# Patient Record
Sex: MALE | Race: WHITE | Employment: FULL TIME | URBAN - METROPOLITAN AREA
[De-identification: names, ages, dates, MRNs, and addresses within clinical notes are randomized per-mention and may not be internally consistent; named-entity substitution may affect disease eponyms.]

---

## 2020-03-25 ENCOUNTER — APPOINTMENT (OUTPATIENT)
Dept: CT IMAGING | Age: 55
DRG: 244 | End: 2020-03-25
Attending: PHYSICIAN ASSISTANT
Payer: MEDICAID

## 2020-03-25 ENCOUNTER — HOSPITAL ENCOUNTER (INPATIENT)
Age: 55
LOS: 2 days | Discharge: HOME HEALTH | DRG: 244 | End: 2020-03-27
Attending: EMERGENCY MEDICINE | Admitting: HOSPITALIST
Payer: MEDICAID

## 2020-03-25 DIAGNOSIS — K57.92 DIVERTICULITIS: Primary | ICD-10-CM

## 2020-03-25 DIAGNOSIS — D72.829 LEUKOCYTOSIS, UNSPECIFIED TYPE: ICD-10-CM

## 2020-03-25 DIAGNOSIS — N28.1 KIDNEY CYSTS: ICD-10-CM

## 2020-03-25 PROBLEM — K65.1 ABSCESS OF ABDOMINAL CAVITY (HCC): Status: ACTIVE | Noted: 2020-03-25

## 2020-03-25 PROBLEM — Z72.0 TOBACCO ABUSE: Status: ACTIVE | Noted: 2020-03-25

## 2020-03-25 LAB
ALBUMIN SERPL-MCNC: 3.6 G/DL (ref 3.4–5)
ALBUMIN/GLOB SERPL: 0.8 {RATIO} (ref 0.8–1.7)
ALP SERPL-CCNC: 118 U/L (ref 45–117)
ALT SERPL-CCNC: 31 U/L (ref 16–61)
ANION GAP SERPL CALC-SCNC: 3 MMOL/L (ref 3–18)
APPEARANCE UR: ABNORMAL
AST SERPL-CCNC: 14 U/L (ref 10–38)
BACTERIA URNS QL MICRO: ABNORMAL /HPF
BASOPHILS # BLD: 0 K/UL (ref 0–0.06)
BASOPHILS NFR BLD: 0 % (ref 0–3)
BILIRUB SERPL-MCNC: 0.5 MG/DL (ref 0.2–1)
BILIRUB UR QL: ABNORMAL
BUN SERPL-MCNC: 16 MG/DL (ref 7–18)
BUN/CREAT SERPL: 21 (ref 12–20)
CALCIUM SERPL-MCNC: 9.5 MG/DL (ref 8.5–10.1)
CHLORIDE SERPL-SCNC: 110 MMOL/L (ref 100–111)
CO2 SERPL-SCNC: 29 MMOL/L (ref 21–32)
COLOR UR: ABNORMAL
CREAT SERPL-MCNC: 0.76 MG/DL (ref 0.6–1.3)
DIFFERENTIAL METHOD BLD: ABNORMAL
EOSINOPHIL # BLD: 0 K/UL (ref 0–0.4)
EOSINOPHIL NFR BLD: 0 % (ref 0–5)
EPITH CASTS URNS QL MICRO: ABNORMAL /LPF (ref 0–5)
ERYTHROCYTE [DISTWIDTH] IN BLOOD BY AUTOMATED COUNT: 14.3 % (ref 11.6–14.5)
GLOBULIN SER CALC-MCNC: 4.4 G/DL (ref 2–4)
GLUCOSE SERPL-MCNC: 109 MG/DL (ref 74–99)
GLUCOSE UR STRIP.AUTO-MCNC: NEGATIVE MG/DL
HCT VFR BLD AUTO: 44.9 % (ref 36–48)
HGB BLD-MCNC: 15.1 G/DL (ref 13–16)
HGB UR QL STRIP: NEGATIVE
KETONES UR QL STRIP.AUTO: ABNORMAL MG/DL
LACTATE SERPL-SCNC: 1 MMOL/L (ref 0.4–2)
LEUKOCYTE ESTERASE UR QL STRIP.AUTO: ABNORMAL
LIPASE SERPL-CCNC: 100 U/L (ref 73–393)
LYMPHOCYTES # BLD: 3 K/UL (ref 0.8–3.5)
LYMPHOCYTES NFR BLD: 12 % (ref 20–51)
MCH RBC QN AUTO: 29.4 PG (ref 24–34)
MCHC RBC AUTO-ENTMCNC: 33.6 G/DL (ref 31–37)
MCV RBC AUTO: 87.5 FL (ref 74–97)
MONOCYTES # BLD: 1.8 K/UL (ref 0–1)
MONOCYTES NFR BLD: 7 % (ref 2–9)
MUCOUS THREADS URNS QL MICRO: ABNORMAL /LPF
NEUTS SEG # BLD: 20.5 K/UL (ref 1.8–8)
NEUTS SEG NFR BLD: 81 % (ref 42–75)
NITRITE UR QL STRIP.AUTO: NEGATIVE
PH UR STRIP: 5.5 [PH] (ref 5–8)
PLATELET # BLD AUTO: 370 K/UL (ref 135–420)
PLATELET COMMENTS,PCOM: ABNORMAL
PMV BLD AUTO: 10.2 FL (ref 9.2–11.8)
POTASSIUM SERPL-SCNC: 4.7 MMOL/L (ref 3.5–5.5)
PROT SERPL-MCNC: 8 G/DL (ref 6.4–8.2)
PROT UR STRIP-MCNC: 30 MG/DL
RBC # BLD AUTO: 5.13 M/UL (ref 4.7–5.5)
RBC #/AREA URNS HPF: ABNORMAL /HPF (ref 0–5)
RBC MORPH BLD: ABNORMAL
SODIUM SERPL-SCNC: 142 MMOL/L (ref 136–145)
SP GR UR REFRACTOMETRY: 1.02 (ref 1–1.03)
UROBILINOGEN UR QL STRIP.AUTO: 1 EU/DL (ref 0.2–1)
WBC # BLD AUTO: 25.3 K/UL (ref 4.6–13.2)
WBC URNS QL MICRO: ABNORMAL /HPF (ref 0–4)

## 2020-03-25 PROCEDURE — 99285 EMERGENCY DEPT VISIT HI MDM: CPT

## 2020-03-25 PROCEDURE — 83605 ASSAY OF LACTIC ACID: CPT

## 2020-03-25 PROCEDURE — 74011000258 HC RX REV CODE- 258: Performed by: PHYSICIAN ASSISTANT

## 2020-03-25 PROCEDURE — 74011250637 HC RX REV CODE- 250/637: Performed by: PHYSICIAN ASSISTANT

## 2020-03-25 PROCEDURE — 87086 URINE CULTURE/COLONY COUNT: CPT

## 2020-03-25 PROCEDURE — 85025 COMPLETE CBC W/AUTO DIFF WBC: CPT

## 2020-03-25 PROCEDURE — 81001 URINALYSIS AUTO W/SCOPE: CPT

## 2020-03-25 PROCEDURE — 96375 TX/PRO/DX INJ NEW DRUG ADDON: CPT

## 2020-03-25 PROCEDURE — 83690 ASSAY OF LIPASE: CPT

## 2020-03-25 PROCEDURE — 51798 US URINE CAPACITY MEASURE: CPT

## 2020-03-25 PROCEDURE — 87040 BLOOD CULTURE FOR BACTERIA: CPT

## 2020-03-25 PROCEDURE — 74177 CT ABD & PELVIS W/CONTRAST: CPT

## 2020-03-25 PROCEDURE — 74011636320 HC RX REV CODE- 636/320: Performed by: EMERGENCY MEDICINE

## 2020-03-25 PROCEDURE — 80053 COMPREHEN METABOLIC PANEL: CPT

## 2020-03-25 PROCEDURE — 96365 THER/PROPH/DIAG IV INF INIT: CPT

## 2020-03-25 PROCEDURE — 74011250636 HC RX REV CODE- 250/636: Performed by: PHYSICIAN ASSISTANT

## 2020-03-25 PROCEDURE — 93005 ELECTROCARDIOGRAM TRACING: CPT

## 2020-03-25 PROCEDURE — 65270000029 HC RM PRIVATE

## 2020-03-25 RX ORDER — OXYCODONE AND ACETAMINOPHEN 5; 325 MG/1; MG/1
1-2 TABLET ORAL
Status: DISCONTINUED | OUTPATIENT
Start: 2020-03-25 | End: 2020-03-26

## 2020-03-25 RX ORDER — SODIUM CHLORIDE 0.9 % (FLUSH) 0.9 %
5-10 SYRINGE (ML) INJECTION AS NEEDED
Status: DISCONTINUED | OUTPATIENT
Start: 2020-03-25 | End: 2020-03-27 | Stop reason: HOSPADM

## 2020-03-25 RX ORDER — SODIUM CHLORIDE 9 MG/ML
75 INJECTION, SOLUTION INTRAVENOUS CONTINUOUS
Status: DISCONTINUED | OUTPATIENT
Start: 2020-03-25 | End: 2020-03-26

## 2020-03-25 RX ORDER — ENOXAPARIN SODIUM 100 MG/ML
40 INJECTION SUBCUTANEOUS EVERY 24 HOURS
Status: DISCONTINUED | OUTPATIENT
Start: 2020-03-25 | End: 2020-03-27 | Stop reason: HOSPADM

## 2020-03-25 RX ORDER — ACETAMINOPHEN 325 MG/1
650 TABLET ORAL
Status: DISCONTINUED | OUTPATIENT
Start: 2020-03-25 | End: 2020-03-27 | Stop reason: HOSPADM

## 2020-03-25 RX ORDER — ONDANSETRON 2 MG/ML
4 INJECTION INTRAMUSCULAR; INTRAVENOUS
Status: COMPLETED | OUTPATIENT
Start: 2020-03-25 | End: 2020-03-25

## 2020-03-25 RX ORDER — ONDANSETRON 2 MG/ML
4 INJECTION INTRAMUSCULAR; INTRAVENOUS
Status: DISCONTINUED | OUTPATIENT
Start: 2020-03-25 | End: 2020-03-27 | Stop reason: HOSPADM

## 2020-03-25 RX ORDER — DEXTROSE MONOHYDRATE AND SODIUM CHLORIDE 5; .45 G/100ML; G/100ML
75 INJECTION, SOLUTION INTRAVENOUS CONTINUOUS
Status: DISCONTINUED | OUTPATIENT
Start: 2020-03-25 | End: 2020-03-25

## 2020-03-25 RX ORDER — MORPHINE SULFATE 2 MG/ML
2 INJECTION, SOLUTION INTRAMUSCULAR; INTRAVENOUS
Status: COMPLETED | OUTPATIENT
Start: 2020-03-25 | End: 2020-03-25

## 2020-03-25 RX ADMIN — ONDANSETRON 4 MG: 2 INJECTION INTRAMUSCULAR; INTRAVENOUS at 11:25

## 2020-03-25 RX ADMIN — SODIUM CHLORIDE 75 ML/HR: 900 INJECTION, SOLUTION INTRAVENOUS at 18:31

## 2020-03-25 RX ADMIN — SODIUM CHLORIDE 1000 ML: 900 INJECTION, SOLUTION INTRAVENOUS at 12:07

## 2020-03-25 RX ADMIN — SODIUM CHLORIDE 448 ML: 900 INJECTION, SOLUTION INTRAVENOUS at 14:38

## 2020-03-25 RX ADMIN — PIPERACILLIN AND TAZOBACTAM 3.38 G: 3; .375 INJECTION, POWDER, FOR SOLUTION INTRAVENOUS at 17:26

## 2020-03-25 RX ADMIN — OXYCODONE HYDROCHLORIDE AND ACETAMINOPHEN 2 TABLET: 5; 325 TABLET ORAL at 23:21

## 2020-03-25 RX ADMIN — ENOXAPARIN SODIUM 40 MG: 40 INJECTION SUBCUTANEOUS at 17:23

## 2020-03-25 RX ADMIN — DEXTROSE MONOHYDRATE AND SODIUM CHLORIDE 75 ML/HR: 5; .45 INJECTION, SOLUTION INTRAVENOUS at 17:29

## 2020-03-25 RX ADMIN — MORPHINE SULFATE 2 MG: 2 INJECTION, SOLUTION INTRAMUSCULAR; INTRAVENOUS at 11:27

## 2020-03-25 RX ADMIN — OXYCODONE HYDROCHLORIDE AND ACETAMINOPHEN 2 TABLET: 5; 325 TABLET ORAL at 17:19

## 2020-03-25 RX ADMIN — IOPAMIDOL 100 ML: 612 INJECTION, SOLUTION INTRAVENOUS at 11:18

## 2020-03-25 RX ADMIN — PIPERACILLIN AND TAZOBACTAM 3.38 G: 3; .375 INJECTION, POWDER, FOR SOLUTION INTRAVENOUS at 12:01

## 2020-03-25 NOTE — ED PROVIDER NOTES
EMERGENCY DEPARTMENT HISTORY AND PHYSICAL EXAM    11:19 AM      Date: 3/25/2020  Patient Name: Flakito Rausch    History of Presenting Illness     No chief complaint on file. History Provided By: Patient    Additional History (Context): Flakito Rausch is a 47 y.o. male with No significant past medical history who presents with complaint of intermittent lower abdominal pain x 3 days. Patient notes pain became more severe today. Patient denies fever chills, diaphoresis, chest pain, shortness of breath, nausea, vomiting, diarrhea, dysuria, hematuria, blood in stool. Patient notes Michaelle Rodriguez is a lot of mucus in stool \". Did not take any medication for symptoms prior to arrival. Denies recent travel, abx, sick contacts. PCP: None    Current Facility-Administered Medications   Medication Dose Route Frequency Provider Last Rate Last Dose    sodium chloride (NS) flush 5-10 mL  5-10 mL IntraVENous PRN Gisela Zarate PA        piperacillin-tazobactam (ZOSYN) 3.375 g in 0.9% sodium chloride (MBP/ADV) 100 mL MBP  3.375 g IntraVENous Q6H Gisela Zarate PA   Stopped at 03/25/20 1231    acetaminophen (TYLENOL) tablet 650 mg  650 mg Oral Q4H PRN LIANNE Fischer        ondansetron [facility]) injection 4 mg  4 mg IntraVENous Q6H PRN Renetta Yoon PA        enoxaparin (LOVENOX) injection 40 mg  40 mg SubCUTAneous Q24H Keven MAURICIO [place]           Past History     Past Medical History:  History reviewed. No pertinent past medical history. Past Surgical History:  History reviewed. No pertinent surgical history. Family History:  History reviewed. No pertinent family history. Social History:  Social History     Tobacco Use    Smoking status: Current Every Day Smoker     Packs/day: 1.00   Substance Use Topics    Alcohol use: No    Drug use: No       Allergies:  No Known Allergies      Review of Systems       Review of Systems   Constitutional: Negative for chills and fever.    Respiratory: Negative for shortness of breath. Cardiovascular: Negative for chest pain. Gastrointestinal: Positive for abdominal pain. Negative for nausea and vomiting. Skin: Negative for rash. Neurological: Negative for weakness. All other systems reviewed and are negative. Physical Exam     Visit Vitals  /66   Pulse 83   Temp 98.2 °F (36.8 °C)   Resp 15   Wt 81.6 kg (180 lb)   SpO2 100%   BMI 26.58 kg/m²         Physical Exam  Vitals signs and nursing note reviewed. Constitutional:       General: He is not in acute distress. Appearance: He is well-developed. He is not diaphoretic. HENT:      Head: Normocephalic and atraumatic. Neck:      Musculoskeletal: Normal range of motion and neck supple. Cardiovascular:      Rate and Rhythm: Normal rate and regular rhythm. Heart sounds: Normal heart sounds. No murmur. No friction rub. No gallop. Pulmonary:      Effort: Pulmonary effort is normal. No respiratory distress. Breath sounds: Normal breath sounds. No wheezing or rales. Abdominal:      General: Abdomen is flat. There is no distension. Palpations: Abdomen is soft. There is no mass. Tenderness: There is abdominal tenderness (RLQ/suprapubic moderate). There is no guarding (voluntary) or rebound. Hernia: No hernia is present. Genitourinary:     Rectum: External hemorrhoid (moderate, non-thrombosed) present. No mass, tenderness or anal fissure. Musculoskeletal: Normal range of motion. Skin:     General: Skin is warm. Findings: No rash. Neurological:      Mental Status: He is alert.            Diagnostic Study Results     Labs -  Recent Results (from the past 12 hour(s))   URINALYSIS W/ RFLX MICROSCOPIC    Collection Time: 03/25/20 10:32 AM   Result Value Ref Range    Color DARK YELLOW      Appearance TURBID      Specific gravity 1.025 1.005 - 1.030      pH (UA) 5.5 5.0 - 8.0      Protein 30 (A) NEG mg/dL    Glucose NEGATIVE  NEG mg/dL    Ketone TRACE (A) NEG mg/dL    Bilirubin SMALL (A) NEG      Blood NEGATIVE  NEG      Urobilinogen 1.0 0.2 - 1.0 EU/dL    Nitrites NEGATIVE  NEG      Leukocyte Esterase SMALL (A) NEG     URINE MICROSCOPIC ONLY    Collection Time: 03/25/20 10:32 AM   Result Value Ref Range    WBC 5 to 10 0 - 4 /hpf    RBC 0 to 2 0 - 5 /hpf    Epithelial cells FEW 0 - 5 /lpf    Bacteria 2+ (A) NEG /hpf    Mucus 3+ (A) NEG /lpf   CBC WITH AUTOMATED DIFF    Collection Time: 03/25/20 10:40 AM   Result Value Ref Range    WBC 25.3 (H) 4.6 - 13.2 K/uL    RBC 5.13 4.70 - 5.50 M/uL    HGB 15.1 13.0 - 16.0 g/dL    HCT 44.9 36.0 - 48.0 %    MCV 87.5 74.0 - 97.0 FL    MCH 29.4 24.0 - 34.0 PG    MCHC 33.6 31.0 - 37.0 g/dL    RDW 14.3 11.6 - 14.5 %    PLATELET 468 403 - 604 K/uL    MPV 10.2 9.2 - 11.8 FL    NEUTROPHILS 81 (H) 42 - 75 %    LYMPHOCYTES 12 (L) 20 - 51 %    MONOCYTES 7 2 - 9 %    EOSINOPHILS 0 0 - 5 %    BASOPHILS 0 0 - 3 %    ABS. NEUTROPHILS 20.5 (H) 1.8 - 8.0 K/UL    ABS. LYMPHOCYTES 3.0 0.8 - 3.5 K/UL    ABS. MONOCYTES 1.8 (H) 0 - 1.0 K/UL    ABS. EOSINOPHILS 0.0 0.0 - 0.4 K/UL    ABS. BASOPHILS 0.0 0.0 - 0.06 K/UL    DF MANUAL      PLATELET COMMENTS ADEQUATE PLATELETS      RBC COMMENTS NORMOCYTIC, NORMOCHROMIC     METABOLIC PANEL, COMPREHENSIVE    Collection Time: 03/25/20 10:40 AM   Result Value Ref Range    Sodium 142 136 - 145 mmol/L    Potassium 4.7 3.5 - 5.5 mmol/L    Chloride 110 100 - 111 mmol/L    CO2 29 21 - 32 mmol/L    Anion gap 3 3.0 - 18 mmol/L    Glucose 109 (H) 74 - 99 mg/dL    BUN 16 7.0 - 18 MG/DL    Creatinine 0.76 0.6 - 1.3 MG/DL    BUN/Creatinine ratio 21 (H) 12 - 20      GFR est AA >60 >60 ml/min/1.73m2    GFR est non-AA >60 >60 ml/min/1.73m2    Calcium 9.5 8.5 - 10.1 MG/DL    Bilirubin, total 0.5 0.2 - 1.0 MG/DL    ALT (SGPT) 31 16 - 61 U/L    AST (SGOT) 14 10 - 38 U/L    Alk.  phosphatase 118 (H) 45 - 117 U/L    Protein, total 8.0 6.4 - 8.2 g/dL    Albumin 3.6 3.4 - 5.0 g/dL    Globulin 4.4 (H) 2.0 - 4.0 g/dL    A-G Ratio 0.8 0.8 - 1.7     LIPASE    Collection Time: 03/25/20 10:40 AM   Result Value Ref Range    Lipase 100 73 - 393 U/L   LACTIC ACID    Collection Time: 03/25/20 11:56 AM   Result Value Ref Range    Lactic acid 1.0 0.4 - 2.0 MMOL/L   EKG, 12 LEAD, INITIAL    Collection Time: 03/25/20  2:45 PM   Result Value Ref Range    Ventricular Rate 79 BPM    Atrial Rate 79 BPM    P-R Interval 184 ms    QRS Duration 82 ms    Q-T Interval 374 ms    QTC Calculation (Bezet) 428 ms    Calculated P Axis 53 degrees    Calculated R Axis 49 degrees    Calculated T Axis 39 degrees    Diagnosis       Normal sinus rhythm  Normal ECG  When compared with ECG of 25-JUL-2008 22:40,  No significant change was found         Radiologic Studies -   CT ABD PELV W CONT   Final Result   IMPRESSION:       1. Evidence of complicated diverticulitis, probably subacute, with significant   mural thickening and moderate surrounding stranding and fluid. Likely early   performed abscess in perirectal region. 2. Diverticulosis coli. 3. Large lobulated cystic lesion with thin wall and thin septa in lateral upper   pole right kidney. Recommend further evaluation by renal ultrasound. 2 small   cortical cysts, one in each kidney. Thank you for this referral.       [address] [place] [address]    (Results Pending)         Medical Decision Making   I am the first provider for this patient. I reviewed the vital signs, available nursing notes, past medical history, past surgical history, family history and social history. Vital Signs-Reviewed the patient's vital signs. Records Reviewed: Nursing Notes and Old Medical Records (Time of Review: 11:19 AM)    ED Course: Progress Notes, Reevaluation, and Consults:  11:19 AM: Leukocytosis noted, sepsis bundle ordered. Updated nurse. Abx ordered to cover for possible intra-abdominal infection. 3:46 PM: Discussed case with Dr. Ancelmo Ewing, general surgeon. Would recommend admission, IV abx.     CONSULT NOTE:   3:46 PM  I spoke with Dr. Martha Malcolm  Specialty: Hospitalist  Discussed pt's hx, disposition, and available diagnostic and imaging results. Reviewed care plans. Consulting physician agrees with plans as outlined. Accepts for admission, requests renal US, urology on call for consultation. Written by West Saenz PA-C    Reviewed results with patient. In agreement with admission. 4:31 PM: Discussed case with Jeremiah Fair urology PA. Would recommend post-void residuals. Will consult on patient during admission    Provider Notes (Medical Decision Making): 40-year-old male who presents to the ED due to lower abdominal pain x 3 days. Afebrile, nontoxic-appearing, looks well. Patient with significant leukocytosis with left shift. Lactic within normal limits. CT abdomen/pelvis demonstrates complicated diverticulitis with abscess. Consulted general surgery. Sepsis bundle ordered, including broad spectrum abx. Will be admitted to hospitalist for further assessment. Diagnosis     Clinical Impression:   1. Diverticulitis    2. Leukocytosis, unspecified type    3. Kidney cysts        Disposition: admission    Follow-up Information    None          Patient's Medications    No medications on file       Dictation disclaimer:  Please note that this dictation was completed with [employer], the [employer] voice recognition software. Quite often unanticipated grammatical, syntax, homophones, and other interpretive errors are inadvertently transcribed by the computer software. Please disregard these errors. Please excuse any errors that have escaped final proofreading.

## 2020-03-25 NOTE — PROGRESS NOTES
conducted an initial consultation and Spiritual Assessment for Gaurav Plummer, who is a 47 y.o.,male. Patients Primary Language is: [place]. According to the patients EMR Jewish Affiliation is: [place]. The reason the Patient came to the hospital is:   Patient Active Problem List    Diagnosis Date Noted    Diverticulitis 03/25/2020    Leukocytosis 03/25/2020    Renal cyst 03/25/2020    Abscess of abdominal cavity ([address] 03/25/2020    Tobacco abuse 03/25/2020        The  provided the following Interventions:  Initiated a relationship of care and support. No issues of neisha, belief, spirituality and Buddhist/ritual needs while hospitalized. Listened empathically. Patient expressed\"I am bored and depressed because my wife can't even come and visit me. I have fear of getting corona virus being here. \"  Compassionate assurance that patients tested for Covid-19 are isolated in a different unit. Suggested listening to the music he likes could uplift his spirits. \"I asked my wife to bring my phone  and other personal stuff but will not be allowed to visit. \"  Provided chaplaincy education. Provided information about Spiritual Care Services. Offered prayer and assurance of continued prayers on patient's behalf. Chart reviewed. The following outcomes where achieved:  Patient shared limited information about both his medical narrative and spiritual journey/beliefs.  confirmed Patient's Jewish Affiliation with Let. Patient continues to feel unsafe being in this current hospitalization. Patient expressed gratitude for 's visit. Assessment:  Patient does not have any Buddhist/cultural needs that will affect patients preferences in health care. There are no spiritual or Buddhist issues which require intervention at this time.      Plan:  Chaplains will continue to follow and will provide pastoral care on an as needed/requested basis.   recommends bedside caregivers page  on duty if patient shows signs of acute spiritual or emotional distress.     [address] [facility]   [phone]

## 2020-03-25 NOTE — ED NOTES
TRANSFER - OUT REPORT:    Verbal report given to on Colton Campos  being transferred to  (unit) for routine progression of care       Report consisted of patients Situation, Background, Assessment and   Recommendations(SBAR). Information from the following report(s) SBAR was reviewed with the receiving nurse. Lines:   Peripheral IV 03/25/20 Left Antecubital (Active)   Site Assessment Clean, dry, & intact 3/25/2020 11:23 AM   Phlebitis Assessment 0 3/25/2020 11:23 AM   Infiltration Assessment 0 3/25/2020 11:23 AM   Dressing Type 4 X 4 3/25/2020 11:23 AM   Hub Color/Line Status Flushed;Patent;Pink 3/25/2020 11:23 AM       Peripheral IV 03/25/20 Right Antecubital (Active)   Site Assessment Clean, dry, & intact 3/25/2020 11:58 AM   Phlebitis Assessment 0 3/25/2020 11:58 AM   Infiltration Assessment 0 3/25/2020 11:58 AM   Dressing Type 4 X 4 3/25/2020 11:58 AM   Hub Color/Line Status Pink;Flushed;Patent 3/25/2020 11:58 AM        Opportunity for questions and clarification was provided.

## 2020-03-25 NOTE — ED NOTES
noticed in patient's hospital chart PCP and insurance was not listed.  went into ER-08 to assess patient.  asked patient about insurance and PCP. Patient confirmed that he did not have PCP or insurance.  and patient spoke about [facility] and [address]. Due to patient not having insurance.  suggested [address]. Patient was given [facility] schedule and next available day Mary Lorenz will be near patient. Patient was also given [facility] application. Patient was  told specific documents needed for [facility].  also encouraged patient to contact [employer]. Patient was given [employer] phone number and web site information.  also gave patient Social Service information to apply for Medicaid. Patient stated once he has obtained W-6 form, ID, and bank statement he will bring all documents  into 's office.  patient 's contact information.

## 2020-03-25 NOTE — ED NOTES
First interaction with PT. PT is lying on the stretcher, no distress noted. Reports abdominal pain since Sunday. And diarrhea on Saturday. \"Got up this morning and was feeling bad\". Denies cough, chest pain.

## 2020-03-25 NOTE — H&P
Hospitalist Admission History and Physical    NAME:  Kyler Hernandez   :   1965   MRN:   [id number]     PCP:  None  Date/Time:  3/25/2020 4:09 PM  Subjective:   CHIEF COMPLAINT:  Abdominal pain x4 days     HISTORY OF PRESENT ILLNESS:     Mr. Matt Trevino is a 48 yo male with a past medical hx of inguinal hernia repair wit mesh in  and active tobacco abuse who presents from home with abdominal pain since , 4 days ago. He states the pain was in the RLQ and LLQ then has concentrated to below periumbilical region. The pain was better yesterday than worsened this morning thus prompting ED presentation. He endorsed clear mucus with bowel movement which is unusual for him. He denies fever, chills, nausea, vomiting. Patient has never had a diagnosis of diverticulosis nor diverticulitis. ED course:   - Vital signs: 98.2 F, HR 86, /72, RR 16, 100% RA    - Labs remarkable for: wbc 25, UA + small LE/5-10 WBC/2+ bacteria, lactic acid 1.0  - Imaging: ct abdomen pelvis shows likely subacute diverticulitis and likely early performed abscess in perirectal regions, diverticulosis coli, cystic lesion in right kidney   - EKG: NSR   - Patient received:  Morphine, zofran, zosyn   ED consulted Dr Ceferino Longoria who recommended admission. Past medical hx- smoker  Past surgical hx- the only surgery he has ever gotten is inguinal hernia repair with mesh in   No known allergies   No medications   Social hx- lives with wife, used to smoke pot many yrs ago, denies any other recreational drugs, occasionally drinks, smoking 1 pack per day and vaping as well. Social History     Tobacco Use    Smoking status: Current Every Day Smoker     Packs/day: 1.00   Substance Use Topics    Alcohol use: No        History reviewed. No pertinent family history.      No Known Allergies     None       REVIEW OF SYSTEMS:     [] Unable to obtain  ROS due to  []mental status change  []sedated   []intubated   [x]Total of 12 systems reviewed as follows:  Review of Systems  Constitutional: Patient denies: fever, chills, weight loss  HEENT: Patient denies: change in vision, change in hearing, rhinorrhea, sinus congestion, neck pain/stiffness, sore throat, tongue swelling, lip swelling   PULMONARY: Patient denies: shortness of breath at rest, dyspnea on exertion, cough, wheezing  Cardiovascular: Patient denies chest pain, palpitations, paroxysmal nocturnal dyspnea, orthopnea, lower extremity edema   GASTROINTESTINAL: Patient denies: nausea, vomiting, diarrhea, constipation, melena, bright red blood per rectum. GENITOURINARY: Patient denies: urinary frequency, urgency, dysuria, hematuria  MUSCULOSKELETAL: No joint or muscle pain, no back pain, no muscle weakness, no recent trauma. DERMATOLOGIC: No rash, no itching, no lesions. ENDOCRINE: No polyuria, polydipsia, no heat or cold intolerance. No recent change in weight. HEMATOLOGICAL: No anemia or easy bruising or bleeding. NEUROLOGIC: No headache, seizures, numbness, tingling or weakness. PSY: No anxiety nor depression      Pertinent Positives include :   abdominal pain  Objective:   VITALS:    Visit Vitals  /66   Pulse 83   Temp 98.2 °F (36.8 °C)   Resp 15   Wt 81.6 kg (180 lb)   SpO2 100%   BMI 26.58 kg/m²     Temp (24hrs), Av.2 °F (36.8 °C), Min:98.2 °F (36.8 °C), Max:98.2 °F (36.8 °C)      PHYSICAL EXAM:   General:    Young   male sitting up in bed, no acute distress, appears stated age. Poor dentition. Head:   Normocephalic, without obvious abnormality  Eyes:   Conjunctivae clear, anicteric sclerae, pupils are equal  Nose:  Nares normal, no drainage   Throat:    Lips, mucosa, and tongue normal.    Neck:  Supple, symmetrical, no JVD. Back:    No CVA tenderness. Lungs: On room air, speaking in complete sentences, clear to auscultation bilaterally- no wheezing, rhonchi or rales  Chest wall:  No tenderness or deformity. No Accessory muscle use.   Heart: Regular rate and rhythm;  no murmur, rub or gallop. Abdomen:   Soft, tender to deep palpation in region below umbilicus. Not distended. Bowel sounds normal. No masses  Extremities: No LE edema. No gaurding. Skin:     No rashes or lesions. Not Jaundiced  Psych:  Good insight. Not depressed, anxious or agitated. Neurologic: Alert and oriented x3. No facial asymmetry. No aphasia or slurred speech. Normal strength. Moving all extremities. I have observed pt walking to bathroom- gait is stable without assistive walking device. His breath smells like tobacco     LAB DATA REVIEWED:    No components found for: [place]  Recent Labs     03/25/20  1040      K 4.7      CO2 29   BUN 16   CREA 0.76   *   CA 9.5   ALB 3.6   WBC 25.3*   HGB 15.1   HCT 44.9            IMAGING RESULTS:   []  I have personally reviewed the actual   []CXR  []CT scan      CT :   CT Results (most recent):  Results from Hospital Encounter encounter on 03/25/20   CT ABD PELV W CONT    Narrative CT of abdomen and pelvis with contrast     INDICATION: Intermittent lower abdominal pain    COMPARISON: None. TECHNIQUE: 5 mm helical scan to the abdomen and pelvis is obtained  from the  diaphragm to the symphysis pubis after uneventful nonionic IV contrast  administration. All CT scans at this facility performed using dose optimization techniques as  appreciated to a performed exam, to include automated exposure control,  adjustment of the mA and or KU according to patient size (including appropriate  matching for site specific examination), or use of iterative reconstruction  technique. FINDINGS:     Lung Bases: Clear. Liver: Normal.    Gallbladder: Normal.     Biliary System: No ductal dilatation. Spleen: Normal.    Pancreas: Normal.    Adrenal Glands: Normal.    Kidneys: In the lateral upper pole, there is a large exophytic cystic lesion  with lobulated contour identified and measures 5.6 x 5.5 x 7.4 cm. Likely thin  septa noted at the medial aspect of the lesion. No definite enhancing mural  nodule seen. 1.0 cm cortical cysts present at posterior midpole of right kidney  and medial midpole of left kidney. Bowel: The small and large bowel are nondilated. Normal appendix. Extensive  diverticula in the left-sided colon. Significant mural thickening and moderate  surrounding inflammation and fluid identified in the distal sigmoid colon,  consistent with diverticulitis. No definite free air seen. Although, subtle  focal fluid collection in the prerectal region with thin enhancing wall  identified on #77/2 and better seen on coronal #36 and roughly measures 3.0 x  1.4 x 6.5 cm. Lower genitourinary system: The bladder is poorly distended. The prostate is not  enlarged. Peritoneum/Retroperitoneum:  Multiple subcentimeter lymph nodes noted in the  iliac chains with no pathologic lymphadenopathy. Vasculature: Mild atherosclerotic aortic disease. Other: Moderate fatty stranding and small free fluid in the pelvic floor. Faint  focal fluid collection in perirectal region as mentioned above. CT OSSEOUS STRUCTURES:       Unremarkable for age. Impression IMPRESSION:     1. Evidence of complicated diverticulitis, probably subacute, with significant  mural thickening and moderate surrounding stranding and fluid. Likely early  performed abscess in perirectal region. 2. Diverticulosis coli. 3. Large lobulated cystic lesion with thin wall and thin septa in lateral upper  pole right kidney. Recommend further evaluation by renal ultrasound. 2 small  cortical cysts, one in each kidney. Thank you for this referral.          Assessment/Plan:    Mr. Lou Paul is a 48 yo  male who came with abdominal pain for 4 days and being admitted for diverticulitis with possible rectal abscess. Consults: surgery Dr. Willow Rae, Urology TARUN Nina     1.  Abdominal pain secondary to subacute? complicated diverticulitis   2. Early performed abscess in perirectal region   3. Leukocytosis   4. Diverticulosis coli   5. Lobulated cystic lesion in lateral upper pole right kidney   6. Hx of right inguinal hernia repair with mesh in 9/2013 by Dr. Elsy Watkins at [facility]   7. Active tobacco abuse     - Surgery consulted. Keep NPO and start maintenance fluids. continue IV zosyn. Pain control as needed. - follow blood and urine cultures. Trend white count. - Urology consulted. renal ultrasound is pending. Urology Recommends PVRs for now. - patient counseled on tobacco cessation, I told him he can die from lung cancer if he continues to smoke. States he tried to quit, then transitioned to vaping, then back to cigarettes. Declines nicotine patch at this time. - will need nutrition consult for education on best diet for diverticulosis. - encourage OOB    Patient is hesitant to be admitted due to concerns for COVID 19 infection. He is requesting oral abxs and discharge to home. After a very long discussion with pt about risks of leaving against medical advise, including perforation and/or death, he has agreed to stay. I also reassured him that COVID rule out patients are  in their own unit. Patient denies fever, sob, cough. He denies travel, has stayed in [facility] since the pandemic in [place]. Lives with wife who has not traveled nor is sick. Code status: full code   DVT/GI prophylaxis: lovenox.    Disposition: home in 24-48 hours   __________________________________________________  Risk of deterioration:  []Low    [x]Moderate  []High    Care Plan discussed with:    [x]Patient   []Family    []ED Care Manager  []ED Doc   []Specialist :    Total Time Coordinating Admission:  60    minutes    []Total Critical Care Time:     ___________________________________________________  Admitting Physician: LIANNE Coronado

## 2020-03-25 NOTE — ED TRIAGE NOTES
C/o lower intermittent  abdominal pain that started 2/22/2020. reports clear mucus discharge in tool, denies nausea vomiting.  Reports has mesh to RLQ from previous hernia surgery

## 2020-03-26 ENCOUNTER — HOSPITAL ENCOUNTER (OUTPATIENT)
Dept: ULTRASOUND IMAGING | Age: 55
Discharge: HOME | DRG: 244 | End: 2020-03-26
Attending: PHYSICIAN ASSISTANT
Payer: MEDICAID

## 2020-03-26 LAB
ANION GAP SERPL CALC-SCNC: 5 MMOL/L (ref 3–18)
ATRIAL RATE: 79 BPM
BASOPHILS # BLD: 0 K/UL (ref 0–0.06)
BASOPHILS NFR BLD: 0 % (ref 0–3)
BUN SERPL-MCNC: 14 MG/DL (ref 7–18)
BUN/CREAT SERPL: 25 (ref 12–20)
CALCIUM SERPL-MCNC: 8.1 MG/DL (ref 8.5–10.1)
CALCULATED P AXIS, ECG09: 53 DEGREES
CALCULATED R AXIS, ECG10: 49 DEGREES
CALCULATED T AXIS, ECG11: 39 DEGREES
CHLORIDE SERPL-SCNC: 114 MMOL/L (ref 100–111)
CO2 SERPL-SCNC: 23 MMOL/L (ref 21–32)
CREAT SERPL-MCNC: 0.56 MG/DL (ref 0.6–1.3)
DIAGNOSIS, 93000: NORMAL
DIFFERENTIAL METHOD BLD: ABNORMAL
EOSINOPHIL # BLD: 0.3 K/UL (ref 0–0.4)
EOSINOPHIL NFR BLD: 2 % (ref 0–5)
ERYTHROCYTE [DISTWIDTH] IN BLOOD BY AUTOMATED COUNT: 14.2 % (ref 11.6–14.5)
GLUCOSE SERPL-MCNC: 102 MG/DL (ref 74–99)
HCT VFR BLD AUTO: 37.1 % (ref 36–48)
HGB BLD-MCNC: 12.3 G/DL (ref 13–16)
LYMPHOCYTES # BLD: 3.7 K/UL (ref 0.8–3.5)
LYMPHOCYTES NFR BLD: 22 % (ref 20–51)
MCH RBC QN AUTO: 29.5 PG (ref 24–34)
MCHC RBC AUTO-ENTMCNC: 33.2 G/DL (ref 31–37)
MCV RBC AUTO: 89 FL (ref 74–97)
MONOCYTES # BLD: 1.2 K/UL (ref 0–1)
MONOCYTES NFR BLD: 7 % (ref 2–9)
NEUTS BAND NFR BLD MANUAL: 1 % (ref 0–5)
NEUTS SEG # BLD: 11.5 K/UL (ref 1.8–8)
NEUTS SEG NFR BLD: 68 % (ref 42–75)
P-R INTERVAL, ECG05: 184 MS
PLATELET # BLD AUTO: 338 K/UL (ref 135–420)
PLATELET COMMENTS,PCOM: ABNORMAL
PMV BLD AUTO: 10.2 FL (ref 9.2–11.8)
POTASSIUM SERPL-SCNC: 3.8 MMOL/L (ref 3.5–5.5)
Q-T INTERVAL, ECG07: 374 MS
QRS DURATION, ECG06: 82 MS
QTC CALCULATION (BEZET), ECG08: 428 MS
RBC # BLD AUTO: 4.17 M/UL (ref 4.7–5.5)
RBC MORPH BLD: ABNORMAL
RBC MORPH BLD: ABNORMAL
SODIUM SERPL-SCNC: 142 MMOL/L (ref 136–145)
VENTRICULAR RATE, ECG03: 79 BPM
WBC # BLD AUTO: 16.7 K/UL (ref 4.6–13.2)

## 2020-03-26 PROCEDURE — 74011000258 HC RX REV CODE- 258: Performed by: PHYSICIAN ASSISTANT

## 2020-03-26 PROCEDURE — 85025 COMPLETE CBC W/AUTO DIFF WBC: CPT

## 2020-03-26 PROCEDURE — 36415 COLL VENOUS BLD VENIPUNCTURE: CPT

## 2020-03-26 PROCEDURE — 65270000029 HC RM PRIVATE

## 2020-03-26 PROCEDURE — 74011250637 HC RX REV CODE- 250/637: Performed by: PHYSICIAN ASSISTANT

## 2020-03-26 PROCEDURE — 74011250636 HC RX REV CODE- 250/636: Performed by: PHYSICIAN ASSISTANT

## 2020-03-26 PROCEDURE — 76770 US EXAM ABDO BACK WALL COMP: CPT

## 2020-03-26 PROCEDURE — 80048 BASIC METABOLIC PNL TOTAL CA: CPT

## 2020-03-26 PROCEDURE — 74011250637 HC RX REV CODE- 250/637: Performed by: INTERNAL MEDICINE

## 2020-03-26 RX ORDER — FAMOTIDINE 20 MG/1
20 TABLET, FILM COATED ORAL 2 TIMES DAILY
Status: DISCONTINUED | OUTPATIENT
Start: 2020-03-26 | End: 2020-03-27 | Stop reason: HOSPADM

## 2020-03-26 RX ORDER — OXYCODONE AND ACETAMINOPHEN 5; 325 MG/1; MG/1
1 TABLET ORAL
Status: DISCONTINUED | OUTPATIENT
Start: 2020-03-26 | End: 2020-03-27 | Stop reason: HOSPADM

## 2020-03-26 RX ADMIN — PIPERACILLIN AND TAZOBACTAM 3.38 G: 3; .375 INJECTION, POWDER, FOR SOLUTION INTRAVENOUS at 01:05

## 2020-03-26 RX ADMIN — PIPERACILLIN AND TAZOBACTAM 3.38 G: 3; .375 INJECTION, POWDER, FOR SOLUTION INTRAVENOUS at 08:28

## 2020-03-26 RX ADMIN — ACETAMINOPHEN 650 MG: 325 TABLET ORAL at 16:19

## 2020-03-26 RX ADMIN — FAMOTIDINE 20 MG: 20 TABLET ORAL at 17:25

## 2020-03-26 RX ADMIN — PIPERACILLIN AND TAZOBACTAM 3.38 G: 3; .375 INJECTION, POWDER, FOR SOLUTION INTRAVENOUS at 14:53

## 2020-03-26 RX ADMIN — PIPERACILLIN AND TAZOBACTAM 3.38 G: 3; .375 INJECTION, POWDER, FOR SOLUTION INTRAVENOUS at 21:03

## 2020-03-26 RX ADMIN — OXYCODONE HYDROCHLORIDE AND ACETAMINOPHEN 1 TABLET: 5; 325 TABLET ORAL at 21:41

## 2020-03-26 RX ADMIN — OXYCODONE HYDROCHLORIDE AND ACETAMINOPHEN 1 TABLET: 5; 325 TABLET ORAL at 14:57

## 2020-03-26 RX ADMIN — OXYCODONE HYDROCHLORIDE AND ACETAMINOPHEN 2 TABLET: 5; 325 TABLET ORAL at 08:27

## 2020-03-26 NOTE — PROGRESS NOTES
1920: Assumed patient care from [address]. Patient is alert and oriented to person, place, time and situation. Respiratory status is stable on room air. Vital signs are stable. MEWS score is a one. Patient denies any pain, discomfort, nausea vomiting dizziness or anxiety. White board and fall card is updated. Bed is locked and in lowest position. Call bell, water and personal belongings are within reach. Patient has no questions, comments or concerns after bedside shift report. 2321: Patient asked for and received  Two 5/325 mg Percocet tablets for abdominal pain which he rated at a 8/10, with five being an acceptable level. 0015: Patient rated his pain at a 3/10    0700: Patient had an uneventful shift. Respiratory status, vital signs and MEWS score remained stable. Patient was resting quietly with no signs of distress noted. Bed locked and in lowest position. Call bell water and personal belongings were within reach. Patient had no questions, comments or concerns after bedside shift report.

## 2020-03-26 NOTE — PROGRESS NOTES
[facility]ist [facility]  Progress Note    Patient: Lorenza Gomesster Age: 47 y.o. : 1965 MR#: [id number] SSN: [id number]  Date: 3/26/2020     Subjective:     Reports lower abd pain improving, +mucous per rectum; Denies SOB, CP, nausea / vomiting or constipation    Assessment/Plan:   1. Abdominal pain r/t diverticulitis, ? abscess - improving; pain control  2. Early performed abscess in perirectal region   3. Leukocytosis - improving, follow  4. Lobulated cystic lesion in lateral upper pole right kidney - Dr. Alatorre Generous appreciated / OP follow up  5. Hx of right inguinal hernia repair with mesh in 2013 by Dr. Nargis Edmond at [facility]   6. Active tobacco abuse - counseled on cessation   7.  Dispo - potential discharge tomorrow  provided continued improvement pending further surgical input    Additional Notes:      Case discussed with:  [x]Patient  []Family  [x]Nursing  []Case Management  DVT Prophylaxis:  [x]Lovenox  []Hep SQ  []SCDs  []Coumadin   []On Heparin gtt    Objective:   VS:   Visit Vitals  /67 (BP 1 Location: Left arm, BP Patient Position: At rest)   Pulse 69   Temp 97.7 °F (36.5 °C)   Resp 18   Ht 5' 8\" (1.727 m)   Wt 81.6 kg (180 lb)   SpO2 95%   BMI 27.37 kg/m²      Tmax/24hrs: Temp (24hrs), Av.5 °F (36.4 °C), Min:96.8 °F (36 °C), Max:98.2 °F (36.8 °C)      Intake/Output Summary (Last 24 hours) at 3/26/2020 1212  Last data filed at 3/25/2020 1920  Gross per 24 hour   Intake 240 ml   Output 350 ml   Net -110 ml     General:  Alert, NAD  Cardiovascular:  RRR  Pulmonary:  LSC throughout  GI:  +BS in all four quadrants, soft, non-tender this AM  Extremities:  No edema; 2+ dorsalis pedis pulses bilaterally  Neuro: alert and oriented x 4    Labs:    Recent Results (from the past 24 hour(s))   EKG, 12 LEAD, INITIAL    Collection Time: 20  2:45 PM   Result Value Ref Range    Ventricular Rate 79 BPM    Atrial Rate 79 BPM    P-R Interval 184 ms    QRS Duration 82 ms    Q-T Interval 374 ms    QTC Calculation (Bezet) 428 ms    Calculated P Axis 53 degrees    Calculated R Axis 49 degrees    Calculated T Axis 39 degrees    Diagnosis       Normal sinus rhythm  Normal ECG  When compared with ECG of 25-JUL-2008 22:40,  No significant change was found  Confirmed by Barbara Rodriguez ([id number]) on 3/26/2020 7:32:18 AM     CBC WITH AUTOMATED DIFF    Collection Time: 03/26/20  2:05 AM   Result Value Ref Range    WBC 16.7 (H) 4.6 - 13.2 K/uL    RBC 4.17 (L) 4.70 - 5.50 M/uL    HGB 12.3 (L) 13.0 - 16.0 g/dL    HCT 37.1 36.0 - 48.0 %    MCV 89.0 74.0 - 97.0 FL    MCH 29.5 24.0 - 34.0 PG    MCHC 33.2 31.0 - 37.0 g/dL    RDW 14.2 11.6 - 14.5 %    PLATELET 293 771 - 181 K/uL    MPV 10.2 9.2 - 11.8 FL    NEUTROPHILS 68 42 - 75 %    BAND NEUTROPHILS 1 0 - 5 %    LYMPHOCYTES 22 20 - 51 %    MONOCYTES 7 2 - 9 %    EOSINOPHILS 2 0 - 5 %    BASOPHILS 0 0 - 3 %    ABS. NEUTROPHILS 11.5 (H) 1.8 - 8.0 K/UL    ABS. LYMPHOCYTES 3.7 (H) 0.8 - 3.5 K/UL    ABS. MONOCYTES 1.2 (H) 0 - 1.0 K/UL    ABS. EOSINOPHILS 0.3 0.0 - 0.4 K/UL    ABS.  BASOPHILS 0.0 0.0 - 0.06 K/UL    DF MANUAL      PLATELET COMMENTS ADEQUATE PLATELETS      RBC COMMENTS HYPOCHROMIA  1+        RBC COMMENTS MICROCYTOSIS  1+       METABOLIC PANEL, BASIC    Collection Time: 03/26/20  2:05 AM   Result Value Ref Range    Sodium 142 136 - 145 mmol/L    Potassium 3.8 3.5 - 5.5 mmol/L    Chloride 114 (H) 100 - 111 mmol/L    CO2 23 21 - 32 mmol/L    Anion gap 5 3.0 - 18 mmol/L    Glucose 102 (H) 74 - 99 mg/dL    BUN 14 7.0 - 18 MG/DL    Creatinine 0.56 (L) 0.6 - 1.3 MG/DL    BUN/Creatinine ratio 25 (H) 12 - 20      GFR est AA >60 >60 ml/min/1.73m2    GFR est non-AA >60 >60 ml/min/1.73m2    Calcium 8.1 (L) 8.5 - 10.1 MG/DL     Signed By: Darrin Payne NP     March 26, 2020

## 2020-03-26 NOTE — PROGRESS NOTES
[facility] [place] [address]  [phone]  Colon and Rectal Surgery Progress Note      Patient: Bear rGoss MRN: [id number]  SSN: [id number]    YOB: 1965  Age: 47 y.o. Sex: male      Admit Date: 3/25/2020    LOS: 1 day     Subjective:     Pain stable. No prior diverticulitis episodes. No prior colonoscopy. Tolerating some solids, appetite moderate. Objective:     Vitals:    03/25/20 2051 03/25/20 2352 03/26/20 0344 03/26/20 0827   BP: 122/70 114/72 118/70 123/69   Pulse: 68 67 84 77   Resp: 20 16 20 18   Temp: 96.8 °F (36 °C) 97 °F (36.1 °C) 97.4 °F (36.3 °C) 97.2 °F (36.2 °C)   SpO2: 97% 98% 98% 95%   Weight:            Intake and Output:  Current Shift: No intake/output data recorded. Last three shifts: 03/24 1901 - 03/26 0700  In: 240 [P.O.:240]  Out: 350 [Urine:350]    Physical Exam:     abd soft, LLQ tender, moderate, ND    Lab/Data Review:    CMP:   Lab Results   Component Value Date/Time     03/26/2020 02:05 AM    K 3.8 03/26/2020 02:05 AM     (H) 03/26/2020 02:05 AM    CO2 23 03/26/2020 02:05 AM    AGAP 5 03/26/2020 02:05 AM     (H) 03/26/2020 02:05 AM    BUN 14 03/26/2020 02:05 AM    CREA 0.56 (L) 03/26/2020 02:05 AM    GFRAA >60 03/26/2020 02:05 AM    GFRNA >60 03/26/2020 02:05 AM    CA 8.1 (L) 03/26/2020 02:05 AM    ALB 3.6 03/25/2020 10:40 AM    TP 8.0 03/25/2020 10:40 AM    GLOB 4.4 (H) 03/25/2020 10:40 AM    AGRAT 0.8 03/25/2020 10:40 AM    SGOT 14 03/25/2020 10:40 AM    ALT 31 03/25/2020 10:40 AM     CBC:   Lab Results   Component Value Date/Time    WBC 16.7 (H) 03/26/2020 02:05 AM    HGB 12.3 (L) 03/26/2020 02:05 AM    HCT 37.1 03/26/2020 02:05 AM     03/26/2020 02:05 AM        Assessment:     Diverticulitis, pelvic free fluid, ?  Developing abscess    Plan:     Continue IV abx  Diet as tolerated  Consider D/C tomorrow if tolerating diet    Signed By: Therese Moran MD        March 26, 2020

## 2020-03-26 NOTE — INTERDISCIPLINARY ROUNDS
Interdisciplinary Round Note Patient Information: Patient Information: Cassandra Rodriguez 464/01 Reason for Admission: Diverticulitis [K57.92] Leukocytosis [D72.829] Renal cyst [N28.1] Attending Provider:   Tc Le MD 
 Past Medical History: History reviewed. No pertinent past medical history. Hospital day: 1 Estimated discharge date: ?3/27/20 RRAT Score: Low Risk 6 Total Score 2 . Living with Significant Other. Assisted Living. LTAC. SNF. or  
Rehab  
 4 Pt. Coverage (Medicare=5 , Medicaid, or Self-Pay=4) Criteria that do not apply:  
 Has Seen PCP in Last 6 Months (Yes=3, No=0) Patient Length of Stay (>5 days = 3) IP Visits Last 12 Months (1-3=4, 4=9, >4=11) Charlson Comorbidity Score (Age + Comorbid Conditions) Goals for Today: iv hydration VITAL SIGNS Vitals:  
 03/26/20 0344 03/26/20 0827 03/26/20 1041 03/26/20 1132 BP: 118/70 123/69  132/67 Pulse: 84 77  69 Resp: 20 18  18 Temp: 97.4 °F (36.3 °C) 97.2 °F (36.2 °C)  97.7 °F (36.5 °C) SpO2: 98% 95%  95% Weight:      
Height:   5' 8\" (1.727 m) Lines, Drains, & Airways Peripheral IV 03/25/20 Left Antecubital-Site Assessment: Clean, dry, & intact Peripheral IV 03/25/20 Right Antecubital-Site Assessment: Clean, dry, & intact VTE Prophylaxis Intake and Output:  
03/24 1901 - 03/26 0700 In: 240 [P.O.:240] Out: 350 [Urine:350] No intake/output data recorded. Current Diet: DIET GI LITE (POST SURGICAL) Abdominal  
Abdominal Assessment: Soft, Intact, Tender Appetite: Good Bowel Sounds: Active Recent Glucose Results:  
Lab Results Component Value Date/Time  (H) 03/26/2020 02:05 AM  
 
 
  
IV Antibiotics? yes Activity Level: Activity Level: Up ad marcelo Needs assistance with ADLs: yes PT Consult Status: NO Current Immunizations: There is no immunization history on file for this patient. Recommendations:  
Discharge Disposition: Home with Home Health Medication Reconciliation Completed: YES Cardiac/Pulmonary Rehab Ordered:  NO 
 
Needs for Discharge: seen today by [employer] to apply for medicaid Recommendations from IDR team: none

## 2020-03-26 NOTE — CONSULTS
Urology Consult Note    Patient: Chyrl Hamman               Sex: male          DOA: 3/25/2020         YOB: 1965      Age:  47 y.o.        LOS:  LOS: 1 day              Referring physician: Dillon Veloz  Reason for consult: renal cyst      Assessment   This is a 47 y.o. male with -  Complex 7.4 cm left renal cyst  diverticulitis    Plan   1. I reviewed CT images. Complex left renal cyst, bosniak II. Agree with Ultrasound. Will likely need annual follow up. Will review ultrasound. He can see me as an annual follow up. pls contact with any other questions or concerns. HPI:     Chyrl Hamman is a 47 y.o. male who has been is currently admitted with  Diverticulitis. 2 day h/o lower abdominal pain and diarrhea. No fevers or chills. Denies hematuria or flank pain. Underwent CT imaging revealing a 7 cm complex left renal cyst.     History reviewed. No pertinent past medical history. History reviewed. No pertinent surgical history. History reviewed. No pertinent family history.     Social History     Socioeconomic History    Marital status:      Spouse name: Not on file    Number of children: Not on file    Years of education: Not on file    Highest education level: Not on file   Tobacco Use    Smoking status: Current Every Day Smoker     Packs/day: 1.00   Substance and Sexual Activity    Alcohol use: No    Drug use: No       Prior to Admission medications    Not on File       No Known Allergies    Review of Systems  Constitutional: negative  Eyes: negative  Ears, Nose, Mouth, Throat, and Face: negative  Respiratory: negative  Cardiovascular: negative  Gastrointestinal: positive for abdominal pain  Genitourinary:negative  Integument/Breast: negative  Hematologic/Lymphatic: negative  Musculoskeletal:negative      Physical Exam:      Visit Vitals  /69 (BP 1 Location: Left arm, BP Patient Position: At rest)   Pulse 77   Temp 97.2 °F (36.2 °C)   Resp 18   Ht 5' 8\" (1.727 m)   Wt 180 lb (81.6 kg)   SpO2 95%   BMI 27.37 kg/m²       Physical Exam:   GENERAL: alert, cooperative, no distress, appears stated age  [de-identified]: NCAT, PERRL  CV: RRR  LUNG: unlabored breathing  ABDOMEN: soft, non-tender. EXTREMITIES:  extremities normal, atraumatic, no cyanosis or edema  SKIN: no jaundice  : no CVA tenderness      Lab/Data Review:  BMP:   Lab Results   Component Value Date/Time     03/26/2020 02:05 AM    K 3.8 03/26/2020 02:05 AM     (H) 03/26/2020 02:05 AM    CO2 23 03/26/2020 02:05 AM    AGAP 5 03/26/2020 02:05 AM     (H) 03/26/2020 02:05 AM    BUN 14 03/26/2020 02:05 AM    CREA 0.56 (L) 03/26/2020 02:05 AM    GFRAA >60 03/26/2020 02:05 AM    GFRNA >60 03/26/2020 02:05 AM     CBC:   Lab Results   Component Value Date/Time    WBC 16.7 (H) 03/26/2020 02:05 AM    HGB 12.3 (L) 03/26/2020 02:05 AM    HCT 37.1 03/26/2020 02:05 AM     03/26/2020 02:05 AM     COAGS: No results found for: APTT, PTP, INR, INREXT, INREXT       CT Results:  Results from Hospital Encounter encounter on 03/25/20   CT ABD PELV W CONT    Narrative CT of abdomen and pelvis with contrast     INDICATION: Intermittent lower abdominal pain    COMPARISON: None. TECHNIQUE: 5 mm helical scan to the abdomen and pelvis is obtained  from the  diaphragm to the symphysis pubis after uneventful nonionic IV contrast  administration. All CT scans at this facility performed using dose optimization techniques as  appreciated to a performed exam, to include automated exposure control,  adjustment of the mA and or KU according to patient size (including appropriate  matching for site specific examination), or use of iterative reconstruction  technique. FINDINGS:     Lung Bases: Clear. Liver: Normal.    Gallbladder: Normal.     Biliary System: No ductal dilatation. Spleen: Normal.    Pancreas: Normal.    Adrenal Glands: Normal.    Kidneys:  In the lateral upper pole, there is a large exophytic cystic lesion  with lobulated contour identified and measures 5.6 x 5.5 x 7.4 cm. Likely thin  septa noted at the medial aspect of the lesion. No definite enhancing mural  nodule seen. 1.0 cm cortical cysts present at posterior midpole of right kidney  and medial midpole of left kidney. Bowel: The small and large bowel are nondilated. Normal appendix. Extensive  diverticula in the left-sided colon. Significant mural thickening and moderate  surrounding inflammation and fluid identified in the distal sigmoid colon,  consistent with diverticulitis. No definite free air seen. Although, subtle  focal fluid collection in the prerectal region with thin enhancing wall  identified on #77/2 and better seen on coronal #36 and roughly measures 3.0 x  1.4 x 6.5 cm. Lower genitourinary system: The bladder is poorly distended. The prostate is not  enlarged. Peritoneum/Retroperitoneum:  Multiple subcentimeter lymph nodes noted in the  iliac chains with no pathologic lymphadenopathy. Vasculature: Mild atherosclerotic aortic disease. Other: Moderate fatty stranding and small free fluid in the pelvic floor. Faint  focal fluid collection in perirectal region as mentioned above. CT OSSEOUS STRUCTURES:       Unremarkable for age. Impression IMPRESSION:     1. Evidence of complicated diverticulitis, probably subacute, with significant  mural thickening and moderate surrounding stranding and fluid. Likely early  performed abscess in perirectal region. 2. Diverticulosis coli. 3. Large lobulated cystic lesion with thin wall and thin septa in lateral upper  pole right kidney. Recommend further evaluation by renal ultrasound. 2 small  cortical cysts, one in each kidney. Thank you for this referral.        US Results:  No results found for this or any previous visit.     Delicia Kan MD    Pager: [phone]  Office: [phone]

## 2020-03-26 NOTE — PROGRESS NOTES
Reason for Admission:  Diverticulitis [K57.92]  Leukocytosis [D72.829]  Renal cyst [N28.1]                 RRAT Score:    9%            Plan for utilizing home health:    TBD                      Likelihood of Readmission:   LOW                         Transition of Care Plan:    Home with wife          Initial assessment completed with spouse/SO. Cognitive status of patient: oriented to time, place, person and situation. Face sheet information confirmed:  yes. The patient designates spouse, Joaquin Ly [phone] to participate in his discharge plan and to receive any needed information. This patient lives in a single family home with spouse. Patient is able to navigate steps as needed. Prior to hospitalization, patient was considered to be independent with ADLs/IADLS : yes . Patient has a current ACP document on file: no  The spouse will be available to transport patient home upon discharge. The patient has no medical equipment available in the home. Patient is not currently active with home health. Patient has not stayed in a skilled nursing facility or rehab. This patient is on dialysis :no    Currently, the discharge plan is Home. The patient states that he can obtain his medications from the pharmacy, and take his medications as directed. Patient's current insurance is Self Pay. Pt has not applied for Medicaid before. Instructed wife to call Patient Accounts to talk with them about the \[facility] [place] [facility]\"" free care program.  Pt is employed but hasnt been working lately. Pt seen by Livia today.       Care Management Interventions  PCP Verified by CM: Yes(pt does not have a PCP)  Mode of Transport at Discharge: Self  Transition of Care Consult (CM Consult): Discharge Planning, [address]: Yes  Current Support Network: Lives with Spouse  Confirm Follow Up Transport: Family  The Plan for Transition of Care is Related to the Following Treatment Goals : home vs home with home health  The Patient and/or Patient Representative was Provided with a Choice of Provider and Agrees with the Discharge Plan?: Yes  Name of the Patient Representative Who was Provided with a Choice of Provider and Agrees with the Discharge Plan: verbal given via phone by wife, Kamille Liplia of Choice List was Provided with Basic Dialogue that Supports the Patient's Individualized Plan of Care/Goals, Treatment Preferences and Shares the Quality Data Associated with the Providers?: Yes  Discharge Location  Discharge Placement: Home with home health        Neetu Shelton RN - Outcomes Manager  [phone]

## 2020-03-26 NOTE — CONSULTS
[facility]  General Surgery    Subjective:      HPI: Patient is a very pleasant 59-year-old male with a past medical history remarkable for tobacco abuse. He presented to the hospital today with a 4-day history of lower quadrant abdominal pain. He states that he has been eating lots of pecans. On Sunday he had a large loose bowel movement. He states that he could make it to the toilet. He did not think anything of this. He then started to have sharp lower abdominal pain in the suprapubic region. The pains have intensified until he presented to the emergency department today. I reviewed the CT of the abdomen and pelvis independently on the monitor. I reviewed the findings of sigmoid diverticulitis with abscess without free air. Patient has leukocytosis with white blood cell count nearly 26,000. Patient Active Problem List    Diagnosis Date Noted    Diverticulitis 03/25/2020    Leukocytosis 03/25/2020    Renal cyst 03/25/2020    Abscess of abdominal cavity ([address] 03/25/2020    Tobacco abuse 03/25/2020     History reviewed. No pertinent past medical history. History reviewed. No pertinent surgical history. History reviewed. No pertinent family history. Social History     Tobacco Use    Smoking status: Current Every Day Smoker     Packs/day: 1.00   Substance Use Topics    Alcohol use: No      No Known Allergies    Prior to Admission medications    Not on File       Review of Systems:    14 systems were reviewed. The results are as above in the HPI and otherwise negative. Objective:     Vitals:    03/25/20 1515 03/25/20 1715 03/25/20 1730 03/25/20 1833   BP: 119/83 129/74 130/75 110/69   Pulse: 76 76 78 73   Resp: 13 14 11 16   Temp:   98.2 °F (36.8 °C) 97.9 °F (36.6 °C)   SpO2: 100% 97% 98% 95%   Weight:           Physical Exam:  GENERAL: alert, cooperative, no distress, appears stated age,   EYE: conjunctivae/corneas clear. PERRL, EOM's intact.   THROAT & NECK: normal and no erythema or exudates noted. ,    LYMPHATIC: Cervical, supraclavicular, and axillary nodes normal. ,   LUNG: clear to auscultation bilaterally,   HEART: regular rate and rhythm, S1, S2 normal, no murmur, click, rub or gallop,   ABDOMEN: soft, non-distended, tender suprapubic and left lower quadrant. . Bowel sounds normal. No masses,  no organomegaly,   EXTREMITIES:  extremities normal, atraumatic, no cyanosis or edema,   SKIN: Normal.,   NEUROLOGIC: AOx3. Cranial nerves 2-12 and sensation grossly intact. ,     Data Review:    Recent Results (from the past 24 hour(s))   URINALYSIS W/ RFLX MICROSCOPIC    Collection Time: 03/25/20 10:32 AM   Result Value Ref Range    Color DARK YELLOW      Appearance TURBID      Specific gravity 1.025 1.005 - 1.030      pH (UA) 5.5 5.0 - 8.0      Protein 30 (A) NEG mg/dL    Glucose NEGATIVE  NEG mg/dL    Ketone TRACE (A) NEG mg/dL    Bilirubin SMALL (A) NEG      Blood NEGATIVE  NEG      Urobilinogen 1.0 0.2 - 1.0 EU/dL    Nitrites NEGATIVE  NEG      Leukocyte Esterase SMALL (A) NEG     URINE MICROSCOPIC ONLY    Collection Time: 03/25/20 10:32 AM   Result Value Ref Range    WBC 5 to 10 0 - 4 /hpf    RBC 0 to 2 0 - 5 /hpf    Epithelial cells FEW 0 - 5 /lpf    Bacteria 2+ (A) NEG /hpf    Mucus 3+ (A) NEG /lpf   CBC WITH AUTOMATED DIFF    Collection Time: 03/25/20 10:40 AM   Result Value Ref Range    WBC 25.3 (H) 4.6 - 13.2 K/uL    RBC 5.13 4.70 - 5.50 M/uL    HGB 15.1 13.0 - 16.0 g/dL    HCT 44.9 36.0 - 48.0 %    MCV 87.5 74.0 - 97.0 FL    MCH 29.4 24.0 - 34.0 PG    MCHC 33.6 31.0 - 37.0 g/dL    RDW 14.3 11.6 - 14.5 %    PLATELET 998 439 - 864 K/uL    MPV 10.2 9.2 - 11.8 FL    NEUTROPHILS 81 (H) 42 - 75 %    LYMPHOCYTES 12 (L) 20 - 51 %    MONOCYTES 7 2 - 9 %    EOSINOPHILS 0 0 - 5 %    BASOPHILS 0 0 - 3 %    ABS. NEUTROPHILS 20.5 (H) 1.8 - 8.0 K/UL    ABS. LYMPHOCYTES 3.0 0.8 - 3.5 K/UL    ABS. MONOCYTES 1.8 (H) 0 - 1.0 K/UL    ABS. EOSINOPHILS 0.0 0.0 - 0.4 K/UL    ABS.  BASOPHILS 0.0 0.0 - 0.06 K/UL    DF MANUAL      PLATELET COMMENTS ADEQUATE PLATELETS      RBC COMMENTS NORMOCYTIC, NORMOCHROMIC     METABOLIC PANEL, COMPREHENSIVE    Collection Time: 03/25/20 10:40 AM   Result Value Ref Range    Sodium 142 136 - 145 mmol/L    Potassium 4.7 3.5 - 5.5 mmol/L    Chloride 110 100 - 111 mmol/L    CO2 29 21 - 32 mmol/L    Anion gap 3 3.0 - 18 mmol/L    Glucose 109 (H) 74 - 99 mg/dL    BUN 16 7.0 - 18 MG/DL    Creatinine 0.76 0.6 - 1.3 MG/DL    BUN/Creatinine ratio 21 (H) 12 - 20      GFR est AA >60 >60 ml/min/1.73m2    GFR est non-AA >60 >60 ml/min/1.73m2    Calcium 9.5 8.5 - 10.1 MG/DL    Bilirubin, total 0.5 0.2 - 1.0 MG/DL    ALT (SGPT) 31 16 - 61 U/L    AST (SGOT) 14 10 - 38 U/L    Alk. phosphatase 118 (H) 45 - 117 U/L    Protein, total 8.0 6.4 - 8.2 g/dL    Albumin 3.6 3.4 - 5.0 g/dL    Globulin 4.4 (H) 2.0 - 4.0 g/dL    A-G Ratio 0.8 0.8 - 1.7     LIPASE    Collection Time: 03/25/20 10:40 AM   Result Value Ref Range    Lipase 100 73 - 393 U/L   LACTIC ACID    Collection Time: 03/25/20 11:56 AM   Result Value Ref Range    Lactic acid 1.0 0.4 - 2.0 MMOL/L   EKG, 12 LEAD, INITIAL    Collection Time: 03/25/20  2:45 PM   Result Value Ref Range    Ventricular Rate 79 BPM    Atrial Rate 79 BPM    P-R Interval 184 ms    QRS Duration 82 ms    Q-T Interval 374 ms    QTC Calculation (Bezet) 428 ms    Calculated P Axis 53 degrees    Calculated R Axis 49 degrees    Calculated T Axis 39 degrees    Diagnosis       Normal sinus rhythm  Normal ECG  When compared with ECG of 25-JUL-2008 22:40,  No significant change was found           Impression:     · Patient with complicated sigmoid diverticulitis with abscess.     Plan:     · Agree with IV antibiotics  · N.p.o. with IV fluid  · Repeat CAT scan in 2 days    Signed By: Juani Lantigua MD     March 25, 2020

## 2020-03-26 NOTE — PROGRESS NOTES
Bedside shift change report given to Graciela Umanzor RN (oncoming nurse) by Jennifer Jackson RN (offgoing nurse). Report included the following information SBAR, Kardex and MAR.

## 2020-03-26 NOTE — PROGRESS NOTES
Problem: Falls - Risk of  Goal: *Absence of Falls  Description: Document Yazmin Will Fall Risk and appropriate interventions in the flowsheet.   Outcome: Progressing Towards Goal  Note: Fall Risk Interventions:            Medication Interventions: Teach patient to arise slowly                   Problem: Patient Education: Go to Patient Education Activity  Goal: Patient/Family Education  Outcome: Progressing Towards Goal     Problem: General Medical Care Plan  Goal: *Vital signs within specified parameters  Outcome: Progressing Towards Goal  Goal: *Labs within defined limits  Outcome: Progressing Towards Goal  Goal: *Absence of infection signs and symptoms  Outcome: Progressing Towards Goal  Goal: *Optimal pain control at patient's stated goal  Outcome: Progressing Towards Goal  Goal: *Skin integrity maintained  Outcome: Progressing Towards Goal  Goal: *Fluid volume balance  Outcome: Progressing Towards Goal  Goal: *Optimize nutritional status  Outcome: Progressing Towards Goal  Goal: *Anxiety reduced or absent  Outcome: Progressing Towards Goal  Goal: *Progressive mobility and function (eg: ADL's)  Outcome: Progressing Towards Goal

## 2020-03-27 ENCOUNTER — APPOINTMENT (OUTPATIENT)
Dept: MRI IMAGING | Age: 55
DRG: 244 | End: 2020-03-27
Attending: NURSE PRACTITIONER
Payer: MEDICAID

## 2020-03-27 ENCOUNTER — APPOINTMENT (OUTPATIENT)
Dept: GENERAL RADIOLOGY | Age: 55
DRG: 244 | End: 2020-03-27
Attending: INTERNAL MEDICINE
Payer: MEDICAID

## 2020-03-27 VITALS
HEART RATE: 67 BPM | WEIGHT: 172.6 LBS | DIASTOLIC BLOOD PRESSURE: 71 MMHG | BODY MASS INDEX: 26.16 KG/M2 | HEIGHT: 68 IN | OXYGEN SATURATION: 96 % | TEMPERATURE: 97.4 F | RESPIRATION RATE: 16 BRPM | SYSTOLIC BLOOD PRESSURE: 122 MMHG

## 2020-03-27 LAB
BACTERIA SPEC CULT: NORMAL
BASOPHILS # BLD: 0 K/UL (ref 0–0.1)
BASOPHILS NFR BLD: 0 % (ref 0–2)
DIFFERENTIAL METHOD BLD: ABNORMAL
EOSINOPHIL # BLD: 0.1 K/UL (ref 0–0.4)
EOSINOPHIL NFR BLD: 1 % (ref 0–5)
ERYTHROCYTE [DISTWIDTH] IN BLOOD BY AUTOMATED COUNT: 14 % (ref 11.6–14.5)
HCT VFR BLD AUTO: 38 % (ref 36–48)
HGB BLD-MCNC: 12.6 G/DL (ref 13–16)
LYMPHOCYTES # BLD: 3.8 K/UL (ref 0.9–3.6)
LYMPHOCYTES NFR BLD: 21 % (ref 21–52)
MCH RBC QN AUTO: 29.4 PG (ref 24–34)
MCHC RBC AUTO-ENTMCNC: 33.2 G/DL (ref 31–37)
MCV RBC AUTO: 88.8 FL (ref 74–97)
MONOCYTES # BLD: 1.4 K/UL (ref 0.05–1.2)
MONOCYTES NFR BLD: 8 % (ref 3–10)
NEUTS SEG # BLD: 12.4 K/UL (ref 1.8–8)
NEUTS SEG NFR BLD: 70 % (ref 40–73)
PLATELET # BLD AUTO: 372 K/UL (ref 135–420)
PMV BLD AUTO: 10.3 FL (ref 9.2–11.8)
RBC # BLD AUTO: 4.28 M/UL (ref 4.7–5.5)
SERVICE CMNT-IMP: NORMAL
WBC # BLD AUTO: 17.8 K/UL (ref 4.6–13.2)

## 2020-03-27 PROCEDURE — 36415 COLL VENOUS BLD VENIPUNCTURE: CPT

## 2020-03-27 PROCEDURE — 74011250636 HC RX REV CODE- 250/636: Performed by: INTERNAL MEDICINE

## 2020-03-27 PROCEDURE — 74011250637 HC RX REV CODE- 250/637: Performed by: INTERNAL MEDICINE

## 2020-03-27 PROCEDURE — 74183 MRI ABD W/O CNTR FLWD CNTR: CPT

## 2020-03-27 PROCEDURE — A9577 INJ MULTIHANCE: HCPCS | Performed by: INTERNAL MEDICINE

## 2020-03-27 PROCEDURE — 74011250636 HC RX REV CODE- 250/636: Performed by: PHYSICIAN ASSISTANT

## 2020-03-27 PROCEDURE — 85025 COMPLETE CBC W/AUTO DIFF WBC: CPT

## 2020-03-27 PROCEDURE — 74011000258 HC RX REV CODE- 258: Performed by: PHYSICIAN ASSISTANT

## 2020-03-27 PROCEDURE — 73552 X-RAY EXAM OF FEMUR 2/>: CPT

## 2020-03-27 RX ORDER — METRONIDAZOLE 500 MG/1
500 TABLET ORAL EVERY 12 HOURS
Qty: 20 TAB | Refills: 0 | Status: SHIPPED | OUTPATIENT
Start: 2020-03-27 | End: 2020-04-06

## 2020-03-27 RX ORDER — CIPROFLOXACIN 500 MG/1
500 TABLET ORAL EVERY 12 HOURS
Qty: 20 TAB | Refills: 0 | Status: SHIPPED | OUTPATIENT
Start: 2020-03-27 | End: 2020-04-06

## 2020-03-27 RX ORDER — CIPROFLOXACIN 500 MG/1
500 TABLET ORAL EVERY 12 HOURS
Status: DISCONTINUED | OUTPATIENT
Start: 2020-03-27 | End: 2020-03-27

## 2020-03-27 RX ORDER — ONDANSETRON 4 MG/1
4 TABLET, ORALLY DISINTEGRATING ORAL
Qty: 10 TAB | Refills: 0 | Status: SHIPPED | OUTPATIENT
Start: 2020-03-27 | End: 2022-02-20

## 2020-03-27 RX ORDER — METRONIDAZOLE 500 MG/1
500 TABLET ORAL EVERY 12 HOURS
Status: DISCONTINUED | OUTPATIENT
Start: 2020-03-27 | End: 2020-03-27 | Stop reason: HOSPADM

## 2020-03-27 RX ORDER — CIPROFLOXACIN 500 MG/1
500 TABLET ORAL EVERY 12 HOURS
Status: DISCONTINUED | OUTPATIENT
Start: 2020-03-27 | End: 2020-03-27 | Stop reason: HOSPADM

## 2020-03-27 RX ORDER — OXYCODONE AND ACETAMINOPHEN 5; 325 MG/1; MG/1
1 TABLET ORAL
Qty: 21 TAB | Refills: 0 | Status: SHIPPED | OUTPATIENT
Start: 2020-03-27 | End: 2020-04-03

## 2020-03-27 RX ADMIN — FAMOTIDINE 20 MG: 20 TABLET ORAL at 09:15

## 2020-03-27 RX ADMIN — GADOBENATE DIMEGLUMINE 15 ML: 529 INJECTION, SOLUTION INTRAVENOUS at 11:50

## 2020-03-27 RX ADMIN — PIPERACILLIN AND TAZOBACTAM 3.38 G: 3; .375 INJECTION, POWDER, FOR SOLUTION INTRAVENOUS at 03:23

## 2020-03-27 RX ADMIN — OXYCODONE HYDROCHLORIDE AND ACETAMINOPHEN 1 TABLET: 5; 325 TABLET ORAL at 09:47

## 2020-03-27 RX ADMIN — CIPROFLOXACIN HYDROCHLORIDE 500 MG: 500 TABLET, FILM COATED ORAL at 14:24

## 2020-03-27 RX ADMIN — OXYCODONE HYDROCHLORIDE AND ACETAMINOPHEN 1 TABLET: 5; 325 TABLET ORAL at 04:24

## 2020-03-27 RX ADMIN — PIPERACILLIN AND TAZOBACTAM 3.38 G: 3; .375 INJECTION, POWDER, FOR SOLUTION INTRAVENOUS at 09:15

## 2020-03-27 NOTE — INTERDISCIPLINARY ROUNDS
Interdisciplinary Round Note Patient Information: Patient Information: Mark Decker 464/01 Reason for Admission: Diverticulitis [K57.92] Leukocytosis [D72.829] Renal cyst [N28.1] Attending Provider:   Nancy Ramos MD 
 Past Medical History: History reviewed. No pertinent past medical history. Hospital day: 2 Estimated discharge date: possibly today RRAT Score: Low Risk 6 Total Score 2 . Living with Significant Other. Assisted Living. LTAC. SNF. or  
Rehab  
 4 Pt. Coverage (Medicare=5 , Medicaid, or Self-Pay=4) Criteria that do not apply:  
 Has Seen PCP in Last 6 Months (Yes=3, No=0) Patient Length of Stay (>5 days = 3) IP Visits Last 12 Months (1-3=4, 4=9, >4=11) Charlson Comorbidity Score (Age + Comorbid Conditions) Goals for Today: having scans done VITAL SIGNS Vitals:  
 03/27/20 0028 03/27/20 7482 03/27/20 9065 03/27/20 6142 BP: 147/81 109/73 122/71 Pulse: 65 68 67 Resp: 16 18 16 Temp: 98.5 °F (36.9 °C) 98.1 °F (36.7 °C) 97.4 °F (36.3 °C) SpO2: 97% 98% 96% Weight:    78.3 kg (172 lb 9.6 oz) Height:      
 
 
 
 Lines, Drains, & Airways Peripheral IV 03/25/20 Left Antecubital-Site Assessment: Clean, dry, & intact Peripheral IV 03/25/20 Right Antecubital-Site Assessment: Clean, dry, & intact VTE Prophylaxis Intake and Output:  
03/25 1901 - 03/27 0700 In: 240 [P.O.:240] Out: 150 [Urine:150] 03/27 0701 - 03/27 1900 In: 480 [P.O.:480] Out: -  Current Diet: DIET GI LITE (POST SURGICAL) Abdominal  
Abdominal Assessment: Soft, Tender Appetite: Good Bowel Sounds: Active Recent Glucose Results: No results found for: GLU, GLUPOC, GLUCPOC 
 
  
IV Antibiotics? yes Activity Level: Activity Level: Up ad marcelo Needs assistance with ADLs: no 
PT Consult Status: NO Current Immunizations: There is no immunization history for the selected administration types on file for this patient. Recommendations:  
Discharge Disposition: Home and Home with Home Health Medication Reconciliation Completed: YES Cardiac/Pulmonary Rehab Ordered:  NO 
 
Needs for Discharge: will need home health if discharged with IV abx Recommendations from IDR team: none

## 2020-03-27 NOTE — PROGRESS NOTES
Problem: Falls - Risk of  Goal: *Absence of Falls  Description: Document James Reynolds Fall Risk and appropriate interventions in the flowsheet.   Outcome: Progressing Towards Goal  Note: Fall Risk Interventions:            Medication Interventions: Patient to call before getting OOB, Teach patient to arise slowly                   Problem: Patient Education: Go to Patient Education Activity  Goal: Patient/Family Education  Outcome: Progressing Towards Goal     Problem: General Medical Care Plan  Goal: *Vital signs within specified parameters  Outcome: Progressing Towards Goal  Goal: *Labs within defined limits  Outcome: Progressing Towards Goal  Goal: *Absence of infection signs and symptoms  Outcome: Progressing Towards Goal  Goal: *Optimal pain control at patient's stated goal  Outcome: Progressing Towards Goal  Goal: *Skin integrity maintained  Outcome: Progressing Towards Goal  Goal: *Fluid volume balance  Outcome: Progressing Towards Goal  Goal: *Optimize nutritional status  Outcome: Progressing Towards Goal  Goal: *Anxiety reduced or absent  Outcome: Progressing Towards Goal  Goal: *Progressive mobility and function (eg: ADL's)  Outcome: Progressing Towards Goal

## 2020-03-27 NOTE — PROGRESS NOTES
[facility] [place] [address]  [phone]  Colon and Rectal Surgery Progress Note      Patient: Colton Campos MRN: [id number]  SSN: [id number]    YOB: 1965  Age: 47 y.o. Sex: male      Admit Date: 3/25/2020    LOS: 2 days     Subjective: Tolerating diet. Mild pain. Passing bm. Objective:     Vitals:    03/27/20 0028 03/27/20 0423 03/27/20 0735 03/27/20 0944   BP: 147/81 109/73 122/71    Pulse: 65 68 67    Resp: 16 18 16    Temp: 98.5 °F (36.9 °C) 98.1 °F (36.7 °C) 97.4 °F (36.3 °C)    SpO2: 97% 98% 96%    Weight:    78.3 kg (172 lb 9.6 oz)   Height:            Intake and Output:  Current Shift: 03/27 0701 - 03/27 1900  In: 480 [P.O.:480]  Out: -   Last three shifts: 03/25 1901 - 03/27 0700  In: 240 [P.O.:240]  Out: 150 [Urine:150]    Physical Exam:     abd soft, LLQ tender,mild, ND    Lab/Data Review:    CBC:   Lab Results   Component Value Date/Time    WBC 17.8 (H) 03/27/2020 03:50 AM    HGB 12.6 (L) 03/27/2020 03:50 AM    HCT 38.0 03/27/2020 03:50 AM     03/27/2020 03:50 AM        Assessment:     Diverticulitis, pelvic free fluid, ?  Developing abscess    Plan:     Continue IV abx  Diet as tolerated  D/C per primary team, rec 2 wks abx and f/u with me in 2 weeks  Pt told he needs colonoscopy after resolution of diverticulitis to R/O cancer    Signed By: Vianey Whyte MD        March 27, 2020

## 2020-03-27 NOTE — PROGRESS NOTES
Discharge order noted for today. Orders reviewed. No needs identified at this time.  remains available if needed.   Cuco Fernando RN - Outcomes Manager  [phone]

## 2020-03-27 NOTE — ROUTINE PROCESS
Bedside and Verbal shift change report given to Stacey Sawant RN (oncoming nurse) by Vandana Chen RN (offgoing nurse). Report included the following information SBAR, Kardex and MAR.

## 2020-03-27 NOTE — PROGRESS NOTES
[facility]  Progress Note    Patient: Adelina Law Age: 47 y.o. : 1965 MR#: [id number] SSN: [id number]  Date: 3/27/2020     Subjective:     Patient is feeling fine. He wants to go home. His abdominal pain is much better now. No nausea or vomiting. He had a bowel movement. No dysuria. No headaches or dizziness. Denies for chest pain or shortness of breath or cough. Assessment/Plan:     1. Abdominal pain due to diverticulitis -improving, continue current pain management  2. Diverticulitis and early performed abscess in perirectal region  -discussed with surgeon: Patient can be discharged home on antibiotic by mouth for 10 days  3. Leukocytosis -stable  4. Lobulated cystic lesion in lateral upper pole right kidney - Dr. Larisa Savage appreciated / OP follow up. Discussed with urologist and they will follow this patient's MRI report as an outpatient.   5. Hx of right inguinal hernia repair with mesh in 2013 by Dr. Tomas Steinberg at [facility]     Additional Notes: Discharge patient home after MRI is done    Case discussed with:  [x]Patient  []Family  [x]Nursing  []Case Management  DVT Prophylaxis:  [x]Lovenox  []Hep SQ  []SCDs  []Coumadin   []On Heparin gtt    Objective:   VS:   Visit Vitals  /71 (BP 1 Location: Left arm, BP Patient Position: At rest)   Pulse 67   Temp 97.4 °F (36.3 °C)   Resp 16   Ht 5' 8\" (1.727 m)   Wt 78.3 kg (172 lb 9.6 oz)   SpO2 96%   BMI 26.24 kg/m²      Tmax/24hrs: Temp (24hrs), Av.6 °F (36.4 °C), Min:97.1 °F (36.2 °C), Max:98.5 °F (36.9 °C)      Intake/Output Summary (Last 24 hours) at 3/27/2020 1211  Last data filed at 3/27/2020 0920  Gross per 24 hour   Intake 480 ml   Output    Net 480 ml     General: Awake, follows commands appropriately, not in acute distress  Cardiovascular:  RRR  Pulmonary: Clear to auscultation bilaterally  GI: Soft, nontender, mild left lower quadrant tenderness present, bowel sounds present  Extremities:  No edema; 2+. Neuro: alert and oriented x 4    Labs:    Recent Results (from the past 24 hour(s))   CBC WITH AUTOMATED DIFF    Collection Time: 03/27/20  3:50 AM   Result Value Ref Range    WBC 17.8 (H) 4.6 - 13.2 K/uL    RBC 4.28 (L) 4.70 - 5.50 M/uL    HGB 12.6 (L) 13.0 - 16.0 g/dL    HCT 38.0 36.0 - 48.0 %    MCV 88.8 74.0 - 97.0 FL    MCH 29.4 24.0 - 34.0 PG    MCHC 33.2 31.0 - 37.0 g/dL    RDW 14.0 11.6 - 14.5 %    PLATELET 132 703 - 381 K/uL    MPV 10.3 9.2 - 11.8 FL    NEUTROPHILS 70 40 - 73 %    LYMPHOCYTES 21 21 - 52 %    MONOCYTES 8 3 - 10 %    EOSINOPHILS 1 0 - 5 %    BASOPHILS 0 0 - 2 %    ABS. NEUTROPHILS 12.4 (H) 1.8 - 8.0 K/UL    ABS. LYMPHOCYTES 3.8 (H) 0.9 - 3.6 K/UL    ABS. MONOCYTES 1.4 (H) 0.05 - 1.2 K/UL    ABS. EOSINOPHILS 0.1 0.0 - 0.4 K/UL    ABS.  BASOPHILS 0.0 0.0 - 0.1 K/UL    DF AUTOMATED       Signed By: Aaron Dugan MD     March 27, 2020

## 2020-03-27 NOTE — ROUTINE PROCESS
Please complete MRI screening form with patient or knowledgeable family member and fax to MRI. Thanks
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
## 2020-03-27 NOTE — PROGRESS NOTES
[address], [address]                                                      Phone: [phone]  Urology Daily Progress Note    Patient Active Problem List   Diagnosis Code    Diverticulitis K57.92    Leukocytosis D72.829    Renal cyst N28.1    Abscess of abdominal cavity (HCC) K65.1    Tobacco abuse Z72.0         Assessment & Plan     Assessment     This is a 47 y.o. male with -  Complex 7.4 cm left renal cyst  diverticulitis    Plan:  Patient not seen, out of room at time of rounds  CASH reviewed- will need MRI to further evaluate; ordered  IF able please get MRI prior to his discharge  Further management ast outpt with Dr. Aurora Cook in the next few weeks  Follow Up arranged: YES  Will sign off at this time. Please contact with any questions or concerns. Kiana Wagoner  [facility]  Pager # [phone]    Available M-F 7:30- 5pm .  After 5pm and weekends please call answering service at [phone]
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
## 2020-03-31 LAB
BACTERIA SPEC CULT: NORMAL
BACTERIA SPEC CULT: NORMAL
SERVICE CMNT-IMP: NORMAL
SERVICE CMNT-IMP: NORMAL

## 2020-04-18 ENCOUNTER — HOSPITAL ENCOUNTER (INPATIENT)
Age: 55
LOS: 3 days | Discharge: HOME HEALTH | DRG: 244 | End: 2020-04-21
Attending: EMERGENCY MEDICINE | Admitting: SURGERY
Payer: MEDICAID

## 2020-04-18 ENCOUNTER — APPOINTMENT (OUTPATIENT)
Dept: CT IMAGING | Age: 55
DRG: 244 | End: 2020-04-18
Attending: PHYSICIAN ASSISTANT
Payer: MEDICAID

## 2020-04-18 DIAGNOSIS — K65.1 PELVIC ABSCESS IN MALE (HCC): Primary | ICD-10-CM

## 2020-04-18 DIAGNOSIS — K57.32 SIGMOID DIVERTICULITIS: ICD-10-CM

## 2020-04-18 DIAGNOSIS — K61.1 PERIRECTAL ABSCESS: ICD-10-CM

## 2020-04-18 LAB
ALBUMIN SERPL-MCNC: 3.2 G/DL (ref 3.4–5)
ALBUMIN/GLOB SERPL: 0.7 {RATIO} (ref 0.8–1.7)
ALP SERPL-CCNC: 155 U/L (ref 45–117)
ALT SERPL-CCNC: 41 U/L (ref 16–61)
ANION GAP SERPL CALC-SCNC: 6 MMOL/L (ref 3–18)
APPEARANCE UR: CLEAR
APTT PPP: 31.6 SEC (ref 23–36.4)
AST SERPL-CCNC: 20 U/L (ref 10–38)
BACTERIA URNS QL MICRO: ABNORMAL /HPF
BASOPHILS # BLD: 0 K/UL (ref 0–0.1)
BASOPHILS NFR BLD: 0 % (ref 0–3)
BILIRUB SERPL-MCNC: 1.2 MG/DL (ref 0.2–1)
BILIRUB UR QL: ABNORMAL
BUN SERPL-MCNC: 11 MG/DL (ref 7–18)
BUN/CREAT SERPL: 18 (ref 12–20)
CALCIUM SERPL-MCNC: 9.1 MG/DL (ref 8.5–10.1)
CHLORIDE SERPL-SCNC: 103 MMOL/L (ref 100–111)
CO2 SERPL-SCNC: 27 MMOL/L (ref 21–32)
COLOR UR: ABNORMAL
CREAT SERPL-MCNC: 0.6 MG/DL (ref 0.6–1.3)
DIFFERENTIAL METHOD BLD: ABNORMAL
EOSINOPHIL # BLD: 0 K/UL (ref 0–0.4)
EOSINOPHIL NFR BLD: 0 % (ref 0–5)
EPITH CASTS URNS QL MICRO: ABNORMAL /LPF (ref 0–5)
ERYTHROCYTE [DISTWIDTH] IN BLOOD BY AUTOMATED COUNT: 13.7 % (ref 11.6–14.5)
GLOBULIN SER CALC-MCNC: 4.5 G/DL (ref 2–4)
GLUCOSE SERPL-MCNC: 100 MG/DL (ref 74–99)
GLUCOSE UR STRIP.AUTO-MCNC: NEGATIVE MG/DL
HCT VFR BLD AUTO: 38.4 % (ref 36–48)
HGB BLD-MCNC: 13.1 G/DL (ref 13–16)
HGB UR QL STRIP: ABNORMAL
INR PPP: 1.1 (ref 0.8–1.2)
KETONES UR QL STRIP.AUTO: 15 MG/DL
LACTATE SERPL-SCNC: 0.8 MMOL/L (ref 0.4–2)
LEUKOCYTE ESTERASE UR QL STRIP.AUTO: ABNORMAL
LIPASE SERPL-CCNC: 37 U/L (ref 73–393)
LYMPHOCYTES # BLD: 1.9 K/UL (ref 0.8–3.5)
LYMPHOCYTES NFR BLD: 8 % (ref 20–51)
MCH RBC QN AUTO: 29.8 PG (ref 24–34)
MCHC RBC AUTO-ENTMCNC: 34.1 G/DL (ref 31–37)
MCV RBC AUTO: 87.3 FL (ref 74–97)
MONOCYTES # BLD: 3.7 K/UL (ref 0–1)
MONOCYTES NFR BLD: 16 % (ref 2–9)
NEUTS BAND NFR BLD MANUAL: 3 % (ref 0–5)
NEUTS SEG # BLD: 17 K/UL (ref 1.8–8)
NEUTS SEG NFR BLD: 73 % (ref 42–75)
NITRITE UR QL STRIP.AUTO: NEGATIVE
PH UR STRIP: 6 [PH] (ref 5–8)
PLATELET # BLD AUTO: 398 K/UL (ref 135–420)
PLATELET COMMENTS,PCOM: ABNORMAL
PMV BLD AUTO: 10.1 FL (ref 9.2–11.8)
POTASSIUM SERPL-SCNC: 3.6 MMOL/L (ref 3.5–5.5)
PROT SERPL-MCNC: 7.7 G/DL (ref 6.4–8.2)
PROT UR STRIP-MCNC: 30 MG/DL
PROTHROMBIN TIME: 14 SEC (ref 11.5–15.2)
RBC # BLD AUTO: 4.4 M/UL (ref 4.7–5.5)
RBC #/AREA URNS HPF: ABNORMAL /HPF (ref 0–5)
RBC MORPH BLD: ABNORMAL
SODIUM SERPL-SCNC: 136 MMOL/L (ref 136–145)
SP GR UR REFRACTOMETRY: >1.03 (ref 1–1.03)
UROBILINOGEN UR QL STRIP.AUTO: 1 EU/DL (ref 0.2–1)
WBC # BLD AUTO: 23.3 K/UL (ref 4.6–13.2)
WBC MORPH BLD: ABNORMAL
WBC URNS QL MICRO: ABNORMAL /HPF (ref 0–4)

## 2020-04-18 PROCEDURE — 85730 THROMBOPLASTIN TIME PARTIAL: CPT

## 2020-04-18 PROCEDURE — 74011636320 HC RX REV CODE- 636/320: Performed by: EMERGENCY MEDICINE

## 2020-04-18 PROCEDURE — 83605 ASSAY OF LACTIC ACID: CPT

## 2020-04-18 PROCEDURE — 85610 PROTHROMBIN TIME: CPT

## 2020-04-18 PROCEDURE — 87040 BLOOD CULTURE FOR BACTERIA: CPT

## 2020-04-18 PROCEDURE — 74177 CT ABD & PELVIS W/CONTRAST: CPT

## 2020-04-18 PROCEDURE — 74011250637 HC RX REV CODE- 250/637: Performed by: PHYSICIAN ASSISTANT

## 2020-04-18 PROCEDURE — 74011000258 HC RX REV CODE- 258: Performed by: SURGERY

## 2020-04-18 PROCEDURE — 74011250637 HC RX REV CODE- 250/637: Performed by: SURGERY

## 2020-04-18 PROCEDURE — 81001 URINALYSIS AUTO W/SCOPE: CPT

## 2020-04-18 PROCEDURE — 99285 EMERGENCY DEPT VISIT HI MDM: CPT

## 2020-04-18 PROCEDURE — 83690 ASSAY OF LIPASE: CPT

## 2020-04-18 PROCEDURE — 74011250636 HC RX REV CODE- 250/636: Performed by: SURGERY

## 2020-04-18 PROCEDURE — 74011250636 HC RX REV CODE- 250/636: Performed by: PHYSICIAN ASSISTANT

## 2020-04-18 PROCEDURE — 99284 EMERGENCY DEPT VISIT MOD MDM: CPT

## 2020-04-18 PROCEDURE — 85025 COMPLETE CBC W/AUTO DIFF WBC: CPT

## 2020-04-18 PROCEDURE — 65270000029 HC RM PRIVATE

## 2020-04-18 PROCEDURE — 80053 COMPREHEN METABOLIC PANEL: CPT

## 2020-04-18 RX ORDER — HYDROMORPHONE HYDROCHLORIDE 1 MG/ML
1 INJECTION, SOLUTION INTRAMUSCULAR; INTRAVENOUS; SUBCUTANEOUS
Status: DISCONTINUED | OUTPATIENT
Start: 2020-04-18 | End: 2020-04-21 | Stop reason: HOSPADM

## 2020-04-18 RX ORDER — LORAZEPAM 2 MG/ML
0.5 CONCENTRATE ORAL 3 TIMES DAILY
Status: DISCONTINUED | OUTPATIENT
Start: 2020-04-18 | End: 2020-04-21 | Stop reason: HOSPADM

## 2020-04-18 RX ORDER — LORAZEPAM 2 MG/ML
1 INJECTION INTRAMUSCULAR
Status: COMPLETED | OUTPATIENT
Start: 2020-04-18 | End: 2020-04-18

## 2020-04-18 RX ORDER — SODIUM CHLORIDE 0.9 % (FLUSH) 0.9 %
5-40 SYRINGE (ML) INJECTION AS NEEDED
Status: DISCONTINUED | OUTPATIENT
Start: 2020-04-18 | End: 2020-04-21 | Stop reason: HOSPADM

## 2020-04-18 RX ORDER — HEPARIN SODIUM 5000 [USP'U]/ML
5000 INJECTION, SOLUTION INTRAVENOUS; SUBCUTANEOUS EVERY 8 HOURS
Status: DISCONTINUED | OUTPATIENT
Start: 2020-04-18 | End: 2020-04-21 | Stop reason: HOSPADM

## 2020-04-18 RX ORDER — SODIUM CHLORIDE 0.9 % (FLUSH) 0.9 %
5-40 SYRINGE (ML) INJECTION EVERY 8 HOURS
Status: DISCONTINUED | OUTPATIENT
Start: 2020-04-18 | End: 2020-04-21 | Stop reason: HOSPADM

## 2020-04-18 RX ORDER — ACETAMINOPHEN 500 MG
1000 TABLET ORAL
Status: COMPLETED | OUTPATIENT
Start: 2020-04-18 | End: 2020-04-18

## 2020-04-18 RX ADMIN — LORAZEPAM 1 MG: 2 INJECTION, SOLUTION INTRAMUSCULAR; INTRAVENOUS at 17:48

## 2020-04-18 RX ADMIN — ACETAMINOPHEN 1000 MG: 500 TABLET ORAL at 18:26

## 2020-04-18 RX ADMIN — LORAZEPAM 0.5 MG: 2 SOLUTION, CONCENTRATE ORAL at 23:21

## 2020-04-18 RX ADMIN — SODIUM CHLORIDE 1000 ML: 900 INJECTION, SOLUTION INTRAVENOUS at 18:27

## 2020-04-18 RX ADMIN — IOPAMIDOL 100 ML: 612 INJECTION, SOLUTION INTRAVENOUS at 13:57

## 2020-04-18 RX ADMIN — Medication 10 ML: at 17:59

## 2020-04-18 RX ADMIN — PIPERACILLIN AND TAZOBACTAM 3.38 G: 3; .375 INJECTION, POWDER, LYOPHILIZED, FOR SOLUTION INTRAVENOUS at 17:49

## 2020-04-18 RX ADMIN — Medication 10 ML: at 23:22

## 2020-04-18 RX ADMIN — HEPARIN SODIUM 5000 UNITS: 5000 INJECTION INTRAVENOUS; SUBCUTANEOUS at 17:48

## 2020-04-18 NOTE — ED TRIAGE NOTES
Pt arrived from home A/O x 4 with c/o diarrhea, cramps and intermittent abdominal pain rated 6 out of 10.

## 2020-04-18 NOTE — ED PROVIDER NOTES
EMERGENCY DEPARTMENT HISTORY AND PHYSICAL EXAM      Date: 4/18/2020  Patient Name: uT Mcallister    History of Presenting Illness     No chief complaint on file. History Provided By: Patient    HPI: Tu Mcallister, 47 y.o. male PMHx significant for diverticulitis presents ambulatory to the ED with cc of constant sharp LLQ abd pain with assoc diarrhea with mucous x 2 days. Denies blood in stool. Denies vomiting, fever/chills. Hx diverticulitis and perirectal abscess. Pt reports sx improved with abx last time that he was admitted. Denies cough, congestion, fever/chills, known exposure to COVID. There are no other complaints, changes, or physical findings at this time. PCP: None    No current facility-administered medications on file prior to encounter. Current Outpatient Medications on File Prior to Encounter   Medication Sig Dispense Refill    ondansetron (Zofran ODT) 4 mg disintegrating tablet Take 1 Tab by mouth every eight (8) hours as needed for Nausea or Vomiting. 10 Tab 0       Past History     Past Medical History:  No past medical history on file. Past Surgical History:  No past surgical history on file. Family History:  No family history on file. Social History:  Social History     Tobacco Use    Smoking status: Current Every Day Smoker     Packs/day: 1.00   Substance Use Topics    Alcohol use: No    Drug use: No       Allergies:  No Known Allergies      Review of Systems   Review of Systems   Constitutional: Negative for chills and fever. HENT: Negative for facial swelling. Eyes: Negative for photophobia and visual disturbance. Respiratory: Negative for shortness of breath. Cardiovascular: Negative for chest pain. Gastrointestinal: Positive for abdominal pain and diarrhea. Negative for constipation, nausea and vomiting. Genitourinary: Negative for flank pain. Skin: Negative for color change, pallor, rash and wound.    Neurological: Negative for dizziness, weakness, light-headedness and headaches. All other systems reviewed and are negative. Physical Exam   Physical Exam  Vitals signs and nursing note reviewed. Constitutional:       General: He is not in acute distress. Appearance: He is well-developed. Comments: Pt well-appearing in NAD   HENT:      Head: Normocephalic and atraumatic. Eyes:      Conjunctiva/sclera: Conjunctivae normal.   Cardiovascular:      Rate and Rhythm: Normal rate and regular rhythm. Heart sounds: Normal heart sounds. Pulmonary:      Effort: Pulmonary effort is normal. No respiratory distress. Breath sounds: Normal breath sounds. Comments: Lungs CTA  Abdominal:      General: Bowel sounds are normal. There is no distension. Palpations: Abdomen is soft. Tenderness: There is abdominal tenderness in the left lower quadrant. Comments: Abdomen nondistended  No guarding or rigidity   Musculoskeletal: Normal range of motion. Skin:     General: Skin is warm. Findings: No rash. Neurological:      Mental Status: He is alert and oriented to person, place, and time. Psychiatric:         Behavior: Behavior normal.         Diagnostic Study Results     Labs -     Recent Results (from the past 12 hour(s))   CBC WITH AUTOMATED DIFF    Collection Time: 04/18/20 12:42 PM   Result Value Ref Range    WBC 23.3 (H) 4.6 - 13.2 K/uL    RBC 4.40 (L) 4.70 - 5.50 M/uL    HGB 13.1 13.0 - 16.0 g/dL    HCT 38.4 36.0 - 48.0 %    MCV 87.3 74.0 - 97.0 FL    MCH 29.8 24.0 - 34.0 PG    MCHC 34.1 31.0 - 37.0 g/dL    RDW 13.7 11.6 - 14.5 %    PLATELET 949 362 - 086 K/uL    MPV 10.1 9.2 - 11.8 FL    NEUTROPHILS 73 42 - 75 %    BAND NEUTROPHILS 3 0 - 5 %    LYMPHOCYTES 8 (L) 20 - 51 %    MONOCYTES 16 (H) 2 - 9 %    EOSINOPHILS 0 0 - 5 %    BASOPHILS 0 0 - 3 %    ABS. NEUTROPHILS 17.0 (H) 1.8 - 8.0 K/UL    ABS. LYMPHOCYTES 1.9 0.8 - 3.5 K/UL    ABS. MONOCYTES 3.7 (H) 0 - 1.0 K/UL    ABS.  EOSINOPHILS 0.0 0.0 - 0.4 K/UL ABS. BASOPHILS 0.0 0.0 - 0.1 K/UL    PLATELET COMMENTS ADEQUATE PLATELETS      RBC COMMENTS NORMOCYTIC, NORMOCHROMIC      WBC COMMENTS REACTIVE LYMPHS      DF MANUAL     METABOLIC PANEL, COMPREHENSIVE    Collection Time: 04/18/20 12:42 PM   Result Value Ref Range    Sodium 136 136 - 145 mmol/L    Potassium 3.6 3.5 - 5.5 mmol/L    Chloride 103 100 - 111 mmol/L    CO2 27 21 - 32 mmol/L    Anion gap 6 3.0 - 18 mmol/L    Glucose 100 (H) 74 - 99 mg/dL    BUN 11 7.0 - 18 MG/DL    Creatinine 0.60 0.6 - 1.3 MG/DL    BUN/Creatinine ratio 18 12 - 20      GFR est AA >60 >60 ml/min/1.73m2    GFR est non-AA >60 >60 ml/min/1.73m2    Calcium 9.1 8.5 - 10.1 MG/DL    Bilirubin, total 1.2 (H) 0.2 - 1.0 MG/DL    ALT (SGPT) 41 16 - 61 U/L    AST (SGOT) 20 10 - 38 U/L    Alk. phosphatase 155 (H) 45 - 117 U/L    Protein, total 7.7 6.4 - 8.2 g/dL    Albumin 3.2 (L) 3.4 - 5.0 g/dL    Globulin 4.5 (H) 2.0 - 4.0 g/dL    A-G Ratio 0.7 (L) 0.8 - 1.7     LIPASE    Collection Time: 04/18/20 12:42 PM   Result Value Ref Range    Lipase 37 (L) 73 - 393 U/L   LACTIC ACID    Collection Time: 04/18/20 12:42 PM   Result Value Ref Range    Lactic acid 0.8 0.4 - 2.0 MMOL/L       Radiologic Studies -   CT ABD PELV W CONT   Final Result   IMPRESSION:   1. Diverticulitis of the distal sigmoid and rectum with large perirectosigmoid   fluid collection. CT GUIDE CATH/PERC DRAIN PERITON/RETROPERI FLUID W SI    (Results Pending)     CT Results  (Last 48 hours)               04/18/20 1358  CT ABD PELV W CONT Final result    Impression:  IMPRESSION:   1. Diverticulitis of the distal sigmoid and rectum with large perirectosigmoid   fluid collection. Narrative:  EXAM: CT Abdomen and Pelvis with IV contrast       CLINICAL INDICATION:  previous hx diverticulitis and perirectal abscess       TECHNIQUE: CT of the abdomen and pelvis performed post IV contrast. Sagittal and   coronal reformations obtained.         All CT scans at this facility are performed using dose optimization technique as   appropriate to a performed exam, to include automated exposure control,   adjustment of the mA and/or kV according to patient size (including appropriate   matching for site specific examination) or use of iterative reconstruction   technique. IV CONTRAST: 100 cc of Isovue 300       ENTERIC CONTRAST: None       COMPARISON: 3/25/2020       FINDINGS:    Lower Chest: No acute infiltrate or effusion appreciated. The visualized heart   and pericardium are unremarkable. Peritoneum: No free air appreciated. No free fluid identified. There is a   perirectosigmoid collection which is significantly progressed since prior   imaging measuring 6.8 x 5.7 x 5.3 cm. Liver: No focal lesion appreciated. Biliary/Gallbladder: No intrahepatic or extrahepatic biliary ductal dilatation   appreciated. The gallbladder is unremarkable. No pericholecystic fluid or   inflammation. Spleen: Unremarkable. Pancreas: No focal lesion appreciated. The pancreatic duct is unremarkable. No   peripancreatic inflammation or adenopathy. : The adrenal glands are unremarkable. No perinephric fat stranding   appreciated. Bilateral renal cysts are noted. No hydroureteronephrosis of   either collecting system. Mild inflammation is noted in the bladder likely   related to the adjacent colonic pathology. Prostate has calcifications but is   otherwise grossly unremarkable. GI: The stomach is unremarkable. The small bowel is without evidence of   obstruction. No small bowel wall thickening. The mesentery is without   inflammation or adenopathy. The appendix is unremarkable. There is   diverticulitis involving the distal sigmoid and inflammatory changes of the wall   of the rectum. This is progressed. There is a prominent perirectosigmoid   collection as described previously. Aorta and retroperitoneum: No aortic aneurysm seen.  Multiple reactive periaortic   lymph nodes are noted No fluid collections in the retroperitoneum appreciated. Abdominal wall: Unremarkable       Musculoskeletal: Unremarkable               CXR Results  (Last 48 hours)    None          Medical Decision Making   I am the first provider for this patient. I reviewed the vital signs, available nursing notes, past medical history, past surgical history, family history and social history. Vital Signs-Reviewed the patient's vital signs. Patient Vitals for the past 12 hrs:   Temp Pulse Resp BP SpO2   04/18/20 1456 98.4 °F (36.9 °C)       04/18/20 1330  87 13 125/85 100 %   04/18/20 1315  86 13 128/77 99 %   04/18/20 1300  87 11 120/71 98 %   04/18/20 1245  92 17 (!) 139/100 100 %   04/18/20 1230  90 13 130/77 100 %   04/18/20 1215  86 13 136/75 99 %   04/18/20 1213  89 13  100 %   04/18/20 1209    147/81        Records Reviewed: Nursing Notes and Old Medical Records    Provider Notes (Medical Decision Making):   DDx: Diverticulitis, Perirectal abscess, Sepsis    46 yo M who presents for LLQ abd pain and mucous in stool x 2 days. Previous hx diverticulitis with admission. Pt reports sx resolved after admission last time. Leukocytosis present. Pt is afebrile and not tachycardic, so no sepsis protocol started. Gave pt fluids and started zosyn. Discussed CT scan with general surgeon, Dr. Yan Berger, and also Dr. Jazmin Tatum, interventional radiologist. Taina Nash agrees to drain abscess tomorrow am. Dr. Yan Berger, general surgeon, agrees to admit pt. ED Course:   Initial assessment performed. The patients presenting problems have been discussed, and they are in agreement with the care plan formulated and outlined with them. I have encouraged them to ask questions as they arise throughout their visit. 0  Spoke with Dr Yan Berger, consult for general surgery.  Discussed with Dr. Yan Berger, discussion with radiologist. Discussed that radiologist states abscess if more specifically in the pelvis rather than perirectal. Dr. Chinmay Valles states this sounds like it would be better suited for IR for a percutaneous drainage. He states due to size and WBC he thinks this needs to be performed emergently. Will consult IR.     [id number]  Spoke with Dr. Ileana Hendricks, consult for IR. Discussed pt's CT he states he will look at CT scan and call back. [address] with Dr. Ileana Hendricks, consult for IR. He states he reviewed pt's CT scan and he agrees to drain abscess tomorrow. Disposition:  Admitted    PLAN:  1. Current Discharge Medication List        2. Follow-up Information    None       Return to ED if worse     Diagnosis     Clinical Impression:   1. Sigmoid diverticulitis    2. Perirectal abscess        Attestations:    LIANNE Menchaca    Please note that this dictation was completed with Elance, the computer voice recognition software. Quite often unanticipated grammatical, syntax, homophones, and other interpretive errors are inadvertently transcribed by the computer software. Please disregard these errors. Please excuse any errors that have escaped final proofreading. Thank you.

## 2020-04-18 NOTE — H&P
General Surgery Admission Note    Sujata Betts  Admit date: 2020    MRN: [id number]     : 1965     Age: 47 y.o. Attending Physician: Yolie Ortiz MD, [facility]      History of Present Illness:      Sujata Betts is a 47 y.o. male who presented with abdominal pain. He was seen about a month ago . ound to have diverticulitis and started on antibiotics. Got bettter and finished AB. Then gradually worse. Now with diarrhea and mucus. No blood. No dysuria      Patient Active Problem List    Diagnosis Date Noted    Perirectal abscess 2020    Sigmoid diverticulitis 2020    Diverticulitis 2020    Leukocytosis 2020    Renal cyst 2020    Abscess of abdominal cavity ([address] 2020    Tobacco abuse 2020     No past medical history on file. No past surgical history on file. Social History     Tobacco Use    Smoking status: Current Every Day Smoker     Packs/day: 1.00   Substance Use Topics    Alcohol use: No      Social History     Tobacco Use   Smoking Status Current Every Day Smoker    Packs/day: 1.00     No family history on file. Current Facility-Administered Medications   Medication Dose Route Frequency    piperacillin-tazobactam (ZOSYN) 3.375 g in 0.9% sodium chloride (MBP/ADV) 100 mL MBP  3.375 g IntraVENous Q6H    LORazepam (ATIVAN) injection 1 mg  1 mg IntraVENous NOW     Current Outpatient Medications   Medication Sig    ondansetron (Zofran ODT) 4 mg disintegrating tablet Take 1 Tab by mouth every eight (8) hours as needed for Nausea or Vomiting. No Known Allergies     Review of Systems:  A comprehensive review of systems was negative except for that written in the History of Present Illness.     Objective:     Visit Vitals  /85   Pulse 87   Temp 98.4 °F (36.9 °C)   Resp 13   SpO2 100%       Physical Exam:      General:  in no apparent distress,  But he is nervous    Eyes:  conjunctivae and sclerae normal, pupils equal, round, reactive to light   Throat & Neck: no erythema or exudates noted and neck supple and symmetrical; no palpable masses   Lungs:   clear to auscultation bilaterally   Heart:  Regular rate and rhythm   Abdomen:   Non distended. Few BS. Tender in epigastric area. No rebound. No peritoneal signs    Extremities: extremities normal, atraumatic, no cyanosis or edema   Skin: Normal.       Imaging and Lab Review:     CBC:   Lab Results   Component Value Date/Time    WBC 23.3 (H) 04/18/2020 12:42 PM    RBC 4.40 (L) 04/18/2020 12:42 PM    HGB 13.1 04/18/2020 12:42 PM    HCT 38.4 04/18/2020 12:42 PM    PLATELET 102 97/60/3165 12:42 PM     BMP:   Lab Results   Component Value Date/Time    Glucose 100 (H) 04/18/2020 12:42 PM    Sodium 136 04/18/2020 12:42 PM    Potassium 3.6 04/18/2020 12:42 PM    Chloride 103 04/18/2020 12:42 PM    CO2 27 04/18/2020 12:42 PM    BUN 11 04/18/2020 12:42 PM    Creatinine 0.60 04/18/2020 12:42 PM    Calcium 9.1 04/18/2020 12:42 PM     CMP:  Lab Results   Component Value Date/Time    Glucose 100 (H) 04/18/2020 12:42 PM    Sodium 136 04/18/2020 12:42 PM    Potassium 3.6 04/18/2020 12:42 PM    Chloride 103 04/18/2020 12:42 PM    CO2 27 04/18/2020 12:42 PM    BUN 11 04/18/2020 12:42 PM    Creatinine 0.60 04/18/2020 12:42 PM    Calcium 9.1 04/18/2020 12:42 PM    Anion gap 6 04/18/2020 12:42 PM    BUN/Creatinine ratio 18 04/18/2020 12:42 PM    Alk.  phosphatase 155 (H) 04/18/2020 12:42 PM    Protein, total 7.7 04/18/2020 12:42 PM    Albumin 3.2 (L) 04/18/2020 12:42 PM    Globulin 4.5 (H) 04/18/2020 12:42 PM    A-G Ratio 0.7 (L) 04/18/2020 12:42 PM       Recent Results (from the past 24 hour(s))   CBC WITH AUTOMATED DIFF    Collection Time: 04/18/20 12:42 PM   Result Value Ref Range    WBC 23.3 (H) 4.6 - 13.2 K/uL    RBC 4.40 (L) 4.70 - 5.50 M/uL    HGB 13.1 13.0 - 16.0 g/dL    HCT 38.4 36.0 - 48.0 %    MCV 87.3 74.0 - 97.0 FL    MCH 29.8 24.0 - 34.0 PG    MCHC 34.1 31.0 - 37.0 g/dL    RDW 13.7 11.6 - 14.5 %    PLATELET 399 850 - 608 K/uL    MPV 10.1 9.2 - 11.8 FL    NEUTROPHILS 73 42 - 75 %    BAND NEUTROPHILS 3 0 - 5 %    LYMPHOCYTES 8 (L) 20 - 51 %    MONOCYTES 16 (H) 2 - 9 %    EOSINOPHILS 0 0 - 5 %    BASOPHILS 0 0 - 3 %    ABS. NEUTROPHILS 17.0 (H) 1.8 - 8.0 K/UL    ABS. LYMPHOCYTES 1.9 0.8 - 3.5 K/UL    ABS. MONOCYTES 3.7 (H) 0 - 1.0 K/UL    ABS. EOSINOPHILS 0.0 0.0 - 0.4 K/UL    ABS. BASOPHILS 0.0 0.0 - 0.1 K/UL    PLATELET COMMENTS ADEQUATE PLATELETS      RBC COMMENTS NORMOCYTIC, NORMOCHROMIC      WBC COMMENTS REACTIVE LYMPHS      DF MANUAL     METABOLIC PANEL, COMPREHENSIVE    Collection Time: 04/18/20 12:42 PM   Result Value Ref Range    Sodium 136 136 - 145 mmol/L    Potassium 3.6 3.5 - 5.5 mmol/L    Chloride 103 100 - 111 mmol/L    CO2 27 21 - 32 mmol/L    Anion gap 6 3.0 - 18 mmol/L    Glucose 100 (H) 74 - 99 mg/dL    BUN 11 7.0 - 18 MG/DL    Creatinine 0.60 0.6 - 1.3 MG/DL    BUN/Creatinine ratio 18 12 - 20      GFR est AA >60 >60 ml/min/1.73m2    GFR est non-AA >60 >60 ml/min/1.73m2    Calcium 9.1 8.5 - 10.1 MG/DL    Bilirubin, total 1.2 (H) 0.2 - 1.0 MG/DL    ALT (SGPT) 41 16 - 61 U/L    AST (SGOT) 20 10 - 38 U/L    Alk. phosphatase 155 (H) 45 - 117 U/L    Protein, total 7.7 6.4 - 8.2 g/dL    Albumin 3.2 (L) 3.4 - 5.0 g/dL    Globulin 4.5 (H) 2.0 - 4.0 g/dL    A-G Ratio 0.7 (L) 0.8 - 1.7     LIPASE    Collection Time: 04/18/20 12:42 PM   Result Value Ref Range    Lipase 37 (L) 73 - 393 U/L   LACTIC ACID    Collection Time: 04/18/20 12:42 PM   Result Value Ref Range    Lactic acid 0.8 0.4 - 2.0 MMOL/L   PTT    Collection Time: 04/18/20 12:42 PM   Result Value Ref Range    aPTT 31.6 23.0 - 36.4 SEC   PROTHROMBIN TIME + INR    Collection Time: 04/18/20 12:42 PM   Result Value Ref Range    Prothrombin time 14.0 11.5 - 15.2 sec    INR 1.1 0.8 - 1.2         Large pelvic abscess. Wording initially said perirectal but clarification showed pelvic abscess.      Assessment:   Diamond Molina is a 47 y.o. male is presenting with diverticular abscess    Plan:     Diet: NPO except sips of water and medications  IVF for hydration  Correction of any electrolytes abnormalities  NG tube placement for decompression  Daily labs including CBC and CMP  DVT prophylaxis  Ambulation as tolerated  Antibiotics: Zosyn  Meds: continue pain med  IR with radiology and percutaneous drainage          Signed By: Addi Gagnon MD     April 18, 2020

## 2020-04-19 ENCOUNTER — APPOINTMENT (OUTPATIENT)
Dept: CT IMAGING | Age: 55
DRG: 244 | End: 2020-04-19
Attending: RADIOLOGY
Payer: MEDICAID

## 2020-04-19 PROCEDURE — 87205 SMEAR GRAM STAIN: CPT

## 2020-04-19 PROCEDURE — 74011000258 HC RX REV CODE- 258: Performed by: SURGERY

## 2020-04-19 PROCEDURE — 87075 CULTR BACTERIA EXCEPT BLOOD: CPT

## 2020-04-19 PROCEDURE — 65270000029 HC RM PRIVATE

## 2020-04-19 PROCEDURE — 74011250637 HC RX REV CODE- 250/637: Performed by: SURGERY

## 2020-04-19 PROCEDURE — 0W9H30Z DRAINAGE OF RETROPERITONEUM WITH DRAINAGE DEVICE, PERCUTANEOUS APPROACH: ICD-10-PCS | Performed by: RADIOLOGY

## 2020-04-19 PROCEDURE — 49406 IMAGE CATH FLUID PERI/RETRO: CPT

## 2020-04-19 PROCEDURE — 74011250636 HC RX REV CODE- 250/636: Performed by: SURGERY

## 2020-04-19 PROCEDURE — 74011250636 HC RX REV CODE- 250/636: Performed by: RADIOLOGY

## 2020-04-19 RX ORDER — FENTANYL CITRATE 50 UG/ML
12.5-5 INJECTION, SOLUTION INTRAMUSCULAR; INTRAVENOUS
Status: DISCONTINUED | OUTPATIENT
Start: 2020-04-19 | End: 2020-04-21 | Stop reason: HOSPADM

## 2020-04-19 RX ORDER — MIDAZOLAM HYDROCHLORIDE 1 MG/ML
1 INJECTION, SOLUTION INTRAMUSCULAR; INTRAVENOUS
Status: DISCONTINUED | OUTPATIENT
Start: 2020-04-19 | End: 2020-04-21 | Stop reason: HOSPADM

## 2020-04-19 RX ADMIN — HYDROMORPHONE HYDROCHLORIDE 1 MG: 1 INJECTION, SOLUTION INTRAMUSCULAR; INTRAVENOUS; SUBCUTANEOUS at 18:00

## 2020-04-19 RX ADMIN — HEPARIN SODIUM 5000 UNITS: 5000 INJECTION INTRAVENOUS; SUBCUTANEOUS at 21:58

## 2020-04-19 RX ADMIN — PIPERACILLIN AND TAZOBACTAM 3.38 G: 3; .375 INJECTION, POWDER, LYOPHILIZED, FOR SOLUTION INTRAVENOUS at 12:14

## 2020-04-19 RX ADMIN — LORAZEPAM 0.5 MG: 2 SOLUTION, CONCENTRATE ORAL at 22:00

## 2020-04-19 RX ADMIN — PIPERACILLIN AND TAZOBACTAM 3.38 G: 3; .375 INJECTION, POWDER, LYOPHILIZED, FOR SOLUTION INTRAVENOUS at 06:09

## 2020-04-19 RX ADMIN — PIPERACILLIN AND TAZOBACTAM 3.38 G: 3; .375 INJECTION, POWDER, LYOPHILIZED, FOR SOLUTION INTRAVENOUS at 18:22

## 2020-04-19 RX ADMIN — FENTANYL CITRATE 50 MCG: 50 INJECTION INTRAMUSCULAR; INTRAVENOUS at 09:55

## 2020-04-19 RX ADMIN — LORAZEPAM 0.5 MG: 2 SOLUTION, CONCENTRATE ORAL at 17:17

## 2020-04-19 RX ADMIN — MIDAZOLAM 1 MG: 1 INJECTION INTRAMUSCULAR; INTRAVENOUS at 09:55

## 2020-04-19 RX ADMIN — PIPERACILLIN AND TAZOBACTAM 3.38 G: 3; .375 INJECTION, POWDER, LYOPHILIZED, FOR SOLUTION INTRAVENOUS at 23:59

## 2020-04-19 RX ADMIN — Medication 10 ML: at 17:19

## 2020-04-19 RX ADMIN — Medication 10 ML: at 06:13

## 2020-04-19 RX ADMIN — HEPARIN SODIUM 5000 UNITS: 5000 INJECTION INTRAVENOUS; SUBCUTANEOUS at 12:19

## 2020-04-19 RX ADMIN — PIPERACILLIN AND TAZOBACTAM 3.38 G: 3; .375 INJECTION, POWDER, LYOPHILIZED, FOR SOLUTION INTRAVENOUS at 00:29

## 2020-04-19 RX ADMIN — MIDAZOLAM 1 MG: 1 INJECTION INTRAMUSCULAR; INTRAVENOUS at 09:50

## 2020-04-19 RX ADMIN — Medication 10 ML: at 22:01

## 2020-04-19 RX ADMIN — LORAZEPAM 0.5 MG: 2 SOLUTION, CONCENTRATE ORAL at 08:37

## 2020-04-19 RX ADMIN — FENTANYL CITRATE 50 MCG: 50 INJECTION INTRAMUSCULAR; INTRAVENOUS at 09:50

## 2020-04-19 NOTE — PROGRESS NOTES
TRANSFER - OUT REPORT:    Verbal report given to Aranza RN(name) on Sonu Solis  being transferred to (unit) for routine post - op       Report consisted of patients Situation, Background, Assessment and   Recommendations(SBAR). Information from the following report(s) SBAR, Kardex, Procedure Summary and MAR was reviewed with the receiving nurse. Lines:   Peripheral IV 04/18/20 Anterior;Right Forearm (Active)   Site Assessment Clean, dry, & intact 4/18/2020  9:15 PM   Phlebitis Assessment 0 4/18/2020  9:15 PM   Infiltration Assessment 0 4/18/2020  9:15 PM   Dressing Status Clean, dry, & intact 4/18/2020  9:15 PM   Dressing Type Transparent 4/18/2020  9:15 PM   Hub Color/Line Status Blue;Flushed 4/18/2020  9:15 PM   Action Taken Open ports on tubing capped 4/18/2020  9:15 PM   Alcohol Cap Used Yes 4/18/2020  9:15 PM       Peripheral IV 04/18/20 Left Antecubital (Active)   Site Assessment Clean, dry, & intact 4/18/2020  9:15 PM   Phlebitis Assessment 0 4/18/2020  9:15 PM   Infiltration Assessment 0 4/18/2020  9:15 PM   Dressing Status Clean, dry, & intact 4/18/2020  9:15 PM   Dressing Type Transparent 4/18/2020  9:15 PM   Hub Color/Line Status Pink;Capped 4/18/2020  9:15 PM   Action Taken Open ports on tubing capped 4/18/2020  9:15 PM   Alcohol Cap Used Yes 4/18/2020  9:15 PM        Opportunity for questions and clarification was provided.       Patient transported with:   Registered Nurse  Tech

## 2020-04-19 NOTE — PROGRESS NOTES
Patients is back to room with URSULA drain draining small amount of sanguinous drainage, no complain

## 2020-04-19 NOTE — ED NOTES
TRANSFER - OUT REPORT:    Verbal report given to Randi Backer (name) on Gab Alcocer  being transferred to [address] [place] [address](unit) for routine progression of care       Report consisted of patients Situation, Background, Assessment and   Recommendations(SBAR). Information from the following report(s) SBAR, Kardex, Procedure Summary, Recent Results and Med Rec Status was reviewed with the receiving nurse. Lines:   Peripheral IV 04/18/20 Anterior;Right Forearm (Active)   Site Assessment Clean, dry, & intact 4/18/2020 12:50 PM   Phlebitis Assessment 0 4/18/2020 12:50 PM   Infiltration Assessment 0 4/18/2020 12:50 PM   Dressing Status Clean, dry, & intact 4/18/2020 12:50 PM   Dressing Type Transparent;Tape 4/18/2020 12:50 PM   Hub Color/Line Status Blue 4/18/2020 12:50 PM   Action Taken Blood drawn 4/18/2020 12:50 PM   Alcohol Cap Used Yes 4/18/2020 12:50 PM       Peripheral IV 04/18/20 Left Antecubital (Active)        Opportunity for questions and clarification was provided.       Patient transported with:   Registered Nurse

## 2020-04-19 NOTE — PROCEDURES
RADIOLOGY POST PROCEDURE NOTE     April 19, 2020       10:14 AM     Preoperative Diagnosis:   Diverticular pelvic abscess    Postoperative Diagnosis:  Same. :  Renea Falcon    Assistant:  None. Type of Anesthesia: moderate sedation    Procedure/Description:  CT guided drainage pelvic abscess    Findings:   10 cc pus removed. Estimated blood Loss:  Minimal    Specimen Removed:   yes    Blood transfusions:  None. Implants:  None.     Complications: None    Condition: Stable    Discharge Plan:  continue present therapy    Lorenzo James MD

## 2020-04-19 NOTE — ROUTINE PROCESS
TRANSFER - IN REPORT: 
 
Verbal report received from SIDDHARTH Alvarado (name) on Lisa Calderon  being received from ED (unit) for routine progression of care Report consisted of patients Situation, Background, Assessment and  
Recommendations(SBAR). Information from the following report(s) SBAR, Kardex, ED Summary, [facility] and Recent Results was reviewed with the receiving nurse. Opportunity for questions and clarification was provided. Assessment completed upon patients arrival to unit and care assumed. Patient is alert and oriented, vitals within normal limits. Oriented to room and unit, call bell and telephone within reach. Juice, jello, and ice water given, and patient was informed that he will be NPO after midnight. Verbalized understanding. Ambulated independently to bathroom, no issues observed or reported with gait and balance. No complaints of pain.

## 2020-04-19 NOTE — PROGRESS NOTES
Problem: General Infection Care Plan (Adult and Pediatric)  Goal: Improvement in signs and symptoms of infection  Outcome: Progressing Towards Goal  Goal: *Optimize nutritional status  Outcome: Progressing Towards Goal     Problem: Patient Education: Go to Patient Education Activity  Goal: Patient/Family Education  Outcome: Progressing Towards Goal     Problem: General Medical Care Plan  Goal: *Vital signs within specified parameters  Outcome: Progressing Towards Goal  Goal: *Labs within defined limits  Outcome: Progressing Towards Goal  Goal: *Absence of infection signs and symptoms  Outcome: Progressing Towards Goal  Goal: *Optimal pain control at patient's stated goal  Outcome: Progressing Towards Goal  Goal: *Skin integrity maintained  Outcome: Progressing Towards Goal  Goal: *Fluid volume balance  Outcome: Progressing Towards Goal  Goal: *Optimize nutritional status  Outcome: Progressing Towards Goal  Goal: *Anxiety reduced or absent  Outcome: Progressing Towards Goal  Goal: *Progressive mobility and function (eg: ADL's)  Outcome: Progressing Towards Goal     Problem: Patient Education: Go to Patient Education Activity  Goal: Patient/Family Education  Outcome: Progressing Towards Goal

## 2020-04-19 NOTE — ROUTINE PROCESS
Report given to Aranza RN, including SBAR, MAR, and Kardex. Patient alert and oriented, vitals within normal limits, no apparent distress.

## 2020-04-19 NOTE — PROGRESS NOTES
Preprocedure Assessment      Today 4/19/2020     Indication/Symptoms:   Scar Paulino is a 47 y. o.with diverticular pelvic abscess    The H & P and/or progress notes and any available imaging were reviewed. The risks, indications and possible alternatives to the procedure, including doing nothing, were discussed and informed consent was obtained. Physical Exam:      Heart:   RRR   Lungs:   CTA, no wheezes, rhonchi or rales. The patient is an appropriate candidate to undergo the planned procedure and sedation.     Babs Lambert MD

## 2020-04-19 NOTE — PROGRESS NOTES
Inpatient Daily Progress Note    Subjective: Sonu Solis  Pain is well controlled. has not had nausea has not vomiting has not passed flatus has had a bowel movement    Objective:       Physical Exam:  Visit Vitals  /71 (BP 1 Location: Right arm, BP Patient Position: At rest)   Pulse 71   Temp 97 °F (36.1 °C)   Resp 18   Ht 5' 8\" (1.727 m)   Wt 76.9 kg (169 lb 8.5 oz)   SpO2 96%   BMI 25.78 kg/m²       Gen: Well developed, well nourished 47 y.o. male in no acute distress  Resp: clear to auscultation bilaterally, no wheezes rhonchi or rales, excursions normal and symmetrical  Cardio: Regular rate and rhythm, no murmurs, clicks, gallops or rubs, no edema Abdomen: soft, nontender, nondistended, normoactive bowel sounds, no hernias  Psych: alert and oriented to person, place and time        Recent Results (from the past 12 hour(s))   CULTURE, BODY FLUID W GRAM STAIN    Collection Time: 04/19/20 10:00 AM   Result Value Ref Range    Special Requests: L PELVID DRAIN     GRAM STAIN MANY WBCS SEEN      GRAM STAIN MANY GRAM POSITIVE COCCI IN CHAINS      Culture result: PENDING        Assessment:     Sonu Solis is a 47 y.o. male with a pelvic abscess      Physical Exam:      General:  in no apparent distress   Eyes:  conjunctivae and sclerae normal, pupils equal, round, reactive to light   Throat & Neck: no erythema or exudates noted and neck supple and symmetrical; no palpable masses   Lungs:   clear to auscultation bilaterally   Heart:  Regular rate and rhythm   Abdomen:   flat, soft, nontender, nondistended, no masses or organomegaly, feels better less pain. Extremities: extremities normal, atraumatic, no cyanosis or edema, varicose veins noted   Skin: Normal.       Plan:     -Continue current medications    Good progress. Abscess draiined. Feeling well. Start on regular diet.      Nicolle Vizcaino MD

## 2020-04-20 PROCEDURE — 65270000029 HC RM PRIVATE

## 2020-04-20 PROCEDURE — 74011250636 HC RX REV CODE- 250/636: Performed by: SURGERY

## 2020-04-20 PROCEDURE — 74011250637 HC RX REV CODE- 250/637: Performed by: COLON & RECTAL SURGERY

## 2020-04-20 PROCEDURE — 74011000258 HC RX REV CODE- 258: Performed by: SURGERY

## 2020-04-20 PROCEDURE — 74011250637 HC RX REV CODE- 250/637: Performed by: SURGERY

## 2020-04-20 RX ORDER — OXYCODONE AND ACETAMINOPHEN 5; 325 MG/1; MG/1
1-2 TABLET ORAL
Status: DISCONTINUED | OUTPATIENT
Start: 2020-04-20 | End: 2020-04-21 | Stop reason: HOSPADM

## 2020-04-20 RX ADMIN — PIPERACILLIN AND TAZOBACTAM 3.38 G: 3; .375 INJECTION, POWDER, LYOPHILIZED, FOR SOLUTION INTRAVENOUS at 23:56

## 2020-04-20 RX ADMIN — PIPERACILLIN AND TAZOBACTAM 3.38 G: 3; .375 INJECTION, POWDER, LYOPHILIZED, FOR SOLUTION INTRAVENOUS at 05:47

## 2020-04-20 RX ADMIN — LORAZEPAM 0.5 MG: 2 SOLUTION, CONCENTRATE ORAL at 18:48

## 2020-04-20 RX ADMIN — Medication 10 ML: at 14:00

## 2020-04-20 RX ADMIN — LORAZEPAM 0.5 MG: 2 SOLUTION, CONCENTRATE ORAL at 22:05

## 2020-04-20 RX ADMIN — PIPERACILLIN AND TAZOBACTAM 3.38 G: 3; .375 INJECTION, POWDER, LYOPHILIZED, FOR SOLUTION INTRAVENOUS at 18:00

## 2020-04-20 RX ADMIN — Medication 10 ML: at 22:05

## 2020-04-20 RX ADMIN — OXYCODONE HYDROCHLORIDE AND ACETAMINOPHEN 2 TABLET: 5; 325 TABLET ORAL at 20:16

## 2020-04-20 RX ADMIN — HEPARIN SODIUM 5000 UNITS: 5000 INJECTION INTRAVENOUS; SUBCUTANEOUS at 14:00

## 2020-04-20 RX ADMIN — PIPERACILLIN AND TAZOBACTAM 3.38 G: 3; .375 INJECTION, POWDER, LYOPHILIZED, FOR SOLUTION INTRAVENOUS at 12:44

## 2020-04-20 RX ADMIN — HEPARIN SODIUM 5000 UNITS: 5000 INJECTION INTRAVENOUS; SUBCUTANEOUS at 22:05

## 2020-04-20 RX ADMIN — LORAZEPAM 0.5 MG: 2 SOLUTION, CONCENTRATE ORAL at 09:58

## 2020-04-20 RX ADMIN — Medication 10 ML: at 05:47

## 2020-04-20 NOTE — PROGRESS NOTES
Reason for Admission:  Sigmoid diverticulitis [K57.32]  Perirectal abscess [K61.1]  Pelvic abscess in male [facility]) [K65.1]                 RRAT Score:    9%            Plan for utilizing home health:    Yes, [facility] signed for [address]                      Likelihood of Readmission:   LOW                         Transition of Care Plan:      Return home with spouse        Initial assessment completed with patient. Cognitive status of patient: oriented to time, place, person and situation. Face sheet information confirmed:  yes. The patient designates spouse, Majo Patricia to participate in his discharge plan and to receive any needed information. This patient lives in a single family home with spouse. Patient is able to navigate steps as needed. Prior to hospitalization, patient was considered to be independent with ADLs/IADLS : yes . Patient has a current ACP document on file: no  The spouse will be available to transport patient home upon discharge. The patient has no medical equipment available in the home. Patient is not currently active with home health. Patient has not stayed in a skilled nursing facility or rehab. This patient is on dialysis :no    List of available Home Health agencies were provided and reviewed with the patient prior to discharge. Freedom of choice signed: yes, for [address]. Currently, the discharge plan is Home with [address] [place] [address] [facility]. The patient states that he can obtain his medications from the pharmacy, and take his medications as directed. Patient's current insurance is Self Pay. Was seen earlier today by Patient Accounts.        Care Management Interventions  PCP Verified by CM: No(does not have a PCP)  Mode of Transport at Discharge: Self  Transition of Care Consult (CM Consult): Discharge Planning, [address]: Yes  Current Support Network: Lives with Spouse  Confirm Follow Up Transport: Family  The Plan for Transition of Care is Related to the Following Treatment Goals : home vs home with home health  The Patient and/or Patient Representative was Provided with a Choice of Provider and Agrees with the Discharge Plan?: Yes  Freedom of Choice List was Provided with Basic Dialogue that Supports the Patient's Individualized Plan of Care/Goals, Treatment Preferences and Shares the Quality Data Associated with the Providers?: Yes  Discharge Location  Discharge Placement: Home with home health        Carrie Osborne RN - Outcomes Manager  [phone]

## 2020-04-20 NOTE — PROGRESS NOTES
Bedside and Verbal shift change report given to Rebekah Singh RN (oncoming nurse) by US Herring RN (offgoing nurse). Report included the following information SBAR, Kardex, Intake/Output and MAR.

## 2020-04-20 NOTE — ROUTINE PROCESS
1930 Bedside and Verbal shift change  Received from Zaid Mercer RN (outgoing nurse), to NIMO Moody (oncoming)  Pt. Is AOX 4. IV Sl, Pt. denies  pain at this time. Report included the following information SBAR, Kardex, Procedure Summary, Intake/Output, MAR, Recent Lab Results. Will resume care and monitor Pt. Condition. Pt. Educated on call bell when in need of help and assistance. Pt. denies understanding. 1945 Pt. Head to toe Assessment Done and documented. 2100  Pt denies pain. 2200  Pt. Given night medication. 2300  Pt. Resting comfortably in bed, no sign of distress. 0015  Pt. Made no complaints, resting comfortably. 0200  Pt. Resting comfortably in bed, eyes closed, easily awaken. 0400  Pt. Made no complaints. 0600  Pt. Able to rest and sleep well. Verbal and bedside Shift changed report given to Annabella Aponte RN (oncoming RN) on Pt. Condition. Report consisted of patients Situation, History, Activities, intake/output,  Background, Assessment and Recommendations(SBAR). Information from the following report(s) Kardex, order Summary, Lab results and MAR was reviewed with the receiving nurse. Opportunity for questions and clarification was provided.

## 2020-04-20 NOTE — PROGRESS NOTES
Inpatient Daily Progress Note    Subjective: Bree Sawyer  Pain is well controlled. has not had nausea has not vomiting has passed flatus has had a bowel movement    Objective:       Physical Exam:  Visit Vitals  /69 (BP 1 Location: Left arm, BP Patient Position: At rest)   Pulse 73   Temp 97 °F (36.1 °C)   Resp 18   Ht 5' 8\" (1.727 m)   Wt 76.9 kg (169 lb 8.5 oz)   SpO2 95%   BMI 25.78 kg/m²       Gen: Well developed, well nourished 47 y.o. male in no acute distress  Resp: clear to auscultation bilaterally, no wheezes rhonchi or rales, excursions normal and symmetrical  Cardio: Regular rate and rhythm, no murmurs, clicks, gallops or rubs, no edema Abdomen: soft, nontender, nondistended, normoactive bowel sounds, no hernias  Psych: alert and oriented to person, place and time        No results found for this or any previous visit (from the past 12 hour(s)). Assessment:     Bree Sawyer is a 47 y.o. male with a pelvic abscess aparrently from Diverticulitis    Physical Exam:      General:  in no apparent distress   Eyes:  conjunctivae and sclerae normal, pupils equal, round, reactive to light   Throat & Neck: no erythema or exudates noted and neck supple and symmetrical; no palpable masses   Lungs:   clear to auscultation bilaterally   Heart:  Regular rate and rhythm   Abdomen:   flat, soft, nontender, nondistended, no masses or organomegaly  Abdomen is soft. Drain in the buttock working;.  clearish drainage. No pus. Pt feels better   Extremities: extremities normal, atraumatic, no cyanosis or edema   Skin: Normal.       Plan:     -Continue current medications  Good progress. Hope for D/C tomorrow.    Yan El MD

## 2020-04-21 ENCOUNTER — HOME HEALTH ADMISSION (OUTPATIENT)
Dept: HOME HEALTH SERVICES | Facility: HOME HEALTH | Age: 55
End: 2020-04-21

## 2020-04-21 VITALS
WEIGHT: 169.53 LBS | RESPIRATION RATE: 18 BRPM | HEIGHT: 68 IN | DIASTOLIC BLOOD PRESSURE: 74 MMHG | HEART RATE: 69 BPM | SYSTOLIC BLOOD PRESSURE: 131 MMHG | OXYGEN SATURATION: 95 % | BODY MASS INDEX: 25.69 KG/M2 | TEMPERATURE: 97.5 F

## 2020-04-21 LAB
BACTERIA SPEC CULT: ABNORMAL
BACTERIA SPEC CULT: ABNORMAL
BACTERIA SPEC CULT: NORMAL
GRAM STN SPEC: ABNORMAL
GRAM STN SPEC: ABNORMAL
SERVICE CMNT-IMP: ABNORMAL
SERVICE CMNT-IMP: NORMAL

## 2020-04-21 PROCEDURE — 74011250637 HC RX REV CODE- 250/637: Performed by: SURGERY

## 2020-04-21 PROCEDURE — 74011000258 HC RX REV CODE- 258: Performed by: SURGERY

## 2020-04-21 PROCEDURE — 74011250636 HC RX REV CODE- 250/636: Performed by: SURGERY

## 2020-04-21 PROCEDURE — 74011250637 HC RX REV CODE- 250/637: Performed by: COLON & RECTAL SURGERY

## 2020-04-21 RX ORDER — METRONIDAZOLE 500 MG/1
500 TABLET ORAL 3 TIMES DAILY
Qty: 30 TAB | Refills: 1 | Status: SHIPPED | OUTPATIENT
Start: 2020-04-21 | End: 2020-05-11 | Stop reason: ALTCHOICE

## 2020-04-21 RX ORDER — OXYCODONE AND ACETAMINOPHEN 5; 325 MG/1; MG/1
1-2 TABLET ORAL
Qty: 15 TAB | Refills: 0 | Status: SHIPPED | OUTPATIENT
Start: 2020-04-21 | End: 2020-04-24

## 2020-04-21 RX ORDER — CIPROFLOXACIN 250 MG/1
500 TABLET, FILM COATED ORAL EVERY 12 HOURS
Qty: 20 TAB | Refills: 1 | Status: SHIPPED | OUTPATIENT
Start: 2020-04-21 | End: 2020-05-11 | Stop reason: ALTCHOICE

## 2020-04-21 RX ADMIN — PIPERACILLIN AND TAZOBACTAM 3.38 G: 3; .375 INJECTION, POWDER, LYOPHILIZED, FOR SOLUTION INTRAVENOUS at 05:13

## 2020-04-21 RX ADMIN — HEPARIN SODIUM 5000 UNITS: 5000 INJECTION INTRAVENOUS; SUBCUTANEOUS at 05:13

## 2020-04-21 RX ADMIN — LORAZEPAM 0.5 MG: 2 SOLUTION, CONCENTRATE ORAL at 09:09

## 2020-04-21 RX ADMIN — OXYCODONE HYDROCHLORIDE AND ACETAMINOPHEN 2 TABLET: 5; 325 TABLET ORAL at 08:36

## 2020-04-21 RX ADMIN — OXYCODONE HYDROCHLORIDE AND ACETAMINOPHEN 2 TABLET: 5; 325 TABLET ORAL at 01:31

## 2020-04-21 RX ADMIN — Medication 10 ML: at 05:13

## 2020-04-21 NOTE — PROGRESS NOTES
Problem: General Infection Care Plan (Adult and Pediatric)  Goal: Improvement in signs and symptoms of infection  Outcome: Progressing Towards Goal  Goal: *Optimize nutritional status  Outcome: Progressing Towards Goal     Problem: Patient Education: Go to Patient Education Activity  Goal: Patient/Family Education  Outcome: Progressing Towards Goal     Problem: General Medical Care Plan  Goal: *Vital signs within specified parameters  Outcome: Progressing Towards Goal  Goal: *Labs within defined limits  Outcome: Progressing Towards Goal  Goal: *Absence of infection signs and symptoms  Outcome: Progressing Towards Goal  Goal: *Optimal pain control at patient's stated goal  Outcome: Progressing Towards Goal  Goal: *Skin integrity maintained  Outcome: Progressing Towards Goal  Goal: *Fluid volume balance  Outcome: Progressing Towards Goal  Goal: *Optimize nutritional status  Outcome: Progressing Towards Goal  Goal: *Anxiety reduced or absent  Outcome: Progressing Towards Goal  Goal: *Progressive mobility and function (eg: ADL's)  Outcome: Progressing Towards Goal     Problem: Patient Education: Go to Patient Education Activity  Goal: Patient/Family Education  Outcome: Progressing Towards Goal     Problem: Falls - Risk of  Goal: *Absence of Falls  Description: Document Jacky Fall Risk and appropriate interventions in the flowsheet.   Outcome: Progressing Towards Goal  Note: Fall Risk Interventions:    Medication Interventions: Teach patient to arise slowly    Problem: Patient Education: Go to Patient Education Activity  Goal: Patient/Family Education  Outcome: Progressing Towards Goal

## 2020-04-21 NOTE — PROGRESS NOTES
End of Shift Note     Bedside and verbal shift change report given to yves (On coming nurse) by jeaneth (Off going nurse).   Report included the following information:      --Procedure Summary     --MAR,     --Recent Results     --Med Rec Status    SBAR Recommendations:   Issues for Provider to address          Activity This Shift     [] Bed Rest Order   [] Refused   [] Dangled    [] TDWB         Ambulating:     [x] Bathroom     [] BSC     [x] Room/Hallway      Up in Chair for meals    []Yes [] No   Voiding       [x] Yes  [] No  Magana          [] Yes  [] No  Incontinent [] Yes  [] No    DUE TO VOID POUR        [] Yes [] No  Purewick    [] Yes [] No  New Onset [] Yes [] No Straight Cath   []Yes  [] No  Condom Cath  [] Yes [] No  MD Called      [] Yes  [] No   Blood Sugars Managed []Yes [x] No    Bowels Moved [x] Yes [] No    Incontinent     [] Yes [] No Passed Gas []Yes [] No    New Onset  []Yes [] No        MD Called []Yes  [] No     CHG Bath Done     Before Surgery     After Surgery      [] Yes  [] No  [] Yes  [] No       Drain Removed [] Yes  [] No [x] N/A    Dressing Changed [] Yes   [x] No [] N/A      Nausea/Vomiting [] Yes   [x] No     Ice Packs Changed [] Yes   [] No  [x] N/A    Incentive Spirometer  [x] Yes  [] No      SCD Pumps On     Ankle Pumping  [x] Yes   [] No      [] Yes   [x] No        Telemetry Monitoring [] Yes   [x] No   Rhythm

## 2020-04-21 NOTE — PROGRESS NOTES
Patient is not available to be assessed at this time. Provided Spiritual Care Pamphlet.       [id number] [address]   [phone]

## 2020-04-21 NOTE — DISCHARGE INSTRUCTIONS
Patient Education        Anorectal Abscess Surgery: What to Expect at Home  Your Recovery  Most of the pain that was caused by your abscess will probably go away right after surgery. But you may have some mild pain in your anal area from the incision for several days after the surgery. Most people can go back to work or their normal routine 1 or 2 days after surgery. It will probably take about 2 to 3 weeks for your abscess to completely heal.  Most people get better without any problems. But sometimes a tunnel can form between the old abscess and the outside of the body. This is called a fistula. Your doctor will check for this about 2 to 3 weeks after surgery. If you develop a fistula, the doctor will do surgery to repair the fistula. This care sheet gives you a general idea about how long it will take for you to recover. But each person recovers at a different pace. Follow the steps below to get better as quickly as possible. How can you care for yourself at home? Activity    · Rest when you feel tired. Getting enough sleep will help you recover.     · Try to walk each day. Start by walking a little more than you did the day before. Bit by bit, increase the amount you walk. Walking boosts blood flow and helps prevent pneumonia and constipation.     · Ask your doctor when you can drive again.     · Most people are able to return to work 1 or 2 days after surgery.     · You may shower. Let water run over the abscess area. This will help the abscess heal. Pat your anal area dry with a towel when you are done. Diet    · You can eat your normal diet. If your stomach is upset, try bland, low-fat foods like plain rice, broiled chicken, toast, and yogurt.     · Drink plenty of fluids (unless your doctor tells you not to).   · Eat a low-fiber diet for a couple days or as your doctor suggests. It is best to eat many small meals throughout the day.  Add high-fiber foods a little at a time.     · You may notice that your bowel movements are not regular right after your surgery. This is common. Try to avoid constipation and straining with bowel movements. If you have not had a bowel movement after a couple of days, ask your doctor about taking a mild laxative. Medicines    · Your doctor will tell you if and when you can restart your medicines. He or she will also give you instructions about taking any new medicines.     · If you take aspirin or some other blood thinner, ask your doctor if and when to start taking it again. Make sure that you understand exactly what your doctor wants you to do.     · Take pain medicines exactly as directed. ? If the doctor gave you a prescription medicine for pain, take it as prescribed. ? If you are not taking a prescription pain medicine, ask your doctor if you can take an over-the-counter medicine.     · If your doctor prescribed antibiotics, take them as directed. Do not stop taking them just because you feel better. You need to take the full course of antibiotics.     · If you think your pain medicine is making you sick to your stomach:  ? Take your medicine after meals (unless your doctor has told you not to). ? Ask your doctor for a different pain medicine. Incision care    · Wash your anal area daily with warm, soapy water, and pat it dry. Don't use hydrogen peroxide or alcohol, which can slow healing. You may cover the area with a gauze bandage if it weeps or rubs against clothing. Change the bandage every day.     · After a bowel movement, use a baby wipe or take a shower or sitz bath to gently clean the anal area.     · If your doctor put gauze in your abscess during surgery, follow his or her instructions about when to remove it. Other instructions    · Place a maxi pad or gauze in your underwear to absorb drainage from your abscess while it heals.     · Sit in a few inches of warm water (sitz bath) for 15 to 20 minutes 3 times a day.  Do this as long as you have pain in your anal area.     · Apply ice several times a day for 10 to 20 minutes at a time. Put a thin cloth between the ice and your skin.     · Support your feet with a small step stool when you sit on the toilet. This helps flex your hips and places your pelvis in a squatting position. This can make bowel movements easier after surgery.     · Try lying on your stomach with a pillow under your hips to decrease swelling. Follow-up care is a key part of your treatment and safety. Be sure to make and go to all appointments, and call your doctor if you are having problems. It's also a good idea to know your test results and keep a list of the medicines you take. When should you call for help? Call 911 anytime you think you may need emergency care. For example, call if:    · You passed out (lost consciousness).     · You are short of breath. [place] your doctor now or seek immediate medical care if:    · You are sick to your stomach and cannot drink fluids.     · You have signs of a blood clot in your leg (called a deep vein thrombosis), such as:  ? Pain in your calf, back of the knee, thigh, or groin. ? Redness and swelling in your leg or groin.     · You have signs of infection, such as:  ? Increased pain, swelling, warmth, or redness. ? Red streaks leading from the incision. ? Pus draining from the incision. ? A fever.     · You cannot pass stools or gas.     · Bright red blood has soaked through the bandage over your incision.     · You have pain that does not get better after you take pain medicine.    Watch closely for any changes in your health, and be sure to contact your doctor if you have any problems. Where can you learn more? Go to http://cheryl-angelina.info/  Enter H717 in the search box to learn more about \"Anorectal Abscess Surgery: What to Expect at Home. \"  Current as of: August 11, 2019Content Version: 12.4  © 2585-6168 Healthwise, Incorporated.   Care instructions adapted under license by [employer] (which disclaims liability or warranty for this information). If you have questions about a medical condition or this instruction, always ask your healthcare professional. [address] any warranty or liability for your use of this information. Patient Education        Surgical Drain Care: Care Instructions  Your Care Instructions    After a surgery, fluid may collect inside your body in the surgical area. This makes an infection or other problems more likely. A surgical drain allows the fluid to flow out. The doctor puts a thin, flexible rubber tube into the area of your body where the fluid is likely to collect. The rubber tube carries the fluid outside your body. The most common type of surgical drain carries the fluid into a collection bulb that you empty. This is called a Fermin-Cardoza (URSULA) drain. The drain uses suction created by the bulb to pull the fluid from your body into the bulb. The rubber tube will probably be held in place by one or two stitches in your skin. The bulb will probably be attached with a safety pin to your clothes or near the bandage so that it doesn't flip around or pull on the stitches. Another type of drain is called a Penrose drain. This type of drain doesn't have a bulb. Instead, the end of the tube is open. That allows the fluid to drain onto a dressing taped to your skin. The drain may be kept in place next to your skin with a stitch or a safety pin in the tube. When you first get the drain, the fluid will be bloody. It will change color from red to pink to a light yellow or clear as the wound heals and the fluid starts to go away. Your doctor may give you information on when you no longer need the drain and when it will be removed. Follow-up care is a key part of your treatment and safety. Be sure to make and go to all appointments, and call your doctor if you are having problems.  It's also a good idea to know your test results and keep a list of the medicines you take. How do you empty the bulb of a Fermin-Cardoza drain? Follow any instructions your doctor gives you. How often you empty the bulb depends on how much fluid is draining. Empty the bulb when it is half full. 1. Wash your hands with soap and water. 2. Take the plug out of the bulb. 3. Empty the bulb. If your doctor asks you to measure the fluid, empty the fluid into a measuring cup, and write down the color and how much you collected. Your doctor will want to know this information. 4. Clean the plug with alcohol. 5. Squeeze the bulb until it is flat. This removes all the air from the bulb. You may need to put the bulb on a table or a counter to flatten it. 6. Keep the bulb flat, and put the plug in. The bulb should stay flat after you put the plug back in. This creates the suction that pulls the fluid into the bulb. 7. Empty the fluid into the toilet. 8. Wash your hands. How do you change the dressing around your surgical drain? You may have a dressing (bandage). The dressing is often made of gauze pads held on with tape. Your doctor will tell you how often to change it. 1. Wash your hands with soap and water. 2. Take off the dressing from around the drain. 3. Clean the drain site and the skin around it with soap and water. Use gauze or a cotton swab. 4. When the site is dry, put on a new dressing. The way your dressing is put on depends on what kind of drain you have. You will get instructions for your type of drain. 5. Wash your hands again with soap and water. Your doctor may ask you to keep track of your dressing changes. Write down the time of day and the amount and color of the fluid on the dressing. How do you help prevent clogs in your surgical drain? Squeezing or \"milking\" the tube of your surgical drain can help prevent clogs so that it drains correctly. Your doctor will tell you when you need to do this.  In general, you do this when:  · You see a clot in the tube that prevents fluid from draining. The clot may look like a dark, stringy lining. · You see fluid leaking around the tube where it goes into the skin. Follow these steps for milking the tube. 1. Use one hand to hold and pinch the tube where it leaves the skin. 2. With the thumb and first finger of your other hand, pinch the tube just below where you're holding it. 3. Slowly and firmly push your thumb and first finger down the tubing toward the end of the tube. 4. Repeat this as many times as needed to move the clot. If you have a Fermin-Cardoza (URSULA) drain, the clot should move down the tube and into the bulb. If you have a Penrose drain, the clot should move into the dressing. When should you call for help? Call your doctor now or seek immediate medical care if:    · You have signs of infection, such as:  ? Increased pain, swelling, warmth, or redness around the area. ? Red streaks leading from the area. ? Pus draining from the area. ? A fever.     · You see a sudden change in the color or smell of the drainage.     · The tube is coming loose where it leaves your skin.    Watch closely for changes in your health, and be sure to contact your doctor if:    · You see a lot of fluid around the drain.     · You cannot remove a clot from the tube by milking the tube. Where can you learn more? Go to http://cheryl-angelina.info/  Enter K117 in the search box to learn more about \"Surgical Drain Care: Care Instructions. \"  Current as of: June 26, 2019Content Version: 12.4  © 4581-5513 Healthwise, Incorporated. Care instructions adapted under license by [employer] (which disclaims liability or warranty for this information). If you have questions about a medical condition or this instruction, always ask your healthcare professional. [place] [address] any warranty or liability for your use of this information.     DISCHARGE SUMMARY from Nurse    PATIENT INSTRUCTIONS:    After general anesthesia or intravenous sedation, for 24 hours or while taking prescription Narcotics:  · Limit your activities  · Do not drive and operate hazardous machinery  · Do not make important personal or business decisions  · Do  not drink alcoholic beverages  · If you have not urinated within 8 hours after discharge, please contact your surgeon on call. Report the following to your surgeon:  · Excessive pain, swelling, redness or odor of or around the surgical area  · Temperature over 100.5  · Nausea and vomiting lasting longer than 4 hours or if unable to take medications  · Any signs of decreased circulation or nerve impairment to extremity: change in color, persistent  numbness, tingling, coldness or increase pain  · Any questions    What to do at Home:  Recommended activity  *  Please give a list of your current medications to your Primary Care Provider. *  Please update this list whenever your medications are discontinued, doses are      changed, or new medications (including over-the-counter products) are added. *  Please carry medication information at all times in case of emergency situations. These are general instructions for a healthy lifestyle:    No smoking/ No tobacco products/ Avoid exposure to second hand smoke  Surgeon General's Warning:  Quitting smoking now greatly reduces serious risk to your health. Obesity, smoking, and sedentary lifestyle greatly increases your risk for illness    A healthy diet, regular physical exercise & weight monitoring are important for maintaining a healthy lifestyle    You may be retaining fluid if you have a history of heart failure or if you experience any of the following symptoms:  Weight gain of 3 pounds or more overnight or 5 pounds in a week, increased swelling in our hands or feet or shortness of breath while lying flat in bed.   Please call your doctor as soon as you notice any of these symptoms; do not wait until your next office visit. The discharge information has been reviewed with the patient. The patient verbalized understanding. Discharge medications reviewed with the patient and appropriate educational materials and side effects teaching were provided.   ___________________________________________________________________________________________________________________________________

## 2020-04-21 NOTE — PROGRESS NOTES
Discharge plannning    Writer called Dr. Chinmay Valles concerning [facility] orders. Telephone order received for SN for forward flush for drain and education. Met with patient and discussed discharge plan. The Plan for Transition of Care is related to the following treatment goals:  home with home health    The Patient  was provided with a choice of provider and agrees   with the discharge plan. [x] Yes [] No    Freedom of choice list was provided with basic dialogue that supports the patient's individualized plan of care/goals, treatment preferences and shares the quality data associated with the providers. [x] Yes [] No  Freedom of choice obtained for [address] and referral sent and spoke with Kristin Vera. Discharge order noted for today. Pt has been accepted to [facility] agency. Met with patient  agreeable to the transition plan today. Transport has been arranged with wife. . Patient's discharge summary and home health  orders have been forwarded to [facility] home health  agency via [employer]. Updated bedside RN, Cathy,  to the transition plan.   Discharge information has been documented on the AVS.       ZHANE Grimm, RN  Pager # [phone]  Care Manager

## 2020-04-21 NOTE — DISCHARGE SUMMARY
General Surgery Discharge Note    Admission Date: 4/18/2020    Discharge Date:     Admission Diagnosis:  Abdominal pain     Discharge Diagnosis: .diag  Pelvic abscess requiring percutaneous drainage    Procedures Performed:   Percutaneous drainage    Hospital Course:  Patient was admitted on 4/18/2020 for abdominal pain . The Interventional radiology staff was able to place a percutaneous drain without significant complication. Patient admitted to the floor postoperatively, monitored as per protocol. Diet sequentially advanced beginning POD 1, pain medications transitioned to oral during the hospital course. At the time of discharge the patient is afebrile, vital signs stable,  tolerating a diet, voiding spontaneously, ambulatory with adequate pain control with oral medications and clear surgical sites without evidence of infection. Condition on Discharge:  good    Follow-up care :   Follow-up in one week in the clinic ([address] [place] [address] instructions provided)    Disposition:  home    Discharge Medications:      Current Facility-Administered Medications   Medication Dose Route Frequency    oxyCODONE-acetaminophen (PERCOCET) 5-325 mg per tablet 1-2 Tab  1-2 Tab Oral Q4H PRN    fentaNYL citrate (PF) injection 12.5-50 mcg  12.5-50 mcg IntraVENous Multiple    midazolam (VERSED) injection 1 mg  1 mg IntraVENous Multiple    sodium chloride (NS) flush 5-40 mL  5-40 mL IntraVENous Q8H    sodium chloride (NS) flush 5-40 mL  5-40 mL IntraVENous PRN    piperacillin-tazobactam (ZOSYN) 3.375 g in 0.9% sodium chloride (MBP/ADV) 100 mL MBP  3.375 g IntraVENous Q6H    heparin (porcine) injection 5,000 Units  5,000 Units SubCUTAneous Q8H    LORazepam (INTENSOL) 2 mg/mL oral concentrate 0.5 mg  0.5 mg Oral TID    HYDROmorphone (DILAUDID) injection 1 mg  1 mg IntraVENous Q2H PRN       Local wound care with daily showers, keep wounds clean and dry    Activity: as desired, no lifting greater than 15lbs or situps for 30 days    Special Instructions:   No driving until activity is not influenced by incisional pain and off narcotics   No bath or hot tub until wounds are healed   Notify [facility] for a fever>101, redness or foul-smelling  drainage from incision, or worsening pain. Followup with surgeon in 10-14 days.

## 2020-04-21 NOTE — CDMP QUERY
Patient admitted with   diverticular abscess . ER notes indicate  \" DDx: Diverticulitis, Perirectal abscess, Sepsis\" If possible, please document in the progress notes and discharge summary if   SEPSIS  was: 
 
Julianna Rocha  Ruled out after study  Treated and resolved   Confirmed after study The medical record reflects the following: 
 
  Risk Factors: abscess Clinical Indicators: WBC   23.3; Temp 102.3 on admit   lactic acid     0.8;   HR  89;  RR  13 Treatment: drainage of abscess IV abx Documented Source of suspected or confirmed infection as manifested by 2 or more of the following, not easily explained by another co-existing condition: 
; Temperature:  >38C (100.9F)   -OR-  <36C  (96.8F) 
; Heart Rate:  > 90 bpm 
; Respiratory Rate:  > 20 / min  -OR-  PaCO2 < 32 
; WBC:  > 12K  -OR- < 4K  -OR- Bands > 10 Thank you,   Dwayne Mcgee RN  CCDS   [phone]

## 2020-04-21 NOTE — HOME CARE
Discharge noted for today. Received home health referral for [facility] for SN - drain care. Spoke with patient, explained services and answered all questions. Demographics verified. Order processed and emailed to central office. Patient has the following DME: none.  Norm Deleon [facility] Liaison

## 2020-04-21 NOTE — ROUTINE PROCESS
I have reviewed discharge instructions with the patient. The patient verbalized understanding. Discharge medications reviewed with patient and appropriate educational materials and side effects teaching were provided. Patient armband removed and shredded. Current Discharge Medication List  
  
START taking these medications Details  
oxyCODONE-acetaminophen (PERCOCET) 5-325 mg per tablet Take 1-2 Tabs by mouth every four (4) hours as needed for Pain for up to 3 days. Max Daily Amount: 12 Tabs. Qty: 15 Tab, Refills: 0 Associated Diagnoses: Pelvic abscess in [facility])  
  
ciprofloxacin HCl (CIPRO) 250 mg tablet Take 2 Tabs by mouth every twelve (12) hours. Indications: diverticulitis Qty: 20 Tab, Refills: 1  
  
metroNIDAZOLE (FLAGYL) 500 mg tablet Take 1 Tab by mouth three (3) times daily. Qty: 30 Tab, Refills: 1

## 2020-04-21 NOTE — PROGRESS NOTES
Tu Goldstein paged for Pain medication. Patient had bad reaction to IV pain medication last night requesting a pain pill instead of IV medicine. Orders received for Percocet 1-2 tabs po Q 4 hours for pain prn. URSULA drain flushed with 10cc NS.

## 2020-04-22 ENCOUNTER — HOME CARE VISIT (OUTPATIENT)
Dept: SCHEDULING | Facility: HOME HEALTH | Age: 55
End: 2020-04-22

## 2020-04-22 PROCEDURE — G0299 HHS/HOSPICE OF RN EA 15 MIN: HCPCS

## 2020-04-22 PROCEDURE — 400013 HH SOC

## 2020-04-23 ENCOUNTER — HOME CARE VISIT (OUTPATIENT)
Dept: SCHEDULING | Facility: HOME HEALTH | Age: 55
End: 2020-04-23

## 2020-04-23 ENCOUNTER — HOME CARE VISIT (OUTPATIENT)
Dept: HOME HEALTH SERVICES | Facility: HOME HEALTH | Age: 55
End: 2020-04-23

## 2020-04-23 VITALS
TEMPERATURE: 97.6 F | HEART RATE: 80 BPM | OXYGEN SATURATION: 98 % | RESPIRATION RATE: 20 BRPM | SYSTOLIC BLOOD PRESSURE: 110 MMHG | DIASTOLIC BLOOD PRESSURE: 80 MMHG

## 2020-04-23 PROCEDURE — G0300 HHS/HOSPICE OF LPN EA 15 MIN: HCPCS

## 2020-04-24 ENCOUNTER — HOME CARE VISIT (OUTPATIENT)
Dept: HOME HEALTH SERVICES | Facility: HOME HEALTH | Age: 55
End: 2020-04-24

## 2020-04-24 ENCOUNTER — VIRTUAL VISIT (OUTPATIENT)
Dept: FAMILY MEDICINE CLINIC | Facility: CLINIC | Age: 55
End: 2020-04-24

## 2020-04-24 DIAGNOSIS — K65.1 PELVIC ABSCESS IN MALE (HCC): Primary | ICD-10-CM

## 2020-04-24 DIAGNOSIS — F32.A DEPRESSION, UNSPECIFIED DEPRESSION TYPE: ICD-10-CM

## 2020-04-24 RX ORDER — BUPROPION HYDROCHLORIDE 75 MG/1
75 TABLET ORAL 2 TIMES DAILY
Qty: 60 TAB | Refills: 0 | Status: SHIPPED | OUTPATIENT
Start: 2020-04-24 | End: 2020-06-01 | Stop reason: ALTCHOICE

## 2020-04-24 NOTE — PROGRESS NOTES
Consent: Scar Paulino, who was seen by synchronous (real-time) audio-video technology, and/or his healthcare decision maker, is aware that this patient-initiated, Telehealth encounter on 4/24/2020 is a billable service, with coverage as determined by his insurance carrier. He is aware that he may receive a bill and has provided verbal consent to proceed: Yes. This service was provided through telemedicine services in real-time. .  The patient is at his residence and I am at [place]          Number elevated. ASSESSMENT and PLAN    ICD-10-CM ICD-9-CM    1. Pelvic abscess in male [facility]) K65.1 567.22     Secondary to diverticulitis with drainage tube. Will follow along with specialist.  Kevin Mcnair to be improving off is minimal if at the appointment did did not lo   2. Depression, unspecified depression type F32.9 311 buPROPion (WELLBUTRIN) 75 mg tablet    The patient is also trying to stop smoking. Will trial Wellbutrin if unsuccessful tried Zoloft and trazodone in combination, or other combination         Health Maintenance Due   Topic Date Due    Hepatitis C Screening  1965    Pneumococcal 0-64 years (1 of 1 - PPSV23) 12/21/1971    DTaP/Tdap/Td series (1 - Tdap) 12/21/1986    Lipid Screen  12/21/2005    Shingrix Vaccine Age 50> (1 of 2) 12/21/2015    FOBT Q1Y Age 50-75  12/21/2015           712  Subjective: Scar Paulino is a 47 y.o. male who was seen for hospital follow-up  The patient is status post hospital admission 4/18/2020 discharge with pelvic abscess requiring percutaneous drainage last white count 23,000 on 4/18/2020 chemistries were unremarkable including LFTs    Diverticulitis  Twice seen for this problem. Second time he was kept in a drainage was done. This was for a diverticular abscess. He is due for follow-up with his colon doctor on Monday. Still on the antibioticsMucous and cramping admitted to intermittently but this is improving. Not too much pain  Nervous cant sleep, he says  Out of work at this time because of the pandemic in his weights heavily on his mind. This also affects his sleep. He is a . The patient states that he also started smoking again after a 5-year hiatus because of his anxiety. He like to stop before it becomes a routine habit. Started smoking 5 years      Prior to Admission medications    Medication Sig Start Date End Date Taking? Authorizing Provider   oxyCODONE-acetaminophen (PERCOCET) 5-325 mg per tablet Take 1-2 Tabs by mouth every four (4) hours as needed for Pain for up to 3 days. Max Daily Amount: 12 Tabs. 4/21/20 4/24/20  Darcie Loredo MD   ciprofloxacin HCl (CIPRO) 250 mg tablet Take 2 Tabs by mouth every twelve (12) hours. Indications: diverticulitis 4/21/20   Darcie Loredo MD   metroNIDAZOLE (FLAGYL) 500 mg tablet Take 1 Tab by mouth three (3) times daily. 4/21/20   Darcie Loredo MD   ondansetron (Zofran ODT) 4 mg disintegrating tablet Take 1 Tab by mouth every eight (8) hours as needed for Nausea or Vomiting. 3/27/20   Camilla Hill MD     No Known Allergies  has Diverticulitis, Leukocytosis, Renal cyst, Abscess of abdominal cavity ([address] Tobacco abuse, Perirectal abscess, Sigmoid diverticulitis, and Pelvic abscess in [facility]) on their problem list.  No past surgical history on file. .    Patient Active Problem List   Diagnosis Code    Diverticulitis K57.92    Leukocytosis D72.829    Renal cyst N28.1    Abscess of abdominal cavity (HCC) K65.1    Tobacco abuse Z72.0    Perirectal abscess K61.1    Sigmoid diverticulitis K57.32    Pelvic abscess in [facility]) K65.1     Current Outpatient Medications   Medication Sig Dispense Refill    buPROPion (WELLBUTRIN) 75 mg tablet Take 1 Tab by mouth two (2) times a day. 60 Tab 0    ciprofloxacin HCl (CIPRO) 250 mg tablet Take 2 Tabs by mouth every twelve (12) hours.  Indications: diverticulitis 20 Tab 1    metroNIDAZOLE (FLAGYL) 500 mg tablet Take 1 Tab by mouth three (3) times daily. 30 Tab 1    ondansetron (Zofran ODT) 4 mg disintegrating tablet Take 1 Tab by mouth every eight (8) hours as needed for Nausea or Vomiting. 10 Tab 0     No Known Allergies  No past medical history on file. No past surgical history on file. Review of Systems   Constitutional: Negative for fever. HENT: Negative for hearing loss. Respiratory: Negative for cough, shortness of breath and wheezing. Cardiovascular: Negative for chest pain, claudication and leg swelling. Gastrointestinal: Positive for abdominal pain. Negative for blood in stool, constipation, diarrhea, heartburn, melena, nausea and vomiting. Musculoskeletal: Negative for back pain. Psychiatric/Behavioral: Positive for depression. Negative for suicidal ideas. The patient is not nervous/anxious. Objective: There were no vitals taken for this visit.    General: alert, cooperative, no distress   Mental  status: normal mood, behavior, speech, dress, motor activity, and thought processes, able to follow commands   HENT: NCAT   Neck: no visualized mass   Musculoskeletal:  Negative   Resp: no respiratory distress   Neuro: no gross deficits   Skin: no discoloration or lesions of concern on visible areas   Psychiatric: normal affect, consistent with stated mood, no evidence of hallucinations     Additional exam findings:   Results for orders placed or performed during the hospital encounter of 04/18/20   CULTURE, BLOOD   Result Value Ref Range    Special Requests: NO SPECIAL REQUESTS      Culture result: NO GROWTH 5 DAYS     CULTURE, BLOOD   Result Value Ref Range    Special Requests: NO SPECIAL REQUESTS      Culture result: NO GROWTH 5 DAYS     CULTURE, BODY FLUID W GRAM STAIN   Result Value Ref Range    Special Requests: L PELVID DRAIN     GRAM STAIN MANY WBCS SEEN      GRAM STAIN MANY GRAM POSITIVE COCCI IN CHAINS      Culture result: Culture performed on Unspun Fluid Culture result: HEAVY MICROAEROPHILIC STREPTOCOCCUS (A)     CULTURE, ANAEROBIC   Result Value Ref Range    Special Requests: L PELVIC DRAIN     Culture result: NO ANAEROBES ISOLATED     CBC WITH AUTOMATED DIFF   Result Value Ref Range    WBC 23.3 (H) 4.6 - 13.2 K/uL    RBC 4.40 (L) 4.70 - 5.50 M/uL    HGB 13.1 13.0 - 16.0 g/dL    HCT 38.4 36.0 - 48.0 %    MCV 87.3 74.0 - 97.0 FL    MCH 29.8 24.0 - 34.0 PG    MCHC 34.1 31.0 - 37.0 g/dL    RDW 13.7 11.6 - 14.5 %    PLATELET 379 096 - 637 K/uL    MPV 10.1 9.2 - 11.8 FL    NEUTROPHILS 73 42 - 75 %    BAND NEUTROPHILS 3 0 - 5 %    LYMPHOCYTES 8 (L) 20 - 51 %    MONOCYTES 16 (H) 2 - 9 %    EOSINOPHILS 0 0 - 5 %    BASOPHILS 0 0 - 3 %    ABS. NEUTROPHILS 17.0 (H) 1.8 - 8.0 K/UL    ABS. LYMPHOCYTES 1.9 0.8 - 3.5 K/UL    ABS. MONOCYTES 3.7 (H) 0 - 1.0 K/UL    ABS. EOSINOPHILS 0.0 0.0 - 0.4 K/UL    ABS. BASOPHILS 0.0 0.0 - 0.1 K/UL    PLATELET COMMENTS ADEQUATE PLATELETS      RBC COMMENTS NORMOCYTIC, NORMOCHROMIC      WBC COMMENTS REACTIVE LYMPHS      DF MANUAL     METABOLIC PANEL, COMPREHENSIVE   Result Value Ref Range    Sodium 136 136 - 145 mmol/L    Potassium 3.6 3.5 - 5.5 mmol/L    Chloride 103 100 - 111 mmol/L    CO2 27 21 - 32 mmol/L    Anion gap 6 3.0 - 18 mmol/L    Glucose 100 (H) 74 - 99 mg/dL    BUN 11 7.0 - 18 MG/DL    Creatinine 0.60 0.6 - 1.3 MG/DL    BUN/Creatinine ratio 18 12 - 20      GFR est AA >60 >60 ml/min/1.73m2    GFR est non-AA >60 >60 ml/min/1.73m2    Calcium 9.1 8.5 - 10.1 MG/DL    Bilirubin, total 1.2 (H) 0.2 - 1.0 MG/DL    ALT (SGPT) 41 16 - 61 U/L    AST (SGOT) 20 10 - 38 U/L    Alk.  phosphatase 155 (H) 45 - 117 U/L    Protein, total 7.7 6.4 - 8.2 g/dL    Albumin 3.2 (L) 3.4 - 5.0 g/dL    Globulin 4.5 (H) 2.0 - 4.0 g/dL    A-G Ratio 0.7 (L) 0.8 - 1.7     LIPASE   Result Value Ref Range    Lipase 37 (L) 73 - 393 U/L   LACTIC ACID   Result Value Ref Range    Lactic acid 0.8 0.4 - 2.0 MMOL/L   URINALYSIS W/ RFLX MICROSCOPIC   Result Value Ref Range    Color DARK YELLOW      Appearance CLEAR      Specific gravity >1.030 (H) 1.005 - 1.030    pH (UA) 6.0 5.0 - 8.0      Protein 30 (A) NEG mg/dL    Glucose Negative NEG mg/dL    Ketone 15 (A) NEG mg/dL    Bilirubin SMALL (A) NEG      Blood TRACE (A) NEG      Urobilinogen 1.0 0.2 - 1.0 EU/dL    Nitrites Negative NEG      Leukocyte Esterase TRACE (A) NEG     PTT   Result Value Ref Range    aPTT 31.6 23.0 - 36.4 SEC   PROTHROMBIN TIME + INR   Result Value Ref Range    Prothrombin time 14.0 11.5 - 15.2 sec    INR 1.1 0.8 - 1.2     URINE MICROSCOPIC ONLY   Result Value Ref Range    WBC 1 to 3 0 - 4 /hpf    RBC 1 to 2 0 - 5 /hpf    Epithelial cells FEW 0 - 5 /lpf    Bacteria FEW (A) NEG /hpf         We discussed the expected course, resolution and complications of the diagnosis(es) in detail. Medication risks, benefits, costs, interactions, and alternatives were discussed as indicated. I advised him to contact the office if his condition worsens, changes or fails to improve as anticipated. He expressed understanding with the diagnosis(es) and plan. Scar Paulino is a 47 y.o. male being evaluated by a video visit encounter for concerns as above. A caregiver was present when appropriate. Due to this being a TeleHealth encounter ([facility]), evaluation of the following organ systems was limited: Vitals/Constitutional/EENT/Resp/CV/GI//MS/Neuro/Skin/Heme-Lymph-Imm. Pursuant to the emergency declaration under the [facility], 1135 waiver authority and the [employer] and [employer] Act, this Virtual  Visit was conducted, with patient's (and/or legal guardian's) consent, to reduce the patient's risk of exposure to COVID-19 and provide necessary medical care. Maurizio Parisi MD      This note was done with the assistance of dragon speech software.   Some inadvertent errors or omissions may be present

## 2020-04-25 ENCOUNTER — HOME CARE VISIT (OUTPATIENT)
Dept: SCHEDULING | Facility: HOME HEALTH | Age: 55
End: 2020-04-25

## 2020-04-25 PROCEDURE — G0299 HHS/HOSPICE OF RN EA 15 MIN: HCPCS

## 2020-04-26 VITALS
DIASTOLIC BLOOD PRESSURE: 78 MMHG | TEMPERATURE: 98.6 F | OXYGEN SATURATION: 98 % | SYSTOLIC BLOOD PRESSURE: 120 MMHG | HEART RATE: 76 BPM | RESPIRATION RATE: 20 BRPM

## 2020-04-27 ENCOUNTER — OFFICE VISIT (OUTPATIENT)
Dept: SURGERY | Age: 55
End: 2020-04-27

## 2020-04-27 VITALS
BODY MASS INDEX: 25.01 KG/M2 | WEIGHT: 165 LBS | OXYGEN SATURATION: 100 % | TEMPERATURE: 97.7 F | DIASTOLIC BLOOD PRESSURE: 85 MMHG | HEIGHT: 68 IN | HEART RATE: 88 BPM | SYSTOLIC BLOOD PRESSURE: 121 MMHG | RESPIRATION RATE: 17 BRPM

## 2020-04-27 VITALS
OXYGEN SATURATION: 100 % | SYSTOLIC BLOOD PRESSURE: 130 MMHG | DIASTOLIC BLOOD PRESSURE: 80 MMHG | HEART RATE: 87 BPM | TEMPERATURE: 97.9 F

## 2020-04-27 DIAGNOSIS — K57.32 DIVERTICULITIS OF COLON: Primary | ICD-10-CM

## 2020-04-27 RX ORDER — AMOXICILLIN AND CLAVULANATE POTASSIUM 875; 125 MG/1; MG/1
1 TABLET, FILM COATED ORAL EVERY 12 HOURS
Qty: 28 TAB | Refills: 0 | Status: SHIPPED | OUTPATIENT
Start: 2020-04-27 | End: 2020-05-11

## 2020-04-27 RX ORDER — HYDROCODONE BITARTRATE AND ACETAMINOPHEN 5; 325 MG/1; MG/1
1 TABLET ORAL
COMMUNITY
Start: 2013-09-16 | End: 2022-02-20

## 2020-04-27 NOTE — PROGRESS NOTES
Subjective: He is tolerating a diet and moving bowels. Some cramping with po intake, but otherwise denies pain. Minimal drain output, has been getting it flushed. Past medical history and ROS were reviewed and unchanged. abd soft, NTND  Drain with serosanguinous output    Assessment / Plan    Perforated diverticulitis with large pelvic abscess, s/p IR drain  Drain study and CT next week  F/U in 2 weeks  Change to Augmentin for 10 days based on cultures  Have recommended colonoscopy then surgery    A total of 15 minutes was spent with the patient, with >50% of time spent on counseling and coordination of care. The diagnoses and plan were discussed with patient. All questions answered. Plan of care agreed to by all concerned.

## 2020-04-27 NOTE — LETTER
4/27/20 Patient: Chato Gutierrez YOB: 1965 Date of Visit: 4/27/2020 Carlyle Hawk, [address] [place] [address] VIA In Basket Dear Dayanna Mccord Brooke is seen in the office after recent hospitalization for diverticulitis with large pelvic abscess. The abscess was drained by IR and he was discharged with PO abx. Today he appears healthy and his exam is benign. I will order repeat CT and drain study next week and hope to have the drain removed. I have recommended a colonoscopy and surgery following resolution of this attack. If you have questions, please do not hesitate to call me. I look forward to following your patient along with you. Sincerely, Shani Gray MD

## 2020-04-27 NOTE — PROGRESS NOTES
HPI: Mesfin Chance is a 47 y.o. male presenting with chief complain of *** Past Medical History:  
Diagnosis Date  Diverticulitis of intestine with abscess  Family history of diverticulitis of colon   
 mother and brother Past Surgical History:  
Procedure Laterality Date  HX HERNIA REPAIR  2012  
 inguinal repair with mesh Family History Problem Relation Age of Onset  Colon Polyps Maternal Grandfather   
     pre cancerous polyp removed Social History Socioeconomic History  Marital status:  Spouse name: Not on file  Number of children: Not on file  Years of education: Not on file  Highest education level: Not on file Tobacco Use  Smoking status: Current Every Day Smoker Packs/day: 1.00  Smokeless tobacco: Never Used Substance and Sexual Activity  Alcohol use: No  
 Drug use: No  
 
 
Review of Systems - Review of Systems Constitutional: Negative. HENT: Negative. Eyes: Positive for blurred vision. Negative for double vision, photophobia, pain, discharge and redness. Respiratory: Negative. Cardiovascular: Negative. Gastrointestinal: Positive for abdominal pain. Negative for blood in stool, constipation, diarrhea, heartburn, melena, nausea and vomiting. Some abd cramping Genitourinary: Negative. Musculoskeletal: Positive for back pain. Negative for falls, joint pain, myalgias and neck pain. Skin: Positive for itching. Negative for rash. Around drain site Neurological: Positive for dizziness and headaches. Negative for tingling, tremors, sensory change, speech change, focal weakness, seizures, loss of consciousness and weakness. Endo/Heme/Allergies: Negative. Psychiatric/Behavioral: Positive for depression. Negative for hallucinations, memory loss, substance abuse and suicidal ideas. The patient is nervous/anxious and has insomnia. Outpatient Medications Marked as Taking for the 4/27/20 encounter (Office Visit) with Laury Donahue MD  
Medication Sig Dispense Refill  
 HYDROcodone-acetaminophen (NORCO) 5-325 mg per tablet Take 1 Tab by mouth.  buPROPion (WELLBUTRIN) 75 mg tablet Take 1 Tab by mouth two (2) times a day. 60 Tab 0  
 ciprofloxacin HCl (CIPRO) 250 mg tablet Take 2 Tabs by mouth every twelve (12) hours. Indications: diverticulitis 20 Tab 1  
 metroNIDAZOLE (FLAGYL) 500 mg tablet Take 1 Tab by mouth three (3) times daily. 30 Tab 1 No Known Allergies Vitals:  
 04/27/20 0941 Resp: 17 Weight: 74.8 kg (165 lb) Height: 5' 8\" (1.727 m) PainSc:   1 PainLoc: Abdomen Physical Exam 
 
Assessment / Plan 
 
*** The diagnoses and plan were discussed with the patient. All questions answered. Plan of care agreed to by all concerned.

## 2020-04-28 ENCOUNTER — HOME CARE VISIT (OUTPATIENT)
Dept: SCHEDULING | Facility: HOME HEALTH | Age: 55
End: 2020-04-28

## 2020-04-28 PROCEDURE — G0155 HHCP-SVS OF CSW,EA 15 MIN: HCPCS

## 2020-04-29 ENCOUNTER — HOME CARE VISIT (OUTPATIENT)
Dept: SCHEDULING | Facility: HOME HEALTH | Age: 55
End: 2020-04-29

## 2020-05-04 ENCOUNTER — HOSPITAL ENCOUNTER (OUTPATIENT)
Dept: INTERVENTIONAL RADIOLOGY/VASCULAR | Age: 55
Discharge: HOME | End: 2020-05-04
Attending: COLON & RECTAL SURGERY | Admitting: RADIOLOGY
Payer: MEDICAID

## 2020-05-04 ENCOUNTER — HOSPITAL ENCOUNTER (OUTPATIENT)
Dept: CT IMAGING | Age: 55
Discharge: HOME | End: 2020-05-04
Attending: COLON & RECTAL SURGERY
Payer: MEDICAID

## 2020-05-04 VITALS
RESPIRATION RATE: 16 BRPM | BODY MASS INDEX: 23.1 KG/M2 | TEMPERATURE: 98 F | HEART RATE: 74 BPM | HEIGHT: 71 IN | OXYGEN SATURATION: 98 % | DIASTOLIC BLOOD PRESSURE: 77 MMHG | SYSTOLIC BLOOD PRESSURE: 130 MMHG | WEIGHT: 165 LBS

## 2020-05-04 DIAGNOSIS — K57.32 DIVERTICULITIS OF COLON: ICD-10-CM

## 2020-05-04 PROCEDURE — 74011636320 HC RX REV CODE- 636/320: Performed by: RADIOLOGY

## 2020-05-04 PROCEDURE — 74011636320 HC RX REV CODE- 636/320: Performed by: COLON & RECTAL SURGERY

## 2020-05-04 PROCEDURE — 20501 NJX SINUS TRACT DIAGNOSTIC: CPT

## 2020-05-04 PROCEDURE — 74177 CT ABD & PELVIS W/CONTRAST: CPT

## 2020-05-04 RX ADMIN — IOHEXOL 50 ML: 300 INJECTION, SOLUTION INTRAVENOUS at 09:12

## 2020-05-04 RX ADMIN — IOPAMIDOL 100 ML: 612 INJECTION, SOLUTION INTRAVENOUS at 09:00

## 2020-05-04 NOTE — PROGRESS NOTES
7: 17 AM  Cath holding summary     Patient escorted to cath holding from waiting area ambulatory, alert and oriented x 4, voicing no complaints. Changed into gown and placed on monitor. NPO since MN. Lab results, med rec and H&P reviewed on chart. 9:30 AM  Pt. Returned from interventional radiology and discharge criteria met. The discharge information has been reviewed with the patient. The patient verbalized understanding. Discharge medications reviewed with the patient and appropriate educational materials and side effects teaching were provided. Patient armband removed and shredded.

## 2020-05-04 NOTE — DISCHARGE INSTRUCTIONS
DISCHARGE SUMMARY from Nurse    PATIENT INSTRUCTIONS:    After general anesthesia or intravenous sedation, for 24 hours or while taking prescription Narcotics:  · Limit your activities  · Do not drive and operate hazardous machinery  · Do not make important personal or business decisions  · Do  not drink alcoholic beverages  · If you have not urinated within 8 hours after discharge, please contact your surgeon on call. Report the following to your surgeon:  · Excessive pain, swelling, redness or odor of or around the surgical area  · Temperature over 100.5  · Nausea and vomiting lasting longer than 4 hours or if unable to take medications  · Any signs of decreased circulation or nerve impairment to extremity: change in color, persistent  numbness, tingling, coldness or increase pain  · Any questions    What to do at Home:  Recommended activity: Activity as tolerated. If you experience any of the following symptoms- chest pain, shortness of breath, One-sided weakness , please follow up with emergency services. *  Please give a list of your current medications to your Primary Care Provider. *  Please update this list whenever your medications are discontinued, doses are      changed, or new medications (including over-the-counter products) are added. *  Please carry medication information at all times in case of emergency situations. These are general instructions for a healthy lifestyle:    No smoking/ No tobacco products/ Avoid exposure to second hand smoke  Surgeon General's Warning:  Quitting smoking now greatly reduces serious risk to your health.     Obesity, smoking, and sedentary lifestyle greatly increases your risk for illness    A healthy diet, regular physical exercise & weight monitoring are important for maintaining a healthy lifestyle    You may be retaining fluid if you have a history of heart failure or if you experience any of the following symptoms:  Weight gain of 3 pounds or more overnight or 5 pounds in a week, increased swelling in our hands or feet or shortness of breath while lying flat in bed. Please call your doctor as soon as you notice any of these symptoms; do not wait until your next office visit. The discharge information has been reviewed with the patient. The patient verbalized understanding. Discharge medications reviewed with the patient and appropriate educational materials and side effects teaching were provided. Patient armband removed and shredded.      ___________________________________________________________________________________________________________________________________

## 2020-05-05 ENCOUNTER — HOME CARE VISIT (OUTPATIENT)
Dept: SCHEDULING | Facility: HOME HEALTH | Age: 55
End: 2020-05-05

## 2020-05-06 ENCOUNTER — HOME CARE VISIT (OUTPATIENT)
Dept: HOME HEALTH SERVICES | Facility: HOME HEALTH | Age: 55
End: 2020-05-06

## 2020-05-11 ENCOUNTER — TELEPHONE (OUTPATIENT)
Dept: SURGERY | Age: 55
End: 2020-05-11

## 2020-05-11 ENCOUNTER — OFFICE VISIT (OUTPATIENT)
Dept: SURGERY | Age: 55
End: 2020-05-11

## 2020-05-11 VITALS
HEART RATE: 81 BPM | HEIGHT: 71 IN | SYSTOLIC BLOOD PRESSURE: 135 MMHG | TEMPERATURE: 97.6 F | RESPIRATION RATE: 18 BRPM | DIASTOLIC BLOOD PRESSURE: 94 MMHG | WEIGHT: 167 LBS | BODY MASS INDEX: 23.38 KG/M2

## 2020-05-11 DIAGNOSIS — K57.20 DIVERTICULITIS OF LARGE INTESTINE WITH ABSCESS WITHOUT BLEEDING: Primary | ICD-10-CM

## 2020-05-11 NOTE — TELEPHONE ENCOUNTER
LVM for patient to call office he had no insurance listed and if he does not have any insurance he will need rayne form mailed to him and taken to business office window at [facility].

## 2020-05-11 NOTE — PROGRESS NOTES
Subjective: Tolerating diet and moving his bowels. Some slight cramping with bowel movements. Completing antibiotics. Past medical history and ROS were reviewed and unchanged. Abdomen: Soft, nontender nondistended  Digital rectal exam shows a hypertrophied anal papilla    Assessment / Plan    Diverticulitis with abscess status post IR drainage, recovered  Colonoscopy in 6 weeks  Surgery after that  Can excise his prolapsing hypertrophied anal papilla at the time of his operation if he wishes    A total of 15 minutes was spent with the patient, with >50% of time spent on counseling and coordination of care. The diagnoses and plan were discussed with patient. All questions answered. Plan of care agreed to by all concerned.

## 2020-05-11 NOTE — PROGRESS NOTES
Dennis Fong is a 47 y.o. male  Chief Complaint   Patient presents with    Follow-up     4/21/20 perforated diverticulitisis with lare pelvic abcess         Is someone accompanying this pt? no    Is the patient using any DME equipment during OV? no        Vitals:    05/11/20 1001   BP: (!) 135/94   Pulse: 81   Resp: 18   Temp: 97.6 °F (36.4 °C)   Weight: 167 lb (75.8 kg)   Height: 5' 11\" (1.803 m)        Depression Screening:  3 most recent PHQ Screens 5/11/2020   Little interest or pleasure in doing things Several days   Feeling down, depressed, irritable, or hopeless Several days   Total Score PHQ 2 2       Learning Assessment:  No flowsheet data found. Fall Risk  No flowsheet data found. Health Maintenance reviewed and discussed and ordered per Provider. Coordination of Care:  1. Have you been to the ER, urgent care clinic since your last visit? Hospitalized since your last visit? no    2. Have you seen or consulted any other health care providers outside of the [address] [place] [address] since your last visit? Include any pap smears or colon screening.  no

## 2020-05-12 ENCOUNTER — HOME CARE VISIT (OUTPATIENT)
Dept: SCHEDULING | Facility: HOME HEALTH | Age: 55
End: 2020-05-12

## 2020-05-14 ENCOUNTER — HOME CARE VISIT (OUTPATIENT)
Dept: HOME HEALTH SERVICES | Facility: HOME HEALTH | Age: 55
End: 2020-05-14

## 2020-05-15 NOTE — CDMP QUERY
Patient was admitted on 4/18/2020 for abdominal pain Deborah Reyes Patient had a pelvic abscess per Discharge Summary Deborah Reyes Per CT report of pelvic abscess drainage this was a deep pelvic diverticular abscess and a trocar needle was placed through the left but ox into the deep pelvic abscess. Appears this is a pelvic abscess and not an abscess contained within the intestine. If this was a different body part such as the pelvic area then it would be coded as an additional diagnosis being that the abscess is of a different body part. The diverticulitis with abscess combination code refers to the abscess of the intestine, the abdominal or pelvic abscess is coded separately. Please clarify  If  abscess  was :  
 
contained within the intestine  ? 
 
or   
 
contained  within  in pelvic cavity  ? Thank you,   Mateusz Nj RN   CCDS   [phone]

## 2020-05-16 ENCOUNTER — HOME CARE VISIT (OUTPATIENT)
Dept: SCHEDULING | Facility: HOME HEALTH | Age: 55
End: 2020-05-16

## 2020-05-16 PROCEDURE — G0299 HHS/HOSPICE OF RN EA 15 MIN: HCPCS

## 2020-05-18 VITALS
SYSTOLIC BLOOD PRESSURE: 122 MMHG | TEMPERATURE: 97 F | HEART RATE: 98 BPM | RESPIRATION RATE: 20 BRPM | OXYGEN SATURATION: 97 % | DIASTOLIC BLOOD PRESSURE: 80 MMHG

## 2020-05-20 NOTE — PHYSICIAN ADVISORY
Report created for Dr. Dong Clark Case ID : [id number] [facility] 1965 CT report during the first admission:  
Results CT ABD PELV W CONT (Accession [id number]) (Order [id number]) Status: Final result (Exam End: 3/25/2020 13:40) Provider Status: Open Study Result CT of abdomen and pelvis with contrast  
  
INDICATION: Intermittent lower abdominal pain 
  
COMPARISON: None. 
  
TECHNIQUE: 5 mm helical scan to the abdomen and pelvis is obtained  from the 
diaphragm to the symphysis pubis after uneventful nonionic IV contrast 
administration. 
  
All CT scans at this facility performed using dose optimization techniques as 
appreciated to a performed exam, to include automated exposure control, 
adjustment of the mA and or KU according to patient size (including appropriate 
matching for site specific examination), or use of iterative reconstruction 
technique. 
  
FINDINGS:  
  
Lung Bases: Clear. 
  
Liver: Normal. 
  
Gallbladder: Normal.  
  
Biliary System: No ductal dilatation. 
  
Spleen: Normal. 
  
Pancreas: Normal. 
  
Adrenal Glands: Normal. 
  
Kidneys: In the lateral upper pole, there is a large exophytic cystic lesion 
with lobulated contour identified and measures 5.6 x 5.5 x 7.4 cm. Likely thin 
septa noted at the medial aspect of the lesion. No definite enhancing mural 
nodule seen. 1.0 cm cortical cysts present at posterior midpole of right kidney 
and medial midpole of left kidney. 
  
Bowel: The small and large bowel are nondilated. Normal appendix. Extensive 
diverticula in the left-sided colon. Significant mural thickening and moderate 
surrounding inflammation and fluid identified in the distal sigmoid colon, 
consistent with diverticulitis. No definite free air seen. Although, subtle 
focal fluid collection in the prerectal region with thin enhancing wall 
identified on #77/2 and better seen on coronal #36 and roughly measures 3.0 x 
1.4 x 6.5 cm.   
Lower genitourinary system: The bladder is poorly distended. The prostate is not 
enlarged.  
  
Peritoneum/Retroperitoneum:  Multiple subcentimeter lymph nodes noted in the 
iliac chains with no pathologic lymphadenopathy. 
  
Vasculature: Mild atherosclerotic aortic disease.  
  
Other: Moderate fatty stranding and small free fluid in the pelvic floor. Faint 
focal fluid collection in perirectal region as mentioned above. 
  
CT OSSEOUS STRUCTURES:    
  
Unremarkable for age. 
  
IMPRESSION IMPRESSION:  
  
1. Evidence of complicated diverticulitis, probably subacute, with significant 
mural thickening and moderate surrounding stranding and fluid. Likely early 
performed abscess in perirectal region. 
  
2. Diverticulosis coli. 
  
3. Large lobulated cystic lesion with thin wall and thin septa in lateral upper 
pole right kidney. Recommend further evaluation by renal ultrasound. 2 small 
cortical cysts, one in each kidney. 
  
Thank you for this referral.   
 
 
 
Chemistry report Results for Zaria Hernández (MRN [id number]) as of 5/20/2020 09:43 Ref. Range 3/25/2020 10:40 3/25/2020 11:56 3/26/2020 02:05 4/18/2020 12:42 Sodium Latest Ref Range: 136 - 145 mmol/L 142  142 136 Potassium Latest Ref Range: 3.5 - 5.5 mmol/L 4.7  3.8 3.6 Chloride Latest Ref Range: 100 - 111 mmol/L 110  114 (H) 103 CO2 Latest Ref Range: 21 - 32 mmol/L 29  23 27 Anion gap Latest Ref Range: 3.0 - 18 mmol/L 3  5 6 Glucose Latest Ref Range: 74 - 99 mg/dL 109 (H)  102 (H) 100 (H) BUN Latest Ref Range: 7.0 - 18 MG/DL 16  14 11 Creatinine Latest Ref Range: 0.6 - 1.3 MG/DL 0.76  0.56 (L) 0.60  
BUN/Creatinine ratio Latest Ref Range: 12 - 20   21 (H)  25 (H) 18 Calcium Latest Ref Range: 8.5 - 10.1 MG/DL 9.5  8.1 (L) 9.1 GFR est non-AA Latest Ref Range: >60 ml/min/1.73m2 >60  >60 >60  
GFR est AA Latest Ref Range: >60 ml/min/1.73m2 >60  >60 >60 Bilirubin, total Latest Ref Range: 0.2 - 1.0 MG/DL 0.5   1.2 (H) Protein, total Latest Ref Range: 6.4 - 8.2 g/dL 8.0   7.7 Albumin Latest Ref Range: 3.4 - 5.0 g/dL 3.6   3.2 (L) Globulin Latest Ref Range: 2.0 - 4.0 g/dL 4.4 (H)   4.5 (H) A-G Ratio Latest Ref Range: 0.8 - 1.7   0.8   0.7 (L) ALT (SGPT) Latest Ref Range: 16 - 61 U/L 31   41 AST Latest Ref Range: 10 - 38 U/L 14   20 Alk. phosphatase Latest Ref Range: 45 - 117 U/L 118 (H)   155 (H) Lipase Latest Ref Range: 73 - 393 U/L 100   37 (L) Lactic acid Latest Ref Range: 0.4 - 2.0 MMOL/L  1.0  0.8 Valeria Santana MD MPH FACP  
9:52 AM 
5/20/2020

## 2020-05-31 NOTE — PROGRESS NOTES
Consent: Sonu Solis, who was seen by synchronous (real-time) audio-video technology, and/or his healthcare decision maker, is aware that this patient-initiated, Telehealth encounter on 6/1/2020 is a billable service, with coverage as determined by his insurance carrier. He is aware that he may receive a bill and has provided verbal consent to proceed: Yes. The patient was at home and I was at the office,and no one else participated in the service. ASSESSMENT and PLAN    ICD-10-CM ICD-9-CM    1. Pelvic abscess in male [facility]) H88.9 395.39     For repeat colonoscopy on June 26, 2020. Question of whether or not he may need a colon resection. 2. Depression, unspecified depression type F32.9 311 escitalopram oxalate (LEXAPRO) 5 mg tablet    Stop Wellbutrin. Begin a trial of Lexapro 5 mg daily. Advised not to use his sisters and benzodiazepines. 3. Renal lesion N28.9 593.9     For follow-up with urology. No symptoms at this time. 4. Current smoker F17.200 305.1     Strongly advised to stop smoking. We will assist as requested. lab results and schedule of future lab studies reviewed with patient  Health Maintenance Due   Topic Date Due    Hepatitis C Screening  1965    Pneumococcal 0-64 years (1 of 1 - PPSV23) 12/21/1971    DTaP/Tdap/Td series (1 - Tdap) 12/21/1986    Lipid Screen  12/21/2005    Shingrix Vaccine Age 50> (1 of 2) 12/21/2015    FOBT Q1Y Age 50-75  12/21/2015   Plan to begin work-up on his health maintenance once he is able to focus on it post follow-up with his specialists. I spent at least 15 minutes with this established patient, and >50% of the time was spent counseling and/or coordinating care regarding Anxiety, depression, pelvic abscess from diverticulitis, due for follow-up with GI, renal lesion for 2 for follow-up with the urologist.  He was strongly advised to stop smoking. 712  Subjective:    Sonu Solis is a 47 y.o. male who was seen for follow up.  The patient is status post drainage of a diverticular abscess. He is scheduled for follow-up colonoscopy on 26 June. In addition he was found to have a cyst on the right kidney that is multiloculated. He will follow-up with urology for this problem. Anxiety/depression  Onset; the patient has always had anxiety and was a teenager, on lithium. He thinks he may have been diagnosed with bipolar disorder at that time. He took himself off of the medication. He has recently had an increase in his anxiety because, due to the viral pandemic, he is not able to work as much. This is compounded by his medical condition. Character generalized free-floating anxiety that situational.  Associations none. Time course this is a constant problem with interference with sleep. In addition he smokes and states that the smoking may be increased slightly. Exacerbating factors lack of work and his medical condition. , relieving factors nothing. The patient states that the Wellbutrin that he was administered previously did not work. He stopped taking it. In addition he did borrow a few Xanax from his sister which she states did help. Severity moderate. Current smoker  The patient is smoking less than a pack a day. He is been a smoker since he was a teenager. He is trying to cut down but the anxiety is making it difficult. Wellbutrin did not help. He does not want to try any other medication until he gets his anxiety under control. Current Outpatient Medications   Medication Sig    HYDROcodone-acetaminophen (NORCO) 5-325 mg per tablet Take 1 Tab by mouth.  buPROPion (WELLBUTRIN) 75 mg tablet Take 1 Tab by mouth two (2) times a day.  ondansetron (Zofran ODT) 4 mg disintegrating tablet Take 1 Tab by mouth every eight (8) hours as needed for Nausea or Vomiting. No current facility-administered medications for this visit.       No Known Allergies  has Diverticulitis, Leukocytosis, Renal cyst, Abscess of abdominal cavity ([address] Tobacco abuse, Perirectal abscess, Sigmoid diverticulitis, and Pelvic abscess in male [facility]) on their problem list.  Past Surgical History:   Procedure Laterality Date    HX HERNIA REPAIR  2012    inguinal repair with mesh     Relationships   Social connections    Talks on phone: Not on file    Gets together: Not on file    Attends Yarsanism service: Not on file    Active member of club or organization: Not on file    Attends meetings of clubs or organizations: Not on file    Relationship status: Not on file     family history includes Colon Polyps in his maternal grandfather. Review of Systems   Constitutional: Negative for chills, fever and malaise/fatigue. HENT: Negative for congestion and sore throat. Eyes: Negative for blurred vision and redness. Respiratory: Negative for cough, shortness of breath and wheezing. Cardiovascular: Negative for chest pain and leg swelling. Gastrointestinal: Positive for abdominal pain (He states that the left lower quadrant abdominal pain is improving.). Negative for blood in stool, constipation, diarrhea and heartburn. Genitourinary: Negative for dysuria, hematuria and urgency. Musculoskeletal: Negative for joint pain and myalgias. Neurological: Negative for dizziness, sensory change, speech change and focal weakness. Endo/Heme/Allergies: Does not bruise/bleed easily. Psychiatric/Behavioral: Positive for depression. Negative for substance abuse and suicidal ideas. The patient is nervous/anxious and has insomnia. Objective: There were no vitals taken for this visit. General: alert, cooperative, no distress   Mental  status: normal mood, behavior, speech, dress, motor activity, and thought processes, able to follow commands   HENT: NCAT. Pupils are equal.  Eyelids normal.  No obvious oral lesions are noted. Neck: no visualized mass   Musculoskeletal: Able to move the extremities without difficulty.    Resp: no respiratory distress. Adequate excursion. Neuro: no gross deficits. Skin: . No discoloration or lesions of concern on visible areas. Psychiatric:  The patient appears anxious. No hallucinations noted. He is oriented. He is presently at his job site doing light duty. Results for orders placed or performed during the hospital encounter of 04/18/20   CULTURE, BLOOD   Result Value Ref Range    Special Requests: NO SPECIAL REQUESTS      Culture result: NO GROWTH 6 DAYS     CULTURE, BLOOD   Result Value Ref Range    Special Requests: NO SPECIAL REQUESTS      Culture result: NO GROWTH 6 DAYS     CULTURE, BODY FLUID W GRAM STAIN   Result Value Ref Range    Special Requests: L PELVID DRAIN     GRAM STAIN MANY WBCS SEEN      GRAM STAIN MANY GRAM POSITIVE COCCI IN CHAINS      Culture result: Culture performed on Unspun Fluid      Culture result: HEAVY MICROAEROPHILIC STREPTOCOCCUS (A)     CULTURE, ANAEROBIC   Result Value Ref Range    Special Requests: L PELVIC DRAIN     Culture result: NO ANAEROBES ISOLATED     CBC WITH AUTOMATED DIFF   Result Value Ref Range    WBC 23.3 (H) 4.6 - 13.2 K/uL    RBC 4.40 (L) 4.70 - 5.50 M/uL    HGB 13.1 13.0 - 16.0 g/dL    HCT 38.4 36.0 - 48.0 %    MCV 87.3 74.0 - 97.0 FL    MCH 29.8 24.0 - 34.0 PG    MCHC 34.1 31.0 - 37.0 g/dL    RDW 13.7 11.6 - 14.5 %    PLATELET 733 731 - 671 K/uL    MPV 10.1 9.2 - 11.8 FL    NEUTROPHILS 73 42 - 75 %    BAND NEUTROPHILS 3 0 - 5 %    LYMPHOCYTES 8 (L) 20 - 51 %    MONOCYTES 16 (H) 2 - 9 %    EOSINOPHILS 0 0 - 5 %    BASOPHILS 0 0 - 3 %    ABS. NEUTROPHILS 17.0 (H) 1.8 - 8.0 K/UL    ABS. LYMPHOCYTES 1.9 0.8 - 3.5 K/UL    ABS. MONOCYTES 3.7 (H) 0 - 1.0 K/UL    ABS. EOSINOPHILS 0.0 0.0 - 0.4 K/UL    ABS.  BASOPHILS 0.0 0.0 - 0.1 K/UL    PLATELET COMMENTS ADEQUATE PLATELETS      RBC COMMENTS NORMOCYTIC, NORMOCHROMIC      WBC COMMENTS REACTIVE LYMPHS      DF MANUAL     METABOLIC PANEL, COMPREHENSIVE   Result Value Ref Range    Sodium 136 136 - 145 mmol/L    Potassium 3.6 3.5 - 5.5 mmol/L    Chloride 103 100 - 111 mmol/L    CO2 27 21 - 32 mmol/L    Anion gap 6 3.0 - 18 mmol/L    Glucose 100 (H) 74 - 99 mg/dL    BUN 11 7.0 - 18 MG/DL    Creatinine 0.60 0.6 - 1.3 MG/DL    BUN/Creatinine ratio 18 12 - 20      GFR est AA >60 >60 ml/min/1.73m2    GFR est non-AA >60 >60 ml/min/1.73m2    Calcium 9.1 8.5 - 10.1 MG/DL    Bilirubin, total 1.2 (H) 0.2 - 1.0 MG/DL    ALT (SGPT) 41 16 - 61 U/L    AST (SGOT) 20 10 - 38 U/L    Alk. phosphatase 155 (H) 45 - 117 U/L    Protein, total 7.7 6.4 - 8.2 g/dL    Albumin 3.2 (L) 3.4 - 5.0 g/dL    Globulin 4.5 (H) 2.0 - 4.0 g/dL    A-G Ratio 0.7 (L) 0.8 - 1.7     LIPASE   Result Value Ref Range    Lipase 37 (L) 73 - 393 U/L   LACTIC ACID   Result Value Ref Range    Lactic acid 0.8 0.4 - 2.0 MMOL/L   URINALYSIS W/ RFLX MICROSCOPIC   Result Value Ref Range    Color DARK YELLOW      Appearance CLEAR      Specific gravity >1.030 (H) 1.005 - 1.030    pH (UA) 6.0 5.0 - 8.0      Protein 30 (A) NEG mg/dL    Glucose Negative NEG mg/dL    Ketone 15 (A) NEG mg/dL    Bilirubin SMALL (A) NEG      Blood TRACE (A) NEG      Urobilinogen 1.0 0.2 - 1.0 EU/dL    Nitrites Negative NEG      Leukocyte Esterase TRACE (A) NEG     PTT   Result Value Ref Range    aPTT 31.6 23.0 - 36.4 SEC   PROTHROMBIN TIME + INR   Result Value Ref Range    Prothrombin time 14.0 11.5 - 15.2 sec    INR 1.1 0.8 - 1.2     URINE MICROSCOPIC ONLY   Result Value Ref Range    WBC 1 to 3 0 - 4 /hpf    RBC 1 to 2 0 - 5 /hpf    Epithelial cells FEW 0 - 5 /lpf    Bacteria FEW (A) NEG /hpf     No results found for any visits on 06/01/20. We discussed the expected course, resolution and complications of the diagnosis(es) in detail. Medication risks, benefits, costs, interactions, and alternatives were discussed as indicated. I advised him to contact the office if his condition worsens, changes or fails to improve as anticipated.  He expressed understanding with the diagnosis(es) and dejuanJonny Sosa is a 47 y.o. male being evaluated by a video visit encounter for concerns as above. A caregiver was present when appropriate. Due to this being a TeleHealth encounter (During JYWPT-64 public health emergency), evaluation of the following organ systems was limited: Vitals/Constitutional/EENT/Resp/CV/GI//MS/Neuro/Skin/Heme-Lymph-Imm. Pursuant to the emergency declaration under the [facility], [facility] waiver authority and the [employer] Act, this Virtual  Visit was conducted, with patient's (and/or legal guardian's) consent, to reduce the patient's risk of exposure to COVID-19 and provide necessary medical care. Sultana Zheng MD    This note was done with the assistance of dragon speech software.   Some inadvertent errors or omissions may be present

## 2020-06-01 ENCOUNTER — VIRTUAL VISIT (OUTPATIENT)
Dept: FAMILY MEDICINE CLINIC | Facility: CLINIC | Age: 55
End: 2020-06-01

## 2020-06-01 DIAGNOSIS — K65.1 PELVIC ABSCESS IN MALE (HCC): Primary | ICD-10-CM

## 2020-06-01 DIAGNOSIS — F32.A DEPRESSION, UNSPECIFIED DEPRESSION TYPE: ICD-10-CM

## 2020-06-01 DIAGNOSIS — N28.9 RENAL LESION: ICD-10-CM

## 2020-06-01 DIAGNOSIS — F17.200 CURRENT SMOKER: ICD-10-CM

## 2020-06-01 RX ORDER — ESCITALOPRAM OXALATE 5 MG/1
5 TABLET ORAL DAILY
Qty: 30 TAB | Refills: 2 | Status: SHIPPED | OUTPATIENT
Start: 2020-06-01 | End: 2022-02-20

## 2020-06-26 ENCOUNTER — TELEPHONE (OUTPATIENT)
Dept: SURGERY | Age: 55
End: 2020-06-26

## 2020-06-26 NOTE — TELEPHONE ENCOUNTER
On 6/25/2020 this patient returned my phone call. I had left several messages for  Him to confirm his colonoscopy on 6/26/2020. He stated that he needed to cancel the colonoscopy because he has a cyst on his kidney that he wants to take care of first.  He stated he has other health issues to address and will reschedule this procedure in the future. I told him I would call him with next available appointments to see if he wanted to get in sooner.

## 2022-02-20 ENCOUNTER — HOSPITAL ENCOUNTER (EMERGENCY)
Age: 57
Discharge: HOME | End: 2022-02-20
Attending: EMERGENCY MEDICINE
Payer: MEDICAID

## 2022-02-20 VITALS
SYSTOLIC BLOOD PRESSURE: 146 MMHG | HEIGHT: 72 IN | HEART RATE: 84 BPM | BODY MASS INDEX: 22.35 KG/M2 | WEIGHT: 165 LBS | TEMPERATURE: 98.5 F | OXYGEN SATURATION: 99 % | DIASTOLIC BLOOD PRESSURE: 74 MMHG | RESPIRATION RATE: 18 BRPM

## 2022-02-20 DIAGNOSIS — F41.1 ANXIETY STATE: Primary | ICD-10-CM

## 2022-02-20 LAB
ALBUMIN SERPL-MCNC: 3.8 G/DL (ref 3.4–5)
ALBUMIN/GLOB SERPL: 1.2 {RATIO} (ref 0.8–1.7)
ALP SERPL-CCNC: 79 U/L (ref 45–117)
ALT SERPL-CCNC: 33 U/L (ref 16–61)
AMPHET UR QL SCN: NEGATIVE
ANION GAP SERPL CALC-SCNC: 3 MMOL/L (ref 3–18)
APPEARANCE UR: NORMAL
AST SERPL-CCNC: 16 U/L (ref 10–38)
BARBITURATES UR QL SCN: NEGATIVE
BASOPHILS # BLD: 0.1 K/UL (ref 0–0.1)
BASOPHILS NFR BLD: 1 % (ref 0–2)
BENZODIAZ UR QL: NEGATIVE
BILIRUB DIRECT SERPL-MCNC: <0.1 MG/DL (ref 0–0.2)
BILIRUB SERPL-MCNC: 0.3 MG/DL (ref 0.2–1)
BILIRUB UR QL: NEGATIVE
BUN SERPL-MCNC: 18 MG/DL (ref 7–18)
BUN/CREAT SERPL: 26 (ref 12–20)
CALCIUM SERPL-MCNC: 9.6 MG/DL (ref 8.5–10.1)
CANNABINOIDS UR QL SCN: NEGATIVE
CHLORIDE SERPL-SCNC: 108 MMOL/L (ref 100–111)
CO2 SERPL-SCNC: 32 MMOL/L (ref 21–32)
COCAINE UR QL SCN: NEGATIVE
COLOR UR: YELLOW
COVID-19 RAPID TEST, COVR: NOT DETECTED
CREAT SERPL-MCNC: 0.69 MG/DL (ref 0.6–1.3)
DIFFERENTIAL METHOD BLD: NORMAL
EOSINOPHIL # BLD: 0 K/UL (ref 0–0.4)
EOSINOPHIL NFR BLD: 0 % (ref 0–5)
ERYTHROCYTE [DISTWIDTH] IN BLOOD BY AUTOMATED COUNT: 13.5 % (ref 11.6–14.5)
ETHANOL SERPL-MCNC: <3 MG/DL (ref 0–3)
GLOBULIN SER CALC-MCNC: 3.2 G/DL (ref 2–4)
GLUCOSE SERPL-MCNC: 106 MG/DL (ref 74–99)
GLUCOSE UR STRIP.AUTO-MCNC: NEGATIVE MG/DL
HCT VFR BLD AUTO: 39.7 % (ref 36–48)
HDSCOM,HDSCOM: NORMAL
HGB BLD-MCNC: 13.1 G/DL (ref 13–16)
HGB UR QL STRIP: NEGATIVE
IMM GRANULOCYTES # BLD AUTO: 0 K/UL (ref 0–0.04)
IMM GRANULOCYTES NFR BLD AUTO: 0 % (ref 0–0.5)
KETONES UR QL STRIP.AUTO: NEGATIVE MG/DL
LEUKOCYTE ESTERASE UR QL STRIP.AUTO: NEGATIVE
LYMPHOCYTES # BLD: 2.3 K/UL (ref 0.9–3.6)
LYMPHOCYTES NFR BLD: 24 % (ref 21–52)
MCH RBC QN AUTO: 29.8 PG (ref 24–34)
MCHC RBC AUTO-ENTMCNC: 33 G/DL (ref 31–37)
MCV RBC AUTO: 90.2 FL (ref 78–100)
METHADONE UR QL: NEGATIVE
MONOCYTES # BLD: 0.7 K/UL (ref 0.05–1.2)
MONOCYTES NFR BLD: 7 % (ref 3–10)
NEUTS SEG # BLD: 6.4 K/UL (ref 1.8–8)
NEUTS SEG NFR BLD: 68 % (ref 40–73)
NITRITE UR QL STRIP.AUTO: NEGATIVE
NRBC # BLD: 0 K/UL (ref 0–0.01)
NRBC BLD-RTO: 0 PER 100 WBC
OPIATES UR QL: NEGATIVE
PCP UR QL: NEGATIVE
PH UR STRIP: 7.5 [PH] (ref 5–8)
PLATELET # BLD AUTO: 384 K/UL (ref 135–420)
PMV BLD AUTO: 9.4 FL (ref 9.2–11.8)
POTASSIUM SERPL-SCNC: 4 MMOL/L (ref 3.5–5.5)
PROT SERPL-MCNC: 7 G/DL (ref 6.4–8.2)
PROT UR STRIP-MCNC: NEGATIVE MG/DL
RBC # BLD AUTO: 4.4 M/UL (ref 4.35–5.65)
SODIUM SERPL-SCNC: 143 MMOL/L (ref 136–145)
SOURCE, COVRS: NORMAL
SP GR UR REFRACTOMETRY: 1.01 (ref 1–1.03)
TSH SERPL DL<=0.05 MIU/L-ACNC: 1.05 UIU/ML (ref 0.36–3.74)
UROBILINOGEN UR QL STRIP.AUTO: 0.2 EU/DL (ref 0.2–1)
WBC # BLD AUTO: 9.5 K/UL (ref 4.6–13.2)

## 2022-02-20 PROCEDURE — 80076 HEPATIC FUNCTION PANEL: CPT

## 2022-02-20 PROCEDURE — 81003 URINALYSIS AUTO W/O SCOPE: CPT

## 2022-02-20 PROCEDURE — 80048 BASIC METABOLIC PNL TOTAL CA: CPT

## 2022-02-20 PROCEDURE — 84443 ASSAY THYROID STIM HORMONE: CPT

## 2022-02-20 PROCEDURE — 85025 COMPLETE CBC W/AUTO DIFF WBC: CPT

## 2022-02-20 PROCEDURE — 82077 ASSAY SPEC XCP UR&BREATH IA: CPT

## 2022-02-20 PROCEDURE — 99284 EMERGENCY DEPT VISIT MOD MDM: CPT

## 2022-02-20 PROCEDURE — 80307 DRUG TEST PRSMV CHEM ANLYZR: CPT

## 2022-02-20 PROCEDURE — 87635 SARS-COV-2 COVID-19 AMP PRB: CPT

## 2022-02-20 PROCEDURE — 74011250637 HC RX REV CODE- 250/637: Performed by: EMERGENCY MEDICINE

## 2022-02-20 RX ORDER — HYDROXYZINE PAMOATE 25 MG/1
25 CAPSULE ORAL
Qty: 10 CAPSULE | Refills: 0 | Status: SHIPPED | OUTPATIENT
Start: 2022-02-20 | End: 2022-03-06

## 2022-02-20 RX ORDER — TRAZODONE HYDROCHLORIDE 50 MG/1
25 TABLET ORAL
Qty: 4 TABLET | Refills: 0 | Status: SHIPPED | OUTPATIENT
Start: 2022-02-20 | End: 2022-02-27

## 2022-02-20 RX ORDER — LORAZEPAM 1 MG/1
1 TABLET ORAL
Status: COMPLETED | OUTPATIENT
Start: 2022-02-20 | End: 2022-02-20

## 2022-02-20 RX ORDER — IBUPROFEN 200 MG
1 TABLET ORAL DAILY
Status: DISCONTINUED | OUTPATIENT
Start: 2022-02-20 | End: 2022-02-20 | Stop reason: HOSPADM

## 2022-02-20 RX ADMIN — LORAZEPAM 1 MG: 1 TABLET ORAL at 13:38

## 2022-02-20 NOTE — ED TRIAGE NOTES
Patient just recently got out of nursing home, has multiple summons for court, this is making patient anxious and family anxious which increased patients anxiety, have difficulty sleeping
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
## 2022-02-20 NOTE — DISCHARGE INSTRUCTIONS
Please follow-up with the community outpatient behavior health providers and return if you are at all worsened or concerned. Keep the numbers for these national suicide hotlines nearby: 5-149-974-TALK (9-322.434.1241) and 6-127-HZZGUGT (6-932.622.1935). If you or someone you know talks about suicide or feeling hopeless, get help right away.

## 2022-02-20 NOTE — ED PROVIDER NOTES
EMERGENCY DEPARTMENT HISTORY AND PHYSICAL EXAM    1:08 PM      Date: 2/20/2022  Patient Name: Isaac Naranjo    History of Presenting Illness     Chief Complaint   Patient presents with    Anxiety         History Provided By: Patient  Location/Duration/Severity/Modifying factors   Patient is a 14-year-old male with a history of diverticulitis, behavioral health disease in childhood, presents emergency department with his family with approximately 6 months of progressive worsening of his behavior. The patient notes that his family encouraged him to come in because they are getting worried about him. The patient's had several run-ins with law enforcement and has been arrested several times in the last 6 months. The patient's family that is with him states that he has been having increasingly erratic behavior and including talking to himself and doing things that are normal for him. The patient has not been able to to work and the family has been encouraging him to come to the hospital.  The patient denies being suicidal or homicidal and does not so think he needs help but does want to make sure that he helps his family. The family has gone to the  in the past however they have not been able to detain the family member. The patient admits to being a smoker, denies drug use however occasionally drinks alcohol. The patient previously worked as a . The patient and family note that he has a hard time controlling his temper.           PCP: Kevon Fuentes MD    Current Facility-Administered Medications   Medication Dose Route Frequency Provider Last Rate Last Admin    nicotine (NICODERM CQ) 14 mg/24 hr patch 1 Patch  1 Patch TransDERmal DAILY Lydia Ochoa MD           Past History     Past Medical History:  Past Medical History:   Diagnosis Date    Anxiety     Diverticulitis of intestine with abscess     Family history of diverticulitis of colon     mother and brother       Past Surgical History:  Past Surgical History:   Procedure Laterality Date    HX HERNIA REPAIR  2012    inguinal repair with mesh       Family History:  Family History   Problem Relation Age of Onset    Colon Polyps Maternal Grandfather         pre cancerous polyp removed       Social History:  Social History     Tobacco Use    Smoking status: Current Every Day Smoker     Packs/day: 1.00    Smokeless tobacco: Never Used   Substance Use Topics    Alcohol use: No    Drug use: No       Allergies:  No Known Allergies      Review of Systems       Review of Systems   Constitutional: Negative for activity change, fatigue and fever. HENT: Negative for congestion and rhinorrhea. Eyes: Negative for visual disturbance. Respiratory: Negative for shortness of breath. Cardiovascular: Negative for chest pain and palpitations. Gastrointestinal: Negative for abdominal pain, diarrhea, nausea and vomiting. Genitourinary: Negative for dysuria and hematuria. Musculoskeletal: Negative for back pain. Skin: Negative for rash. Neurological: Negative for dizziness, weakness and light-headedness. Psychiatric/Behavioral: Positive for agitation and hallucinations. All other systems reviewed and are negative. Physical Exam     Visit Vitals  BP (!) 146/74 (BP 1 Location: Left upper arm, BP Patient Position: Sitting)   Pulse 84   Temp 98.5 °F (36.9 °C)   Resp 18   Ht 6' (1.829 m)   Wt 74.8 kg (165 lb)   SpO2 99%   BMI 22.38 kg/m²         Physical Exam  Vitals and nursing note reviewed. Constitutional:       General: He is not in acute distress. Appearance: He is well-developed. Comments: Thin, tangential, peculiar affect   HENT:      Head: Normocephalic and atraumatic. Right Ear: External ear normal.      Left Ear: External ear normal.      Nose: Nose normal.   Eyes:      General: No scleral icterus.      Conjunctiva/sclera: Conjunctivae normal.      Pupils: Pupils are equal, round, and reactive to light.   Neck:      Thyroid: No thyromegaly. Vascular: No JVD. Trachea: No tracheal deviation. Cardiovascular:      Rate and Rhythm: Normal rate and regular rhythm. Heart sounds: Normal heart sounds. No murmur heard. No friction rub. No gallop. Pulmonary:      Effort: Pulmonary effort is normal.      Breath sounds: Normal breath sounds. Chest:      Chest wall: No tenderness. Abdominal:      General: Bowel sounds are normal. There is no distension. Palpations: Abdomen is soft. Tenderness: There is no abdominal tenderness. There is no guarding or rebound. Musculoskeletal:         General: No tenderness. Normal range of motion. Cervical back: Normal range of motion and neck supple. Lymphadenopathy:      Cervical: No cervical adenopathy. Skin:     General: Skin is warm and dry. Neurological:      Mental Status: He is alert and oriented to person, place, and time. Cranial Nerves: No cranial nerve deficit.       Coordination: Coordination normal.      Comments: No sensory loss, Gait normal, Motor 5/5   Psychiatric:      Comments: Anxious appearing, speaks fast, poor insight to his behavioral health as compared to what is family describes, admits to hearing voices, denies suicidal thoughts           Diagnostic Study Results     Labs -  Recent Results (from the past 12 hour(s))   URINALYSIS W/ RFLX MICROSCOPIC    Collection Time: 02/20/22 10:17 AM   Result Value Ref Range    Color YELLOW      Appearance TURBID      Specific gravity 1.012 1.005 - 1.030      pH (UA) 7.5 5.0 - 8.0      Protein Negative NEG mg/dL    Glucose Negative NEG mg/dL    Ketone Negative NEG mg/dL    Bilirubin Negative NEG      Blood Negative NEG      Urobilinogen 0.2 0.2 - 1.0 EU/dL    Nitrites Negative NEG      Leukocyte Esterase Negative NEG     DRUG SCREEN, URINE    Collection Time: 02/20/22 10:17 AM   Result Value Ref Range    BENZODIAZEPINES Negative NEG      BARBITURATES Negative NEG      THC (TH-CANNABINOL) Negative NEG      OPIATES Negative NEG      PCP(PHENCYCLIDINE) Negative NEG      COCAINE Negative NEG      AMPHETAMINES Negative NEG      METHADONE Negative NEG      HDSCOM (NOTE)    CBC WITH AUTOMATED DIFF    Collection Time: 02/20/22 10:30 AM   Result Value Ref Range    WBC 9.5 4.6 - 13.2 K/uL    RBC 4.40 4.35 - 5.65 M/uL    HGB 13.1 13.0 - 16.0 g/dL    HCT 39.7 36.0 - 48.0 %    MCV 90.2 78.0 - 100.0 FL    MCH 29.8 24.0 - 34.0 PG    MCHC 33.0 31.0 - 37.0 g/dL    RDW 13.5 11.6 - 14.5 %    PLATELET 401 331 - 031 K/uL    MPV 9.4 9.2 - 11.8 FL    NRBC 0.0 0  WBC    ABSOLUTE NRBC 0.00 0.00 - 0.01 K/uL    NEUTROPHILS 68 40 - 73 %    LYMPHOCYTES 24 21 - 52 %    MONOCYTES 7 3 - 10 %    EOSINOPHILS 0 0 - 5 %    BASOPHILS 1 0 - 2 %    IMMATURE GRANULOCYTES 0 0.0 - 0.5 %    ABS. NEUTROPHILS 6.4 1.8 - 8.0 K/UL    ABS. LYMPHOCYTES 2.3 0.9 - 3.6 K/UL    ABS. MONOCYTES 0.7 0.05 - 1.2 K/UL    ABS. EOSINOPHILS 0.0 0.0 - 0.4 K/UL    ABS. BASOPHILS 0.1 0.0 - 0.1 K/UL    ABS. IMM. GRANS. 0.0 0.00 - 0.04 K/UL    DF AUTOMATED     METABOLIC PANEL, BASIC    Collection Time: 02/20/22 10:30 AM   Result Value Ref Range    Sodium 143 136 - 145 mmol/L    Potassium 4.0 3.5 - 5.5 mmol/L    Chloride 108 100 - 111 mmol/L    CO2 32 21 - 32 mmol/L    Anion gap 3 3.0 - 18 mmol/L    Glucose 106 (H) 74 - 99 mg/dL    BUN 18 7.0 - 18 MG/DL    Creatinine 0.69 0.6 - 1.3 MG/DL    BUN/Creatinine ratio 26 (H) 12 - 20      GFR est AA >60 >60 ml/min/1.73m2    GFR est non-AA >60 >60 ml/min/1.73m2    Calcium 9.6 8.5 - 10.1 MG/DL   HEPATIC FUNCTION PANEL    Collection Time: 02/20/22 10:30 AM   Result Value Ref Range    Protein, total 7.0 6.4 - 8.2 g/dL    Albumin 3.8 3.4 - 5.0 g/dL    Globulin 3.2 2.0 - 4.0 g/dL    A-G Ratio 1.2 0.8 - 1.7      Bilirubin, total 0.3 0.2 - 1.0 MG/DL    Bilirubin, direct <0.1 0.0 - 0.2 MG/DL    Alk.  phosphatase 79 45 - 117 U/L    AST (SGOT) 16 10 - 38 U/L    ALT (SGPT) 33 16 - 61 U/L   TSH 3RD GENERATION Collection Time: 02/20/22 10:30 AM   Result Value Ref Range    TSH 1.05 0.36 - 3.74 uIU/mL   ETHYL ALCOHOL    Collection Time: 02/20/22 10:30 AM   Result Value Ref Range    ALCOHOL(ETHYL),SERUM <3 0 - 3 MG/DL   COVID-19 RAPID TEST    Collection Time: 02/20/22 11:45 AM   Result Value Ref Range    Specimen source Nasopharyngeal      COVID-19 rapid test Not detected NOTD         Radiologic Studies -   No orders to display         Medical Decision Making   I am the first provider for this patient. I reviewed the vital signs, available nursing notes, past medical history, past surgical history, family history and social history. Vital Signs-Reviewed the patient's vital signs. Records Reviewed: Nursing Notes, Old Medical Records, Previous Radiology Studies and Previous Laboratory Studies (Time of Review: 1:08 PM)    ED Course: Progress Notes, Reevaluation, and Consults:     Case discussed with crisis and will come to evaluate the patient. Beata Clements,  1:31 PM    The patient was seen by Fermin from crisis and does not meet inpatient criteria. She feels the patient would benefit from some mild anxiolysis and a sleep aid. We will start the patient on Vistaril 25 as needed anxiety, and trazodone 25 nightly for sleep. In addition the patient admitted to her that he has been using Adderall that she suspects there is a substance abuse component to his presentation. We will proceed with close outpatient care and was given referrals to outpatient resources. Workup and recommendations were reviewed with the patient and all questions were answered. The patient understands the plan and will proceed with close outpatient care. I have encouraged the patient to return if at all worsened or concerned.    Beata Clements DO 2:13 PM      Provider Notes (Medical Decision Making):   MDM  Number of Diagnoses or Management Options  Diagnosis management comments: Patient is a 14-year-old male with a history of diverticulitis and behavioral health disease young in life that presents emergency department with a 6-month decline in his behavioral health according to family. The patient has agitation and difficulty controlling his temper and has been having hallucinations. The patient was brought here by his family and the patient is only here for his family support however has poor insight to the what the family has been describing. We will do a medical screening and if reassuring consult crisis for evaluation. Chong Pacheco, DO 1:19 PM        Procedures          Diagnosis     Clinical Impression:   1. Anxiety state        Disposition: DC    Follow-up Information    None          Patient's Medications   Start Taking    No medications on file   Continue Taking    No medications on file   These Medications have changed    No medications on file   Stop Taking    ESCITALOPRAM OXALATE (LEXAPRO) 5 MG TABLET    Take 1 Tab by mouth daily. HYDROCODONE-ACETAMINOPHEN (NORCO) 5-325 MG PER TABLET    Take 1 Tab by mouth. ONDANSETRON (ZOFRAN ODT) 4 MG DISINTEGRATING TABLET    Take 1 Tab by mouth every eight (8) hours as needed for Nausea or Vomiting. Disclaimer: Sections of this note are dictated using utilizing voice recognition software. Minor typographical errors may be present. If questions arise, please do not hesitate to contact me or call our department.

## 2022-03-18 PROBLEM — K57.32 SIGMOID DIVERTICULITIS: Status: ACTIVE | Noted: 2020-04-18

## 2022-03-18 PROBLEM — D72.829 LEUKOCYTOSIS: Status: ACTIVE | Noted: 2020-03-25

## 2022-03-18 PROBLEM — Z72.0 TOBACCO ABUSE: Status: ACTIVE | Noted: 2020-03-25

## 2022-03-18 PROBLEM — K65.1 ABSCESS OF ABDOMINAL CAVITY (HCC): Status: ACTIVE | Noted: 2020-03-25

## 2022-03-19 PROBLEM — K57.92 DIVERTICULITIS: Status: ACTIVE | Noted: 2020-03-25

## 2022-03-19 PROBLEM — N28.1 RENAL CYST: Status: ACTIVE | Noted: 2020-03-25

## 2022-03-19 PROBLEM — K61.1 PERIRECTAL ABSCESS: Status: ACTIVE | Noted: 2020-04-18

## 2022-03-20 PROBLEM — K65.1 PELVIC ABSCESS IN MALE (HCC): Status: ACTIVE | Noted: 2020-04-18

## 2022-08-31 ENCOUNTER — TRANSCRIBE ORDER (OUTPATIENT)
Dept: SCHEDULING | Age: 57
End: 2022-08-31

## 2022-08-31 DIAGNOSIS — Z87.891 PERSONAL HISTORY OF TOBACCO USE, PRESENTING HAZARDS TO HEALTH: Primary | ICD-10-CM

## 2022-10-17 ENCOUNTER — TRANSCRIBE ORDER (OUTPATIENT)
Dept: SCHEDULING | Age: 57
End: 2022-10-17

## 2022-10-17 DIAGNOSIS — Z12.11 COLON CANCER SCREENING: ICD-10-CM

## 2022-10-17 DIAGNOSIS — K59.00 CONSTIPATION: ICD-10-CM

## 2022-10-17 DIAGNOSIS — R63.4 LOSS OF WEIGHT: Primary | ICD-10-CM

## 2022-11-29 ENCOUNTER — APPOINTMENT (OUTPATIENT)
Dept: NUTRITION | Age: 57
End: 2022-11-29

## 2022-12-20 VITALS — BODY MASS INDEX: 18.28 KG/M2 | HEIGHT: 72 IN | WEIGHT: 135 LBS

## 2022-12-20 RX ORDER — HYDROXYZINE 25 MG/1
25 TABLET, FILM COATED ORAL
COMMUNITY

## 2022-12-27 RX ORDER — SODIUM CHLORIDE 0.9 % (FLUSH) 0.9 %
5-40 SYRINGE (ML) INJECTION AS NEEDED
Status: CANCELLED | OUTPATIENT
Start: 2022-12-27

## 2022-12-27 RX ORDER — SODIUM CHLORIDE 0.9 % (FLUSH) 0.9 %
5-40 SYRINGE (ML) INJECTION EVERY 8 HOURS
Status: CANCELLED | OUTPATIENT
Start: 2022-12-27

## 2022-12-27 RX ORDER — SODIUM CHLORIDE, SODIUM LACTATE, POTASSIUM CHLORIDE, CALCIUM CHLORIDE 600; 310; 30; 20 MG/100ML; MG/100ML; MG/100ML; MG/100ML
75 INJECTION, SOLUTION INTRAVENOUS CONTINUOUS
Status: CANCELLED | OUTPATIENT
Start: 2022-12-27 | End: 2022-12-28

## 2022-12-28 ENCOUNTER — HOSPITAL ENCOUNTER (OUTPATIENT)
Age: 57
Setting detail: OUTPATIENT SURGERY
Discharge: HOME | End: 2022-12-28
Attending: STUDENT IN AN ORGANIZED HEALTH CARE EDUCATION/TRAINING PROGRAM | Admitting: STUDENT IN AN ORGANIZED HEALTH CARE EDUCATION/TRAINING PROGRAM

## 2022-12-29 ENCOUNTER — ANESTHESIA (OUTPATIENT)
Dept: ENDOSCOPY | Age: 57
End: 2022-12-29
Payer: MEDICAID

## 2022-12-29 ENCOUNTER — HOSPITAL ENCOUNTER (OUTPATIENT)
Age: 57
Setting detail: OUTPATIENT SURGERY
Discharge: HOME | End: 2022-12-29
Attending: STUDENT IN AN ORGANIZED HEALTH CARE EDUCATION/TRAINING PROGRAM | Admitting: STUDENT IN AN ORGANIZED HEALTH CARE EDUCATION/TRAINING PROGRAM
Payer: MEDICAID

## 2022-12-29 ENCOUNTER — ANESTHESIA EVENT (OUTPATIENT)
Dept: ENDOSCOPY | Age: 57
End: 2022-12-29
Payer: MEDICAID

## 2022-12-29 VITALS
SYSTOLIC BLOOD PRESSURE: 114 MMHG | DIASTOLIC BLOOD PRESSURE: 73 MMHG | WEIGHT: 133 LBS | HEIGHT: 72 IN | TEMPERATURE: 96.7 F | OXYGEN SATURATION: 100 % | RESPIRATION RATE: 18 BRPM | BODY MASS INDEX: 18.01 KG/M2 | HEART RATE: 63 BPM

## 2022-12-29 LAB
AMPHET UR QL SCN: NEGATIVE
BARBITURATES UR QL SCN: NEGATIVE
BENZODIAZ UR QL: NEGATIVE
CANNABINOIDS UR QL SCN: NEGATIVE
COCAINE UR QL SCN: NEGATIVE
HDSCOM,HDSCOM: NORMAL
METHADONE UR QL: NEGATIVE
OPIATES UR QL: NEGATIVE
PCP UR QL: NEGATIVE

## 2022-12-29 PROCEDURE — 74011250636 HC RX REV CODE- 250/636: Performed by: NURSE ANESTHETIST, CERTIFIED REGISTERED

## 2022-12-29 PROCEDURE — 2709999900 HC NON-CHARGEABLE SUPPLY: Performed by: STUDENT IN AN ORGANIZED HEALTH CARE EDUCATION/TRAINING PROGRAM

## 2022-12-29 PROCEDURE — 77030008565 HC TBNG SUC IRR ERBE -B: Performed by: STUDENT IN AN ORGANIZED HEALTH CARE EDUCATION/TRAINING PROGRAM

## 2022-12-29 PROCEDURE — 74011000250 HC RX REV CODE- 250: Performed by: NURSE ANESTHETIST, CERTIFIED REGISTERED

## 2022-12-29 PROCEDURE — 77030013992 HC SNR POLYP ENDOSC BSC -B: Performed by: STUDENT IN AN ORGANIZED HEALTH CARE EDUCATION/TRAINING PROGRAM

## 2022-12-29 PROCEDURE — 76060000031 HC ANESTHESIA FIRST 0.5 HR: Performed by: STUDENT IN AN ORGANIZED HEALTH CARE EDUCATION/TRAINING PROGRAM

## 2022-12-29 PROCEDURE — 76040000019: Performed by: STUDENT IN AN ORGANIZED HEALTH CARE EDUCATION/TRAINING PROGRAM

## 2022-12-29 PROCEDURE — 80307 DRUG TEST PRSMV CHEM ANLYZR: CPT

## 2022-12-29 PROCEDURE — 88305 TISSUE EXAM BY PATHOLOGIST: CPT

## 2022-12-29 RX ORDER — LIDOCAINE HYDROCHLORIDE 20 MG/ML
INJECTION, SOLUTION EPIDURAL; INFILTRATION; INTRACAUDAL; PERINEURAL AS NEEDED
Status: DISCONTINUED | OUTPATIENT
Start: 2022-12-29 | End: 2022-12-29 | Stop reason: HOSPADM

## 2022-12-29 RX ORDER — PROPOFOL 10 MG/ML
INJECTION, EMULSION INTRAVENOUS AS NEEDED
Status: DISCONTINUED | OUTPATIENT
Start: 2022-12-29 | End: 2022-12-29 | Stop reason: HOSPADM

## 2022-12-29 RX ORDER — SODIUM CHLORIDE 0.9 % (FLUSH) 0.9 %
5-40 SYRINGE (ML) INJECTION EVERY 8 HOURS
Status: DISCONTINUED | OUTPATIENT
Start: 2022-12-29 | End: 2022-12-29 | Stop reason: HOSPADM

## 2022-12-29 RX ORDER — SODIUM CHLORIDE, SODIUM LACTATE, POTASSIUM CHLORIDE, CALCIUM CHLORIDE 600; 310; 30; 20 MG/100ML; MG/100ML; MG/100ML; MG/100ML
75 INJECTION, SOLUTION INTRAVENOUS CONTINUOUS
Status: DISCONTINUED | OUTPATIENT
Start: 2022-12-29 | End: 2022-12-29 | Stop reason: HOSPADM

## 2022-12-29 RX ORDER — SODIUM CHLORIDE 0.9 % (FLUSH) 0.9 %
5-40 SYRINGE (ML) INJECTION AS NEEDED
Status: DISCONTINUED | OUTPATIENT
Start: 2022-12-29 | End: 2022-12-29 | Stop reason: HOSPADM

## 2022-12-29 RX ADMIN — PROPOFOL 75 MG: 10 INJECTION, EMULSION INTRAVENOUS at 11:16

## 2022-12-29 RX ADMIN — PROPOFOL 25 MG: 10 INJECTION, EMULSION INTRAVENOUS at 11:28

## 2022-12-29 RX ADMIN — PROPOFOL 50 MG: 10 INJECTION, EMULSION INTRAVENOUS at 11:22

## 2022-12-29 RX ADMIN — LIDOCAINE HYDROCHLORIDE 100 MG: 20 INJECTION, SOLUTION EPIDURAL; INFILTRATION; INTRACAUDAL; PERINEURAL at 11:16

## 2022-12-29 RX ADMIN — SODIUM CHLORIDE, POTASSIUM CHLORIDE, SODIUM LACTATE AND CALCIUM CHLORIDE 75 ML/HR: 600; 310; 30; 20 INJECTION, SOLUTION INTRAVENOUS at 08:45

## 2022-12-29 RX ADMIN — PROPOFOL 25 MG: 10 INJECTION, EMULSION INTRAVENOUS at 11:19

## 2022-12-29 RX ADMIN — FAMOTIDINE 20 MG: 10 INJECTION, SOLUTION INTRAVENOUS at 08:57

## 2022-12-29 RX ADMIN — PROPOFOL 25 MG: 10 INJECTION, EMULSION INTRAVENOUS at 11:25

## 2022-12-29 NOTE — ANESTHESIA PREPROCEDURE EVALUATION
Relevant Problems   No relevant active problems       Anesthetic History   No history of anesthetic complications            Review of Systems / Medical History  Patient summary reviewed, nursing notes reviewed and pertinent labs reviewed    Pulmonary  Within defined limits                 Neuro/Psych         Psychiatric history     Cardiovascular                  Exercise tolerance: >4 METS     GI/Hepatic/Renal  Within defined limits              Endo/Other  Within defined limits           Other Findings              Physical Exam    Airway  Mallampati: II  TM Distance: 4 - 6 cm  Neck ROM: normal range of motion   Mouth opening: Normal     Cardiovascular  Regular rate and rhythm,  S1 and S2 normal,  no murmur, click, rub, or gallop  Rhythm: regular  Rate: normal         Dental  No notable dental hx       Pulmonary  Breath sounds clear to auscultation               Abdominal  GI exam deferred       Other Findings            Anesthetic Plan    ASA: 2  Anesthesia type: MAC          Induction: Intravenous  Anesthetic plan and risks discussed with: Patient

## 2022-12-29 NOTE — DISCHARGE INSTRUCTIONS
Colonoscopy: What to Expect at [address] [place] [address]  After a colonoscopy, you'll stay at the clinic until you wake up. Then you can go home. But you'll need to arrange for a ride. Your doctor will tell you when you can eat and do your other usual activities. Your doctor will talk to you about when you'll need your next colonoscopy. Your doctor can help you decide how often you need to be checked. This will depend on the results of your test and your risk for colorectal cancer. After the test, you may be bloated or have gas pains. You may need to pass gas. If a biopsy was done or a polyp was removed, you may have streaks of blood in your stool (feces) for a few days. Problems such as heavy rectal bleeding may not occur until several weeks after the test. This isn't common. But it can happen after polyps are removed. This care sheet gives you a general idea about how long it will take for you to recover. But each person recovers at a different pace. Follow the steps below to get better as quickly as possible. How can you care for yourself at home? Activity    Rest when you feel tired. You can do your normal activities when it feels okay to do so. Diet    Follow your doctor's directions for eating. Unless your doctor has told you not to, drink plenty of fluids. This helps to replace the fluids that were lost during the colon prep. Do not drink alcohol. Medicines    Your doctor will tell you if and when you can restart your medicines. You will also be given instructions about taking any new medicines. If you take aspirin or some other blood thinner, ask your doctor if and when to start taking it again. Make sure that you understand exactly what your doctor wants you to do. If polyps were removed or a biopsy was done during the test, your doctor may tell you not to take aspirin or other anti-inflammatory medicines for a few days. These include ibuprofen (Advil, Motrin) and naproxen (Aleve). Other instructions    For your safety, do not drive or operate machinery until the medicine wears off and you can think clearly. Your doctor may tell you not to drive or operate machinery until the day after your test.     Do not sign legal documents or make major decisions until the medicine wears off and you can think clearly. The anesthesia can make it hard for you to fully understand what you are agreeing to. Follow-up care is a key part of your treatment and safety. Be sure to make and go to all appointments, and call your doctor if you are having problems. It's also a good idea to know your test results and keep a list of the medicines you take. When should you call for help? Call 911 anytime you think you may need emergency care. For example, call if:    You passed out (lost consciousness). You pass maroon or bloody stools. You have trouble breathing. Call your doctor now or seek immediate medical care if:    You have pain that does not get better after you take pain medicine. You are sick to your stomach or cannot drink fluids. You have new or worse belly pain. You have blood in your stools. You have a fever. You cannot pass stools or gas. Watch closely for changes in your health, and be sure to contact your doctor if you have any problems. Where can you learn more? Go to http://www.gray.com/  Enter E264 in the search box to learn more about \"Colonoscopy: What to Expect at Home. \"  Current as of: September 8, 2021               Content Version: 13.2  © 2006-2022 [employer]. Care instructions adapted under license by [employer] (which disclaims liability or warranty for this information). If you have questions about a medical condition or this instruction, always ask your healthcare professional. [place] [address] any warranty or liability for your use of this information.     DISCHARGE SUMMARY from Nurse     POST-PROCEDURE INSTRUCTIONS:    Call your Physician if you:  Observe any excess bleeding. Develop a temperature over 100.5o F. Experience abdominal, shoulder or chest pain. Notice any signs of decreased circulation or nerve impairment to an extremity such as a change in color, persistent numbness, tingling, coldness or increase in pain. Vomit blood or you have nausea and vomiting lasting longer than 4 hours. Are unable to take medications. Are unable to urinate within 8 hours after discharge following general anesthesia or intravenous sedation. For the next 24 hours after receiving general anesthesia or intravenous sedation, or while taking prescription Narcotics, limit your activities:  Do NOT drive a motor vehicle, operate hazard machinery or power tools, or perform tasks that require coordination. The medication you received during your procedure may have some effect on your mental awareness. Do NOT make important personal or business decisions. The medication you received during your procedure may have some effect on your mental awareness. Do NOT drink alcoholic beverages. These drinks do not mix well with the medications that have been given to you. Upon discharge from the hospital, you must be accompanied by a responsible adult. Resume your diet as directed by your physician. Resume medications as your physician has prescribed. Please give a list of your current medications to your Primary Care Provider. Please update this list whenever your medications are discontinued, doses are changed, or new medications (including over-the-counter products) are added. Please carry medication information at all times in case of emergency situations. These are general instructions for a healthy lifestyle:    No smoking/ No tobacco products/ Avoid exposure to second hand smoke.   Surgeon General's Warning:  Quitting smoking now greatly reduces serious risk to your health. Obesity, smoking, and a sedentary lifestyle greatly increase your risk for illness. A healthy diet, regular physical exercise & weight monitoring are important for maintaining a healthy lifestyle  You may be retaining fluid if you have a history of heart failure or if you experience any of the following symptoms:  Weight gain of 3 pounds or more overnight or 5 pounds in a week, increased swelling in our hands or feet or shortness of breath while lying flat in bed. Please call your doctor as soon as you notice any of these symptoms; do not wait until your next office visit. Recognize signs and symptoms of STROKE:  F  -  Face looks uneven  A  -  Arms unable to move or move unevenly  S  -  Speech slurred or non-existent  T  -  Time to call 911 - as soon as signs and symptoms begin - DO NOT go back to bed or wait to see If you get better - TIME IS BRAIN. Colorectal Screening  Colorectal cancer almost always develops from precancerous polyps (abnormal growths) in the colon or rectum. Screening tests can find precancerous polyps, so that they can be removed before they turn into cancer. Screening tests can also find colorectal cancer early, when treatment works best.  Speak with your physician about when you should begin screening and how often you should be tested. [employer] Activation    Thank you for requesting access to [employer]. Please follow the instructions below to securely access and download your online medical record. [employer] allows you to send messages to your doctor, view your test results, renew your prescriptions, schedule appointments, and more. How Do I Sign Up? In your internet browser, go to [email or web address]. Click on the First Time User? Click Here link in the Sign In box. You will see the New Member Sign Up page. Enter your [employer] Access Code exactly as it appears below.  You will not need to use this code after youve completed the sign-up process. If you do not sign up before the expiration date, you must request a new code. [employer] Access Code: Activation code not generated  Current [employer] Status: Active (This is the date your [employer] access code will )    Enter the last four digits of your Social Security Number (xxxx) and Date of Birth (mm/dd/yyyy) as indicated and click Submit. You will be taken to the next sign-up page. Create a [employer] ID. This will be your [employer] login ID and cannot be changed, so think of one that is secure and easy to remember. Create a [employer] password. You can change your password at any time. Enter your Password Reset Question and Answer. This can be used at a later time if you forget your password. Enter your e-mail address. You will receive e-mail notification when new information is available in [address] Click Sign Up. You can now view and download portions of your medical record. Click the [employer] link to download a portable copy of your medical information. Additional Information    If you have questions, please call [phone]. Remember, [employer] is NOT to be used for urgent needs. For medical emergencies, dial 911. Educational references and/or instructions provided during this visit included:    See Attached      APPOINTMENTS:    Per MD Instruction    Discharge information has been reviewed with the patient. The patient verbalized understanding.

## 2022-12-29 NOTE — ANESTHESIA POSTPROCEDURE EVALUATION
Procedure(s):  COLONOSCOPY WITH POLYPECTOMIES.     MAC    Anesthesia Post Evaluation      Multimodal analgesia: multimodal analgesia used between 6 hours prior to anesthesia start to PACU discharge  Patient location during evaluation: bedside  Patient participation: complete - patient participated  Level of consciousness: awake  Pain management: adequate  Airway patency: patent  Anesthetic complications: no  Cardiovascular status: stable  Respiratory status: acceptable  Hydration status: acceptable  Post anesthesia nausea and vomiting:  controlled  Final Post Anesthesia Temperature Assessment:  Normothermia (36.0-37.5 degrees C)      INITIAL Post-op Vital signs:   Vitals Value Taken Time   /73 12/29/22 1225   Temp 35.9 °C (96.7 °F) 12/29/22 1225   Pulse 63 12/29/22 1225   Resp 18 12/29/22 1225   SpO2 100 % 12/29/22 1225

## 2022-12-29 NOTE — H&P
[email or web address]  [phone]    Chief Complaint: Weight loss, CRCS    PMH:   Past Medical History:   Diagnosis Date    Anxiety     Blind right eye     Detached retina, right     Diverticulitis of intestine with abscess     Family history of diverticulitis of colon     mother and brother    Psychiatric disorder     Bipolar     Allergies: No Known Allergies  Medications:   Current Facility-Administered Medications:     sodium chloride (NS) flush 5-40 mL, 5-40 mL, IntraVENous, Q8H, Daniel Mckenzie CRNA    sodium chloride (NS) flush 5-40 mL, 5-40 mL, IntraVENous, PRN, Juanjo Marie, CRNA    lactated Ringers infusion, 75 mL/hr, IntraVENous, CONTINUOUS, Juanjo Frank, CRNA, Last Rate: 75 mL/hr at 22, 75 mL/hr at 22  FH:   Family History   Problem Relation Age of Onset    Colon Polyps Maternal Grandfather         pre cancerous polyp removed     Social:   Social History     Socioeconomic History    Marital status:    Tobacco Use    Smoking status: Former     Packs/day: 1.00     Types: Cigarettes     Quit date: 10/2022     Years since quittin.2    Smokeless tobacco: Never   Vaping Use    Vaping Use: Never used   Substance and Sexual Activity    Alcohol use: No    Drug use: Not Currently     Types: Marijuana     Surgical H:   Past Surgical History:   Procedure Laterality Date    HX HEENT Right     Repair Detached Retina    HX HERNIA REPAIR      inguinal repair with mesh       ROS: positive for weight loss    Physical Exam: Visit Vitals  /86   Pulse 66   Temp 97.8 °F (36.6 °C)   Resp 18   Ht 6' (1.829 m)   Wt 60.3 kg (133 lb)   SpO2 98%   BMI 18.04 kg/m²     General appearance: alert, no distress  Eyes: pupils equal and reactive, extraocular eye movements intact  Nodes: No gross adenopathy in neck.   Skin: no spider angiomata, jaundice, palmar erythema   Respiratory: clear to auscultation bilaterally  Cardiovascular: regular heart rate, no murmurs, no JVD, normal rate and regular rhythm  Abdomen: soft, non-tender, liver not enlarged, spleen not palpable, no obvious ascites  Extremities: no muscle wasting, no gross arthritic changes  Neurologic: alert and oriented, cranial nerves grossly intact, no asterixis    Labs:   Recent Results (from the past 24 hour(s))   DRUG SCREEN, URINE    Collection Time: 12/29/22  8:00 AM   Result Value Ref Range    BENZODIAZEPINES Negative NEG      BARBITURATES Negative NEG      THC (TH-CANNABINOL) Negative NEG      OPIATES Negative NEG      PCP(PHENCYCLIDINE) Negative NEG      COCAINE Negative NEG      AMPHETAMINES Negative NEG      METHADONE Negative NEG      HDSCOM (NOTE)        Imp/ Plan: Will proceed with colonoscopy as planned. Risk benefits alternative including but not limited to infection, bleeding, perforation of viscous, allergic reaction and resultant morbidity and mortality was discussed. Chance of missing a significant lesion due to various reasons were discussed.     Dereck Erickson MD   [facility], [facility]

## 2024-02-09 ENCOUNTER — HOSPITAL ENCOUNTER (OUTPATIENT)
Facility: HOSPITAL | Age: 59
Discharge: HOME | End: 2024-02-09
Payer: COMMERCIAL

## 2024-02-09 ENCOUNTER — TRANSCRIBE ORDERS (OUTPATIENT)
Facility: HOSPITAL | Age: 59
End: 2024-02-09

## 2024-02-09 DIAGNOSIS — M25.532 BILATERAL WRIST PAIN: Primary | ICD-10-CM

## 2024-02-09 DIAGNOSIS — M79.643 PAIN OF HAND, UNSPECIFIED LATERALITY: ICD-10-CM

## 2024-02-09 DIAGNOSIS — M25.531 BILATERAL WRIST PAIN: Primary | ICD-10-CM

## 2024-02-09 DIAGNOSIS — M25.532 BILATERAL WRIST PAIN: ICD-10-CM

## 2024-02-09 DIAGNOSIS — M25.531 BILATERAL WRIST PAIN: ICD-10-CM

## 2024-02-09 PROCEDURE — 73110 X-RAY EXAM OF WRIST: CPT

## 2024-02-09 PROCEDURE — 73130 X-RAY EXAM OF HAND: CPT

## 2024-04-14 ENCOUNTER — APPOINTMENT (OUTPATIENT)
Facility: HOSPITAL | Age: 59
End: 2024-04-14
Payer: MEDICAID

## 2024-04-14 ENCOUNTER — HOSPITAL ENCOUNTER (EMERGENCY)
Facility: HOSPITAL | Age: 59
Discharge: HOME | End: 2024-04-15
Attending: STUDENT IN AN ORGANIZED HEALTH CARE EDUCATION/TRAINING PROGRAM
Payer: MEDICAID

## 2024-04-14 DIAGNOSIS — R40.4 TRANSIENT ALTERATION OF AWARENESS: Primary | ICD-10-CM

## 2024-04-14 DIAGNOSIS — F31.9 BIPOLAR AFFECTIVE DISORDER, REMISSION STATUS UNSPECIFIED (HCC): ICD-10-CM

## 2024-04-14 LAB
ALBUMIN SERPL-MCNC: 3.7 G/DL (ref 3.4–5)
ALBUMIN/GLOB SERPL: 1.1 (ref 0.8–1.7)
ALP SERPL-CCNC: 106 U/L (ref 45–117)
ALT SERPL-CCNC: 19 U/L (ref 16–61)
AMPHET UR QL SCN: NEGATIVE
ANION GAP SERPL CALC-SCNC: 5 MMOL/L (ref 3–18)
APAP SERPL-MCNC: <2 UG/ML (ref 10–30)
AST SERPL-CCNC: 13 U/L (ref 10–38)
BARBITURATES UR QL SCN: NEGATIVE
BENZODIAZ UR QL: POSITIVE
BILIRUB SERPL-MCNC: 0.5 MG/DL (ref 0.2–1)
BUN SERPL-MCNC: 12 MG/DL (ref 7–18)
BUN/CREAT SERPL: 16 (ref 12–20)
CALCIUM SERPL-MCNC: 9 MG/DL (ref 8.5–10.1)
CANNABINOIDS UR QL SCN: POSITIVE
CHLORIDE SERPL-SCNC: 110 MMOL/L (ref 100–111)
CK SERPL-CCNC: 64 U/L (ref 39–308)
CO2 SERPL-SCNC: 24 MMOL/L (ref 21–32)
COCAINE UR QL SCN: NEGATIVE
CREAT SERPL-MCNC: 0.77 MG/DL (ref 0.6–1.3)
ERYTHROCYTE [DISTWIDTH] IN BLOOD BY AUTOMATED COUNT: 14.4 % (ref 11.6–14.5)
ETHANOL SERPL-MCNC: <3 MG/DL (ref 0–3)
GLOBULIN SER CALC-MCNC: 3.3 G/DL (ref 2–4)
GLUCOSE SERPL-MCNC: 107 MG/DL (ref 74–99)
HCT VFR BLD AUTO: 42.3 % (ref 36–48)
HGB BLD-MCNC: 14.6 G/DL (ref 13–16)
Lab: ABNORMAL
MCH RBC QN AUTO: 29.9 PG (ref 24–34)
MCHC RBC AUTO-ENTMCNC: 34.5 G/DL (ref 31–37)
MCV RBC AUTO: 86.7 FL (ref 78–100)
METHADONE UR QL: NEGATIVE
NRBC # BLD: 0 K/UL (ref 0–0.01)
NRBC BLD-RTO: 0 PER 100 WBC
OPIATES UR QL: NEGATIVE
PCP UR QL: NEGATIVE
PLATELET # BLD AUTO: 387 K/UL (ref 135–420)
PMV BLD AUTO: 10.1 FL (ref 9.2–11.8)
POTASSIUM SERPL-SCNC: 3.7 MMOL/L (ref 3.5–5.5)
PROT SERPL-MCNC: 7 G/DL (ref 6.4–8.2)
RBC # BLD AUTO: 4.88 M/UL (ref 4.35–5.65)
SALICYLATES SERPL-MCNC: 2.8 MG/DL (ref 2.8–20)
SODIUM SERPL-SCNC: 139 MMOL/L (ref 136–145)
WBC # BLD AUTO: 10.2 K/UL (ref 4.6–13.2)

## 2024-04-14 PROCEDURE — 80053 COMPREHEN METABOLIC PANEL: CPT

## 2024-04-14 PROCEDURE — 80179 DRUG ASSAY SALICYLATE: CPT

## 2024-04-14 PROCEDURE — 81001 URINALYSIS AUTO W/SCOPE: CPT

## 2024-04-14 PROCEDURE — 82077 ASSAY SPEC XCP UR&BREATH IA: CPT

## 2024-04-14 PROCEDURE — 93005 ELECTROCARDIOGRAM TRACING: CPT | Performed by: STUDENT IN AN ORGANIZED HEALTH CARE EDUCATION/TRAINING PROGRAM

## 2024-04-14 PROCEDURE — 80307 DRUG TEST PRSMV CHEM ANLYZR: CPT

## 2024-04-14 PROCEDURE — 82550 ASSAY OF CK (CPK): CPT

## 2024-04-14 PROCEDURE — 80143 DRUG ASSAY ACETAMINOPHEN: CPT

## 2024-04-14 PROCEDURE — 70450 CT HEAD/BRAIN W/O DYE: CPT

## 2024-04-14 PROCEDURE — 99284 EMERGENCY DEPT VISIT MOD MDM: CPT

## 2024-04-14 PROCEDURE — 85027 COMPLETE CBC AUTOMATED: CPT

## 2024-04-14 ASSESSMENT — PAIN - FUNCTIONAL ASSESSMENT: PAIN_FUNCTIONAL_ASSESSMENT: NONE - DENIES PAIN

## 2024-04-15 VITALS
TEMPERATURE: 97.7 F | RESPIRATION RATE: 13 BRPM | OXYGEN SATURATION: 98 % | HEART RATE: 93 BPM | DIASTOLIC BLOOD PRESSURE: 92 MMHG | SYSTOLIC BLOOD PRESSURE: 139 MMHG

## 2024-04-15 LAB
APPEARANCE UR: CLEAR
BACTERIA URNS QL MICRO: NEGATIVE /HPF
BILIRUB UR QL: NEGATIVE
COLOR UR: YELLOW
EKG ATRIAL RATE: 76 BPM
EKG DIAGNOSIS: NORMAL
EKG P AXIS: 67 DEGREES
EKG P-R INTERVAL: 172 MS
EKG Q-T INTERVAL: 380 MS
EKG QRS DURATION: 80 MS
EKG QTC CALCULATION (BAZETT): 427 MS
EKG R AXIS: 60 DEGREES
EKG T AXIS: 56 DEGREES
EKG VENTRICULAR RATE: 76 BPM
EPITH CASTS URNS QL MICRO: NEGATIVE /LPF (ref 0–5)
GLUCOSE UR STRIP.AUTO-MCNC: NEGATIVE MG/DL
HGB UR QL STRIP: NEGATIVE
KETONES UR QL STRIP.AUTO: 15 MG/DL
LEUKOCYTE ESTERASE UR QL STRIP.AUTO: ABNORMAL
MUCOUS THREADS URNS QL MICRO: ABNORMAL /LPF
NITRITE UR QL STRIP.AUTO: NEGATIVE
PH UR STRIP: 6.5 (ref 5–8)
PROT UR STRIP-MCNC: NEGATIVE MG/DL
RBC #/AREA URNS HPF: NEGATIVE /HPF (ref 0–5)
SP GR UR REFRACTOMETRY: 1.02 (ref 1–1.03)
UROBILINOGEN UR QL STRIP.AUTO: 1 EU/DL (ref 0.2–1)
WBC URNS QL MICRO: ABNORMAL /HPF (ref 0–4)

## 2024-04-15 PROCEDURE — 93010 ELECTROCARDIOGRAM REPORT: CPT | Performed by: INTERNAL MEDICINE

## 2024-04-15 NOTE — ED TRIAGE NOTES
According to the EMS pt has history of Bipolar. According to EMS pt has been noncompliant with medications for the past several days but pt responds with head shakes pt following commands.     Pt verbally responding with no but seems to stare off afterwards. Provider at bedside. Pt denies SI.

## 2024-04-15 NOTE — ED NOTES
Pt responds by nodding their head. Pt still will not respond much by voice. Pt follows commands. Pt awake and alert. Pt hooked up to cardiac monitor. Family at bedside.

## 2024-04-15 NOTE — ED NOTES
Pt voids freely without difficulty. Urinal at bedside.  Urine collected and sent to lab. Family at bedside.

## 2024-04-15 NOTE — ED PROVIDER NOTES
[facility] EMERGENCY DEPT  EMERGENCY DEPARTMENT ENCOUNTER       Pt Name: Bony Smith  MRN: [id number]  Birthdate 1965  Date of evaluation: 4/14/2024  Provider: Jo Ann Nair MD   PCP: Mikel Nelson MD  Note Started: 2:13 AM 4/14/24     CHIEF COMPLAINT       Chief Complaint   Patient presents with    Mental Health Problem     According to the EMS pt has history of Bipolar. According to EMS pt has been noncompliant with medications for the past several days but pt responds with head shakes pt following commands.         HISTORY OF PRESENT ILLNESS: 1 or more elements      History From: Patient, EMS, and Patient's Sister  Altered Mental Status     Bony Smith is a 58 y.o. male history of bipolar and major depression who presents for altered mental status.  Patient has been staying with his sister who I spoke with on the phone.  His sister, Alicia, states that patient has been on a \"downside\" states he has stopped taking his medications and has had nothing to eat or drink all day.  States he has had several similar episodes over the last 1 to 2 weeks.  Patient tells her that \"everything hurts\" and he is \"dead already\".  She is concerned because the patient cannot take care of himself.  States he had an admission over the summer for mental health and had been doing better but stopped taking his medications.    Patient does not provide any information during evaluation.  He will move all 4 extremities and intermittently nod but does not verbalize words     Nursing Notes were all reviewed and agreed with or any disagreements were addressed in the HPI.     REVIEW OF SYSTEMS      Review of Systems   Unable to perform ROS: Mental status change        Positives and Pertinent negatives as per HPI.    PAST HISTORY     Past Medical History:  Past Medical History:   Diagnosis Date    Anxiety     Blind right eye     Detached retina, right     Diverticulitis of intestine with abscess     Family history of

## 2024-04-15 NOTE — DISCHARGE INSTRUCTIONS
Please call your psychiatrist Monday morning for a follow-up appointment as soon as possible.    Return to emergency room immediately for any new or worsening symptoms to include chest pain, shortness of breath, fever, inability to take by mouth intake or any other concerning symptoms.

## 2024-04-18 ENCOUNTER — HOSPITAL ENCOUNTER (INPATIENT)
Facility: HOSPITAL | Age: 59
LOS: 14 days | Discharge: HOME | End: 2024-05-03
Attending: EMERGENCY MEDICINE | Admitting: PSYCHIATRY & NEUROLOGY
Payer: MEDICAID

## 2024-04-18 ENCOUNTER — APPOINTMENT (OUTPATIENT)
Facility: HOSPITAL | Age: 59
End: 2024-04-18
Payer: MEDICAID

## 2024-04-18 DIAGNOSIS — F31.9 BIPOLAR 1 DISORDER (HCC): ICD-10-CM

## 2024-04-18 DIAGNOSIS — R41.82 ALTERED MENTAL STATUS, UNSPECIFIED ALTERED MENTAL STATUS TYPE: Primary | ICD-10-CM

## 2024-04-18 LAB
ALBUMIN SERPL-MCNC: 3.8 G/DL (ref 3.4–5)
ALBUMIN/GLOB SERPL: 1.2 (ref 0.8–1.7)
ALP SERPL-CCNC: 103 U/L (ref 45–117)
ALT SERPL-CCNC: 20 U/L (ref 16–61)
ANION GAP SERPL CALC-SCNC: 7 MMOL/L (ref 3–18)
AST SERPL-CCNC: 14 U/L (ref 10–38)
BASOPHILS # BLD: 0.1 K/UL (ref 0–0.1)
BASOPHILS NFR BLD: 1 % (ref 0–2)
BILIRUB SERPL-MCNC: 0.7 MG/DL (ref 0.2–1)
BUN SERPL-MCNC: 19 MG/DL (ref 7–18)
BUN/CREAT SERPL: 21 (ref 12–20)
CALCIUM SERPL-MCNC: 9.1 MG/DL (ref 8.5–10.1)
CHLORIDE SERPL-SCNC: 106 MMOL/L (ref 100–111)
CO2 SERPL-SCNC: 25 MMOL/L (ref 21–32)
CREAT SERPL-MCNC: 0.89 MG/DL (ref 0.6–1.3)
DIFFERENTIAL METHOD BLD: ABNORMAL
EOSINOPHIL # BLD: 0 K/UL (ref 0–0.4)
EOSINOPHIL NFR BLD: 0 % (ref 0–5)
ERYTHROCYTE [DISTWIDTH] IN BLOOD BY AUTOMATED COUNT: 14.2 % (ref 11.6–14.5)
ETHANOL SERPL-MCNC: <3 MG/DL (ref 0–3)
FLUAV RNA SPEC QL NAA+PROBE: NOT DETECTED
FLUBV RNA SPEC QL NAA+PROBE: NOT DETECTED
GLOBULIN SER CALC-MCNC: 3.3 G/DL (ref 2–4)
GLUCOSE SERPL-MCNC: 87 MG/DL (ref 74–99)
HCT VFR BLD AUTO: 42.2 % (ref 36–48)
HGB BLD-MCNC: 14.3 G/DL (ref 13–16)
IMM GRANULOCYTES # BLD AUTO: 0.1 K/UL (ref 0–0.04)
IMM GRANULOCYTES NFR BLD AUTO: 0 % (ref 0–0.5)
LYMPHOCYTES # BLD: 2.5 K/UL (ref 0.9–3.6)
LYMPHOCYTES NFR BLD: 21 % (ref 21–52)
MCH RBC QN AUTO: 29.1 PG (ref 24–34)
MCHC RBC AUTO-ENTMCNC: 33.9 G/DL (ref 31–37)
MCV RBC AUTO: 85.8 FL (ref 78–100)
MONOCYTES # BLD: 0.9 K/UL (ref 0.05–1.2)
MONOCYTES NFR BLD: 7 % (ref 3–10)
NEUTS SEG # BLD: 8.8 K/UL (ref 1.8–8)
NEUTS SEG NFR BLD: 71 % (ref 40–73)
NRBC # BLD: 0 K/UL (ref 0–0.01)
NRBC BLD-RTO: 0 PER 100 WBC
PLATELET # BLD AUTO: 386 K/UL (ref 135–420)
PMV BLD AUTO: 10.2 FL (ref 9.2–11.8)
POTASSIUM SERPL-SCNC: 3.6 MMOL/L (ref 3.5–5.5)
PROT SERPL-MCNC: 7.1 G/DL (ref 6.4–8.2)
RBC # BLD AUTO: 4.92 M/UL (ref 4.35–5.65)
SARS-COV-2 RNA RESP QL NAA+PROBE: NOT DETECTED
SODIUM SERPL-SCNC: 138 MMOL/L (ref 136–145)
WBC # BLD AUTO: 12.4 K/UL (ref 4.6–13.2)

## 2024-04-18 PROCEDURE — 80053 COMPREHEN METABOLIC PANEL: CPT

## 2024-04-18 PROCEDURE — 87636 SARSCOV2 & INF A&B AMP PRB: CPT

## 2024-04-18 PROCEDURE — 85025 COMPLETE CBC W/AUTO DIFF WBC: CPT

## 2024-04-18 PROCEDURE — 93005 ELECTROCARDIOGRAM TRACING: CPT | Performed by: EMERGENCY MEDICINE

## 2024-04-18 PROCEDURE — 70450 CT HEAD/BRAIN W/O DYE: CPT

## 2024-04-18 PROCEDURE — 94761 N-INVAS EAR/PLS OXIMETRY MLT: CPT

## 2024-04-18 PROCEDURE — 51798 US URINE CAPACITY MEASURE: CPT

## 2024-04-18 PROCEDURE — 99285 EMERGENCY DEPT VISIT HI MDM: CPT

## 2024-04-18 PROCEDURE — 82077 ASSAY SPEC XCP UR&BREATH IA: CPT

## 2024-04-18 NOTE — ED TRIAGE NOTES
Patient arrived via EMS with family reports of AMS for a few weeks. Per EMS patient has a history of Bipolar disorder. Patient denies SI/HI/AVH. Upon arrival patient alert and oriented x 0. Responds \"no\" to all questions. Can't state name or birthday. Able to follow commands. Afebrile.

## 2024-04-19 PROBLEM — F31.5 BIPOLAR DISORDER, CURRENT EPISODE DEPRESSED, SEVERE, WITH PSYCHOTIC FEATURES (HCC): Status: ACTIVE | Noted: 2024-04-19

## 2024-04-19 LAB
AMPHET UR QL SCN: NEGATIVE
APPEARANCE UR: CLEAR
BACTERIA URNS QL MICRO: NEGATIVE /HPF
BARBITURATES UR QL SCN: NEGATIVE
BENZODIAZ UR QL: POSITIVE
BILIRUB UR QL: NEGATIVE
CANNABINOIDS UR QL SCN: NEGATIVE
CAOX CRY URNS QL MICRO: ABNORMAL
COCAINE UR QL SCN: NEGATIVE
COLOR UR: ABNORMAL
EKG ATRIAL RATE: 96 BPM
EKG DIAGNOSIS: NORMAL
EKG P AXIS: 49 DEGREES
EKG P-R INTERVAL: 158 MS
EKG Q-T INTERVAL: 344 MS
EKG QRS DURATION: 82 MS
EKG QTC CALCULATION (BAZETT): 434 MS
EKG R AXIS: 58 DEGREES
EKG T AXIS: 52 DEGREES
EKG VENTRICULAR RATE: 96 BPM
EPITH CASTS URNS QL MICRO: NEGATIVE /LPF (ref 0–5)
GLUCOSE UR STRIP.AUTO-MCNC: NEGATIVE MG/DL
HGB UR QL STRIP: NEGATIVE
KETONES UR QL STRIP.AUTO: >160 MG/DL
LEUKOCYTE ESTERASE UR QL STRIP.AUTO: NEGATIVE
Lab: ABNORMAL
METHADONE UR QL: NEGATIVE
MUCOUS THREADS URNS QL MICRO: ABNORMAL /LPF
NITRITE UR QL STRIP.AUTO: NEGATIVE
OPIATES UR QL: NEGATIVE
PCP UR QL: NEGATIVE
PH UR STRIP: 6 (ref 5–8)
PROT UR STRIP-MCNC: 30 MG/DL
RBC #/AREA URNS HPF: ABNORMAL /HPF (ref 0–5)
SP GR UR REFRACTOMETRY: 1.03 (ref 1–1.03)
UROBILINOGEN UR QL STRIP.AUTO: 1 EU/DL (ref 0.2–1)
WBC URNS QL MICRO: ABNORMAL /HPF (ref 0–4)

## 2024-04-19 PROCEDURE — 93010 ELECTROCARDIOGRAM REPORT: CPT | Performed by: INTERNAL MEDICINE

## 2024-04-19 PROCEDURE — 2580000003 HC RX 258: Performed by: EMERGENCY MEDICINE

## 2024-04-19 PROCEDURE — 80307 DRUG TEST PRSMV CHEM ANLYZR: CPT

## 2024-04-19 PROCEDURE — 1240000000 HC EMOTIONAL WELLNESS R&B

## 2024-04-19 PROCEDURE — 94761 N-INVAS EAR/PLS OXIMETRY MLT: CPT

## 2024-04-19 PROCEDURE — 2580000003 HC RX 258: Performed by: STUDENT IN AN ORGANIZED HEALTH CARE EDUCATION/TRAINING PROGRAM

## 2024-04-19 PROCEDURE — 6370000000 HC RX 637 (ALT 250 FOR IP): Performed by: PSYCHIATRY & NEUROLOGY

## 2024-04-19 PROCEDURE — 81001 URINALYSIS AUTO W/SCOPE: CPT

## 2024-04-19 RX ORDER — 0.9 % SODIUM CHLORIDE 0.9 %
1000 INTRAVENOUS SOLUTION INTRAVENOUS ONCE
Status: COMPLETED | OUTPATIENT
Start: 2024-04-19 | End: 2024-04-19

## 2024-04-19 RX ORDER — BUPROPION HYDROCHLORIDE 75 MG/1
75 TABLET ORAL 2 TIMES DAILY
Status: DISCONTINUED | OUTPATIENT
Start: 2024-04-19 | End: 2024-04-22

## 2024-04-19 RX ORDER — SODIUM CHLORIDE, SODIUM LACTATE, POTASSIUM CHLORIDE, AND CALCIUM CHLORIDE .6; .31; .03; .02 G/100ML; G/100ML; G/100ML; G/100ML
1000 INJECTION, SOLUTION INTRAVENOUS ONCE
Status: COMPLETED | OUTPATIENT
Start: 2024-04-19 | End: 2024-04-19

## 2024-04-19 RX ORDER — MAGNESIUM HYDROXIDE/ALUMINUM HYDROXICE/SIMETHICONE 120; 1200; 1200 MG/30ML; MG/30ML; MG/30ML
30 SUSPENSION ORAL EVERY 6 HOURS PRN
Status: DISCONTINUED | OUTPATIENT
Start: 2024-04-19 | End: 2024-05-03 | Stop reason: HOSPADM

## 2024-04-19 RX ORDER — DULOXETIN HYDROCHLORIDE 30 MG/1
60 CAPSULE, DELAYED RELEASE ORAL DAILY
Status: DISCONTINUED | OUTPATIENT
Start: 2024-04-20 | End: 2024-05-03 | Stop reason: HOSPADM

## 2024-04-19 RX ORDER — OLANZAPINE 5 MG/1
5 TABLET ORAL NIGHTLY
Status: DISCONTINUED | OUTPATIENT
Start: 2024-04-19 | End: 2024-04-22

## 2024-04-19 RX ORDER — BENZTROPINE MESYLATE 1 MG/ML
1 INJECTION INTRAMUSCULAR; INTRAVENOUS EVERY 6 HOURS PRN
Status: DISCONTINUED | OUTPATIENT
Start: 2024-04-19 | End: 2024-05-03 | Stop reason: HOSPADM

## 2024-04-19 RX ORDER — BENZTROPINE MESYLATE 1 MG/1
1 TABLET ORAL EVERY 6 HOURS PRN
Status: DISCONTINUED | OUTPATIENT
Start: 2024-04-19 | End: 2024-05-03 | Stop reason: HOSPADM

## 2024-04-19 RX ORDER — ACETAMINOPHEN 325 MG/1
650 TABLET ORAL EVERY 4 HOURS PRN
Status: DISCONTINUED | OUTPATIENT
Start: 2024-04-19 | End: 2024-05-03 | Stop reason: HOSPADM

## 2024-04-19 RX ORDER — HALOPERIDOL 5 MG/1
5 TABLET ORAL EVERY 6 HOURS PRN
Status: DISCONTINUED | OUTPATIENT
Start: 2024-04-19 | End: 2024-05-03 | Stop reason: HOSPADM

## 2024-04-19 RX ORDER — HYDROXYZINE 50 MG/1
25 TABLET, FILM COATED ORAL EVERY 6 HOURS PRN
Status: DISCONTINUED | OUTPATIENT
Start: 2024-04-19 | End: 2024-05-03 | Stop reason: HOSPADM

## 2024-04-19 RX ORDER — TRAZODONE HYDROCHLORIDE 50 MG/1
50 TABLET ORAL NIGHTLY PRN
Status: DISCONTINUED | OUTPATIENT
Start: 2024-04-19 | End: 2024-05-03 | Stop reason: HOSPADM

## 2024-04-19 RX ORDER — HALOPERIDOL 5 MG/ML
5 INJECTION INTRAMUSCULAR EVERY 6 HOURS PRN
Status: DISCONTINUED | OUTPATIENT
Start: 2024-04-19 | End: 2024-05-03 | Stop reason: HOSPADM

## 2024-04-19 RX ADMIN — HYDROXYZINE HYDROCHLORIDE 25 MG: 50 TABLET, FILM COATED ORAL at 20:14

## 2024-04-19 RX ADMIN — BUPROPION HYDROCHLORIDE 75 MG: 75 TABLET, FILM COATED ORAL at 21:41

## 2024-04-19 RX ADMIN — TRAZODONE HYDROCHLORIDE 50 MG: 50 TABLET ORAL at 20:14

## 2024-04-19 RX ADMIN — OLANZAPINE 5 MG: 5 TABLET, FILM COATED ORAL at 20:14

## 2024-04-19 RX ADMIN — SODIUM CHLORIDE 1000 ML: 9 INJECTION, SOLUTION INTRAVENOUS at 05:24

## 2024-04-19 RX ADMIN — SODIUM CHLORIDE, SODIUM LACTATE, POTASSIUM CHLORIDE, AND CALCIUM CHLORIDE 1000 ML: 600; 310; 30; 20 INJECTION, SOLUTION INTRAVENOUS at 08:37

## 2024-04-19 ASSESSMENT — SLEEP AND FATIGUE QUESTIONNAIRES
DO YOU USE A SLEEP AID: NO
SLEEP PATTERN: DISTURBED/INTERRUPTED SLEEP;INSOMNIA;RESTLESSNESS
AVERAGE NUMBER OF SLEEP HOURS: 3
DO YOU HAVE DIFFICULTY SLEEPING: YES

## 2024-04-19 ASSESSMENT — LIFESTYLE VARIABLES
HOW MANY STANDARD DRINKS CONTAINING ALCOHOL DO YOU HAVE ON A TYPICAL DAY: PATIENT DOES NOT DRINK
HOW OFTEN DO YOU HAVE A DRINK CONTAINING ALCOHOL: NEVER

## 2024-04-19 NOTE — PROGRESS NOTES
Patient arrived to the unit via wheelchair escorted by security and ED staff.  Patient presents confused; he is alert and oriented to self.  Patient denies SI/HI and AVH.  When the patient was asked why was he admitted to the hospital, patient replied \"I don't know; I was killed on a ship\".  Patient is situationally  unaware and a poor historian.  Patient  presents suspicious and paranoid. At this time, patient is unable to sign unit forms.  Patient was provided with a tour of his room and unit.      PFM is aware of the patient's arrival.  RN will initiate, develop, implement, review/revise the patient's treatment plan.            04/19/24 1645   Vital Signs   Temp 99 °F (37.2 °C)   Temp Source Oral   Pulse (!) 111   Heart Rate Source Brachial   Respirations 18   /81   MAP (Calculated) 94   BP Location Left upper arm   BP Method Automatic   Patient Position Sitting   Pain Assessment   Pain Assessment None - Denies Pain   Oxygen Therapy   SpO2 96 %

## 2024-04-19 NOTE — PLAN OF CARE
Problem: Self Harm/Suicidality  Goal: Will have no self-injury during hospital stay  Description: INTERVENTIONS:  1.  Ensure constant observer at bedside with Q15M safety checks  2.  Maintain a safe environment  3.  Secure patient belongings  4.  Ensure family/visitors adhere to safety recommendations  5.  Ensure safety tray has been added to patient's diet order  6.  Every shift and PRN: Re-assess suicidal risk via Frequent Screener    Outcome: Progressing     Problem: Depression  Goal: Will be euthymic at discharge  Description: INTERVENTIONS:  1. Administer medication as ordered  2. Provide emotional support via 1:1 interaction with staff  3. Encourage involvement in milieu/groups/activities  4. Monitor for social isolation  Outcome: Progressing     Problem: Dinorah  Goal: Will exhibit normal sleep and speech and no impulsivity  Description: INTERVENTIONS:  1. Administer medication as ordered  2. Set limits on impulsive behavior  3. Make attempts to decrease external stimuli as possible  Outcome: Progressing

## 2024-04-19 NOTE — ED NOTES
.TRANSFER - OUT REPORT:    Verbal report given to DALLAS Lambert on Bony Smith  being transferred to [place] [id number] for continuum of care.     Report consisted of patient's Situation, Background, Assessment and   Recommendations(SBAR).     Information from the following report(s) Nurse Handoff Report was reviewed with the receiving nurse.    Kinder Fall Assessment:                           Lines:   Peripheral IV 04/19/24 Left;Posterior Hand (Active)   Site Assessment Clean, dry & intact 04/19/24 0501   Line Status Blood return noted;Flushed 04/19/24 0501   Phlebitis Assessment No symptoms 04/19/24 0501   Infiltration Assessment 0 04/19/24 0501   Dressing Status New dressing applied 04/19/24 0501   Dressing Type Transparent 04/19/24 0501        Opportunity for questions and clarification was provided.      Patient transported with:  Tech

## 2024-04-19 NOTE — ED NOTES
Patient bladder scan resulted in >336 mL of urine. Patient was straight catheterized and provided 600 mL of dark yellow urine.

## 2024-04-19 NOTE — BSMART NOTE
Behavioral Health Crisis Assessment    Chief Complaint: \"I don't know. Did Joel blow up from a methane explosion?   Chief Complaint   Patient presents with    Altered Mental Status      NOTE: Patient likes to be called Randall.  NOTE: please have patient sign a NURIA for his wife Kristin.    Voluntary or Involuntary Status: voluntarily      C-SSRS current suicide Risk (High, Moderate, Low): low      Past Suicide Attempts (specify):  denied      Self Injurious/Self Mutilation behaviors (specify): denied      Protective Factors (specify): Adequate family/social support      Risk Factors (specify): history of mental illness, history of non compliance with medications.      Substance use (current or past): (See Below)     Alcohol: reports has not drank alcohol in years.  Date started:  Route:  Frequency:  Amount:  Last use:  Withdrawals:   Rehab/Inpatient Services:   Hx of seizures:  Hx of blackouts:     Heroin: denied  Date started:  Route:  Frequency:  Amount:  Last use:  Withdrawals:   Rehab/Inpatient Services:      Cocaine: denied  Date started:   Route:  Frequency:  Amount:  Last use:   Withdrawals:   Rehab/Inpatient Services:     Marijuana: denied     Prescription/OTC Medications:  denied     Hallucinogenics: denied     Cigarettes/Cigars/Vapors: denied         MH & Substance use Treatment  (current and/or past):  Inpatient: [facility] September -December 2023                  [facility] 2022  Outpatient: [facility].      Violence towards others (current and/or past:(specify): denied      Legal issues (current or past): denied current.      Access to weapons: everyday common house hold items and sharps       Trauma or Abuse (specify): denied      Living Situation: with his wife Kristin ([phone])      Employment: has not worked in 2 years.      Education level: 10 th

## 2024-04-19 NOTE — ED PROVIDER NOTES
ED Course as of 04/19/24 1253   Fri Apr 19, 2024   0619 Eb to ks 59 yo male pmh of bipolar disorder cc- poor po intake / grave disability? / workup - pending ua and labs / plan- crisis eval?  [KS]   1249 Medically cleared for psychiatric treatment  [KS]      ED Course User Index  [KS] Harvinder Colunga MD Sarpong, Keneth, MD  04/19/24 1256

## 2024-04-19 NOTE — CONSULTS
[facility]  FAMILY MEDICINE CONSULT NOTE FOR BEHAVIORAL HEALTH UNIT    Patient:    Bony Smith , 58 y.o. male   Room/Bed:  130/01  Admission Date:   4/18/2024  Code status:  Full Code      Reason for Consult: Cassidy  Psychiatry Attending Requesting Consult: medical management    ASSESSMENT:  Bony Smith is a 58 y.o. male w/ a PMH of bipolar disorder, anxiety, blindness in right eye, and diverticulitis presenting for AMS who is now admitted to the [facility].    Nurse Chaperone during History and Physical    PLAN:    Bipolar disorder  Anxiety  -Home medications unclear: history limited by patient  -Inpatient meds: Wellbutrin 75mg, Duloxetine 60mg, Zyprexa 5mg  -PRN med: Cogentin, Haldol,Atarax, Trazodone  -Management per inpatient psychiatry    Substance use  -UDS positive for benzodiazepine  -Hx limited by patient  -Low suspicion for withdrawal at this time  -continue to monitor     Urinary rentention  -In ED: pt stated he was unable to urinate  -Bladder scan revealed 300cc, under went straight cath on 4/19 in am with 600cc out  -Low suspicion for UTI given clean UA, low suspicion for medication side effect, another possibility is BPH  -will order 1 bladder scan now  -continue to monitor urine output      Global  Diet: regular  Mobility: per unit protocol    Thank you for this consult.         SUBJECTIVE:   Dr. Benjamin has consulted Family Medicine to evaluate Bony Smith  in the Emergency Department. He  is a 58 y.o. male w/ PMHx of bipolar disorder, anxiety, blindness in right eye, and diverticulitis presenting for AMS who is now admitted to the [facility]. History is limited by patient mentation.     ED Course:  -Vitals: mild HTN, otherwise VSS on RA  -Labs: CBC, BMP unremarkable, ethanol < 3, UDS notable for cocaine, UA negative  -Imaging: CT head negative for acute abnormalities  -EKG: NSR  -Meds: none  -IVF: LR 1L bolus  -Procedures: straight

## 2024-04-19 NOTE — ED PROVIDER NOTES
EMERGENCY DEPARTMENT HISTORY AND PHYSICAL EXAM      Patient Name: Bony Smith  MRN: [id number]  YOB: 1965  Provider: Bev Mora MD  PCP: Mikel Nelson MD   Time/Date of evaluation: 10:37 PM EDT on 4/18/24    History of Presenting Illness     Chief Complaint   Patient presents with    Altered Mental Status       History Provided By: EMS and Patient     History (Narative):   Bony Smith is a 58 y.o. male with a PMHX of bipolar disorder, anxiety, blindness in the right eye, and diverticulitis  who presents to the emergency department  by EMS C/O altered mental status for the past several weeks.  He is not talking much.  Right now his family is not with him.    He was able to verbalize that he is not suicidal, homicidal or having any auditory or visual hallucinations.        Past History     Past Medical History:  Past Medical History:   Diagnosis Date    Anxiety     Blind right eye     Detached retina, right     Diverticulitis of intestine with abscess     Family history of diverticulitis of colon     mother and brother    Psychiatric disorder     Bipolar       Past Surgical History:  Past Surgical History:   Procedure Laterality Date    COLONOSCOPY N/A 12/29/2022    COLONOSCOPY WITH POLYPECTOMIES performed by Chevy Barroso MD at [facility] ENDOSCOPY    CT RETROPERITONEAL PERC DRAIN  4/19/2020    CT RETROPERITONEAL PERC DRAIN 4/19/2020 [facility] RAD CT    HEENT Right 2022    Repair Detached Retina    HERNIA REPAIR  2012    inguinal repair with mesh       Family History:  Family History   Problem Relation Age of Onset    Colon Polyps Maternal Grandfather         pre cancerous polyp removed       Social History:  Social History     Tobacco Use    Smoking status: Every Day     Current packs/day: 1.00     Types: Cigarettes    Smokeless tobacco: Never   Vaping Use    Vaping Use: Never used   Substance Use Topics    Alcohol use: Yes     Comment: rare    Drug use: Not Currently     Types: Marijuana (Weed)

## 2024-04-19 NOTE — ED NOTES
Patient refusing straight cath. Family members reports that patient has not been eating/drinking much.

## 2024-04-19 NOTE — ED NOTES
Bedside shift change report given to DALLAS Kingston (oncoming nurse) by DALLAS Noonan (offgoing nurse). Report included the following information Nurse Handoff Report.

## 2024-04-20 LAB
CHOLEST SERPL-MCNC: 127 MG/DL
EST. AVERAGE GLUCOSE BLD GHB EST-MCNC: 105 MG/DL
HBA1C MFR BLD: 5.3 % (ref 4.2–5.6)
HDLC SERPL-MCNC: 44 MG/DL (ref 40–60)
HDLC SERPL: 2.9 (ref 0–5)
LDLC SERPL CALC-MCNC: 66.2 MG/DL (ref 0–100)
LIPID PANEL: NORMAL
TRIGL SERPL-MCNC: 84 MG/DL
TSH SERPL DL<=0.05 MIU/L-ACNC: 1.11 UIU/ML (ref 0.36–3.74)
VLDLC SERPL CALC-MCNC: 16.8 MG/DL

## 2024-04-20 PROCEDURE — 99223 1ST HOSP IP/OBS HIGH 75: CPT | Performed by: PSYCHIATRY & NEUROLOGY

## 2024-04-20 PROCEDURE — 80061 LIPID PANEL: CPT

## 2024-04-20 PROCEDURE — 6370000000 HC RX 637 (ALT 250 FOR IP): Performed by: PSYCHIATRY & NEUROLOGY

## 2024-04-20 PROCEDURE — 84443 ASSAY THYROID STIM HORMONE: CPT

## 2024-04-20 PROCEDURE — 83036 HEMOGLOBIN GLYCOSYLATED A1C: CPT

## 2024-04-20 PROCEDURE — 1240000000 HC EMOTIONAL WELLNESS R&B

## 2024-04-20 PROCEDURE — 36415 COLL VENOUS BLD VENIPUNCTURE: CPT

## 2024-04-20 RX ADMIN — OLANZAPINE 5 MG: 5 TABLET, FILM COATED ORAL at 20:10

## 2024-04-20 RX ADMIN — HYDROXYZINE HYDROCHLORIDE 25 MG: 50 TABLET, FILM COATED ORAL at 20:09

## 2024-04-20 RX ADMIN — BUPROPION HYDROCHLORIDE 75 MG: 75 TABLET, FILM COATED ORAL at 20:09

## 2024-04-20 RX ADMIN — BUPROPION HYDROCHLORIDE 75 MG: 75 TABLET, FILM COATED ORAL at 08:23

## 2024-04-20 RX ADMIN — TRAZODONE HYDROCHLORIDE 50 MG: 50 TABLET ORAL at 20:09

## 2024-04-20 RX ADMIN — DULOXETINE HYDROCHLORIDE 60 MG: 30 CAPSULE, DELAYED RELEASE ORAL at 08:23

## 2024-04-20 NOTE — H&P
Eosinophils Absolute 0.0 0.0 - 0.4 K/UL    Basophils Absolute 0.1 0.0 - 0.1 K/UL    Immature Granulocytes Absolute 0.1 (H) 0.00 - 0.04 K/UL    Differential Type AUTOMATED     Comprehensive Metabolic Panel   Result Value Ref Range    Sodium 138 136 - 145 mmol/L    Potassium 3.6 3.5 - 5.5 mmol/L    Chloride 106 100 - 111 mmol/L    CO2 25 21 - 32 mmol/L    Anion Gap 7 3.0 - 18 mmol/L    Glucose 87 74 - 99 mg/dL    BUN 19 (H) 7.0 - 18 MG/DL    Creatinine 0.89 0.6 - 1.3 MG/DL    Bun/Cre Ratio 21 (H) 12 - 20      Est, Glom Filt Rate >90 >60 ml/min/1.73m2    Calcium 9.1 8.5 - 10.1 MG/DL    Total Bilirubin 0.7 0.2 - 1.0 MG/DL    ALT 20 16 - 61 U/L    AST 14 10 - 38 U/L    Alk Phosphatase 103 45 - 117 U/L    Total Protein 7.1 6.4 - 8.2 g/dL    Albumin 3.8 3.4 - 5.0 g/dL    Globulin 3.3 2.0 - 4.0 g/dL    Albumin/Globulin Ratio 1.2 0.8 - 1.7     Ethanol   Result Value Ref Range    Ethanol Lvl <3 0 - 3 MG/DL   Urine Drug Screen   Result Value Ref Range    Benzodiazepines, Urine Positive (A) NEG      Barbiturates, Urine Negative NEG      THC, TH-Cannabinol, Urine Negative NEG      Opiates, Urine Negative NEG      PCP, Urine Negative NEG      Cocaine, Urine Negative NEG      Amphetamine, Urine Negative NEG      Methadone, Urine Negative NEG      Comments: (NOTE)    Urinalysis   Result Value Ref Range    Color, UA DARK YELLOW      Appearance CLEAR      Specific Gravity, UA 1.026 1.005 - 1.030      pH, Urine 6.0 5.0 - 8.0      Protein, UA 30 (A) NEG mg/dL    Glucose, UA Negative NEG mg/dL    Ketones, Urine >160 (A) NEG mg/dL    Bilirubin Urine Negative NEG      Blood, Urine Negative NEG      Urobilinogen, Urine 1.0 0.2 - 1.0 EU/dL    Nitrite, Urine Negative NEG      Leukocyte Esterase, Urine Negative NEG     Urinalysis, Micro   Result Value Ref Range    WBC, UA 0 to 3 0 - 4 /hpf    RBC, UA 0 to 3 0 - 5 /hpf    Epithelial Cells UA Negative 0 - 5 /lpf    BACTERIA, URINE Negative NEG /hpf    Mucus, UA 1+ (A) NEG /lpf    Calcium  Frustration tolerance/optimism  []  Children or pets in the home  []  Sense of responsibility to family  []  Agrees to treatment plan and follow up    High Risk Diagnoses (select all that apply):  [x]  Depression/Bipolar Disorder  []  Dual Diagnosis  []  Cardiovascular Disease  []  Schizophrenia  []  Chronic Pain  []  Epilepsy  []  Cancer  []  Personality Disorder  []  HIV/AIDS  []  Multiple Sclerosis    Dangerousness Assessment (Suicide, homicide, property destruction...)    Risk Factors reviewed and risk assessed to be:  [] low  [] low-moderate  [] moderate   [x] moderate-high  [] high     Protection factors reviewed and risk assessed to be:  [] low  [] low-moderate  [] moderate   [x] moderate-high  [] high     Response to treatment and risk assessed to be:  [] low  [] low-moderate  [] moderate   [x] moderate-high  [] high     Support reviewed and risk assessed to be:  [] low  [] low-moderate  [] moderate   [x] moderate-high  [] high     Acceptance of Discharge and outpatient treatment reviewed and risk assessed to be:    [] low  [] low-moderate  [] moderate   [x] moderate-high  [] high   Overall risk assessed to be:  [] low  [] low-moderate  [] moderate   [x] moderate-high  [] high       Assessment and Plan     Psychiatric Diagnoses:   Patient Active Problem List   Diagnosis    Tobacco abuse    Abscess of abdominal cavity (HCC)    Sigmoid diverticulitis    Leukocytosis    Perirectal abscess    Renal cyst    Diverticulitis    Pelvic abscess in male (HCC)    Bipolar disorder, current episode depressed, severe, with psychotic features (HCC)       Medical Diagnoses: As above    Psychosocial and contextual factors: Likely homeless, poor social support    Level of impairment/disability: Severe    Bony Smith is a 58 y.o. who is currently requiring acute stabilization after presenting with acute mental status change in the context of history of psychiatric illness.     Admit to locked inpatient

## 2024-04-20 NOTE — GROUP NOTE
Group Therapy Note    Date: 4/20/2024    Group Start Time: 1000  Group End Time: 1030  Group Topic: Medication    [facility] ADULT Janet Reese, DALLAS        Group Therapy Note    Attendees: 4       Patient's Goal:  Medication compliance      Notes:  pt refused    Status After Intervention:  Unchanged    Participation Level: None    Participation Quality: Resistant      Speech:  pressured      Thought Process/Content: Linear      Affective Functioning: Blunted and Flat      Mood: anxious and depressed      Level of consciousness:  Alert      Response to Learning: Resistant      Endings: None Reported    Modes of Intervention: Education and Support      Discipline Responsible: Registered Nurse      Signature:  Janet Crystal RN

## 2024-04-20 NOTE — PLAN OF CARE
Problem: Psychosis  Goal: Will report no hallucinations or delusions  Description: INTERVENTIONS:  1. Administer medication as  ordered  2. Assist with reality testing to support increasing orientation  3. Assess if patient's hallucinations or delusions are encouraging self harm or harm to others and intervene as appropriate  Outcome: Progressing  Note: Pt observed to have paranoid behavior.     Problem: Self Care Deficit  Goal: Return ADL status to a safe level of function  Description: INTERVENTIONS:  1. Administer medication as ordered  2. Assess ADL deficits and provide assistive devices as needed  3. Obtain PT/OT consults as needed  4. Assist and instruct patient to increase activity and self care as tolerated  Outcome: Progressing  Flowsheets (Taken 4/20/2024 1152)  Return ADL Status to a Safe Level of Function:   Administer medication as ordered   Assess activities of daily living deficits and provide assistive devices as needed   Assist and instruct patient to increase activity and self care as tolerated  Note: Pt noted to need much encouragement to eat meals and do self care.    Pt received in room, alert and oriented to self; re-oriented pt to time, place and situation. Pt keeps to self and needs much encouragement with ADL's. Pt presents with a flat affect; loose and disorganized but goal-directed thought process and appears disheveled. Writer encouraged pt to shower. Pt denies any SI/HI but reports having paranoid thoughts that someone is after pt. Paranoid behavior also observed as pt refused to go to dayroom for meals and refused to sign any consents. Pt reported decreased appetite, difficulty toileting and difficulty sleeping. Pt reported, \"I have a hard time going to pee.\" MD aware. Per report, pt voided last night. Writer offered pt juice and encouraged pt to inform staff when pt voids. Pt is compliant with medications. Pt noted to answer, \"I don't know.\" to some assessment questions. Pt

## 2024-04-20 NOTE — PLAN OF CARE
Problem: Self Harm/Suicidality  Goal: Will have no self-injury during hospital stay  Description: INTERVENTIONS:  1.  Ensure constant observer at bedside with Q15M safety checks  2.  Maintain a safe environment  3.  Secure patient belongings  4.  Ensure family/visitors adhere to safety recommendations  5.  Ensure safety tray has been added to patient's diet order  6.  Every shift and PRN: Re-assess suicidal risk via Frequent Screener    4/19/2024 2311 by J Carlos Washington RN  Outcome: Progressing  4/19/2024 1801 by Melany Orona RN  Outcome: Progressing     Problem: Depression  Goal: Will be euthymic at discharge  Description: INTERVENTIONS:  1. Administer medication as ordered  2. Provide emotional support via 1:1 interaction with staff  3. Encourage involvement in milieu/groups/activities  4. Monitor for social isolation  4/19/2024 2311 by J Carlos Washington RN  Outcome: Progressing  4/19/2024 1801 by Melany Orona RN  Outcome: Progressing     Problem: Dinorah  Goal: Will exhibit normal sleep and speech and no impulsivity  Description: INTERVENTIONS:  1. Administer medication as ordered  2. Set limits on impulsive behavior  3. Make attempts to decrease external stimuli as possible  4/19/2024 1801 by Melany Orona RN  Outcome: Progressing     Problem: Anxiety  Goal: Will report anxiety at manageable levels  Description: INTERVENTIONS:  1. Administer medication as ordered  2. Teach and rehearse alternative coping skills  3. Provide emotional support with 1:1 interaction with staff  Outcome: Progressing     Pt is friendly, cooperative and medication compliant. Pt presents with flat facial expressions and a blunt affect. Pt appears to be confused upon assessment and needs to be firmly addressed when discussing medical conditions. PFM assessed pt and ordered bladder scan. Bladder scan performed and found 167 mL of urine in bladder. Pt denies all SI, HI, and AVH. Pt has been in day room all evening, but styed

## 2024-04-20 NOTE — GROUP NOTE
Art Therapy Group Progress Note    PATIENT SCHEDULED FOR GROUP AT:  12:00 PM     GROUP STOP TIME:  12:45 PM    ATTENDANCE: Moderate (4/9 participants)     TOPIC / FOCUS: Create Prayer and Prayer Candle     GOALS:  define distress tolerance, discussing techniques to help get through intense emotions, creating practical short-term solutions, encourage creativity, reduce stress and anxiety, promote healthy emotional management skills, increase emotional awareness    PARTICIPATION LEVEL:  Pt did not attend.     Leighton Bowman  Art Therapist, MA, ATR-P  Provisional Registered Art Therapist      Pt declined to participate but was unable to specify why they declined.

## 2024-04-21 PROCEDURE — 1240000000 HC EMOTIONAL WELLNESS R&B

## 2024-04-21 PROCEDURE — 99232 SBSQ HOSP IP/OBS MODERATE 35: CPT | Performed by: PSYCHIATRY & NEUROLOGY

## 2024-04-21 PROCEDURE — 6370000000 HC RX 637 (ALT 250 FOR IP): Performed by: PSYCHIATRY & NEUROLOGY

## 2024-04-21 RX ADMIN — BUPROPION HYDROCHLORIDE 75 MG: 75 TABLET, FILM COATED ORAL at 20:35

## 2024-04-21 RX ADMIN — DULOXETINE HYDROCHLORIDE 60 MG: 30 CAPSULE, DELAYED RELEASE ORAL at 08:34

## 2024-04-21 RX ADMIN — BUPROPION HYDROCHLORIDE 75 MG: 75 TABLET, FILM COATED ORAL at 08:33

## 2024-04-21 RX ADMIN — OLANZAPINE 5 MG: 5 TABLET, FILM COATED ORAL at 20:35

## 2024-04-21 RX ADMIN — HYDROXYZINE HYDROCHLORIDE 25 MG: 50 TABLET, FILM COATED ORAL at 08:34

## 2024-04-21 NOTE — PLAN OF CARE
Problem: Behavior  Goal: Pt/Family maintain appropriate behavior and adhere to behavioral management agreement, if implemented  Description: INTERVENTIONS:  1. Assess patient/family's coping skills and  non-compliant behavior (including use of illegal substances)  2. Notify security of behavior or suspected illegal substances which indicate the need for search of the family and/or belongings  3. Encourage verbalization of thoughts and concerns in a socially appropriate manner  4. Utilize positive, consistent limit setting strategies supporting safety of patient, staff and others  5. Encourage participation in the decision making process about the behavioral management agreement  6. If a visitor's behavior poses a threat to safety call refer to organization policy.  7. Initiate consult with , Psychosocial CNS, Spiritual Care as appropriate  Outcome: Progressing  Flowsheets (Taken 4/21/2024 1112)  Patient/family maintains appropriate behavior and adheres to behavioral management agreement, if implemented:   Encourage verbalization of thoughts and concerns in a socially appropriate manner   Utilize positive, consistent limit setting strategies supporting safety of patient, staff and others   Encourage participation in the decision making process about the behavioral management agreement     Problem: Self Care Deficit  Goal: Return ADL status to a safe level of function  Description: INTERVENTIONS:  1. Administer medication as ordered  2. Assess ADL deficits and provide assistive devices as needed  3. Obtain PT/OT consults as needed  4. Assist and instruct patient to increase activity and self care as tolerated  Outcome: Progressing  Flowsheets (Taken 4/21/2024 1112)  Return ADL Status to a Safe Level of Function:   Assess activities of daily living deficits and provide assistive devices as needed   Assist and instruct patient to increase activity and self care as tolerated  Note: Pt showered today.

## 2024-04-21 NOTE — PLAN OF CARE
Problem: Self Harm/Suicidality  Goal: Will have no self-injury during hospital stay  Description: INTERVENTIONS:  1.  Ensure constant observer at bedside with Q15M safety checks  2.  Maintain a safe environment  3.  Secure patient belongings  4.  Ensure family/visitors adhere to safety recommendations  5.  Ensure safety tray has been added to patient's diet order  6.  Every shift and PRN: Re-assess suicidal risk via Frequent Screener    Outcome: Progressing     Problem: Depression  Goal: Will be euthymic at discharge  Description: INTERVENTIONS:  1. Administer medication as ordered  2. Provide emotional support via 1:1 interaction with staff  3. Encourage involvement in milieu/groups/activities  4. Monitor for social isolation  Outcome: Progressing     Problem: Psychosis  Goal: Will report no hallucinations or delusions  Description: INTERVENTIONS:  1. Administer medication as  ordered  2. Assist with reality testing to support increasing orientation  3. Assess if patient's hallucinations or delusions are encouraging self harm or harm to others and intervene as appropriate  4/20/2024 1152 by Janet Crystal, RN  Outcome: Progressing  Note: Pt observed to have paranoid behavior.     Problem: Self Care Deficit  Goal: Return ADL status to a safe level of function  Description: INTERVENTIONS:  1. Administer medication as ordered  2. Assess ADL deficits and provide assistive devices as needed  3. Obtain PT/OT consults as needed  4. Assist and instruct patient to increase activity and self care as tolerated  4/20/2024 1152 by Janet Crystal, RN  Outcome: Progressing  Flowsheets (Taken 4/20/2024 1152)  Return ADL Status to a Safe Level of Function:   Administer medication as ordered   Assess activities of daily living deficits and provide assistive devices as needed   Assist and instruct patient to increase activity and self care as tolerated  Note: Pt noted to need much encouragement to eat meals and do self care.

## 2024-04-21 NOTE — PROGRESS NOTES
[facility]  Inpatient Progress Note     Date of Service: 04/21/24  Hospital Day: 2     Subjective/Interval History   04/21/24    Treatment Team Notes:  Notes reviewed and/or discussed and report that Bony Smith is a 58 y.o. White (non-) male with a history reported records of a bipolar disorder and multitude of medical issues including blindness in right eye and diverticulitis, who presented with acute mental status change.  At the time of admission, the urine was positive for benzodiazepines.       Patient interview: Bony Smith was interviewed by this writer today.  Staff reported that with much encouragement, patient did take shower today morning although he continues to be guarded and disorganized.  To almost every question, he responds, \"I do not know.\"  I saw the patient in his room and he presented as paranoid with poor processing.  His speech was latent, soft, and slow and sometimes difficult to understand.  Remains a poor historian.      Objective     Vitals:    04/21/24 0744   BP: (!) 85/61   Pulse: 92   Resp: 18   Temp: 97.7 °F (36.5 °C)   SpO2: 95%       No results found for this or any previous visit (from the past 24 hour(s)).    Mental Status Examination     Appearance/Hygiene 58 y.o. White (non-) male  Hygiene: In hospital scrubs, improved from yesterday   Behavior/Social Relatedness Difficult to engage   Musculoskeletal Gait/Station: appropriate  Tone (flaccid, cogwheeling, spastic): not assessed  Psychomotor (hyperkinetic, hypokinetic): calm   Involuntary movements (tics, dyskinesias, akathisa, stereotypies): none   Speech   Latent, soft, slow, difficult to understand at times   Mood   I do not know   Affect    Blunted to flat   Thought Process Disorganized   Thought Content and Perceptual Disturbances No response regarding suicidal or homicidal thoughts   overvalued ideas, ruminations, obsession, compulsions, and phobias     No response

## 2024-04-22 PROBLEM — F41.9 ANXIETY DISORDER, UNSPECIFIED: Status: ACTIVE | Noted: 2024-04-22

## 2024-04-22 PROCEDURE — 6370000000 HC RX 637 (ALT 250 FOR IP): Performed by: PSYCHIATRY & NEUROLOGY

## 2024-04-22 PROCEDURE — 99232 SBSQ HOSP IP/OBS MODERATE 35: CPT | Performed by: PSYCHIATRY & NEUROLOGY

## 2024-04-22 PROCEDURE — 1240000000 HC EMOTIONAL WELLNESS R&B

## 2024-04-22 RX ORDER — POLYETHYLENE GLYCOL 3350 17 G/17G
17 POWDER, FOR SOLUTION ORAL DAILY PRN
Status: DISCONTINUED | OUTPATIENT
Start: 2024-04-22 | End: 2024-05-03 | Stop reason: HOSPADM

## 2024-04-22 RX ORDER — BENZTROPINE MESYLATE 1 MG/1
1 TABLET ORAL DAILY
Status: DISCONTINUED | OUTPATIENT
Start: 2024-04-22 | End: 2024-04-26

## 2024-04-22 RX ORDER — OLANZAPINE 5 MG/1
5 TABLET ORAL 2 TIMES DAILY
Status: DISCONTINUED | OUTPATIENT
Start: 2024-04-22 | End: 2024-04-24

## 2024-04-22 RX ADMIN — BENZTROPINE MESYLATE 1 MG: 1 TABLET ORAL at 11:43

## 2024-04-22 RX ADMIN — OLANZAPINE 5 MG: 5 TABLET, FILM COATED ORAL at 11:43

## 2024-04-22 RX ADMIN — OLANZAPINE 5 MG: 5 TABLET, FILM COATED ORAL at 20:53

## 2024-04-22 RX ADMIN — BUPROPION HYDROCHLORIDE 75 MG: 75 TABLET, FILM COATED ORAL at 09:04

## 2024-04-22 RX ADMIN — DULOXETINE HYDROCHLORIDE 60 MG: 30 CAPSULE, DELAYED RELEASE ORAL at 09:04

## 2024-04-22 ASSESSMENT — PAIN SCALES - GENERAL: PAINLEVEL_OUTOF10: 0

## 2024-04-22 NOTE — PROGRESS NOTES
[facility]  Inpatient Progress Note     Date of Service: 04/22/24  Hospital Day: 3     Subjective/Interval History   04/22/24    Nursing Report:  Notes reviewed and/or discussed in report. Bony Smith is a 58 y.o. White (non-) male with bipolar disorder who presented with paranoid delusions. Reported to be paranoid, guarded, disheveled, and covering attending for the weekend reported the same behavior plus the pt answering many questions as \"I don't know.\" He was resumed on his home regimen on admission over the weekend. He slept 9 hours last night. PRN required yesterday: Vistaril 25 mg in the morning, but no PRNs since.      Patient interview: Bony Smith was interviewed by this writer today in his room. He is noted to be isolative, withdrawn, and displays a guarded affect and demeanor, clutching his arms toward his face as if scared of writer. I attempted to reassure pt he is safe in the unit and he nods. He shakes his head in \"no\" fashion to questions regarding SI or HI. He does not answer writer questions about auditory or visual hallucinations. To these he responds \"I don't know\" as he did with admitting attending over the weekend, and also responds this when asked any open ended questions about how he is feeling. I also agree that his speech was latent, soft, and slow and sometimes difficult to understand. Asked pt if he feels paranoid, followed, or watched, and he shakes his head and says \"yes\" but does not elaborate who he feels is watching or following him.     He agrees to Zyprexa dose increase for paranoia, and I will order a no-roommate order due to severity of paranoia. Asked pt to extend his arms and provide full ROM to test for rigidity, cogwheeling, or tremors, and he displays what appears to be a voluntary mild tremor of both UE, as it fully subsides when asked to provide ROM and does not occur at rest. Despite no concern for involuntary movements, I

## 2024-04-22 NOTE — BH NOTE
Patient appeared to have slept 9hr thus far. Patient was isolative to room entire shift. No issues noted. Will continue to monitor.

## 2024-04-22 NOTE — GROUP NOTE
Art Therapy Group Progress Note    PATIENT SCHEDULED FOR GROUP AT:  1:00 PM     GROUP STOP TIME:  1:45 PM    ATTENDANCE: Moderate (3/10 participants)     TOPIC / FOCUS: 2x2 Structured Pattern Quilt     GOALS:  encourage focus, practice art as coping skill, encourage creativity, reduce feeling of stress and anxiety, promote relaxation, increase distress tolerance     PARTICIPATION LEVEL:  Pt did not attend.     Leighton Bowman  Art Therapist, MA, ATR-P  Provisional Registered Art Therapist

## 2024-04-22 NOTE — PROGRESS NOTES
Psychosocial Assessment     Admission Reason: Pt is a 58 year old male admitted to this family for delusional and paranoid thoughts     C-SSRS Screening Completed - Current Suicide Risk:   [] No Risk  [x] Low [] Moderate [] High     Risk Factors:     Protective Factors: has family as a support system    After consideration of C-SSRS screening results, C-SSRS assessments, and this professional's assessment the patient's overall suicide risk assessed to be:  [x] Low   [] Moderate   [] High     [x] Discussed current suicide risk, protective and risk factors with treatment team to determine safety interventions as applicable.     Gender:  [x] Male [] Female [] Transgender  [] Other    Sexual Orientation:  [x] Heterosexual [] Homosexual [] Bisexual [] Other    Homicidal Ideation:  [] Past [] Present [x] Denies     Onset and Duration of Problem: Pt has been non compliant with medication in the community     Current or Past Mental Health and/or Addictions Treatment (and response to treatment):  [x] Yes, When and Where: [facility] 2023 and [facility] 2022  [] No    Substance Use/Alcohol Use/Addiction (document name of substance, age of onset, how much and how often, route of use and date of last use): History of alcohol use not current   [] Reports [x] Denies     History of Biomedical Complications Associated with Substance Use/Abuse:  [] Reports [x] Denies  Specify:    Family History of Mental Illness or Substance Use/Abuse: [x] Yes (Specify)  [] No    Trauma and Abuse History: Patient did not provide information about trauma and abuse history.  [] Reports [x] Denies  Specify:      Legal History:  []  Yes (Specify)  [x] No currently on probation     Involvement:  [] Yes (Specify)  [x] No    Level of Education and Cognitive Functioning: Pt has a 10 th level of education     Employment and/or Benefits:  Unemployed  [] Yes (Specify)  [x] No      Leisure & Recreational Interests and

## 2024-04-22 NOTE — GROUP NOTE
Group Therapy Note    Date: 4/22/2024    Group Start Time: 1500  Group End Time: 1550  Group Topic: Music Therapy      Gabrielle Riddle        Group Therapy Note    Attendees: 5/10    Group Focus: Music therapy group consisted of a bell choir intervention where group members were given lyrics color coded to resonator bells to support reality orientation, focus, mood enhancement, stress reduction, and focus on the here-and-now. The group closed with collaborative music making involving a range of instruments.         Notes:  Pt did not attend group, responding \"no thank you\" while resting in his room when invited to group.      Discipline Responsible: /Counselor      Signature:  JOSE Salazar, LPC  Board-Certified Music Therapist  Licensed Professional Counselor

## 2024-04-22 NOTE — PLAN OF CARE
Problem: Discharge Planning  Goal: Discharge to home or other facility with appropriate resources  Outcome: Progressing     Problem: Risk for Elopement  Goal: Patient will not exit the unit/facility without proper excort  Outcome: Progressing     Problem: Self Harm/Suicidality  Goal: Will have no self-injury during hospital stay  Description: INTERVENTIONS:  1.  Ensure constant observer at bedside with Q15M safety checks  2.  Maintain a safe environment  3.  Secure patient belongings  4.  Ensure family/visitors adhere to safety recommendations  5.  Ensure safety tray has been added to patient's diet order  6.  Every shift and PRN: Re-assess suicidal risk via Frequent Screener    Outcome: Progressing     Problem: Depression  Goal: Will be euthymic at discharge  Description: INTERVENTIONS:  1. Administer medication as ordered  2. Provide emotional support via 1:1 interaction with staff  3. Encourage involvement in milieu/groups/activities  4. Monitor for social isolation  Outcome: Progressing     Problem: Dinorah  Goal: Will exhibit normal sleep and speech and no impulsivity  Description: INTERVENTIONS:  1. Administer medication as ordered  2. Set limits on impulsive behavior  3. Make attempts to decrease external stimuli as possible  Outcome: Progressing     Problem: Psychosis  Goal: Will report no hallucinations or delusions  Description: INTERVENTIONS:  1. Administer medication as  ordered  2. Assist with reality testing to support increasing orientation  3. Assess if patient's hallucinations or delusions are encouraging self harm or harm to others and intervene as appropriate  Outcome: Progressing     Problem: Behavior  Goal: Pt/Family maintain appropriate behavior and adhere to behavioral management agreement, if implemented  Description: INTERVENTIONS:  1. Assess patient/family's coping skills and  non-compliant behavior (including use of illegal substances)  2. Notify security of behavior or suspected illegal  substances which indicate the need for search of the family and/or belongings  3. Encourage verbalization of thoughts and concerns in a socially appropriate manner  4. Utilize positive, consistent limit setting strategies supporting safety of patient, staff and others  5. Encourage participation in the decision making process about the behavioral management agreement  6. If a visitor's behavior poses a threat to safety call refer to organization policy.  7. Initiate consult with , Psychosocial CNS, Spiritual Care as appropriate  Outcome: Progressing     Problem: Anxiety  Goal: Will report anxiety at manageable levels  Description: INTERVENTIONS:  1. Administer medication as ordered  2. Teach and rehearse alternative coping skills  3. Provide emotional support with 1:1 interaction with staff  Outcome: Progressing     Problem: Sleep Disturbance  Goal: Will exhibit normal sleeping pattern  Description: INTERVENTIONS:  1. Administer medication as ordered  2. Decrease environmental stimuli, including noise, as appropriate  3. Discourage social isolation and naps during the day  Outcome: Progressing     Problem: Self Care Deficit  Goal: Return ADL status to a safe level of function  Description: INTERVENTIONS:  1. Administer medication as ordered  2. Assess ADL deficits and provide assistive devices as needed  3. Obtain PT/OT consults as needed  4. Assist and instruct patient to increase activity and self care as tolerated  Outcome: Progressing     Problem: Spiritual Care  Goal: Pt/Family able to move forward in process of forgiving self, others, and/or higher power  Description: INTERVENTIONS:  1. Assist patient/family to overcome blocks to healing by use of spiritual practices (prayer, meditation, guided imagery, reiki, breath work, etc).  2. De-myth guilt and help patient/family identify possible irrational spiritual/cultural beliefs and values.  3. Explore possibilities of making amends & reconciliation

## 2024-04-22 NOTE — PLAN OF CARE
Problem: Psychosis  Goal: Will report no hallucinations or delusions  Description: INTERVENTIONS:  1. Administer medication as  ordered  2. Assist with reality testing to support increasing orientation  3. Assess if patient's hallucinations or delusions are encouraging self harm or harm to others and intervene as appropriate  4/22/2024 1125 by Ericka Plasencia RN  Outcome: Progressing  4/22/2024 0645 by Sonia Lockett RN  Outcome: Progressing     Problem: Behavior  Goal: Pt/Family maintain appropriate behavior and adhere to behavioral management agreement, if implemented  Description: INTERVENTIONS:  1. Assess patient/family's coping skills and  non-compliant behavior (including use of illegal substances)  2. Notify security of behavior or suspected illegal substances which indicate the need for search of the family and/or belongings  3. Encourage verbalization of thoughts and concerns in a socially appropriate manner  4. Utilize positive, consistent limit setting strategies supporting safety of patient, staff and others  5. Encourage participation in the decision making process about the behavioral management agreement  6. If a visitor's behavior poses a threat to safety call refer to organization policy.  7. Initiate consult with , Psychosocial CNS, Spiritual Care as appropriate  4/22/2024 1125 by Ericka Plasencia RN  Outcome: Progressing  4/22/2024 0645 by Sonia Lockett RN  Outcome: Progressing       Patient received alert and verbal, no c/o pain, affect flat and blunt, denies SI, HI, and VH, still endorses AH, however patient states, \" he doesn't know what they are saying,\" patient gait was steady, patient continued to move arms and hands continuously and tuck in his arm pits, when asked if he was nervous or cold, patient stated, \"he was alright,\" he remained mostly in his room, he did not attend group today, he was compliant with his medications, he was encouraged to eat, patient only drank his

## 2024-04-23 PROCEDURE — 6370000000 HC RX 637 (ALT 250 FOR IP): Performed by: PSYCHIATRY & NEUROLOGY

## 2024-04-23 PROCEDURE — 99231 SBSQ HOSP IP/OBS SF/LOW 25: CPT | Performed by: PSYCHIATRY & NEUROLOGY

## 2024-04-23 PROCEDURE — 1240000000 HC EMOTIONAL WELLNESS R&B

## 2024-04-23 RX ADMIN — DULOXETINE HYDROCHLORIDE 60 MG: 30 CAPSULE, DELAYED RELEASE ORAL at 08:17

## 2024-04-23 RX ADMIN — OLANZAPINE 5 MG: 5 TABLET, FILM COATED ORAL at 08:17

## 2024-04-23 RX ADMIN — BENZTROPINE MESYLATE 1 MG: 1 TABLET ORAL at 08:17

## 2024-04-23 RX ADMIN — OLANZAPINE 5 MG: 5 TABLET, FILM COATED ORAL at 20:20

## 2024-04-23 RX ADMIN — HYDROXYZINE HYDROCHLORIDE 25 MG: 50 TABLET, FILM COATED ORAL at 08:42

## 2024-04-23 NOTE — GROUP NOTE
Art Therapy Group Progress Note    PATIENT SCHEDULED FOR GROUP AT:  10:15 AM     GROUP STOP TIME:  11:00 AM    ATTENDANCE: Low (4/10 participants)     TOPIC / FOCUS:  Self-Care Bucket     GOALS:  define self-care, identify ways to practice self-care, define burn out, identify ways that encourage burnout, identify positive coping skills to decrease burnout, increase self-awareness, encourage emotional awareness, promote creativity and creative problem solving, encourage meeting basic self-care goals     PARTICIPATION LEVEL:  Pt did not attend.     Leighton Bowman  Art Therapist, MA, ATR-P  Provisional Registered Art Therapist

## 2024-04-23 NOTE — PLAN OF CARE
Comprehensive Nutrition Assessment    Type and Reason for Visit:  Initial, Positive Nutrition Screen (decreased appetite)    Nutrition Recommendations/Plan:   Continue regular diet order  Encourage adequate PO intake  Obtain measured weight  Monitor weights, labs, PO intake and POC while admitted; make recommendations as appropriate     Malnutrition Assessment:  Malnutrition Status:  Insufficient data (04/23/24 0808)    Context:  Acute Illness     Findings of the 6 clinical characteristics of malnutrition:  Energy Intake:  Unable to assess  Weight Loss:  Unable to assess     Body Fat Loss:  Unable to assess     Muscle Mass Loss:  Unable to assess    Fluid Accumulation:  Unable to assess     Strength:  Not Performed    Nutrition Assessment:    Pt admitted for management of bipolar disorder. Pt presented to ED with altered mental status. His sister, Alicia, state pt has been on a \"downside\" states he has stopped taking his medications and has had nothing to eat or drink all day.  She states he has had several similar episodes over the last 1 to 2 weeks. No PO intake documented. Wt hx, per EMR 156lb cbw (stated), 138lb (3/25/24), 90lb (8/21/23), 133lb (12/29/22). NKFA. Will trial oral nutrition supplement to optimize energy intake. In the meantime, request a measured weight to allow for most accurate nutrition recommendations. Currently, recommend encouraging adequate PO intake; BMI WNL and no evidence of significant weight loss.    Nutrition Related Findings:    No edema or BM data documented. Labs and meds reviewed. Wound Type: None       Current Nutrition Intake & Therapies:    Average Meal Intake: Unable to assess  Average Supplements Intake: None Ordered  ADULT DIET; Regular    Anthropometric Measures:  Height: 182.9 cm (6')  Ideal Body Weight (IBW): 178 lbs (81 kg)       Current Body Weight: 70.8 kg (156 lb), 87.6 % IBW. Weight Source: Stated  Current BMI (kg/m2): 21.2        Weight Adjustment For: No

## 2024-04-23 NOTE — PLAN OF CARE
Problem: Depression  Goal: Will be euthymic at discharge  Description: INTERVENTIONS:  1. Administer medication as ordered  2. Provide emotional support via 1:1 interaction with staff  3. Encourage involvement in milieu/groups/activities  4. Monitor for social isolation  4/23/2024 0956 by Jose Roberto Ivy RN  Outcome: Progressing  4/22/2024 2051 by Sussy Newman RN  Outcome: Progressing     Problem: Psychosis  Goal: Will report no hallucinations or delusions  Description: INTERVENTIONS:  1. Administer medication as  ordered  2. Assist with reality testing to support increasing orientation  3. Assess if patient's hallucinations or delusions are encouraging self harm or harm to others and intervene as appropriate  4/22/2024 2051 by Sussy Newman RN  Outcome: Progressing     Problem: Anxiety  Goal: Will report anxiety at manageable levels  Description: INTERVENTIONS:  1. Administer medication as ordered  2. Teach and rehearse alternative coping skills  3. Provide emotional support with 1:1 interaction with staff  Outcome: Progressing     Problem: Self Care Deficit  Goal: Return ADL status to a safe level of function  Description: INTERVENTIONS:  1. Administer medication as ordered  2. Assess ADL deficits and provide assistive devices as needed  3. Obtain PT/OT consults as needed  4. Assist and instruct patient to increase activity and self care as tolerated  Outcome: Progressing     Problem: Nutrition Deficit:  Goal: Optimize nutritional status  Outcome: Progressing

## 2024-04-23 NOTE — BH NOTE
Patient appeared to have been laying down and resting when tech have arrival on shift , vitals was taken during shift , afterwards was given snack and mediation , patient continue to be in the room laying down resting was no issues and compliant will continue to monitor throughout the night .

## 2024-04-23 NOTE — GROUP NOTE
Group Therapy Note    Date: 4/23/2024    Group Start Time: 0930  Group End Time: 1015  Group Topic: Music Therapy    Gabrielle Riddle        Group Therapy Note    Attendees: 3/10    Group Focus: Music therapy group consisted of a music-assisted relaxation incorporating elements of body scan relaxation, deep breathing, affirmation statements, and imagining a peaceful place. After the relaxation, group members discussed their experiences. The group was given a choice of how to close the group and group members chose to end group with collaborative music making. The group may promote use of music and relaxation techniques for stress reduction.        Notes:  Pt did not attend group and was resting in his room when invited to group.      Discipline Responsible: /Counselor      Signature:  JOSE Salazar, LPC  Board-Certified Music Therapist  Licensed Professional Counselor

## 2024-04-23 NOTE — GROUP NOTE
Art Therapy Group Progress Note    PATIENT SCHEDULED FOR GROUP AT:  1:00 PM     GROUP STOP TIME:  1:45 PM    ATTENDANCE: Low (3/11 participants)     TOPIC / FOCUS: Free Choice Coloring Sheets and Listening to Music     GOALS: encourage autonomy, increase focus, promote creativity and self-expression, increase positive feelings, provide space for fun, promote community and positive social interactions, practice art as a positive coping skill     PARTICIPATION LEVEL:  Pt did not attend.     Leighton Bowman  Art Therapist, MA, ATR-P  Provisional Registered Art Therapist

## 2024-04-23 NOTE — PROGRESS NOTES
Pt isolative and withdrawn with blunted affect. Pt states anxiety is 10/10 without precipitating factors. Pt states depression is 10/10 without precipitating factors. Pt denies SI/HI/AH/CH/VH/thoughts of self-harm in conversation with this RN. Pt laying in bed all shift except for breakfast and lunch. Pt refusing to eat dinner. Dinner is being saved for Pt.

## 2024-04-23 NOTE — PROGRESS NOTES
Pt is a 58 with history of bipolar disorder  depressed with psychotic features. Pt was hospitalized for psychosis due to medication noncompliance.       SW met with pt to discuss dc planning.  Pt continues to be guarded and has paranoid thoughts. Pt 's appetite is being monitored.       SW Contact: SW met with pt to discuss dc planning. Pt expressed to still feeling anxious. SW discussed treatment goals to include safety, eating meals and positive coping strategies for cognitive distortions. Pt is anxious, withdrawn and continues to have poor insight.  SW will continue to engage pt with reality orientation  and positive encouragement. Pt signed a release to talk to sp his spouse    SW Collateral:  SW talked to Kristin Luis pt.' Spouse @ [phone]. The spouse reports pt has not been compliant with medications in over a month. Pt stopped eating, ha impaired sleep, non verbal, psychotic arlet delusional. Pt per spouse spent 4 months at [facility] last year. PT was dc from the facility December 2023.  Pt hs been receiving outpt mental health  services with [facility] and non compliant  with medications The  spouse is hoping pt will stabilize in local setting. Pt will return home once dc. SW will assist pt and family with exploring supportive services in the community.       Kristel Montesinos MA, LMHP-R

## 2024-04-23 NOTE — PLAN OF CARE
Problem: Self Harm/Suicidality  Goal: Will have no self-injury during hospital stay  Description: INTERVENTIONS:  1.  Ensure constant observer at bedside with Q15M safety checks  2.  Maintain a safe environment  3.  Secure patient belongings  4.  Ensure family/visitors adhere to safety recommendations  5.  Ensure safety tray has been added to patient's diet order  6.  Every shift and PRN: Re-assess suicidal risk via Frequent Screener    Outcome: Progressing     Problem: Depression  Goal: Will be euthymic at discharge  Description: INTERVENTIONS:  1. Administer medication as ordered  2. Provide emotional support via 1:1 interaction with staff  3. Encourage involvement in milieu/groups/activities  4. Monitor for social isolation  Outcome: Progressing     Problem: Psychosis  Goal: Will report no hallucinations or delusions  Description: INTERVENTIONS:  1. Administer medication as  ordered  2. Assist with reality testing to support increasing orientation  3. Assess if patient's hallucinations or delusions are encouraging self harm or harm to others and intervene as appropriate  4/22/2024 2051 by Sussy Newman, RN  Outcome: Progressing  4/22/2024 1125 by Ericka Plasencia RN  Outcome: Progressing     Problem: Behavior  Goal: Pt/Family maintain appropriate behavior and adhere to behavioral management agreement, if implemented  Description: INTERVENTIONS:  1. Assess patient/family's coping skills and  non-compliant behavior (including use of illegal substances)  2. Notify security of behavior or suspected illegal substances which indicate the need for search of the family and/or belongings  3. Encourage verbalization of thoughts and concerns in a socially appropriate manner  4. Utilize positive, consistent limit setting strategies supporting safety of patient, staff and others  5. Encourage participation in the decision making process about the behavioral management agreement  6. If a visitor's behavior poses a threat to

## 2024-04-23 NOTE — BH NOTE
Upon rounds, patient was sleeping.  Patient was encouraged to come out for vitals, breakfast and meds, all of which he denied.  Will continue 15 minute safety checks for safety and suppport.

## 2024-04-23 NOTE — PROGRESS NOTES
[facility]  Inpatient Progress Note     Date of Service: 04/23/24  Hospital Day: 4     Subjective/Interval History   04/23/24    Nursing Report:  Notes reviewed and/or discussed in report. Bony Smith is a 58 y.o. male with bipolar disorder who presented with paranoid delusions. Reported to be paranoid and guarded. He slept 7 hours last night. PRN required yesterday: Vistaril 25 mg this morning, but no PRNs yesterday. Reported that this appetite was low and a dietary consult was ordered.      Patient interview: Bony Smith was interviewed by this writer today in his room. He is noted to continue to be guarded and withdrawn, and again clutches his arms as if scared of writer. I reassured pt he is safe in the unit and that we are here to help him, and today he does verbalize \"I feel safe.\". He denies SI or HI. He does not answer writer questions about auditory or visual hallucinations, and similar to yesterday he responds \"I don't know\" to these. Speech remains soft in tone and volume, but has less latency of response. Asked pt if he feels paranoid, followed, or watched, and he says \"yes\" but when asked if there is anyone in particular he is worried about he says \"I don't know\" again. He denies any concerns/paranoid delusions about methane gas or explosions today.     Reviewed with pt that yesterday I increased his Zyprexa dose for paranoia and he is receptive to continuing the medication, and denies any side effects. Asked pt to extend his arms and provide full ROM to test for rigidity, cogwheeling, or tremors, and again displays what appears to be a voluntary mild tremor of both UE, as it fully subsides when asked to provide ROM and does not occur at rest. Despite no concern for involuntary movements, I ordered Cogentin 1 mg daily to prevent dystonia given Zyprexa increase, and plan to continue Zyprexa and Cogentin at the same doses today.       Objective     Vitals:    04/23/24

## 2024-04-24 PROCEDURE — 1240000000 HC EMOTIONAL WELLNESS R&B

## 2024-04-24 PROCEDURE — 99231 SBSQ HOSP IP/OBS SF/LOW 25: CPT | Performed by: PSYCHIATRY & NEUROLOGY

## 2024-04-24 PROCEDURE — 6370000000 HC RX 637 (ALT 250 FOR IP): Performed by: PSYCHIATRY & NEUROLOGY

## 2024-04-24 RX ORDER — OLANZAPINE 5 MG/1
2.5 TABLET ORAL ONCE
Status: COMPLETED | OUTPATIENT
Start: 2024-04-24 | End: 2024-04-24

## 2024-04-24 RX ORDER — OLANZAPINE 5 MG/1
7.5 TABLET ORAL 2 TIMES DAILY
Status: DISCONTINUED | OUTPATIENT
Start: 2024-04-24 | End: 2024-04-26

## 2024-04-24 RX ADMIN — OLANZAPINE 7.5 MG: 5 TABLET, FILM COATED ORAL at 20:14

## 2024-04-24 RX ADMIN — OLANZAPINE 2.5 MG: 5 TABLET, FILM COATED ORAL at 12:15

## 2024-04-24 RX ADMIN — DULOXETINE HYDROCHLORIDE 60 MG: 30 CAPSULE, DELAYED RELEASE ORAL at 08:14

## 2024-04-24 RX ADMIN — OLANZAPINE 5 MG: 5 TABLET, FILM COATED ORAL at 08:14

## 2024-04-24 RX ADMIN — BENZTROPINE MESYLATE 1 MG: 1 TABLET ORAL at 08:14

## 2024-04-24 ASSESSMENT — PAIN SCALES - GENERAL
PAINLEVEL_OUTOF10: 0
PAINLEVEL_OUTOF10: 0

## 2024-04-24 NOTE — GROUP NOTE
Group Therapy Note    Date: 4/24/2024    Group Start Time: 1400  Group End Time: 1445  Group Topic: Music Therapy      Gabrielle Riddle        Group Therapy Note    Attendees: 3/11    Group Focus: Music therapy group explored the theme of resiliency through the intervention of matthew analysis. The group listened to and discussed three songs related to resilience, including group members asking to do one song with live music. There was also discussion related to life challenges and warning signs along with exploring what helps group members persevere.          Notes:  Patient did not attend group, and was resting in his room when invited to group.      Discipline Responsible: /Counselor      Signature:  JOSE Salazar, LPC  Board-Certified Music Therapist  Licensed Professional Counselor

## 2024-04-24 NOTE — PROGRESS NOTES
[facility]  Inpatient Progress Note     Date of Service: 04/24/24  Hospital Day: 5     Subjective/Interval History   04/24/24    Nursing Report:  Notes reviewed and/or discussed in report. Bony Smith is a 58 y.o. male with bipolar disorder who presented with paranoid delusions. Reported to be paranoid and guarded. He slept 7.5 hours last night. PRN required yesterday: Vistaril 25 mg yesterday morning, but no PRNs overnight. Reported that his appetite was low. Reported behaviorally to be paranoid and withdrawn.      Patient interview: Bony Smith was interviewed by this writer today in his room. He continues to be guarded and withdrawn, but does not clutch his blanket or his arms / appears less frightened. He denies SI or HI. Today he does answer questions about auditory or visual hallucinations, and denies both, and does not respond to internal stimuli. Speech remains soft in tone and volume, but has less latency of response. Asked pt if he feels paranoid, followed, or watched, and he says \"everything you mentioned yes\" but when asked if there is anyone in particular he is worried about he shakes his head. He denies any concerns/paranoid delusions about methane gas or explosions today. Reviewed with pt I would like to increase his Zyprexa dose for paranoia and for bipolar depression, and he agrees. No side effects noted or reported, including no dystonia or akathisia. I will continue Cogentin 1 mg daily to prevent dystonia given gradual increase in Zyprexa.      Objective     Vitals:    04/24/24 0745   BP: 97/60   Pulse: 97   Resp: 16   Temp: 97 °F (36.1 °C)   SpO2: 97%       No results found for this or any previous visit (from the past 24 hour(s)).    Mental Status Examination     Appearance/Hygiene 58 y.o. White (non-) male  Hygiene: In hospital scrubs, disheveled   Behavior/Social Relatedness Isolative, withdrawn   Musculoskeletal Gait/Station: not tested as pt

## 2024-04-24 NOTE — GROUP NOTE
Art Therapy Group Progress Note    PATIENT SCHEDULED FOR GROUP AT:  1:00 PM     GROUP STOP TIME:  1:45 PM    ATTENDANCE: Low (3/11 participants)     TOPIC / FOCUS: Finish the Images    GOALS: to encourage creativity, practice problem solving, increase focus, increase reality orientation, promote positive coping skills, increase social connection, psychoeducation on perspective     PARTICIPATION LEVEL:  Pt did not attend.     Leighton Bowman  Art Therapist, MA, ATR-P  Provisional Registered Art Therapist

## 2024-04-24 NOTE — PROGRESS NOTES
Pt is a 58 with history of bipolar disorder  depressed with psychotic features. Pt was hospitalized for psychosis due to medication noncompliance.         SW met with pt to discuss dc planning.  Pt continues to be withdrawn an isolative. Pt.'s medications will be adjusted.      SW Contact: SW met with pt to discuss dc planning.Pt admits to having continued racing thoughts but denies ideations and hallucinations. Pt admits he feels anxious and does not want to leave his room  at times. Pt was encouraged to eat meals, discussed safety plan and continued medication compliance.  Pt is anxious, has poor insight and judgement. Sw will continued to provide pt with supports towards dc planning.       Kristel Montesinos MA, HS-BCP, LMHP-R

## 2024-04-24 NOTE — PLAN OF CARE
Problem: Risk for Elopement  Goal: Patient will not exit the unit/facility without proper excort  Outcome: Progressing     Problem: Behavior  Goal: Pt/Family maintain appropriate behavior and adhere to behavioral management agreement, if implemented  Description: INTERVENTIONS:  1. Assess patient/family's coping skills and  non-compliant behavior (including use of illegal substances)  2. Notify security of behavior or suspected illegal substances which indicate the need for search of the family and/or belongings  3. Encourage verbalization of thoughts and concerns in a socially appropriate manner  4. Utilize positive, consistent limit setting strategies supporting safety of patient, staff and others  5. Encourage participation in the decision making process about the behavioral management agreement  6. If a visitor's behavior poses a threat to safety call refer to organization policy.  7. Initiate consult with , Psychosocial CNS, Spiritual Care as appropriate  Outcome: Progressing     Problem: Psychosis  Goal: Will report no hallucinations or delusions  Description: INTERVENTIONS:  1. Administer medication as  ordered  2. Assist with reality testing to support increasing orientation  3. Assess if patient's hallucinations or delusions are encouraging self harm or harm to others and intervene as appropriate  Outcome: Progressing     Pt has been withdrawn to his room this shift.  Pleasant upon approach.  Denies si/hi.  Was compliant w/ hs scheduled medications.  Offers no complaints.  Will continue to monitor/support

## 2024-04-24 NOTE — GROUP NOTE
Group Therapy Note    Date: 4/24/2024    Group Start Time: 1500  Group End Time: 1545  Group Topic: Recreational    [facility]    Matthew Pride        Group Therapy Note    Attendees: 4/11    Group Type: Stress and Anger Bingo      Group: Focus: Recreational therapy groups engaged patients through a game that focused on stress and anger. Patients examined: external stressors, internal stressors, physical stress symptoms, emotional/behavioral stress symptoms, and stress relievers. PTs identified triggers, symptoms, control and ways to prevent anger. Group may help enhance social and coping skills, and decrease stress, depression and anxiety          Notes:  Patient did not attend group.      Recreational Therapist  MATTHEW PRIDE

## 2024-04-24 NOTE — PLAN OF CARE
Problem: Discharge Planning  Goal: Discharge to home or other facility with appropriate resources  Outcome: Progressing     Problem: Risk for Elopement  Goal: Patient will not exit the unit/facility without proper excort  Outcome: Progressing     Problem: Self Harm/Suicidality  Goal: Will have no self-injury during hospital stay  Description: INTERVENTIONS:  1.  Ensure constant observer at bedside with Q15M safety checks  2.  Maintain a safe environment  3.  Secure patient belongings  4.  Ensure family/visitors adhere to safety recommendations  5.  Ensure safety tray has been added to patient's diet order  6.  Every shift and PRN: Re-assess suicidal risk via Frequent Screener    4/24/2024 1741 by Jaz Castro, RN  Outcome: Progressing  Pt presents with flat affect, anxious mood, paranoia, guarded during interview. Pt continues to have poor appetite, but did consume 100% of his lunch. Pt was offered snack by staff multiple times throughout the day. Pt consumed two bags of cookies and five juices during this shift. Pt remains hypervigilant. Pt has been withdrawn to self on the unit, but is pleasant on approach. Pt denies SI/HI/AVH. Pt is medication compliant.

## 2024-04-24 NOTE — PLAN OF CARE
Problem: Depression  Goal: Will be euthymic at discharge  Description: INTERVENTIONS:  1. Administer medication as ordered  2. Provide emotional support via 1:1 interaction with staff  3. Encourage involvement in milieu/groups/activities  4. Monitor for social isolation  Outcome: Progressing     Problem: Self Care Deficit  Goal: Return ADL status to a safe level of function  Description: INTERVENTIONS:  1. Administer medication as ordered  2. Assess ADL deficits and provide assistive devices as needed  3. Obtain PT/OT consults as needed  4. Assist and instruct patient to increase activity and self care as tolerated  Outcome: Progressing     Problem: Self Harm/Suicidality  Goal: Will have no self-injury during hospital stay  Description: INTERVENTIONS:  1.  Ensure constant observer at bedside with Q15M safety checks  2.  Maintain a safe environment  3.  Secure patient belongings  4.  Ensure family/visitors adhere to safety recommendations  5.  Ensure safety tray has been added to patient's diet order  6.  Every shift and PRN: Re-assess suicidal risk via Frequent Screener    Outcome: Progressing   Pt presents with dull affect, depressed mood, with impoverished thought process. Pt has been withdrawn to self on the unit and isolative to his room most of day. Pt displays appropriate boundaries on the unit, adhering to unit guidelines. Pt exhibits poor appetite with excessive sleeping. Pt appears disheveled and unkempt. Pt denies SI/HI/AVH. Pt is medication compliant. Denies pain. Nursing will continue to monitor for safety during inpatient stay.

## 2024-04-25 PROCEDURE — 1240000000 HC EMOTIONAL WELLNESS R&B

## 2024-04-25 PROCEDURE — 6370000000 HC RX 637 (ALT 250 FOR IP): Performed by: PSYCHIATRY & NEUROLOGY

## 2024-04-25 PROCEDURE — 99232 SBSQ HOSP IP/OBS MODERATE 35: CPT | Performed by: PSYCHIATRY & NEUROLOGY

## 2024-04-25 RX ORDER — BUSPIRONE HYDROCHLORIDE 5 MG/1
7.5 TABLET ORAL 2 TIMES DAILY
Status: DISCONTINUED | OUTPATIENT
Start: 2024-04-25 | End: 2024-05-03 | Stop reason: HOSPADM

## 2024-04-25 RX ADMIN — OLANZAPINE 7.5 MG: 5 TABLET, FILM COATED ORAL at 09:03

## 2024-04-25 RX ADMIN — OLANZAPINE 7.5 MG: 5 TABLET, FILM COATED ORAL at 20:08

## 2024-04-25 RX ADMIN — BUSPIRONE HYDROCHLORIDE 7.5 MG: 5 TABLET ORAL at 13:10

## 2024-04-25 RX ADMIN — DULOXETINE HYDROCHLORIDE 60 MG: 30 CAPSULE, DELAYED RELEASE ORAL at 09:05

## 2024-04-25 RX ADMIN — BUSPIRONE HYDROCHLORIDE 7.5 MG: 5 TABLET ORAL at 20:08

## 2024-04-25 RX ADMIN — BENZTROPINE MESYLATE 1 MG: 1 TABLET ORAL at 09:04

## 2024-04-25 ASSESSMENT — PAIN SCALES - GENERAL
PAINLEVEL_OUTOF10: 0
PAINLEVEL_OUTOF10: 0

## 2024-04-25 NOTE — GROUP NOTE
Group Therapy Note    Date: 4/25/2024    Group Start Time: 1030  Group End Time: 1115  Group Topic: Recreational    [facility]    Di Pride        Group Therapy Note    Attendees: 3/12    Group Type: Pieces Of Me     Group Focus: This therapy group engaged patients in a group that focused on parts of our personality. Patients wrote down the pieces of themselves that make them \"who they are\". Patient then discussed the parts of their personality they are proud of, would change, value, or may conflict with others. This group may help improve self-awareness, self-esteem, mood, socialization, anxiety, and depression.      Notes:  Patient did not attend group    Recreational therapist  Di Pride

## 2024-04-25 NOTE — BH NOTE
Treatment team meeting     Medical Director: _Dr. Benjamin____present   Psychiatrist: _Dr. Benjamin____present   Charge nurse: _GLORIA Plasencia RN____present   MSW: _SHARON Fontenot____present   : __NA___present   Nurse Manager: __NA___present   Student RNs: __NA___present   Medical Students: _NA____present   Art Therapist: __NO___present   Clinical Coordinator: _NA____present    Occupational Therapist: __NA___present   : __NA_____ present  UR  __NA_____ present  Crisis Supervisor__NA_____present  Patient:__YES_____ present    Discharge planner: YURIDIA Nichols  : Maddie Loco Therapist: NITHIN Pride  Music Therapist: GLORIA Riddle      Plan of care discussed and updated as appropriate.    Will continue to encourage patient to eat and drink.  Patient encouraged to attend at least one group session today as his goal.  Encourage patient to bath daily.

## 2024-04-25 NOTE — GROUP NOTE
Group Therapy Note    Date: 4/25/2024    Group Start Time: 0930  Group End Time: 1015  Group Topic: Music Therapy      Gabrielle Riddle        Group Therapy Note    Attendees: 5/13    Group Focus: Music therapy group focused on mindfulness and cognitive defusion, including teaching and practicing a music-assisted ACT Leaves on a Stream technique to practice observing and creating distance from thoughts, feelings, and sensations. The group also explored mindfulness through active music making to notice various aspects of the music making. The group may promote use of music and mindfulness for stress reduction and as coping skills.         Notes:  Pt did not attend group, and was resting in his room when invited to group. Pt was awake and stated \"no thank you\" when invited to group.      Discipline Responsible: /Counselor      Signature:  JOSE Salazar, ANTONIO  Board-Certified Music Therapist  Licensed Professional Counselor

## 2024-04-25 NOTE — PLAN OF CARE
Problem: Self Harm/Suicidality  Goal: Will have no self-injury during hospital stay  Description: INTERVENTIONS:  1.  Ensure constant observer at bedside with Q15M safety checks  2.  Maintain a safe environment  3.  Secure patient belongings  4.  Ensure family/visitors adhere to safety recommendations  5.  Ensure safety tray has been added to patient's diet order  6.  Every shift and PRN: Re-assess suicidal risk via Frequent Screener    4/25/2024 0905 by Ericka Plasencia RN  Outcome: Progressing  4/24/2024 2000 by Gris Lyons RN  Flowsheets (Taken 4/24/2024 2000)  Will have no self-injury during hospital stay:   Ensure constant observer at bedside with Q15M safety checks   Secure patient belongings   Ensure safety tray has been added to patient's diet order   Every shift and PRN: Re-assess suicidal risk via Frequent Screener     Problem: Depression  Goal: Will be euthymic at discharge  Description: INTERVENTIONS:  1. Administer medication as ordered  3. Encourage involvement in milieu/groups/activities  4. Monitor for social isolation  Outcome: Progressing    Patient received alert and verbal, no c/o pain, compliant with medication, denies SI, HI, and AVH, ate 50% of breakfast and lunch, 25% of dinner, he has been drinking juices, still paranoid of others, patient did come out and attend his first treatment team meeting, buspar 7.5 mg added to medication regimen, patient took medication as ordered, affect remains flat, will continue to monitor.

## 2024-04-25 NOTE — PROGRESS NOTES
[facility]  Inpatient Progress Note     Date of Service: 04/25/24  Hospital Day: 6     Subjective/Interval History   04/25/24    Nursing Report:  Notes reviewed and/or discussed in report. Bony Smith is a 58 y.o. male with bipolar disorder who presented with paranoid delusions. Reported to be paranoid and guarded. He slept 6 hours last night. PRN required yesterday: None. Reported that his appetite was improved yesterday but has not showered since Monday.     On assessment today, pt was seen with other members of interdisciplinary treatment team in the milieu conference room.     His gait is slow but steady, and he is noted to fidget with his extremities and appears restless. He is able to provide full ROM of both UE, showing no cogwheeling or rigidity, and no resting tremor. He does appear restless as above but likely related to severe anxiety, and he does report feeling anxious. His affect is both anxious and guarded. When asked how he is feeling in mood or emotions, he states \"I don't know.\" He denies SI or HI. He denies auditory or visual hallucinations and does not respond to internal stimuli. Speech remains soft in tone and volume, with slight latency of response. He continues to endorse feeling paranoid but does not elaborate on the content or the person or entity he feels paranoid about. He denies any concerns/paranoid delusions about methane gas or explosions today. Reviewed with pt I increased his Zyprexa dose for paranoia and for bipolar depression yesterday. No dystonia noted and he is on Cogentin. However if he remains restless tomorrow, I will consider addition of propranolol for possible akathisia. Clinical picture today is consistent with anxious distress for which he does agree to Buspar initiation. Social work will communicate with family to obtain a clearer picture of the pt's baseline, as he may have a degree of paranoia at baseline given he has had months-long

## 2024-04-25 NOTE — PLAN OF CARE
Problem: Depression  Goal: Will be euthymic at discharge  Description: INTERVENTIONS:  1. Administer medication as ordered  2. Provide emotional support via 1:1 interaction with staff  3. Encourage involvement in milieu/groups/activities  4. Monitor for social isolation  4/24/2024 1323 by Lakeisha Rosa, RN  Outcome: Progressing     Problem: Self Harm/Suicidality  Goal: Will have no self-injury during hospital stay  Description: INTERVENTIONS:  1.  Ensure constant observer at bedside with Q15M safety checks  2.  Maintain a safe environment  3.  Secure patient belongings  4.  Ensure family/visitors adhere to safety recommendations  5.  Ensure safety tray has been added to patient's diet order  6.  Every shift and PRN: Re-assess suicidal risk via Frequent Screener    4/24/2024 2000 by Gris Lyons, RN  Flowsheets (Taken 4/24/2024 2000)  Will have no self-injury during hospital stay:   Ensure constant observer at bedside with Q15M safety checks   Secure patient belongings   Ensure safety tray has been added to patient's diet order   Every shift and PRN: Re-assess suicidal risk via Frequent Screener  4/24/2024 1741 by Jaz Castro, RN  Outcome: Progressing  4/24/2024 1323 by Lakeisha Rosa, RN  Outcome: Progressing   Pt. is withdrawn to his room. He has flat affect and depressed mood. He is pleasant and compliant. He is free from falls, self harm or harming others.

## 2024-04-25 NOTE — GROUP NOTE
Group Therapy Note    Date: 4/25/2024    Group Start Time: 1500  Group End Time: 1545  Group Topic: Recreational    [facility]    [facility]        Group Therapy Note    Attendees: 8/12    Group Type: Act It, Draw It, Describe It       Group Focus: Recreational therapy group involved group members identifying emotions and coping skills through acting drawing, or descriptions. Patients discussed how to positively react and cope with stressful life events. This group may enhance coping strategies, emotional awareness, mood, and decrease stress and anxiety.           Notes: Patient did not attend group    Recreational Therapist  [facility] [employer]

## 2024-04-25 NOTE — GROUP NOTE
Art Therapy Group Progress Note    PATIENT SCHEDULED FOR GROUP AT:  1:00 PM     GROUP STOP TIME:  1:45 PM    ATTENDANCE: Moderate (5/11 participants)     TOPIC / FOCUS:  Draw to Music     GOALS:  promote focus, encourage relaxation, practice using art as a coping skill, increasing feelings of autonomy and creativity, practice mindfulness, increase positive feelings    PARTICIPATION LEVEL:  Pt did not attend.     Leighton Bowman  Art Therapist, MA, ATR-P  Provisional Registered Art Therapist

## 2024-04-25 NOTE — PROGRESS NOTES
Pt is a 58 with history of bipolar disorder depressed with psychotic features. Pt was hospitalized for psychosis due to medication noncompliance.       Pt.'s case was discussed in treatment team and staffing.  Pt is anxious , isolative and withdrawn to room.   Pt expressed feeling a little paranoid at times but denies delusional thinking. Pt denies ideations and hallucinations. The team discussed and encourage LA continues treatment compliance to include attending group, eating meals and adhering to hygiene. Pt is anxious and continue sot have limited insight.  SW contacted spouse to discuss provide an update and discuss baseline when stabled on medications. The spouse stated pt stabled on medication will comply to treatment by taking medications and eating meals. Pt per spouse is always impaired. Pt will return home at [place] and resume services with [facility].      Kristel Montesinos MA, HS-BCP, LMHP-R

## 2024-04-26 LAB
ANION GAP SERPL CALC-SCNC: 2 MMOL/L (ref 3–18)
BUN SERPL-MCNC: 19 MG/DL (ref 7–18)
BUN/CREAT SERPL: 20 (ref 12–20)
CALCIUM SERPL-MCNC: 9.3 MG/DL (ref 8.5–10.1)
CHLORIDE SERPL-SCNC: 109 MMOL/L (ref 100–111)
CO2 SERPL-SCNC: 30 MMOL/L (ref 21–32)
CREAT SERPL-MCNC: 0.94 MG/DL (ref 0.6–1.3)
GLUCOSE SERPL-MCNC: 109 MG/DL (ref 74–99)
NT PRO BNP: 10 PG/ML (ref 0–900)
POTASSIUM SERPL-SCNC: 3.9 MMOL/L (ref 3.5–5.5)
SODIUM SERPL-SCNC: 141 MMOL/L (ref 136–145)

## 2024-04-26 PROCEDURE — 83880 ASSAY OF NATRIURETIC PEPTIDE: CPT

## 2024-04-26 PROCEDURE — 6370000000 HC RX 637 (ALT 250 FOR IP): Performed by: PSYCHIATRY & NEUROLOGY

## 2024-04-26 PROCEDURE — 36415 COLL VENOUS BLD VENIPUNCTURE: CPT

## 2024-04-26 PROCEDURE — 1240000000 HC EMOTIONAL WELLNESS R&B

## 2024-04-26 PROCEDURE — 99231 SBSQ HOSP IP/OBS SF/LOW 25: CPT | Performed by: PSYCHIATRY & NEUROLOGY

## 2024-04-26 PROCEDURE — 80048 BASIC METABOLIC PNL TOTAL CA: CPT

## 2024-04-26 RX ORDER — OLANZAPINE 10 MG/1
10 TABLET ORAL 2 TIMES DAILY
Status: DISCONTINUED | OUTPATIENT
Start: 2024-04-26 | End: 2024-04-28

## 2024-04-26 RX ORDER — BENZTROPINE MESYLATE 1 MG/1
1 TABLET ORAL 2 TIMES DAILY
Status: DISCONTINUED | OUTPATIENT
Start: 2024-04-26 | End: 2024-04-27

## 2024-04-26 RX ADMIN — OLANZAPINE 7.5 MG: 5 TABLET, FILM COATED ORAL at 09:01

## 2024-04-26 RX ADMIN — DULOXETINE HYDROCHLORIDE 60 MG: 30 CAPSULE, DELAYED RELEASE ORAL at 09:01

## 2024-04-26 RX ADMIN — BUSPIRONE HYDROCHLORIDE 7.5 MG: 5 TABLET ORAL at 20:27

## 2024-04-26 RX ADMIN — OLANZAPINE 10 MG: 10 TABLET, FILM COATED ORAL at 20:27

## 2024-04-26 RX ADMIN — BUSPIRONE HYDROCHLORIDE 7.5 MG: 5 TABLET ORAL at 09:01

## 2024-04-26 RX ADMIN — BENZTROPINE MESYLATE 1 MG: 1 TABLET ORAL at 21:00

## 2024-04-26 RX ADMIN — BENZTROPINE MESYLATE 1 MG: 1 TABLET ORAL at 09:01

## 2024-04-26 ASSESSMENT — PAIN SCALES - GENERAL: PAINLEVEL_OUTOF10: 0

## 2024-04-26 NOTE — BH NOTE
Pt is a 58 with history of bipolar disorder depressed with psychotic features. Pt was hospitalized for psychosis due to medication noncompliance.       Pt was discussed in staffing Sw met with pt to discuss dc planning. Pt expressed to feeling okay.but restless with sleep. Sw encouraged pt to attend groups. Pt declined. SW encouraged pt to contact spouse. \" My wife  \".  Pt continues to be delusional  SW engaged pt with reality orientation.  Pt is anxious and continues to have limited insight.        Kristel Montesinos MA, HS-BCP, LMHP-R

## 2024-04-26 NOTE — PROGRESS NOTES
[facility]  Inpatient Progress Note     Date of Service: 04/26/24  Hospital Day: 7     Subjective/Interval History   04/26/24    Nursing Report:  Notes reviewed and/or discussed in report. Bony Smith is a 58 y.o. male with bipolar disorder who presented with paranoid delusions. Remains paranoid and anxious. He slept 8.5 hours last night. PRN required yesterday: None. RN Reported that his appetite has improved as well as his urine output. Reported he asked for Ensure which is appropriate for this pt given he is thin and has not been eating recently. SW reported that per conversation with pt's wife, his baseline does include a degree of paranoia.    On assessment today, pt is seen in his room as he was getting ready to go to the nurses station. His gait is slow but steady, and he no longer appears restless or fidgety. No rigidity in his movements and no resting tremors. His affect is anxious but appears less guarded, and more notably his latency of response is resolved, as he engages well with all questions for the first time without latency, or without saying \"I don't know\" which he did often in prior days. He states \"I feel fine.\" He denies SI or HI. He denies auditory or visual hallucinations and does not respond to internal stimuli. He continues to endorse feeling paranoid and for the first time in his admission, states it is due to \"people watching me\" and he clarifies he refers to people on the outside, not here in the unit. He denies being paranoid at home or regarding his wife. He denies any concerns/paranoid delusions about methane gas or explosions which were present on admission. Reviewed with pt I would like to increase his Zyprexa dose for paranoia and for bipolar depression further, and he agrees. I will also increase his Cogentin to 1 mg BID for EPS prevention. Reviewed with pt I started Buspar yesterday for anxiety, and that it can be increased over the weekend if his  anxiety does not decrease.      Objective     Vitals:    04/26/24 0730   BP: 92/66   Pulse: (!) 101   Resp: 18   Temp: 98.2 °F (36.8 °C)   SpO2: 99%       Recent Results (from the past 24 hour(s))   Brain Natriuretic Peptide    Collection Time: 04/26/24 10:47 AM   Result Value Ref Range    NT Pro-BNP 10 0 - 900 PG/ML       Mental Status Examination     Appearance/Hygiene 58 y.o. White (non-) male  Hygiene: In hospital scrubs, disheveled   Behavior/Social Relatedness Improved affect, engages more verbally, less withdrawn   Musculoskeletal Gait/Station: stable  Tone (flaccid, cogwheeling, spastic): no rigidity or cogwheeling  Psychomotor (hyperkinetic, hypokinetic): no psychomotor agitation or slowing  Involuntary movements (tics, dyskinesias, akathisia, stereotypies): no dystonia or akathisia   Speech   Normal rate, soft tone and soft volume    Mood   \"Fine\"   Affect    Anxious but less guarded   Thought Process [place]   Thought Content and Perceptual Disturbances Denies suicidal or homicidal thoughts,   +Paranoid delusions as noted in HPI   Denies auditory and visual hallucinations   Sensorium and Cognition  Awake and alert, oriented to self and situation   Insight  Improving   Judgment Improving        Assessment/Plan        Psychiatric Diagnoses:     Bipolar disorder, currently depressed, severe with psychotic features.  Anxiety disorder, unspecified.    Medical Diagnoses: As below    Patient Active Problem List   Diagnosis    Tobacco abuse    Abscess of abdominal cavity (HCC)    Sigmoid diverticulitis    Leukocytosis    Perirectal abscess    Renal cyst    Diverticulitis    Pelvic abscess in male (HCC)    Bipolar disorder, current episode depressed, severe, with psychotic features (HCC)    Anxiety disorder, unspecified       Risk: Moderate to High      1. Plan:    -Increase Zyprexa to 10 mg BID today for psychosis, paranoid delusions and guarded and isolative behavior - and indicated for bipolar

## 2024-04-26 NOTE — PROGRESS NOTES
Pt withdrawn, cooperative, and guarded in thought content. Pt abruptly denies SI/HI/AH/CH/VH/thoughts of self-harm in conversation with this RN. Pt states anxiety 10/10. Pt states depression is 10/10. Pt states is not aware of precipitating factors for anxiety and/or depression. Pt states does not know coping skills. Pt declining groups. Pt independent to meals. Pt eating % of meals. Pt drank two ensures in addition to 100% of lunch.

## 2024-04-26 NOTE — PLAN OF CARE
Problem: Depression  Goal: Will be euthymic at discharge  Description: INTERVENTIONS:  1. Administer medication as ordered  2. Provide emotional support via 1:1 interaction with staff  3. Encourage involvement in milieu/groups/activities  4. Monitor for social isolation  Outcome: Progressing     Problem: Psychosis  Goal: Will report no hallucinations or delusions  Description: INTERVENTIONS:  1. Administer medication as  ordered  2. Assist with reality testing to support increasing orientation  3. Assess if patient's hallucinations or delusions are encouraging self harm or harm to others and intervene as appropriate  Outcome: Progressing     Problem: Anxiety  Goal: Will report anxiety at manageable levels  Description: INTERVENTIONS:  1. Administer medication as ordered  2. Teach and rehearse alternative coping skills  3. Provide emotional support with 1:1 interaction with staff  Outcome: Progressing     Problem: Nutrition Deficit:  Goal: Optimize nutritional status  Outcome: Progressing

## 2024-04-26 NOTE — GROUP NOTE
Group Therapy Note    Date: 4/26/2024    Group Start Time: 1500  Group End Time: 1545  Group Topic: Recreational    [facility]    [facility]        Group Therapy Note    Attendees: 8/12    Group Type: Name That Tune   Group Focus: Recreational Therapist engaged patients through name that tune. Patients divided into two teams to guess the artist of the song being played. This group may help/promote reducing stress, anxiety, and symptoms of depression, while improving mood, well-being, and communication with peers/ team.        Notes:  Patient did not attend group.    Recreational Therapist  [employer]   Attending Attestation (For Attendings USE Only)...

## 2024-04-26 NOTE — PROGRESS NOTES
Behavioral Services                                              Medicare Re-Certification    I certify that the inpatient psychiatric hospital services furnished since the previous certification/re-certification were, and continue to be, medically necessary for;    [x] (1) Treatment which could reasonably be expected to improve the patient's condition,    [] (2) Or for diagnostic study.    Estimated length of stay/service: 3-5 more days,    Plan for post-hospital care: Follow-up at local Counts include 234 beds at the [facility] services board.    This patient continues to need, on a daily basis, active treatment furnished directly by or requiring the supervision of inpatient psychiatric personnel.    Electronically signed by Rafael Benjamin MD on 4/26/2024 at 9:09 AM

## 2024-04-26 NOTE — PLAN OF CARE
Problem: Self Harm/Suicidality  Goal: Will have no self-injury during hospital stay  Description: INTERVENTIONS:  1.  Ensure constant observer at bedside with Q15M safety checks  2.  Maintain a safe environment  3.  Secure patient belongings  4.  Ensure family/visitors adhere to safety recommendations  5.  Ensure safety tray has been added to patient's diet order  6.  Every shift and PRN: Re-assess suicidal risk via Frequent Screener    4/25/2024 2021 by Gris Lyons RN  Flowsheets (Taken 4/24/2024 2000)  Will have no self-injury during hospital stay:   Ensure constant observer at bedside with Q15M safety checks   Secure patient belongings   Ensure safety tray has been added to patient's diet order   Every shift and PRN: Re-assess suicidal risk via Frequent Screener  4/25/2024 0905 by Ericka Plasencia, RN  Outcome: Progressing     Problem: Depression  Goal: Will be euthymic at discharge  Description: INTERVENTIONS:  1. Administer medication as ordered  2. Provide emotional support via 1:1 interaction with staff  3. Encourage involvement in milieu/groups/activities  4. Monitor for social isolation  4/25/2024 0905 by Ericka Plasencia, RN  Outcome: Progressing   Pt. remain isolative to his room. He is compliant to meals and medications. He offers no complaint. He is free from falls, self harm or harming others.

## 2024-04-26 NOTE — GROUP NOTE
Art Therapy Group Progress Note    PATIENT SCHEDULED FOR GROUP AT:  9:30 AM    GROUP STOP TIME:  10:45 AM    ATTENDANCE: Moderate (7/13 participants)     TOPIC / FOCUS: Patterned Mandala     GOALS: define mindfulness, practice deep breathing, promote concentration, disrupt negative thinking, increase autonomy, encourage effective coping skills, psychoeducation on flow experiences, promote creativity, reduce feelings of stress and anxiety, increase relaxation     PARTICIPATION LEVEL:  Pt did not attend.     Leighton Bowman  Art Therapist, MA, ATR-P  Provisional Registered Art Therapist

## 2024-04-26 NOTE — GROUP NOTE
Group Therapy Note    Date: 4/26/2024    Group Start Time: 1300  Group End Time: 1350  Group Topic: Music Therapy    Gabrielle Riddle        Group Therapy Note    Attendees: 6/12    Group Focus: Music therapy group explored values, including discussion of respect, through matthew analysis. Group members also processed grief, can, and personal goals. The group closed with adding instrumentation to the song discussed in group to support the group process. The group may promote self-awareness, self-expression, and use of music as a coping skill.       Notes:  Patient did not attend group.      Discipline Responsible: /Counselor      Signature:  JOSE Salazar, LPC  Board-Certified Music Therapist  Licensed Professional Counselor

## 2024-04-27 PROCEDURE — 6370000000 HC RX 637 (ALT 250 FOR IP): Performed by: PSYCHIATRY & NEUROLOGY

## 2024-04-27 PROCEDURE — 99232 SBSQ HOSP IP/OBS MODERATE 35: CPT | Performed by: PSYCHIATRY & NEUROLOGY

## 2024-04-27 PROCEDURE — 1240000000 HC EMOTIONAL WELLNESS R&B

## 2024-04-27 RX ORDER — BENZTROPINE MESYLATE 1 MG/1
0.5 TABLET ORAL 2 TIMES DAILY
Status: DISCONTINUED | OUTPATIENT
Start: 2024-04-27 | End: 2024-04-30

## 2024-04-27 RX ADMIN — BUSPIRONE HYDROCHLORIDE 7.5 MG: 5 TABLET ORAL at 09:03

## 2024-04-27 RX ADMIN — OLANZAPINE 10 MG: 10 TABLET, FILM COATED ORAL at 09:03

## 2024-04-27 RX ADMIN — BENZTROPINE MESYLATE 1 MG: 1 TABLET ORAL at 09:02

## 2024-04-27 RX ADMIN — DULOXETINE HYDROCHLORIDE 60 MG: 30 CAPSULE, DELAYED RELEASE ORAL at 09:02

## 2024-04-27 RX ADMIN — OLANZAPINE 10 MG: 10 TABLET, FILM COATED ORAL at 20:24

## 2024-04-27 RX ADMIN — BUSPIRONE HYDROCHLORIDE 7.5 MG: 5 TABLET ORAL at 20:24

## 2024-04-27 RX ADMIN — BENZTROPINE MESYLATE 0.5 MG: 1 TABLET ORAL at 20:25

## 2024-04-27 NOTE — PLAN OF CARE
Problem: Self Care Deficit  Goal: Return ADL status to a safe level of function  Description: INTERVENTIONS:  1. Administer medication as ordered  2. Assess ADL deficits and provide assistive devices as needed  3. Obtain PT/OT consults as needed  4. Assist and instruct patient to increase activity and self care as tolerated  Outcome: Progressing     Problem: Anxiety  Goal: Will report anxiety at manageable levels  Description: INTERVENTIONS:  1. Administer medication as ordered  2. Teach and rehearse alternative coping skills  3. Provide emotional support with 1:1 interaction with staff  Outcome: Progressing     Problem: Psychosis  Goal: Will report no hallucinations or delusions  Description: INTERVENTIONS:  1. Administer medication as  ordered  2. Assist with reality testing to support increasing orientation  3. Assess if patient's hallucinations or delusions are encouraging self harm or harm to others and intervene as appropriate  Outcome: Progressing     Problem: Depression  Goal: Will be euthymic at discharge  Description: INTERVENTIONS:  1. Administer medication as ordered  2. Provide emotional support via 1:1 interaction with staff  3. Encourage involvement in milieu/groups/activities  4. Monitor for social isolation  Outcome: Progressing

## 2024-04-27 NOTE — PLAN OF CARE
Problem: Self Harm/Suicidality  Goal: Will have no self-injury during hospital stay  Description: INTERVENTIONS:  1.  Ensure constant observer at bedside with Q15M safety checks  2.  Maintain a safe environment  3.  Secure patient belongings  4.  Ensure family/visitors adhere to safety recommendations  5.  Ensure safety tray has been added to patient's diet order  6.  Every shift and PRN: Re-assess suicidal risk via Frequent Screener    Flowsheets (Taken 4/24/2024 2000)  Will have no self-injury during hospital stay:   Ensure constant observer at bedside with Q15M safety checks   Secure patient belongings   Ensure safety tray has been added to patient's diet order   Every shift and PRN: Re-assess suicidal risk via Frequent Screener     Problem: Depression  Goal: Will be euthymic at discharge  Description: INTERVENTIONS:  1. Administer medication as ordered  2. Provide emotional support via 1:1 interaction with staff  3. Encourage involvement in milieu/groups/activities  4. Monitor for social isolation  4/26/2024 1130 by Jose Roberto Ivy RN  Outcome: Progressing     Problem: Psychosis  Goal: Will report no hallucinations or delusions  Description: INTERVENTIONS:  1. Administer medication as  ordered  2. Assist with reality testing to support increasing orientation  3. Assess if patient's hallucinations or delusions are encouraging self harm or harm to others and intervene as appropriate  4/26/2024 1130 by Jose Roberto Ivy RN  Outcome: Progressing

## 2024-04-27 NOTE — PROGRESS NOTES
Pt socially withdrawn, guarded in thought content, and isolative. This RN notes an increase in range of affect compared to 04/23/24. Pt laying in bed most of shift so far except for meals. Pt eating 100% of meals and drinking ensure. Pt receptive to medications. Pt hypoverbal and guarded in thought content. Pt answering assessment questions with few words as possible. Pt frequently states, \"I don't know,\" when asked assessment questions despite rephrasing by this RN. Pt denies SI/HI/AH/CH/VH/thoughts of self-harm in conversation with this RN. Pt states anxiety is 10/10. Pt states depression is 10/10. Pt states feels mood is improved compared to yesterday. When this RN reminds Pt that Pt rates anxiety at 10/10 and depression at 10/10 yesterday, Pt shrugs and states, \"I don't know.\" Pt's orthostatic B/P check, as charted, positive for orthostatic hypotension; Dr. Monroy informed. Pt placed on fall precautions with yellow bracelet. Pt and staff updated on fall prevention protocol and verbalize understanding of education. Pt verbalizing intent to adhere to fall prevention protocol. Pt currently resting quietly in bed with traction socks on appropriate to Pt's size.

## 2024-04-27 NOTE — BH NOTE
During this shift, half of this shift ([id number]), patient has been in room quietly resting. When this writer entered into the patient's room to do vitals, patient appeared very anxious, once awaken. Patient seems confused and paranoid. In addition, patient was compliant with night-time meds but refused snacks, only drinking fluids. Patient exhibits a dull and flat affect along with being soft-spoken.

## 2024-04-27 NOTE — PROGRESS NOTES
Behavioral Health Progress Note    Admit Date: 4/18/2024      Vitals : Patient Vitals for the past 8 hrs:   BP Temp Temp src Pulse Resp SpO2   04/27/24 1233 91/68 -- -- 81 -- --   04/27/24 1231 (!) 70/54 -- -- (!) 110 -- --   04/27/24 1229 (!) 80/59 -- -- 91 -- --   04/27/24 1227 101/66 -- -- 71 -- --   04/27/24 0743 98/67 97.6 °F (36.4 °C) Oral 92 16 97 %     Labs:  No results found for this or any previous visit (from the past 24 hour(s)).  Meds:   Current Facility-Administered Medications   Medication Dose Route Frequency    benztropine (COGENTIN) tablet 0.5 mg  0.5 mg Oral BID    OLANZapine (ZYPREXA) tablet 10 mg  10 mg Oral BID    busPIRone (BUSPAR) tablet 7.5 mg  7.5 mg Oral BID    polyethylene glycol (GLYCOLAX) packet 17 g  17 g Oral Daily PRN    benztropine (COGENTIN) tablet 1 mg  1 mg Oral Q6H PRN    acetaminophen (TYLENOL) tablet 650 mg  650 mg Oral Q4H PRN    aluminum & magnesium hydroxide-simethicone (MAALOX) 200-200-20 MG/5ML suspension 30 mL  30 mL Oral Q6H PRN    hydrOXYzine HCl (ATARAX) tablet 25 mg  25 mg Oral Q6H PRN    haloperidol (HALDOL) tablet 5 mg  5 mg Oral Q6H PRN    Or    haloperidol lactate (HALDOL) injection 5 mg  5 mg IntraMUSCular Q6H PRN    benztropine mesylate (COGENTIN) injection 1 mg  1 mg IntraMUSCular Q6H PRN    traZODone (DESYREL) tablet 50 mg  50 mg Oral Nightly PRN    DULoxetine (CYMBALTA) extended release capsule 60 mg  60 mg Oral Daily      Hospital Problems: Principal Problem:    Bipolar disorder, current episode depressed, severe, with psychotic features (HCC)  Active Problems:    Anxiety disorder, unspecified  Resolved Problems:    * No resolved hospital problems. *      Subjective:   Medication side effects:  light headed      Mental Status Exam  Sensorium: alert  Orientation: only aware of time, place, and person  Relations: guarded and passive  Eye Contact: poor  Appearance: laying in bed  Thought Process: slow rate of thoughts, poor abstract reasoning/computation, and

## 2024-04-28 PROCEDURE — 99232 SBSQ HOSP IP/OBS MODERATE 35: CPT | Performed by: PSYCHIATRY & NEUROLOGY

## 2024-04-28 PROCEDURE — 6370000000 HC RX 637 (ALT 250 FOR IP): Performed by: PSYCHIATRY & NEUROLOGY

## 2024-04-28 PROCEDURE — 1240000000 HC EMOTIONAL WELLNESS R&B

## 2024-04-28 RX ORDER — OLANZAPINE 5 MG/1
5 TABLET ORAL DAILY
Status: DISCONTINUED | OUTPATIENT
Start: 2024-04-29 | End: 2024-04-30

## 2024-04-28 RX ORDER — OLANZAPINE 10 MG/1
10 TABLET ORAL
Status: DISCONTINUED | OUTPATIENT
Start: 2024-04-29 | End: 2024-05-03 | Stop reason: HOSPADM

## 2024-04-28 RX ADMIN — BUSPIRONE HYDROCHLORIDE 7.5 MG: 5 TABLET ORAL at 20:13

## 2024-04-28 RX ADMIN — DULOXETINE HYDROCHLORIDE 60 MG: 30 CAPSULE, DELAYED RELEASE ORAL at 13:03

## 2024-04-28 RX ADMIN — BENZTROPINE MESYLATE 0.5 MG: 1 TABLET ORAL at 20:13

## 2024-04-28 RX ADMIN — BUSPIRONE HYDROCHLORIDE 7.5 MG: 5 TABLET ORAL at 13:02

## 2024-04-28 RX ADMIN — OLANZAPINE 10 MG: 10 TABLET, FILM COATED ORAL at 13:04

## 2024-04-28 RX ADMIN — BENZTROPINE MESYLATE 0.5 MG: 1 TABLET ORAL at 13:03

## 2024-04-28 ASSESSMENT — PAIN SCALES - GENERAL: PAINLEVEL_OUTOF10: 0

## 2024-04-28 NOTE — PLAN OF CARE
Problem: Self Harm/Suicidality  Goal: Will have no self-injury during hospital stay  Description: INTERVENTIONS:  1.  Ensure constant observer at bedside with Q15M safety checks  2.  Maintain a safe environment  3.  Secure patient belongings  4.  Ensure family/visitors adhere to safety recommendations  5.  Ensure safety tray has been added to patient's diet order  6.  Every shift and PRN: Re-assess suicidal risk via Frequent Screener    Outcome: Progressing  Flowsheets (Taken 4/27/2024 2100)  Will have no self-injury during hospital stay:   Ensure constant observer at bedside with Q15M safety checks   Maintain a safe environment   Every shift and PRN: Re-assess suicidal risk via Frequent Screener     2100-  Assumed care of pt alert and oriented x 4.  Pt has been isolated to self and guarded.  Denied si/hi/avh during time of assessment.  Remains compliant with meds.  All needs assessed and met.  Will continue to monitor and support as needed.     0615- pt has been resting this shift.  Will continue to monitor and support as needed.

## 2024-04-28 NOTE — PROGRESS NOTES
Behavioral Health Progress Note    Admit Date: 4/18/2024      Vitals : Patient Vitals for the past 8 hrs:   BP Temp Temp src Pulse Resp SpO2   04/28/24 0742 99/69 96.9 °F (36.1 °C) Oral 80 16 98 %     Labs:  No results found for this or any previous visit (from the past 24 hour(s)).  Meds:   Current Facility-Administered Medications   Medication Dose Route Frequency    [START ON 4/29/2024] OLANZapine (ZYPREXA) tablet 10 mg  10 mg Oral QHS    OLANZapine (ZYPREXA) tablet 5 mg  5 mg Oral Daily    benztropine (COGENTIN) tablet 0.5 mg  0.5 mg Oral BID    busPIRone (BUSPAR) tablet 7.5 mg  7.5 mg Oral BID    polyethylene glycol (GLYCOLAX) packet 17 g  17 g Oral Daily PRN    benztropine (COGENTIN) tablet 1 mg  1 mg Oral Q6H PRN    acetaminophen (TYLENOL) tablet 650 mg  650 mg Oral Q4H PRN    aluminum & magnesium hydroxide-simethicone (MAALOX) 200-200-20 MG/5ML suspension 30 mL  30 mL Oral Q6H PRN    hydrOXYzine HCl (ATARAX) tablet 25 mg  25 mg Oral Q6H PRN    haloperidol (HALDOL) tablet 5 mg  5 mg Oral Q6H PRN    Or    haloperidol lactate (HALDOL) injection 5 mg  5 mg IntraMUSCular Q6H PRN    benztropine mesylate (COGENTIN) injection 1 mg  1 mg IntraMUSCular Q6H PRN    traZODone (DESYREL) tablet 50 mg  50 mg Oral Nightly PRN    DULoxetine (CYMBALTA) extended release capsule 60 mg  60 mg Oral Daily      Hospital Problems: Principal Problem:    Bipolar disorder, current episode depressed, severe, with psychotic features (HCC)  Active Problems:    Anxiety disorder, unspecified  Resolved Problems:    * No resolved hospital problems. *      Subjective:   Medication side effects: dizziness/lightheadedness      Mental Status Exam  Sensorium: alert  Orientation: only aware of place and person  Relations: guarded  Eye Contact: poor  Appearance: shows poor hygiene  Thought Process: slow rate of thoughts, poor abstract reasoning/computation, and logical    Thought Content: no evidence of impairment   Suicidal: denies   Homicidal:  Problem: Dysphagia  Goal: # Exhibits no s/s of aspiration  Symptoms of aspiration include coughing, choking, throat clearing, change in voice quality, and/or a decrease in oxygen saturation by more than 2% points from baseline after swallowing.  Patient exhibits no signs and symptoms of aspiration with night time meal. Patient exhibits no signs and symptoms of aspiration with honey thickened liquids. Nursing to continue to monitor.

## 2024-04-28 NOTE — PLAN OF CARE
Problem: Sleep Disturbance  Goal: Will exhibit normal sleeping pattern  Description: INTERVENTIONS:  1. Administer medication as ordered  2. Decrease environmental stimuli, including noise, as appropriate  3. Discourage social isolation and naps during the day  Outcome: Not Progressing     Problem: Sleep Disturbance  Goal: Will exhibit normal sleeping pattern  Description: INTERVENTIONS:  1. Administer medication as ordered  2. Decrease environmental stimuli, including noise, as appropriate  3. Discourage social isolation and naps during the day  Outcome: Not Progressing   Patient alert and oriented, in room, no unsafe behaviors noted. Patient is isolative and received his medications late due to patient initially refusing but agreed to take them if they were brought to him in his room.  Patient blood pressure was low this morning, and was rechecked prior to lunch and was 89/61 doctor was called and informed PER doctor order \" HOLD Zyprexa 10 MG night dose\".  Patient blood pressure was checked again prior to the patient going back to his room 105/68.  Follow-up call to the pharmacy was to  inform them of the doctor order to hold the medication.  Patient is resting quietly in his room was encouraged to inform staff if he has any abnormal feelings. Will continue to monitor for location, safety and comfort.

## 2024-04-29 PROCEDURE — 6370000000 HC RX 637 (ALT 250 FOR IP): Performed by: PSYCHIATRY & NEUROLOGY

## 2024-04-29 PROCEDURE — 1240000000 HC EMOTIONAL WELLNESS R&B

## 2024-04-29 PROCEDURE — 99231 SBSQ HOSP IP/OBS SF/LOW 25: CPT | Performed by: PSYCHIATRY & NEUROLOGY

## 2024-04-29 RX ADMIN — OLANZAPINE 10 MG: 10 TABLET, FILM COATED ORAL at 20:12

## 2024-04-29 RX ADMIN — BENZTROPINE MESYLATE 0.5 MG: 1 TABLET ORAL at 21:00

## 2024-04-29 RX ADMIN — BUSPIRONE HYDROCHLORIDE 7.5 MG: 5 TABLET ORAL at 07:44

## 2024-04-29 RX ADMIN — BUSPIRONE HYDROCHLORIDE 7.5 MG: 5 TABLET ORAL at 20:12

## 2024-04-29 RX ADMIN — BENZTROPINE MESYLATE 0.5 MG: 1 TABLET ORAL at 07:44

## 2024-04-29 RX ADMIN — OLANZAPINE 5 MG: 5 TABLET, FILM COATED ORAL at 07:44

## 2024-04-29 RX ADMIN — DULOXETINE HYDROCHLORIDE 60 MG: 30 CAPSULE, DELAYED RELEASE ORAL at 07:44

## 2024-04-29 ASSESSMENT — PAIN SCALES - GENERAL
PAINLEVEL_OUTOF10: 0
PAINLEVEL_OUTOF10: 0

## 2024-04-29 NOTE — GROUP NOTE
Group Therapy Note    Date: 4/29/2024    Group Start Time: 1400  Group End Time: 1445  Group Topic: Psychoeducation        Sunita Nichols        Group Therapy Note    Attendees: 5  Group Topic- Discussing basic life skills that individuals need to assist them on their journey.            Status After Intervention:  Improved    Participation Level: Active Listener and Interactive    Participation Quality: Appropriate, Attentive, and Sharing      Speech:  normal      Thought Process/Content: Logical      Affective Functioning: Congruent      Mood: euthymic      Level of consciousness:  Alert and Attentive      Response to Learning: Able to verbalize current knowledge/experience, Able to verbalize/acknowledge new learning, and Able to retain information      Endings: None Reported    Modes of Intervention: Education      Discipline Responsible: /Counselor      Signature:  Sunita Nichols

## 2024-04-29 NOTE — BSMART NOTE
Individual Start Time: 3:10  Individual End  Time: 3:20  Recreational Therapy    Therapist met with patient individually in attempt to engage  him in therapy. Patient laying on bed when therapist entered room. When RT questioned patient about how he was feeling, patient responded shaking his head left to right. Therapist offered patient a variety of recreational therapy activities for engagement, patient declined. Patient stated, \" I loss all my family at one time, nothing is going to help fix that\" \"I loss my wife and my son at the same time\". Therapist provided pt with support and encouragement, and explained to him the importance and the benefits  of keeping him engaged while he is in treatment. Patient shared he had attended a group prior to me speaking with him, and shared he was unable to express himself during group due to \"so much going thru his brain\", and not being able to form his words. Patient, shared he dislikes sitting in common area because he feels as if peers are talking about him. RT shared with patient that she will continue to stop by and encourage patient to participate individually or in groups, and to spend more time out of room.      Recreational Therapist  [facility]

## 2024-04-29 NOTE — GROUP NOTE
Art Therapy Group Progress Note    PATIENT SCHEDULED FOR GROUP AT:  1:00 PM    GROUP STOP TIME:  1:45 PM    ATTENDANCE: Moderate (4/12 participants)     TOPIC / FOCUS: Blind Contour and Relief Drawings     GOALS:  practice observational drawing, increase reality orientation, encourage focus, promote creativity, decrease feelings of stress and anxiety, encourage positive coping skills, grounding practice, psychoeducation, increase social connection on the milieu     PARTICIPATION LEVEL:  Pt did not attend.     Leighton Bowman  Art Therapist, MA, ATR-P  Provisional Registered Art Therapist

## 2024-04-29 NOTE — PLAN OF CARE
Problem: Self Harm/Suicidality  Goal: Will have no self-injury during hospital stay  Description: INTERVENTIONS:  1.  Ensure constant observer at bedside with Q15M safety checks  2.  Maintain a safe environment  3.  Secure patient belongings  4.  Ensure family/visitors adhere to safety recommendations  5.  Ensure safety tray has been added to patient's diet order  6.  Every shift and PRN: Re-assess suicidal risk via Frequent Screener    Outcome: Progressing     2035-Pt has been isolated to room/resting in bed.  Denied si/hi/avh during time of assessment.  Remains compliant with meds.  Education provided on increasing po intake.  Pt verbalized understanding.  All needs assessed and met.  Will continue to monitor and support as needed.

## 2024-04-29 NOTE — PROGRESS NOTES
[facility]  Inpatient Progress Note     Date of Service: 04/29/24  Hospital Day: 10     Subjective/Interval History   04/29/24    Nursing Report:  Notes reviewed and/or discussed in report. Bony Smith is a 58 y.o. male with bipolar disorder who presented with paranoid delusions. He slept 8 hours last night. PRN required yesterday: None. RN Reported that his appetite has improved. Remains paranoid and anxious. Physician notes over the weekend note orthostatic hypotension with Zyprexa increase, requiring dose decrease and re-distribution.    On assessment today, pt is seen in his room. No rigidity in his movements and no resting tremors. His affect is slightly anxious but less guarded, and without latency of response. He engages well with all questions. He states his mood is fine though affect again slightly anxious. He denies SI or HI. He denies auditory or visual hallucinations and does not respond to internal stimuli. He continues to endorse feeling paranoid due to \"people watching me\" (refers to people on the outside, not here in the unit), and today states the intensity of his paranoia is beginning to reduce, so he is progressing toward baseline. He denies being paranoid at home or regarding his wife. He denies any concerns/paranoid delusions about methane gas or explosions which were present on admission. Reviewed with pt recent decreases in dose to Zyprexa and Cogentin due to orthostatic hypotension, and advised him to get up slowly when rising from a bed or chair, and he is receptive. As above, his paranoia is reducing, and he appears less guarded, so may be approaching baseline. Monitor VS and for side effects closely.      Objective     Vitals:    04/29/24 0800   BP: 101/68   Pulse: 76   Resp: 16   Temp: 97.5 °F (36.4 °C)   SpO2: 99%       No results found for this or any previous visit (from the past 24 hour(s)).      Mental Status Examination     Appearance/Hygiene 58 y.o.  White (non-) male  Hygiene: In hospital scrubs, disheveled   Behavior/Social Relatedness Improved affect, engages more verbally, less withdrawn   Musculoskeletal Gait/Station: stable  Tone (flaccid, cogwheeling, spastic): no rigidity or cogwheeling  Psychomotor (hyperkinetic, hypokinetic): no psychomotor agitation or slowing  Involuntary movements (tics, dyskinesias, akathisia, stereotypies): no dystonia or akathisia   Speech   Normal rate, soft tone and soft volume    Mood   Less guareded   Affect    Anxious but less guarded   Thought Process [place]   Thought Content and Perceptual Disturbances Denies suicidal or homicidal thoughts,   +Paranoid delusions as noted in HPI  Denies auditory and visual hallucinations   Sensorium and Cognition  Awake and alert, oriented to self and situation   Insight  Improving   Judgment Improving        Assessment/Plan        Psychiatric Diagnoses:     Bipolar disorder, currently depressed, severe with psychotic features.  Anxiety disorder, unspecified.    Medical Diagnoses: As below    Patient Active Problem List   Diagnosis    Tobacco abuse    Abscess of abdominal cavity (HCC)    Sigmoid diverticulitis    Leukocytosis    Perirectal abscess    Renal cyst    Diverticulitis    Pelvic abscess in male (HCC)    Bipolar disorder, current episode depressed, severe, with psychotic features (HCC)    Anxiety disorder, unspecified       Risk: Moderate to High      1. Plan:    -Continue Zyprexa 5 mg AM + 10 mg HS (dose reduced over the weekend due to orthostatic hypotension) for psychosis, paranoid delusions and guarded and isolative behavior - and indicated for bipolar depression as well. A1c, lipid panel, and QTc interval on admission all normal.    -Continue Cogentin 0.5 mg BID (dose reduced over the weekend due to orthostatic hypotension) for prevention of EPS symptoms.    -Continue Buspar 7.5 mg BID for anxiety.    -Continue Cymbalta 60 mg daily. While there may be long-term

## 2024-04-29 NOTE — PLAN OF CARE
Problem: Discharge Planning  Goal: Discharge to home or other facility with appropriate resources  Outcome: Progressing     Problem: Self Harm/Suicidality  Goal: Will have no self-injury during hospital stay  Description: INTERVENTIONS:  1.  Ensure constant observer at bedside with Q15M safety checks  2.  Maintain a safe environment  3.  Secure patient belongings  4.  Ensure family/visitors adhere to safety recommendations  5.  Ensure safety tray has been added to patient's diet order  6.  Every shift and PRN: Re-assess suicidal risk via Frequent Screener    4/29/2024 0909 by Jaz Castro, RN  Outcome: Progressing     Problem: Depression  Goal: Will be euthymic at discharge  Description: INTERVENTIONS:  1. Administer medication as ordered  2. Provide emotional support via 1:1 interaction with staff  3. Encourage involvement in milieu/groups/activities  4. Monitor for social isolation  Outcome: Progressing     Pt presents with dull affect, anxious mood, poverty of content, paranoia, hypervigilance. Pt has been withdrawn to self on the unit, primarily only coming out of his room for meals. Pt denies SI/HI/AVH. Pt is medication compliant.

## 2024-04-29 NOTE — PROGRESS NOTES
Pt is a 58 with history of bipolar disorder depressed with psychotic features. Pt was hospitalized for psychosis due to medication noncompliance.     Pt.'s case was discussed in staffing.      SW Contact:  SW met with pt to discuss dc planning. Pt was in room sitting on the bed. SW discussed treatment goals to include medication compliance and safety plan. Pt continues to be anxious.  Pt looks disheveled. SW encouraged pt to take a bath and attend at least one group.Pt continues to anxious along with limited insight. Pt.'s spouse was provide pt with an  update. SW will continue to  assist pt with dc planning.       Kristel Montesinos MA, HS-BCP, LMHP-R

## 2024-04-29 NOTE — GROUP NOTE
Group Therapy Note    Date: 4/29/2024    Group Start Time: 0930  Group End Time: 1015  Group Topic: Recreational    [facility]    [facility]        Group Therapy Note    Attendees: 7/12    Group type: Exercise    Group Focus: This recreational group engaged patients in physical activity.  Recreational therapists lead group members in guided exercises. Patients discussed different physical activities and relaxation techniques they may use or can add to their daily routines. This group may help reduce feelings of depression and stress and enhance mood and over emotional well-being.          Notes:  Patient did not engaged in group.    Recreational Therapist  [facility]

## 2024-04-29 NOTE — GROUP NOTE
Group Therapy Note    Date: 4/29/2024    Group Start Time: 1500  Group End Time: 1550  Group Topic: Music Therapy      Gabrielle Riddle        Group Therapy Note    Attendees: 4/12    Group Focus: Music therapy group focused on the topic of values through song matthew analysis and an ACT values checklist. Group members identified and discussed personal values and wisdom mentioned within song lyrics. The group may promote self-reflection, values identification, and use of music as a coping skill.          Notes:  Pt did not attend group, and was resting in his room when invited to group.      Discipline Responsible: /Counselor      Signature:  JOSE Salazar, LPC  Board-Certified Music Therapist  Licensed Professional Counselor

## 2024-04-30 PROCEDURE — 6370000000 HC RX 637 (ALT 250 FOR IP): Performed by: PSYCHIATRY & NEUROLOGY

## 2024-04-30 PROCEDURE — 99232 SBSQ HOSP IP/OBS MODERATE 35: CPT | Performed by: PSYCHIATRY & NEUROLOGY

## 2024-04-30 PROCEDURE — 1240000000 HC EMOTIONAL WELLNESS R&B

## 2024-04-30 RX ORDER — BENZTROPINE MESYLATE 1 MG/1
0.5 TABLET ORAL DAILY
Status: DISCONTINUED | OUTPATIENT
Start: 2024-05-01 | End: 2024-05-03 | Stop reason: HOSPADM

## 2024-04-30 RX ADMIN — DULOXETINE HYDROCHLORIDE 60 MG: 30 CAPSULE, DELAYED RELEASE ORAL at 08:14

## 2024-04-30 RX ADMIN — BENZTROPINE MESYLATE 0.5 MG: 1 TABLET ORAL at 08:14

## 2024-04-30 RX ADMIN — BUSPIRONE HYDROCHLORIDE 7.5 MG: 5 TABLET ORAL at 08:13

## 2024-04-30 RX ADMIN — BUSPIRONE HYDROCHLORIDE 7.5 MG: 5 TABLET ORAL at 20:20

## 2024-04-30 RX ADMIN — OLANZAPINE 10 MG: 10 TABLET, FILM COATED ORAL at 20:21

## 2024-04-30 ASSESSMENT — PAIN SCALES - GENERAL
PAINLEVEL_OUTOF10: 0
PAINLEVEL_OUTOF10: 0

## 2024-04-30 NOTE — PLAN OF CARE
Problem: Behavior  Goal: Pt/Family maintain appropriate behavior and adhere to behavioral management agreement, if implemented  Description: INTERVENTIONS:  1. Assess patient/family's coping skills and  non-compliant behavior (including use of illegal substances)  2. Notify security of behavior or suspected illegal substances which indicate the need for search of the family and/or belongings  3. Encourage verbalization of thoughts and concerns in a socially appropriate manner  4. Utilize positive, consistent limit setting strategies supporting safety of patient, staff and others  5. Encourage participation in the decision making process about the behavioral management agreement  6. If a visitor's behavior poses a threat to safety call refer to organization policy.  7. Initiate consult with , Psychosocial CNS, Spiritual Care as appropriate  Outcome: Progressing  Flowsheets (Taken 4/21/2024 1112)  Patient/family maintains appropriate behavior and adheres to behavioral management agreement, if implemented:   Encourage verbalization of thoughts and concerns in a socially appropriate manner   Utilize positive, consistent limit setting strategies supporting safety of patient, staff and others   Encourage participation in the decision making process about the behavioral management agreement     Problem: Anxiety  Goal: Will report anxiety at manageable levels  Description: INTERVENTIONS:  1. Administer medication as ordered  2. Teach and rehearse alternative coping skills  3. Provide emotional support with 1:1 interaction with staff  Outcome: Progressing  Flowsheets (Taken 4/30/2024 1218)  Will report anxiety at manageable levels: Teach and rehearse alternative coping skills     Pt received in dayroom, alert and oriented x3; re-oriented pt to time. Pt is cooperative and keeps to self. Pt presents with a blunted affect; goal-directed thought process and appears disheveled. Pt reported pt took a shower  yesterday; pt was encouraged to take another one today. Pt denies any SI/HI and denies any hallucinations. Pt reported feeling paranoid at times that people are after pt. Pt is eating and toileting well but has difficulty sleeping. Pt is compliant with medications. Pt verbalized that pt is feeling better today compared to time of admit. Pt on orthostatic BP checks; pt noted to be hypotensive. This RN encouraged increased oral fluid intake, elevated foot of mattress using thick blankets and instructed pt on fall precautions. Psychiatrist aware; per psychiatrist, Zyprexa 5mg QD discontinued but pt to continue HS dose. Stressed the importance of medication compliance and positive coping skills; encouraged pt to talk to staff in case pt feels anxious or agitated before the situation gets out of hand; pt nodded in understanding. No aggression noted at this time. Will continue to monitor pt.

## 2024-04-30 NOTE — PROGRESS NOTES
Pt is a 58 with history of bipolar disorder depressed with psychotic features. Pt was hospitalized for psychosis due to medication noncompliance.      SW met with PT and his spouse to establish Baseline. PT stated sleep is still impaired but admits to having fewer racing thoughts. pt denies ideations and hallucinations PT stated he felt as though everything patient denies any delusional thoughts at this time patient was observed being anxious a little guarded and has little insight. Patient spouse stated patient is showing some improvement it feels as though making progress towards Baseline patient can return home at discharge. [facility]       Pt. 's case was discussed in staffing the patient . The team was provided an update on pt.'s meeting with spouse. The pt.'s medications are being adjusted    . Kristel Montesinos MA, HS-BCP, LMHP-R

## 2024-04-30 NOTE — PLAN OF CARE
Comprehensive Nutrition Assessment    Type and Reason for Visit:  Reassess    Nutrition Recommendations/Plan:   Continue regular diet order  Continue oral nutrition supplement as ordered  Monitor weights, labs, PO intake and POC while admitted     Malnutrition Assessment:  Malnutrition Status:  Insufficient data (04/23/24 0808)    Context:  Acute Illness     Findings of the 6 clinical characteristics of malnutrition:  Energy Intake:  Unable to assess  Weight Loss:  Unable to assess     Body Fat Loss:  Unable to assess     Muscle Mass Loss:  Unable to assess    Fluid Accumulation:  Unable to assess     Strength:  Not Performed    Nutrition Assessment:    Since last RD asssessment, paranoia is decreasing and pt re-approaching baseline. Plan of care includes medication compliance; pt encouraged by SW to take a shower and attend at least one group therapy session. PO intake improving per documentation of flowsheet and provider notes.    Nutrition Related Findings:    No edema or BM noted. meds and labs reviewed. Wound Type: None       Current Nutrition Intake & Therapies:    Average Meal Intake: %  Average Supplements Intake: None Ordered  ADULT DIET; Regular  ADULT ORAL NUTRITION SUPPLEMENT; Breakfast, Lunch, Dinner; Standard High Calorie/High Protein Oral Supplement    Anthropometric Measures:  Height: 182.9 cm (6')  Ideal Body Weight (IBW): 178 lbs (81 kg)       Current Body Weight: 70.8 kg (156 lb), 87.6 % IBW. Weight Source: Stated  Current BMI (kg/m2): 21.2        Weight Adjustment For: No Adjustment                 BMI Categories: Normal Weight (BMI 22.0 to 24.9) age over 65    Estimated Daily Nutrient Needs:  Energy Requirements Based On: Kcal/kg  Weight Used for Energy Requirements: Current  Energy (kcal/day): 0581-1178 (25-30kcal/kg cbw)  Weight Used for Protein Requirements: Other (Comment) (stated wt)  Protein (g/day): 57-71 (0.8-1.0g/kg cbw)  Method Used for Fluid Requirements: 1 ml/kcal  Fluid

## 2024-04-30 NOTE — GROUP NOTE
Group Therapy Note    Date: 4/30/2024    Group Start Time: 0930  Group End Time: 1020  Group Topic: Music Therapy      Gabrielle Riddle        Group Therapy Note    Attendees: 4/15    Group Focus: Music therapy group started with matthew analysis, where group members discussed topic of not showing how they truly feel. Group continued with poem writing utilizing the technique of song collage. Group members selected lyrics and words from existing songs to create their own songs, raps, or poetry. Group members worked individually and then were given the opportunity to share. The group may promote creativity and use of music, writing, and the creative arts as outlets for self-expression.          Notes:  Patient did not attend group.      Discipline Responsible: /Counselor      Signature:  JOSE Salazar, LPC  Board-Certified Music Therapist  Licensed Professional Counselor     Ruma Alvarez (mother)

## 2024-04-30 NOTE — GROUP NOTE
Group Therapy Note    Date: 4/30/2024    Group Start Time: 1500  Group End Time: 1545  Group Topic: Recreational    [facility]    [facility]        Group Therapy Note    Attendees: 6/15      Group Type: [place]      Group Focus: Recreational therapy group engaged patients through a group game. RT placed patients into two teams to encourage team building. RT used topics that focused on self-care, coping skills, mental health and communication terms. The group can assist in increasing positive mood, self-care, social and problem-solving skills, and reduce stress.        Notes:  Patient did not attend group    Recreational Therapist  [facility]

## 2024-04-30 NOTE — PROGRESS NOTES
[facility]  Inpatient Progress Note     Date of Service: 04/30/24  Hospital Day: 11     Subjective/Interval History   04/30/24    Nursing Report:  Notes reviewed and/or discussed in report. Bony Smith is a 58 y.o. male with bipolar disorder who presented with paranoid delusions. He slept 7 hours last night. PRN required yesterday: None. Remains paranoid and anxious. SW reports that in speaking with pt's wife, she states the pt's mood, affect and behavior does appear improved from previous decompensation. RN and I discussed his hypotension this morning 83/6 with pulse 78. RN reports he is not dizzy or symptomatic. Asked for a re-check and he remained hypotensive at 85/58 with pulse 73. I held his AM 5 mg Zyprexa due to this.    On assessment today, pt is seen in his room. No rigidity in his movements and no resting tremors. His affect is slightly anxious but not guarded, and without latency of response, so mood and affect remain improved. He denies dizziness, or feeling unsteady, and he is awake and alert. Reviewed with pt I will d/c his AM Zyprexa due to hypotension, and will decrease his Cogentin as well. He denies SI or HI. He denies auditory or visual hallucinations and does not respond to internal stimuli. He continues to endorse feeling paranoid due to people watching him but states as he did yesterday that the intensity of his paranoia is slightly reduced, so he is progressing toward baseline. He denies feeling paranoid about his wife or at home. He denies any concerns/paranoid delusions about methane gas or explosions which were present on admission. Monitor orthostatic VS closely. Med changes as above and outlined in plan below.      Objective     Vitals:    04/30/24 1025   BP: (!) 83/58   Pulse: 73   Resp: 17   Temp:    SpO2:        No results found for this or any previous visit (from the past 24 hour(s)).      Mental Status Examination     Appearance/Hygiene 58 y.o. White

## 2024-04-30 NOTE — PLAN OF CARE
Problem: Self Harm/Suicidality  Goal: Will have no self-injury during hospital stay  Description: INTERVENTIONS:  1.  Ensure constant observer at bedside with Q15M safety checks  2.  Maintain a safe environment  3.  Secure patient belongings  4.  Ensure family/visitors adhere to safety recommendations  5.  Ensure safety tray has been added to patient's diet order  6.  Every shift and PRN: Re-assess suicidal risk via Frequent Screener    4/29/2024 2039 by Gris Lyons RN  Flowsheets (Taken 4/27/2024 2100 by Melony Jurado RN)  Will have no self-injury during hospital stay:   Ensure constant observer at bedside with Q15M safety checks   Maintain a safe environment   Every shift and PRN: Re-assess suicidal risk via Frequent Screener  4/29/2024 0909 by Jaz Castro RN  Outcome: Progressing     Problem: Depression  Goal: Will be euthymic at discharge  Description: INTERVENTIONS:  1. Administer medication as ordered  2. Provide emotional support via 1:1 interaction with staff  3. Encourage involvement in milieu/groups/activities  4. Monitor for social isolation  4/29/2024 0909 by Jaz Castro, RN  Outcome: Progressing   Pt. Is visible in the milieu this evening during snack and medication time. He is complaint with his medications. He offers no complaint. Pt. Was encouraged to increase fluid intake. He is free from falls, self harm or harming others.

## 2024-04-30 NOTE — GROUP NOTE
Art Therapy Group Progress Note    PATIENT SCHEDULED FOR GROUP AT:  1:00 PM    GROUP STOP TIME:  1:45 PM    ATTENDANCE: Moderate (8/15 participants)     TOPIC / FOCUS:  A Little Book Of…     GOALS:  encourage creativity, explore narratives and storytelling, increase feelings of melquiades, shift thoughts through focusing on storytelling, reduce stress, reduce anxiety, provide positive coping skills, encourage creative thinking     PARTICIPATION LEVEL:  Pt did not attend.     Leighton Bowman  Art Therapist, MA, ATR-P  Provisional Registered Art Therapist

## 2024-05-01 PROCEDURE — 6370000000 HC RX 637 (ALT 250 FOR IP): Performed by: PSYCHIATRY & NEUROLOGY

## 2024-05-01 PROCEDURE — 99231 SBSQ HOSP IP/OBS SF/LOW 25: CPT | Performed by: PSYCHIATRY & NEUROLOGY

## 2024-05-01 PROCEDURE — 1240000000 HC EMOTIONAL WELLNESS R&B

## 2024-05-01 RX ADMIN — BENZTROPINE MESYLATE 0.5 MG: 1 TABLET ORAL at 08:41

## 2024-05-01 RX ADMIN — DULOXETINE HYDROCHLORIDE 60 MG: 30 CAPSULE, DELAYED RELEASE ORAL at 08:40

## 2024-05-01 RX ADMIN — OLANZAPINE 10 MG: 10 TABLET, FILM COATED ORAL at 20:30

## 2024-05-01 RX ADMIN — BUSPIRONE HYDROCHLORIDE 7.5 MG: 5 TABLET ORAL at 08:41

## 2024-05-01 RX ADMIN — BUSPIRONE HYDROCHLORIDE 7.5 MG: 5 TABLET ORAL at 20:30

## 2024-05-01 ASSESSMENT — PAIN SCALES - GENERAL
PAINLEVEL_OUTOF10: 0
PAINLEVEL_OUTOF10: 0

## 2024-05-01 NOTE — PROGRESS NOTES
[facility]  Inpatient Progress Note     Date of Service: 05/01/24  Hospital Day: 12     Subjective/Interval History   05/01/24    Nursing Report:  Notes reviewed and/or discussed in report. Bony Smith is a 58 y.o. male with bipolar disorder who presented with paranoid delusions. He slept 7 hours last night. PRN required yesterday: None. Remains paranoid and anxious. Following AM Zyprexa discontinuation and Cogentin reduction yesterday for orthostatic hypotension, his vital signs have re-stabilized/improved today to 94/62 with pulse 79, and asymptomatic - no dizziness or falls.    On assessment today, pt is seen in his room. No rigidity in his movements and no resting tremors. His affect is less anxious and less guarded, and without latency of response, so mood and affect continue to improve, consistent with SW report yesterday that pt's wife noted the pt is improving. He denies dizziness or falls, and he is awake and alert. Reviewed with pt I d/c his AM Zyprexa and reduced his Cogentin yesterday due to hypotension, and that his vital signs improved, and he is receptive. He denies SI or HI. He denies auditory or visual hallucinations and does not respond to internal stimuli. He continues to feel paranoid about people watching him but states it continues to reduce, so he continues to progress toward baseline and did not regress with Zyprexa dose reduction. He denies any concerns/paranoid delusions about methane gas or explosions which were present on admission. Monitor orthostatic VS closely, which are again improved. Approaching baseline, and if he sustains current level of improvement, may discharge on Friday.      Objective     Vitals:    05/01/24 0803   BP: 94/62   Pulse: 79   Resp: 16   Temp: 97.7 °F (36.5 °C)   SpO2: 97%       No results found for this or any previous visit (from the past 24 hour(s)).      Mental Status Examination     Appearance/Hygiene 58 y.o. White

## 2024-05-01 NOTE — GROUP NOTE
Group Therapy Note    Date: 5/1/2024    Group Start Time: 1400  Group End Time: 1450  Group Topic: Music Therapy      Gabrielle Riddle        Group Therapy Note    Attendees: 9/15    Group Focus: Therapist provided choice of music therapy interventions and group members selected collaborative music making. The group consisted of collaborative music making with a focus on therapeutic drumming and improvisational music making. The group may promote socialization, group cohesion, present-centered focus, and use of music as a healthy outlet for self-expression.         Notes:  Patient did not attend group.      Discipline Responsible: /Counselor      Signature:  JOSE Salazar, LPC  Board-Certified Music Therapist  Licensed Professional Counselor

## 2024-05-01 NOTE — BSMART NOTE
Patient signed a release of information for [facility] for his medication management and other services that are needed: Address:    [address] [place] [address], Phone# [phone].   Appointment Time: Walk-ins, Monday through Thursday, 8:00am to 2:00pm.    Radha Mast, , MSW, Supervisee

## 2024-05-01 NOTE — GROUP NOTE
Group Therapy Note    Date: 5/1/2024    Group Start Time: 1500  Group End Time: 1545  Group Topic: Recreational    [facility]    [facility]        Group Therapy Note    Attendees: 9/14    Group Type: Communication Bingo and Healthy Relationship Bingo      Group Focus: This recreational therapy group focused on healthy communication and relationships. Group started with patients discussing how to talk about difficult topics, the do's and don'ts when communicating, and active listening. The group continued with patients discussing skills needed to create healthy relationships and examined key topics like things to look for, red flags, green flags, and obstacles.       Notes:  Patient did not attend group    Recreational Therapist  [facility]

## 2024-05-01 NOTE — GROUP NOTE
Art Therapy Adult [place] Group Progress Note    PATIENT SCHEDULED FOR GROUP AT:  2:00 PM    GROUP STOP TIME:  2:45 PM    ATTENDANCE: Low (2/6 participants)     TOPIC / FOCUS: Road to Wellbeing      GOALS:  define wellbeing, identify personal wellbeing goals, identify barriers and blockades, discuss effective and ineffective coping skills, identify successes and areas of growth, promote positive coping skills and creative problem solving     PARTICIPATION LEVEL:  Pt did not attend.     Leighton Bowman  Art Therapist, MA, ATR-P  Provisional Registered Art Therapist

## 2024-05-01 NOTE — BSMART NOTE
Pt is a 58 with history of bipolar disorder depressed with psychotic features. Pt was hospitalized for psychosis due to medication noncompliance.     Pt is showing improvement . SW met with to discuss dc planning. Pt stated he has bene to groups. Pt stated he feels a little better. SW discussed continue medication and treatment complacent, safety plan and aftercare. Pt plans to take medications. Pt plans to follow-up [facility] mental health with [facility], [place] and return to sister's home until he and his spouse can move back in their home. Pt is less anxious and is making some progress.SW will continue to assist pt with dc planning.         Kristel Montesinso MA, HS-BCP, LMHP-R

## 2024-05-01 NOTE — PLAN OF CARE
Problem: Self Harm/Suicidality  Goal: Will have no self-injury during hospital stay  Description: INTERVENTIONS:  1.  Ensure constant observer at bedside with Q15M safety checks  2.  Maintain a safe environment  3.  Secure patient belongings  4.  Ensure family/visitors adhere to safety recommendations  5.  Ensure safety tray has been added to patient's diet order  6.  Every shift and PRN: Re-assess suicidal risk via Frequent Screener    Flowsheets (Taken 4/27/2024 2100 by Melony Jurado, RN)  Will have no self-injury during hospital stay:   Ensure constant observer at bedside with Q15M safety checks   Maintain a safe environment   Every shift and PRN: Re-assess suicidal risk via Frequent Screener     Problem: Anxiety  Goal: Will report anxiety at manageable levels  Description: INTERVENTIONS:  1. Administer medication as ordered  2. Teach and rehearse alternative coping skills  3. Provide emotional support with 1:1 interaction with staff  4/30/2024 1218 by Janet Crystal, RN  Outcome: Progressing  Flowsheets (Taken 4/30/2024 1218)  Will report anxiety at manageable levels: Teach and rehearse alternative coping skills   Pt. Is visible in the milieu this evening . He ate his snacks and compliant with his medications. He offers no complaint. He is free from falls, self harm or harming others.

## 2024-05-01 NOTE — PLAN OF CARE
Problem: Self Harm/Suicidality  Goal: Will have no self-injury during hospital stay  Description: INTERVENTIONS:  1.  Ensure constant observer at bedside with Q15M safety checks  2.  Maintain a safe environment  3.  Secure patient belongings  4.  Ensure family/visitors adhere to safety recommendations  5.  Ensure safety tray has been added to patient's diet order  6.  Every shift and PRN: Re-assess suicidal risk via Frequent Screener    5/1/2024 0925 by Radha Alcala, RN  Outcome: Progressing  4/30/2024 2021 by Gris Lyons, RN  Flowsheets (Taken 4/27/2024 2100 by Melony Jurado, RN)  Will have no self-injury during hospital stay:   Ensure constant observer at bedside with Q15M safety checks   Maintain a safe environment   Every shift and PRN: Re-assess suicidal risk via Frequent Screener     Problem: Behavior  Goal: Pt/Family maintain appropriate behavior and adhere to behavioral management agreement, if implemented  Description: INTERVENTIONS:  1. Assess patient/family's coping skills and  non-compliant behavior (including use of illegal substances)  2. Notify security of behavior or suspected illegal substances which indicate the need for search of the family and/or belongings  3. Encourage verbalization of thoughts and concerns in a socially appropriate manner  4. Utilize positive, consistent limit setting strategies supporting safety of patient, staff and others  5. Encourage participation in the decision making process about the behavioral management agreement  6. If a visitor's behavior poses a threat to safety call refer to organization policy.  7. Initiate consult with , Psychosocial CNS, Spiritual Care as appropriate  Outcome: Progressing     Problem: Anxiety  Goal: Will report anxiety at manageable levels  Description: INTERVENTIONS:  1. Administer medication as ordered  2. Teach and rehearse alternative coping skills  3. Provide emotional support with 1:1 interaction with

## 2024-05-02 PROCEDURE — 6370000000 HC RX 637 (ALT 250 FOR IP): Performed by: PSYCHIATRY & NEUROLOGY

## 2024-05-02 PROCEDURE — 99232 SBSQ HOSP IP/OBS MODERATE 35: CPT | Performed by: PSYCHIATRY & NEUROLOGY

## 2024-05-02 PROCEDURE — 1240000000 HC EMOTIONAL WELLNESS R&B

## 2024-05-02 RX ADMIN — BUSPIRONE HYDROCHLORIDE 7.5 MG: 5 TABLET ORAL at 20:14

## 2024-05-02 RX ADMIN — OLANZAPINE 10 MG: 10 TABLET, FILM COATED ORAL at 20:14

## 2024-05-02 RX ADMIN — BENZTROPINE MESYLATE 0.5 MG: 1 TABLET ORAL at 07:45

## 2024-05-02 RX ADMIN — DULOXETINE HYDROCHLORIDE 60 MG: 30 CAPSULE, DELAYED RELEASE ORAL at 07:45

## 2024-05-02 RX ADMIN — BUSPIRONE HYDROCHLORIDE 7.5 MG: 5 TABLET ORAL at 07:44

## 2024-05-02 ASSESSMENT — PAIN SCALES - GENERAL
PAINLEVEL_OUTOF10: 0
PAINLEVEL_OUTOF10: 0

## 2024-05-02 NOTE — PROGRESS NOTES
[facility]   BRIEF PROGRESS NOTE    Assessment & Plan:    Orthostatic Hypotension  -on orthostatics 24 point drop in SBP from lying to standing and increase in pulse by 15  -suspect 2/2 olanazapine use vs hypovolemia vs multifactorial  -encourage water and salt intake, patient has had poor PO intake  -on med review, only medication causes would by antipsychotics  -recommend limiting antipsychotic drug use most typical for causing orthostatic hypotension as much as possible (olanzapine, risperidone, quietapine, and phenothiazines)      Subjective:  Received page from [facility] this AM regarding patient with orthostatic hypotension. Reviewed chart. BPs have been low but MAPs always >65.  Patient notes that he has been feeling lightheaded and dizzy on standing. He notes he has passed out a few times after standing. He also notes he has not been taking good PO and he has had poor appetite but is drinking his ensures and attempting to get nutrition.    Objective:  Vitals:    05/02/24 1021   BP: 103/67   Pulse: 90   Resp:    Temp:    SpO2:        Physical Exam:  General: Well-appearing, NAD.  CV:  RRR, no M/G/R.  RESP: Unlabored breathing. Lungs CTAB, no wheezes, rales or rhonchi appreciated. Equal expansion bilaterally.   Ext:  No edema.  2+ radial and dp pulses bilaterally.  Skin: Warm & dry. No rashes, lesions, or ulcers.  Good turgor.   Psych: Appropriate mood and affect.    The above patient and plan were discussed with my supervising physician.     See daily progress note for full assessment/plan.      Allison Rodriguez MD, PGY-1  [facility]  5/2/2024, 3:36 PM

## 2024-05-02 NOTE — GROUP NOTE
Group Therapy Note    Date: 5/2/2024    Group Start Time: 1455  Group End Time: 1510  Group Topic: Psychoeducation      Gabrielle Riddle        Group Therapy Note    Attendees: 3/5    Group Focus: The group consisted of psychoeducation on the GLAD Technique as a way of finding melquiades and balance in life. Group members each discussed and shared something they are grateful (G) for today, one new thing they have learned (L) recently, one small accomplishment (A), and one delight (D) for today.          Notes:  Pt did not attend group.      Discipline Responsible: /Counselor      Signature:  JOSE Salazar, LPC  Board-Certified Music Therapist  Licensed Professional Counselor

## 2024-05-02 NOTE — BSMART NOTE
CHARLES. Work Collateral Contact: According to nurse, patient is doing okay. Patient may   possibly be discharge tomorrow. Patient participated in Treatment Team today with staff members. Patient will follow-up with [facility] for his medication management and other services that are need: Address:[address] [place] [address], Phone# [phone].   Appointment Time: Walk-ins, Monday through Thursday, 8:00am to 2:00pm.    Radha Mast  MSW. Supervisee

## 2024-05-02 NOTE — GROUP NOTE
Group Therapy Note    Date: 5/2/2024    Group Start Time: 1500  Group End Time: 1545  Group Topic: Recreational    [facility]    [facility]        Group Therapy Note    Attendees: 7/14    Group Type: Game: Family Feud/ Mental Health Family Feud     Group Focus: Recreational therapy group engaged patients through a group game. RT placed patients into two teams to encourage team building and asking a variety of questions to encourage critical thinking. The group can assist in increasing positive mood, reducing stress, and using good social, coping and problem-solving skills.             Notes:  Patient did not attend group    Recreational Therapist  [facility] [employer]

## 2024-05-02 NOTE — GROUP NOTE
Group Therapy Note    Date: 5/2/2024    Group Start Time: 1015  Group End Time: 1035  Group Topic: Music Therapy      Gabrielle Riddle        Group Therapy Note    Attendees: 2/5    Group Focus: Music therapy group utilized patient preferred music where patients listened to and discussed songs that patients selected. The group closed with some deep breathing and light stretching to start the day on a positive note. The group may promote use of music, deep breathing, and stretching as coping skills.       Notes:  Pt presented with dysphoric affect and reported feeling like a \"1/10\" (10-best) at the start of group. Pt shared his musical preferences in group and the group listened to a song from a musical artist he likes. Pt stated that he hadn't slept in two years due to a traumatic event.     Status After Intervention:  Unchanged    Participation Level: Minimal    Participation Quality: Appropriate and Attentive      Speech:  normal      Thought Process/Content: Logical      Affective Functioning: Congruent      Mood: dysphoric      Level of consciousness:  Alert and Attentive      Response to Learning: Able to verbalize current knowledge/experience      Endings: None Reported    Modes of Intervention: Support, Socialization, Exploration, and Media      Discipline Responsible: /Counselor      Signature:  JOSE Salazar, LPC  Board-Certified Music Therapist  Licensed Professional Counselor

## 2024-05-02 NOTE — PROGRESS NOTES
[facility]  Inpatient Progress Note     Date of Service: 05/02/24  Hospital Day: 13     Subjective/Interval History   05/02/24    Nursing Report:  Notes reviewed and/or discussed in report. Bony Smith is a 58 y.o. male with bipolar disorder who presented with paranoid delusions. He slept 7 hours last night. PRN required yesterday: None. Compliant with scheduled medications. Vital signs have re-stabilized/improved. Today 103/67, pulse 90.    On assessment today, pt was seen with other members of interdisciplinary treatment team.     No rigidity in his movements and no resting tremors. Continues to appear fidgety at times but this may be baseline for the pt and consistent with anxiety. His affect is anxious but not guarded, and without latency of response, so mood and affect continue to improve. No dizziness or falls, and he is awake and alert. Continues on Zyprexa, Cogentin, and Cymbalta at the same doses as yesterday, with recent reductions to the first two as per recent notes. He denies SI or HI. He denies auditory or visual hallucinations and does not respond to internal stimuli. He continues to feel paranoid about people watching him but states it continues to reduce, so he continues to progress toward baseline and has not regressed with Zyprexa dose reduction. Per collateral information from wife, the pt's baseline includes a degree of paranoia and anxiety. As above, his VS have re-stabilized with recent reductions in Zyprexa and Cogentin, again without regression of paranoia. Approaching baseline, and if he sustains current level of improvement, he is a tentative discharge for tomorrow. Discussed this with the pt, and I reminded him that when he is discharged, his treatment will not stop but will rather transition to outpatient, which helped reassure him.       Objective     Vitals:    05/02/24 1021   BP: 103/67   Pulse: 90   Resp:    Temp:    SpO2:        No results found for

## 2024-05-02 NOTE — PLAN OF CARE
Problem: Behavior  Goal: Pt/Family maintain appropriate behavior and adhere to behavioral management agreement, if implemented  Description: INTERVENTIONS:  1. Assess patient/family's coping skills and  non-compliant behavior (including use of illegal substances)  2. Notify security of behavior or suspected illegal substances which indicate the need for search of the family and/or belongings  3. Encourage verbalization of thoughts and concerns in a socially appropriate manner  4. Utilize positive, consistent limit setting strategies supporting safety of patient, staff and others  5. Encourage participation in the decision making process about the behavioral management agreement  6. If a visitor's behavior poses a threat to safety call refer to organization policy.  7. Initiate consult with , Psychosocial CNS, Spiritual Care as appropriate  Outcome: Progressing     Problem: Sleep Disturbance  Goal: Will exhibit normal sleeping pattern  Description: INTERVENTIONS:  1. Administer medication as ordered  2. Decrease environmental stimuli, including noise, as appropriate  3. Discourage social isolation and naps during the day  Outcome: Progressing     Problem: Self Harm/Suicidality  Goal: Will have no self-injury during hospital stay  Description: INTERVENTIONS:  1.  Ensure constant observer at bedside with Q15M safety checks  2.  Maintain a safe environment  3.  Secure patient belongings  4.  Ensure family/visitors adhere to safety recommendations  5.  Ensure safety tray has been added to patient's diet order  6.  Every shift and PRN: Re-assess suicidal risk via Frequent Screener    5/2/2024 0859 by Radha Alcala, RN  Outcome: Progressing  5/1/2024 2155 by Sussy Newman, RN  Outcome: Progressing     Patient demonstrates a flat affect, depressed mood. Patient remains mildly paranoid, but demonstrates improvement from yesterday. Patient is isolative to room but does come out for meals. Patient is

## 2024-05-02 NOTE — PLAN OF CARE
Problem: Self Harm/Suicidality  Goal: Will have no self-injury during hospital stay  Description: INTERVENTIONS:  1.  Ensure constant observer at bedside with Q15M safety checks  2.  Maintain a safe environment  3.  Secure patient belongings  4.  Ensure family/visitors adhere to safety recommendations  5.  Ensure safety tray has been added to patient's diet order  6.  Every shift and PRN: Re-assess suicidal risk via Frequent Screener    5/1/2024 2155 by Sussy Newman RN  Outcome: Progressing  5/1/2024 0925 by Radha Alcala, RN  Outcome: Progressing     Problem: Depression  Goal: Will be euthymic at discharge  Description: INTERVENTIONS:  1. Administer medication as ordered  2. Provide emotional support via 1:1 interaction with staff  3. Encourage involvement in milieu/groups/activities  4. Monitor for social isolation  Outcome: Progressing     Problem: Behavior  Goal: Pt/Family maintain appropriate behavior and adhere to behavioral management agreement, if implemented  Description: INTERVENTIONS:  1. Assess patient/family's coping skills and  non-compliant behavior (including use of illegal substances)  2. Notify security of behavior or suspected illegal substances which indicate the need for search of the family and/or belongings  3. Encourage verbalization of thoughts and concerns in a socially appropriate manner  4. Utilize positive, consistent limit setting strategies supporting safety of patient, staff and others  5. Encourage participation in the decision making process about the behavioral management agreement  6. If a visitor's behavior poses a threat to safety call refer to organization policy.  7. Initiate consult with , Psychosocial CNS, Spiritual Care as appropriate  5/1/2024 0925 by Radha Alcala, RN  Outcome: Progressing     Problem: Anxiety  Goal: Will report anxiety at manageable levels  Description: INTERVENTIONS:  1. Administer medication as ordered  2. Teach and rehearse

## 2024-05-03 VITALS
TEMPERATURE: 97.6 F | OXYGEN SATURATION: 96 % | RESPIRATION RATE: 17 BRPM | HEIGHT: 72 IN | BODY MASS INDEX: 21.13 KG/M2 | WEIGHT: 156 LBS | HEART RATE: 64 BPM | DIASTOLIC BLOOD PRESSURE: 56 MMHG | SYSTOLIC BLOOD PRESSURE: 94 MMHG

## 2024-05-03 PROCEDURE — 99239 HOSP IP/OBS DSCHRG MGMT >30: CPT | Performed by: PSYCHIATRY & NEUROLOGY

## 2024-05-03 PROCEDURE — 6370000000 HC RX 637 (ALT 250 FOR IP): Performed by: PSYCHIATRY & NEUROLOGY

## 2024-05-03 RX ORDER — DULOXETIN HYDROCHLORIDE 60 MG/1
60 CAPSULE, DELAYED RELEASE ORAL DAILY
Qty: 30 CAPSULE | Refills: 0 | Status: SHIPPED | OUTPATIENT
Start: 2024-05-04

## 2024-05-03 RX ORDER — OLANZAPINE 10 MG/1
10 TABLET ORAL
Qty: 30 TABLET | Refills: 0 | Status: SHIPPED | OUTPATIENT
Start: 2024-05-03

## 2024-05-03 RX ORDER — BENZTROPINE MESYLATE 0.5 MG/1
0.5 TABLET ORAL DAILY
Qty: 30 TABLET | Refills: 0 | Status: SHIPPED | OUTPATIENT
Start: 2024-05-04

## 2024-05-03 RX ORDER — BUSPIRONE HYDROCHLORIDE 7.5 MG/1
7.5 TABLET ORAL 2 TIMES DAILY
Qty: 60 TABLET | Refills: 0 | Status: SHIPPED | OUTPATIENT
Start: 2024-05-03

## 2024-05-03 RX ADMIN — BENZTROPINE MESYLATE 0.5 MG: 1 TABLET ORAL at 07:39

## 2024-05-03 RX ADMIN — DULOXETINE HYDROCHLORIDE 60 MG: 30 CAPSULE, DELAYED RELEASE ORAL at 07:39

## 2024-05-03 RX ADMIN — BUSPIRONE HYDROCHLORIDE 7.5 MG: 5 TABLET ORAL at 07:39

## 2024-05-03 ASSESSMENT — PAIN SCALES - GENERAL: PAINLEVEL_OUTOF10: 0

## 2024-05-03 NOTE — PROGRESS NOTES
Pt received discharge instructions, prescriptions, and emergency numbers. Pt completed suicide safety plan with staff. All personal belongings returned to pt. Pt encouraged to follow-up with outpatient resources and to call with any questions or concerns. Pt was picked up by his wife.    Patient signed a release of information for [place] for his medication management and other services that are needed: Address:    [address] [place] [address], Phone# [phone].   Appointment Time: Walk-ins, Monday through Thursday, 8:00am to 2:00pm.

## 2024-05-03 NOTE — GROUP NOTE
Art Therapy Group Progress Note    PATIENT SCHEDULED FOR GROUP AT:  2:00 PM    GROUP STOP TIME:  2:45 PM    ATTENDANCE: Moderate (4/10 participants)     TOPIC / FOCUS: Finish the Image     GOALS:  to practice observation and grounding techniques, practice mindfulness, decrease stress and anxiety, increase reality orientation, encourage focus, increase problem solving skills      PARTICIPATION LEVEL:  Pt did not attend.     Leighton Bowman  Art Therapist, MA, ATR-P  Provisional Registered Art Therapist

## 2024-05-03 NOTE — GROUP NOTE
Group Therapy Note    Date: 5/3/2024    Group Start Time: 1500  Group End Time: 1545  Group Topic: Recreational    [facility] ADULT    [facility]        Group Therapy Note    Attendees: 4/9    Group type: Exercise     This recreational group engaged patients in physical activity.  Recreational therapists lead group members in guided exercises at the start of group, and finished group with relaxation stretches. This group may help reduce feelings of depression and stress and enhance mood and over emotional well-being.              Notes:  Patient did not attend group, patient waiting to be discharged.    Recreational Therapist  [facility]

## 2024-05-03 NOTE — BH NOTE
Patient appeared to have slept for 7 hours throughout the night with no issues ,will continue to monitor.

## 2024-05-03 NOTE — PLAN OF CARE
Problem: Depression  Goal: Will be euthymic at discharge  Description: INTERVENTIONS:  1. Administer medication as ordered  2. Provide emotional support via 1:1 interaction with staff  3. Encourage involvement in milieu/groups/activities  4. Monitor for social isolation  Outcome: Progressing     2020- Pt remains isolated to room/resting in bed.  Pt was symptomatic with orthostatic bp assessments and was encouraged to sit until dizziness subsides.  Pt was educated on getting up slowly and resting in between each position change.  Pt was offered water to prevent dehydration.  He reported that his day was \"so-so.\"  He is very guarded and did not want to elaborate on day.  Remains compliant with meds.  Denied si/hi/avh during time of assessment.  Will continue to monitor and support as needed.     2130- pt very hypervigilant/paranoid in room sitting on bed.  He was re-oriented to reality and safety was ensured.  Will continue to monitor and support as needed.

## 2024-05-03 NOTE — GROUP NOTE
Art Therapy Group Progress Note    PATIENT SCHEDULED FOR GROUP AT:  9:30 AM    GROUP STOP TIME:  10:15 AM    ATTENDANCE: Moderate (5/14 participants)     TOPIC / FOCUS: Mindfulness - Motivational Word Oleg     GOALS: define mindfulness, identify current feelings, encourage healthy emotional management, practicing positive coping skills, encourage focus, promote self-awareness, reduce stress and anxiety     PARTICIPATION LEVEL:  Pt did not attend.     Leighton Bowman  Art Therapist, MA, ATR-P  Provisional Registered Art Therapist        This writer encouraged the Pt to participate in group and offered the option of an individual session. Pt declined both options.

## 2024-05-03 NOTE — PLAN OF CARE
Problem: Discharge Planning  Goal: Discharge to home or other facility with appropriate resources  Outcome: Progressing     Problem: Self Harm/Suicidality  Goal: Will have no self-injury during hospital stay  Description: INTERVENTIONS:  1.  Ensure constant observer at bedside with Q15M safety checks  2.  Maintain a safe environment  3.  Secure patient belongings  4.  Ensure family/visitors adhere to safety recommendations  5.  Ensure safety tray has been added to patient's diet order  6.  Every shift and PRN: Re-assess suicidal risk via Frequent Screener    Outcome: Progressing     Problem: Depression  Goal: Will be euthymic at discharge  Description: INTERVENTIONS:  1. Administer medication as ordered  2. Provide emotional support via 1:1 interaction with staff  3. Encourage involvement in milieu/groups/activities  4. Monitor for social isolation  5/3/2024 2259 by Jaz Castro, RN  Outcome: Progressing  Pt presents with dull affect, depressed mood, thought blocking, paranoia. Pt has been withdrawn to self on the unit, but is pleasant on approach. Orthostatic Blood pressures remain low and pt reports dizziness. Pt encouraged to push fluids. Pt denies SI/HI/AVH. Pt is medication compliant.

## 2024-05-03 NOTE — BSMART NOTE
Pt is a 58 year old male with history of bipolar disorder depressed with psychotic features. Pt was hospitalized for psychosis due to medication noncompliance.       P.t' s case was discussed in staffing.pt will be dc today if his pressure stabilize.  SW was informed by the nursing staff  that pt is dc. SW met with pt to discuss dc . Pt expressed anxiety . SW discussed coping strategies, encouraged pt to follow up and with nursing staff recommendations.  Pt was encouraged to take medication as advised by the treating psychiatrist. Pt has an appointment with Dr. Reeder on 5/8/24 @ 11:30 at [facility] [address] [place] [address] [phone]. SW discussed dc plan with pt.'s spouse. The spouse will pick pt up at OR      Kristel Montesinos  -BCP MA, LMHP-R

## 2024-05-03 NOTE — GROUP NOTE
Group Therapy Note    Date: 5/3/2024    Group Start Time: 1400  Group End Time: 1455  Group Topic: Music Therapy      Gabrielle Riddle        Group Therapy Note    Attendees: 5/12    Group Focus: Music therapy group consisted of a bell choir intervention where group members were given lyrics color coded to resonator bells to support reality orientation, focus, mood enhancement, stress reduction, and focus on the here-and-now. The group closed with collaborative music making involving a range of instruments and group singing.         Notes:  Patient did not attend group.      Discipline Responsible: /Counselor      Signature:  JOSE Salazar, LPC  Board-Certified Music Therapist  Licensed Professional Counselor

## 2025-02-12 ENCOUNTER — APPOINTMENT (OUTPATIENT)
Facility: HOSPITAL | Age: 60
End: 2025-02-12
Payer: MEDICAID

## 2025-02-12 ENCOUNTER — HOSPITAL ENCOUNTER (EMERGENCY)
Facility: HOSPITAL | Age: 60
Discharge: PSYCHIATRIC OR SUBSTANCE USE FACILITY | End: 2025-02-13
Attending: EMERGENCY MEDICINE
Payer: MEDICAID

## 2025-02-12 DIAGNOSIS — F31.4 BIPOLAR DISORDER, CURRENT EPISODE DEPRESSED, SEVERE, WITHOUT PSYCHOTIC FEATURES (HCC): Primary | ICD-10-CM

## 2025-02-12 LAB
ALBUMIN SERPL-MCNC: 3.8 G/DL (ref 3.4–5)
ALBUMIN/GLOB SERPL: 1.3 (ref 0.8–1.7)
ALP SERPL-CCNC: 126 U/L (ref 45–117)
ALT SERPL-CCNC: 35 U/L (ref 16–61)
ANION GAP SERPL CALC-SCNC: 8 MMOL/L (ref 3–18)
APAP SERPL-MCNC: <2 UG/ML (ref 10–30)
AST SERPL-CCNC: 20 U/L (ref 10–38)
BASOPHILS # BLD: 0.08 K/UL (ref 0–0.1)
BASOPHILS NFR BLD: 0.7 % (ref 0–2)
BILIRUB SERPL-MCNC: 0.7 MG/DL (ref 0.2–1)
BUN SERPL-MCNC: 14 MG/DL (ref 7–18)
BUN/CREAT SERPL: 13 (ref 12–20)
CALCIUM SERPL-MCNC: 9.4 MG/DL (ref 8.5–10.1)
CHLORIDE SERPL-SCNC: 105 MMOL/L (ref 100–111)
CK SERPL-CCNC: 107 U/L (ref 39–308)
CO2 SERPL-SCNC: 27 MMOL/L (ref 21–32)
CREAT SERPL-MCNC: 1.07 MG/DL (ref 0.6–1.3)
DIFFERENTIAL METHOD BLD: ABNORMAL
EOSINOPHIL # BLD: 0.09 K/UL (ref 0–0.4)
EOSINOPHIL NFR BLD: 0.8 % (ref 0–5)
ERYTHROCYTE [DISTWIDTH] IN BLOOD BY AUTOMATED COUNT: 15.7 % (ref 11.6–14.5)
ETHANOL SERPL-MCNC: 4 MG/DL (ref 0–3)
GLOBULIN SER CALC-MCNC: 2.9 G/DL (ref 2–4)
GLUCOSE SERPL-MCNC: 111 MG/DL (ref 74–99)
HCT VFR BLD AUTO: 47.6 % (ref 36–48)
HGB BLD-MCNC: 15.6 G/DL (ref 13–16)
IMM GRANULOCYTES # BLD AUTO: 0.03 K/UL (ref 0–0.04)
IMM GRANULOCYTES NFR BLD AUTO: 0.3 % (ref 0–0.5)
LYMPHOCYTES # BLD: 2.89 K/UL (ref 0.9–3.6)
LYMPHOCYTES NFR BLD: 24.6 % (ref 21–52)
MCH RBC QN AUTO: 27.4 PG (ref 24–34)
MCHC RBC AUTO-ENTMCNC: 32.8 G/DL (ref 31–37)
MCV RBC AUTO: 83.5 FL (ref 78–100)
MONOCYTES # BLD: 0.9 K/UL (ref 0.05–1.2)
MONOCYTES NFR BLD: 7.6 % (ref 3–10)
NEUTS SEG # BLD: 7.78 K/UL (ref 1.8–8)
NEUTS SEG NFR BLD: 66 % (ref 40–73)
NRBC # BLD: 0 K/UL (ref 0–0.01)
NRBC BLD-RTO: 0 PER 100 WBC
PLATELET # BLD AUTO: 387 K/UL (ref 135–420)
PMV BLD AUTO: 11 FL (ref 9.2–11.8)
POTASSIUM SERPL-SCNC: 3.8 MMOL/L (ref 3.5–5.5)
PROT SERPL-MCNC: 6.7 G/DL (ref 6.4–8.2)
RBC # BLD AUTO: 5.7 M/UL (ref 4.35–5.65)
SALICYLATES SERPL-MCNC: <1.7 MG/DL (ref 2.8–20)
SODIUM SERPL-SCNC: 140 MMOL/L (ref 136–145)
TROPONIN I SERPL HS-MCNC: 57 NG/L (ref 0–78)
WBC # BLD AUTO: 11.8 K/UL (ref 4.6–13.2)

## 2025-02-12 PROCEDURE — 80143 DRUG ASSAY ACETAMINOPHEN: CPT

## 2025-02-12 PROCEDURE — 70450 CT HEAD/BRAIN W/O DYE: CPT

## 2025-02-12 PROCEDURE — 85025 COMPLETE CBC W/AUTO DIFF WBC: CPT

## 2025-02-12 PROCEDURE — 82077 ASSAY SPEC XCP UR&BREATH IA: CPT

## 2025-02-12 PROCEDURE — 82550 ASSAY OF CK (CPK): CPT

## 2025-02-12 PROCEDURE — 99285 EMERGENCY DEPT VISIT HI MDM: CPT

## 2025-02-12 PROCEDURE — 80179 DRUG ASSAY SALICYLATE: CPT

## 2025-02-12 PROCEDURE — 90791 PSYCH DIAGNOSTIC EVALUATION: CPT | Performed by: SOCIAL WORKER

## 2025-02-12 PROCEDURE — 84484 ASSAY OF TROPONIN QUANT: CPT

## 2025-02-12 PROCEDURE — 93005 ELECTROCARDIOGRAM TRACING: CPT | Performed by: PHYSICIAN ASSISTANT

## 2025-02-12 PROCEDURE — 80053 COMPREHEN METABOLIC PANEL: CPT

## 2025-02-12 ASSESSMENT — PAIN - FUNCTIONAL ASSESSMENT: PAIN_FUNCTIONAL_ASSESSMENT: NONE - DENIES PAIN

## 2025-02-12 NOTE — ED TRIAGE NOTES
Pt arrives via EMS with PPD c/o ECO. Pt here for mental health issues. Denies SI/HI. Pt lost wife about 1.5 months ago.

## 2025-02-12 NOTE — ED PROVIDER NOTES
EMERGENCY DEPARTMENT HISTORY AND PHYSICAL EXAM      Date: 2025  Patient Name: Bony Smith    History of Presenting Illness     Chief Complaint   Patient presents with    Mental Health Problem       History (Context): Bony Smith is a 59 y.o. male with significant PMHx for anxiety, diverticulitis, bipolar disorder presents under ECO to the ED today.  ECO paperwork shows that patient's sister petitioned the  for the ETO.  She states patient's wife  on  and she has noticed a steep decline in his mental health.  She states he uses at home on the couch and the \"freezing cold\". She is concerned that he is not taking care of himself and not taking his psychiatric medication      PCP: Mikel Nelson MD    No current facility-administered medications for this encounter.     Current Outpatient Medications   Medication Sig Dispense Refill    busPIRone (BUSPAR) 7.5 MG tablet Take 1 tablet by mouth 2 times daily 60 tablet 0    DULoxetine (CYMBALTA) 60 MG extended release capsule Take 1 capsule by mouth daily 30 capsule 0    OLANZapine (ZYPREXA) 10 MG tablet Take 1 tablet by mouth nightly 30 tablet 0    benztropine (COGENTIN) 0.5 MG tablet Take 1 tablet by mouth daily 30 tablet 0       Past History     Past Medical History:   Past Medical History:   Diagnosis Date    Anxiety     Blind right eye     Detached retina, right     Diverticulitis of intestine with abscess     Family history of diverticulitis of colon     mother and brother    Psychiatric disorder     Bipolar       Past Surgical History:  Past Surgical History:   Procedure Laterality Date    COLONOSCOPY N/A 2022    COLONOSCOPY WITH POLYPECTOMIES performed by Chevy Barroso MD at [facility] ENDOSCOPY    CT RETROPERITONEAL PERC DRAIN  2020    CT RETROPERITONEAL PERC DRAIN 2020 [facility] RAD CT    HEENT Right     Repair Detached Retina    HERNIA REPAIR  2012    inguinal repair with mesh       Family History:  Family History  14.5 %    Platelets 387 135 - 420 K/uL    MPV 11.0 9.2 - 11.8 FL    Nucleated RBCs 0.0 0  WBC    nRBC 0.00 0.00 - 0.01 K/uL    Neutrophils % 66.0 40.0 - 73.0 %    Lymphocytes % 24.6 21.0 - 52.0 %    Monocytes % 7.6 3.0 - 10.0 %    Eosinophils % 0.8 0.0 - 5.0 %    Basophils % 0.7 0.0 - 2.0 %    Immature Granulocytes % 0.3 0.0 - 0.5 %    Neutrophils Absolute 7.78 1.80 - 8.00 K/UL    Lymphocytes Absolute 2.89 0.90 - 3.60 K/UL    Monocytes Absolute 0.90 0.05 - 1.20 K/UL    Eosinophils Absolute 0.09 0.00 - 0.40 K/UL    Basophils Absolute 0.08 0.00 - 0.10 K/UL    Immature Granulocytes Absolute 0.03 0.00 - 0.04 K/UL    Differential Type AUTOMATED     Comprehensive Metabolic Panel    Collection Time: 02/12/25  6:35 PM   Result Value Ref Range    Sodium 140 136 - 145 mmol/L    Potassium 3.8 3.5 - 5.5 mmol/L    Chloride 105 100 - 111 mmol/L    CO2 27 21 - 32 mmol/L    Anion Gap 8 3.0 - 18 mmol/L    Glucose 111 (H) 74 - 99 mg/dL    BUN 14 7.0 - 18 MG/DL    Creatinine 1.07 0.6 - 1.3 MG/DL    BUN/Creatinine Ratio 13 12 - 20      Est, Glom Filt Rate 80 >60 ml/min/1.73m2    Calcium 9.4 8.5 - 10.1 MG/DL    Total Bilirubin 0.7 0.2 - 1.0 MG/DL    ALT 35 16 - 61 U/L    AST 20 10 - 38 U/L    Alk Phosphatase 126 (H) 45 - 117 U/L    Total Protein 6.7 6.4 - 8.2 g/dL    Albumin 3.8 3.4 - 5.0 g/dL    Globulin 2.9 2.0 - 4.0 g/dL    Albumin/Globulin Ratio 1.3 0.8 - 1.7     Troponin    Collection Time: 02/12/25  6:35 PM   Result Value Ref Range    Troponin, High Sensitivity 57 0 - 78 ng/L      Labs Reviewed   CBC WITH AUTO DIFFERENTIAL - Abnormal; Notable for the following components:       Result Value    RBC 5.70 (*)     RDW 15.7 (*)     All other components within normal limits   COMPREHENSIVE METABOLIC PANEL - Abnormal; Notable for the following components:    Glucose 111 (*)     Alk Phosphatase 126 (*)     All other components within normal limits   TROPONIN   URINE DRUG SCREEN   ETHANOL   SALICYLATE LEVEL   URINALYSIS

## 2025-02-12 NOTE — VIRTUAL HEALTH
Bony Escalanteley  [id number]  1965     Social Work Behavioral Health Crisis Assessment    25    Chief Complaint: Psych Evaluation     HPI: Patient is a 59 y.o. White (non-) male who presents for Psych evaluation. Patient presented to the ED on 25 from home via PD.     Collateral:  Alicia Johnson (Brother/Sister)  [phone] (Home Phone)   No answer    ED recorded, male with significant PMHx for anxiety, diverticulitis, bipolar disorder presents under ECO to the ED today.  ECO paperwork shows that patient's sister petitioned the  for the ETO.  She states patient's wife  on  and she has noticed a steep decline in his mental health.  She states he uses at home on the couch and the \"freezing cold\". She is concerned that he is not taking care of himself and not taking his psychiatric medication.   Pt reported, \"I don't know, I don't know, I am horrible. I have tired, I have tried to kill myself, I am too scared to kill myself. there is a gun on top of the fridge, \"   Pt presented moving hands as if responding to internal stimuli. Pt will pause, move hands, and was unable to fully answer. After moving hands would respond. Pt was guarded but did attempt. Pt reported attempted suicide but became too scared, reported gun on top of the fridge and was unsure if he should speak. Pt was not following up with medication or mental health needs. Pt meets criteria for psych admission at this time.     Past Psychiatric History:  Previous Diagnoses/symptoms: Altered mental status, bipolar disorder, suicidal ideation, depression   Previous suicide attempts/self-harm: Denies  Inpatient psychiatric hospitalizations: no  Current outpatient psychiatric provider: Denies  Current therapist: States not in therapy  Previous psychiatric medication trials: buspar, cymbalta, zyprexa,  Current psychiatric medications: No current psychiatric medications that pt is taking     Sleep Hours: \"I don't know\".  hospital admission.  Prior to Admission medications    Medication Sig Start Date End Date Taking? Authorizing Provider   busPIRone (BUSPAR) 7.5 MG tablet Take 1 tablet by mouth 2 times daily 5/3/24   Rafael Benjamin MD   DULoxetine (CYMBALTA) 60 MG extended release capsule Take 1 capsule by mouth daily 5/4/24   Rafael Benjamin MD   OLANZapine (ZYPREXA) 10 MG tablet Take 1 tablet by mouth nightly 5/3/24   Rafael Benjamin MD   benztropine (COGENTIN) 0.5 MG tablet Take 1 tablet by mouth daily 5/4/24   Rafael Benjamin MD        Labs:  UDS: not available  ETOH: not available      Mental Status Exam:  Level of consciousness:  awake   Appearance:  ill-appearing, street clothes, and lying in bed.  Does appear stated age. No acute distress.  Behavior/Motor:  psychomotor agitation  Attitude toward examiner:  guarded and withdrawn  SI/HI: \"I did try to kill myself, but I was too scared\"  Speech:  whispered and pressured   Mood: anxious  Affect: anxious  Thought Processes:  loose associations.   Thought Content: Suicidal Ideation:  without plan  Hallucinations:  Hallucinations: Denies AVOT-H  Cognition:  disoriented   Concentration: distractible  Memory: impaired recent memory, though not formally tested.  Insight: poor   Judgement: poor   Fund of Knowledge: limited    Overall Level Suicide Risk: TelePsych CSSRS Risk Level: Moderate Risk  CSSR-S Screening: \"I have tired, I am too scared to kill myself.\" Aborted or self-interrupted attempt - Past 3 months;Wish to be dead; Suicidal thoughts; Suicidal thoughts with method (but without specific plan or intent to act); Recent loss(es) or other significant negative event(s) (legal, financial, relationship, etc.); Current or pending isolation or feeling alone; Previous psychiatric diagnosis and treatments; Not receiving treatment       Brief ClinicalSummary:    Patient is a 59 y.o. White (non-) male who presents for Psych evaluation. Patient presented to the ED on 02/12/25

## 2025-02-13 VITALS
TEMPERATURE: 98.1 F | BODY MASS INDEX: 21.13 KG/M2 | OXYGEN SATURATION: 97 % | DIASTOLIC BLOOD PRESSURE: 96 MMHG | HEIGHT: 72 IN | SYSTOLIC BLOOD PRESSURE: 143 MMHG | RESPIRATION RATE: 18 BRPM | HEART RATE: 96 BPM | WEIGHT: 156 LBS

## 2025-02-13 LAB
AMPHET UR QL SCN: NEGATIVE
APPEARANCE UR: ABNORMAL
BACTERIA URNS QL MICRO: ABNORMAL /HPF
BARBITURATES UR QL SCN: NEGATIVE
BENZODIAZ UR QL: NEGATIVE
BILIRUB UR QL: ABNORMAL
CANNABINOIDS UR QL SCN: POSITIVE
COCAINE UR QL SCN: NEGATIVE
COLOR UR: ABNORMAL
EPITH CASTS URNS QL MICRO: ABNORMAL /LPF (ref 0–5)
GLUCOSE UR STRIP.AUTO-MCNC: 100 MG/DL
GRAN CASTS URNS QL MICRO: ABNORMAL /LPF
HGB UR QL STRIP: NEGATIVE
HYALINE CASTS URNS QL MICRO: ABNORMAL /LPF (ref 0–2)
KETONES UR QL STRIP.AUTO: 40 MG/DL
LEUKOCYTE ESTERASE UR QL STRIP.AUTO: ABNORMAL
Lab: ABNORMAL
METHADONE UR QL: NEGATIVE
MUCOUS THREADS URNS QL MICRO: ABNORMAL /LPF
NITRITE UR QL STRIP.AUTO: NEGATIVE
OPIATES UR QL: NEGATIVE
PCP UR QL: NEGATIVE
PH UR STRIP: 6 (ref 5–8)
PROT UR STRIP-MCNC: 100 MG/DL
RBC #/AREA URNS HPF: ABNORMAL /HPF (ref 0–5)
SP GR UR REFRACTOMETRY: 1.02 (ref 1–1.03)
UROBILINOGEN UR QL STRIP.AUTO: 1 EU/DL (ref 0.2–1)
WBC URNS QL MICRO: ABNORMAL /HPF (ref 0–5)

## 2025-02-13 PROCEDURE — 94761 N-INVAS EAR/PLS OXIMETRY MLT: CPT

## 2025-02-13 PROCEDURE — 80307 DRUG TEST PRSMV CHEM ANLYZR: CPT

## 2025-02-13 PROCEDURE — 6370000000 HC RX 637 (ALT 250 FOR IP)

## 2025-02-13 PROCEDURE — 81001 URINALYSIS AUTO W/SCOPE: CPT

## 2025-02-13 RX ADMIN — CEPHALEXIN 500 MG: 250 CAPSULE ORAL at 10:16

## 2025-02-13 ASSESSMENT — PAIN SCALES - GENERAL: PAINLEVEL_OUTOF10: 0

## 2025-02-13 NOTE — BSMART NOTE
Crisis Note: Writer spoke with Riri with [facility], who reports she will support a TDO due to patient lacking capacity.

## 2025-02-13 NOTE — ED PROVIDER NOTES
ED Course as of 02/13/25 0256 Wed Feb 12, 2025   1917 [place] evaluated patient. Patient reports suicidal ideation. They also state gun on top of fridge.  [ET]   1929 Patient afebrile, not tachycardic.  No leukocytosis or left shift.  Normal creatinine and electrolytes.  Normal troponin [ET]   1945 7:45 PM :Pt care assumed from Roselia VERMA, ED provider. Pt complaint(s), current treatment plan, progression and available diagnostic results have been discussed thoroughly. The patient was seen and evaluated on my shift.   Intended Disposition: Inpatient admission.  Pending diagnostic reports and/or labs (please list): CT/ labs; ADT20 after medically cleared.   [AC]   2004 IMPRESSION:     No acute intracranial abnormality.   [AC]   2225 Total ck 107 [AC]   Thu Feb 13, 2025   0040 UA trace leukocytes. Glucose, ketones, protein.  43 WBC, few bacteria.  Will treat for UTI.    Positive for THC    Patient is medically cleared. [AC]      ED Course User Index  [AC] Luba Abel, YEHUDA  [ET] Gayle Casillas PA Cooper, Ashley K, YEHUDA  02/13/25 0256

## 2025-02-13 NOTE — ED NOTES
Transfer Center Handoff for Behavioral Health Transfers      Patient's Current Location: [facility] EMERGENCY DEPARTMENT     Chief Complaint   Patient presents with    Mental Health Problem       Current or History of Violent Behavior: No    Currently in Restraints Now or During this Encounter: No  (Specify if Agitation or self harm is noted in ED?)  If yes, please describe behaviors requiring restraint:             Medical Clearance Documented and Verified in the Chart: Yes    No Known Allergies     Can Patient Tolerate Lying Flat: Yes    Able to Perform ADLs:  Yes  (Specify if able to ambulate or uses any mobility devices such as cane or walker)  Activity:    Level of Assistance:    Assistive Device:    Miscellaneous Devices:      LABS    CBC:   Lab Results   Component Value Date/Time    WBC 11.8 02/12/2025 06:35 PM    RBC 5.70 02/12/2025 06:35 PM    HGB 15.6 02/12/2025 06:35 PM    HCT 47.6 02/12/2025 06:35 PM    MCV 83.5 02/12/2025 06:35 PM    MCH 27.4 02/12/2025 06:35 PM    MCHC 32.8 02/12/2025 06:35 PM    RDW 15.7 02/12/2025 06:35 PM     02/12/2025 06:35 PM    MPV 11.0 02/12/2025 06:35 PM     CMP:   Lab Results   Component Value Date/Time     02/12/2025 06:35 PM    K 3.8 02/12/2025 06:35 PM     02/12/2025 06:35 PM    CO2 27 02/12/2025 06:35 PM    BUN 14 02/12/2025 06:35 PM    CREATININE 1.07 02/12/2025 06:35 PM    GFRAA >60 08/31/2023 08:18 PM    AGRATIO 1.2 02/20/2022 10:30 AM    LABGLOM 80 02/12/2025 06:35 PM    LABGLOM >90 04/26/2024 12:39 PM    GLUCOSE 111 02/12/2025 06:35 PM    GLUCOSE 89 08/31/2023 08:18 PM    CALCIUM 9.4 02/12/2025 06:35 PM    BILITOT 0.7 02/12/2025 06:35 PM    ALKPHOS 126 02/12/2025 06:35 PM    ALKPHOS 96 08/21/2023 08:56 PM    AST 20 02/12/2025 06:35 PM    ALT 35 02/12/2025 06:35 PM     Drug Panel:   Lab Results   Component Value Date/Time    AMPHETAMUR Negative 04/19/2024 10:37 AM    BARBITURATUR Negative 04/19/2024 10:37 AM    COCAINEUR Negative

## 2025-02-13 NOTE — BSMART NOTE
Crisis Note: Writer spoke with Tee with [facility], ext. [phone], who reports a bed search is in progress.

## 2025-02-13 NOTE — ED NOTES
Patient has been accepted at [facility]. Accepting MD Juaquin Simon. Report number [phone].

## 2025-02-14 LAB
EKG ATRIAL RATE: 77 BPM
EKG DIAGNOSIS: NORMAL
EKG P AXIS: 35 DEGREES
EKG P-R INTERVAL: 166 MS
EKG Q-T INTERVAL: 388 MS
EKG QRS DURATION: 82 MS
EKG QTC CALCULATION (BAZETT): 439 MS
EKG R AXIS: 48 DEGREES
EKG T AXIS: 88 DEGREES
EKG VENTRICULAR RATE: 77 BPM

## 2025-02-14 PROCEDURE — 93010 ELECTROCARDIOGRAM REPORT: CPT | Performed by: INTERNAL MEDICINE

## 2025-03-12 ENCOUNTER — APPOINTMENT (OUTPATIENT)
Facility: HOSPITAL | Age: 60
End: 2025-03-12
Payer: MEDICAID

## 2025-03-12 ENCOUNTER — HOSPITAL ENCOUNTER (INPATIENT)
Facility: HOSPITAL | Age: 60
LOS: 2 days | Discharge: HOME | End: 2025-03-14
Admitting: FAMILY MEDICINE
Payer: MEDICAID

## 2025-03-12 DIAGNOSIS — R07.9 CHEST PAIN, UNSPECIFIED TYPE: ICD-10-CM

## 2025-03-12 DIAGNOSIS — I51.3 LV (LEFT VENTRICULAR) MURAL THROMBUS: ICD-10-CM

## 2025-03-12 DIAGNOSIS — I82.90 THROMBUS: Primary | ICD-10-CM

## 2025-03-12 LAB
ALBUMIN SERPL-MCNC: 3.3 G/DL (ref 3.5–5)
ALBUMIN/GLOB SERPL: 1.1 (ref 1.1–2.2)
ALP SERPL-CCNC: 93 U/L (ref 45–117)
ALT SERPL-CCNC: 28 U/L (ref 12–78)
ANION GAP SERPL CALC-SCNC: 3 MMOL/L (ref 2–12)
APTT PPP: 23.9 SEC (ref 21.2–34.1)
AST SERPL W P-5'-P-CCNC: 25 U/L (ref 15–37)
BASOPHILS # BLD: 0.06 K/UL (ref 0–0.1)
BASOPHILS NFR BLD: 0.6 % (ref 0–1)
BILIRUB SERPL-MCNC: 0.2 MG/DL (ref 0.2–1)
BUN SERPL-MCNC: 14 MG/DL (ref 6–20)
BUN/CREAT SERPL: 17 (ref 12–20)
CA-I BLD-MCNC: 9.5 MG/DL (ref 8.5–10.1)
CHLORIDE SERPL-SCNC: 108 MMOL/L (ref 97–108)
CO2 SERPL-SCNC: 30 MMOL/L (ref 21–32)
CREAT SERPL-MCNC: 0.84 MG/DL (ref 0.7–1.3)
DIFFERENTIAL METHOD BLD: ABNORMAL
EOSINOPHIL # BLD: 0.13 K/UL (ref 0–0.4)
EOSINOPHIL NFR BLD: 1.3 % (ref 0–7)
ERYTHROCYTE [DISTWIDTH] IN BLOOD BY AUTOMATED COUNT: 16.2 % (ref 11.5–14.5)
ERYTHROCYTE [DISTWIDTH] IN BLOOD BY AUTOMATED COUNT: 16.4 % (ref 11.5–14.5)
GLOBULIN SER CALC-MCNC: 3 G/DL (ref 2–4)
GLUCOSE SERPL-MCNC: 108 MG/DL (ref 65–100)
HCT VFR BLD AUTO: 35.3 % (ref 36.6–50.3)
HCT VFR BLD AUTO: 36.7 % (ref 36.6–50.3)
HGB BLD-MCNC: 11.8 G/DL (ref 12.1–17)
HGB BLD-MCNC: 12.1 G/DL (ref 12.1–17)
IMM GRANULOCYTES # BLD AUTO: 0.02 K/UL (ref 0–0.04)
IMM GRANULOCYTES NFR BLD AUTO: 0.2 % (ref 0–0.5)
INR PPP: 1 (ref 0.9–1.1)
LYMPHOCYTES # BLD: 2.3 K/UL (ref 0.8–3.5)
LYMPHOCYTES NFR BLD: 22.7 % (ref 12–49)
MCH RBC QN AUTO: 28.6 PG (ref 26–34)
MCH RBC QN AUTO: 28.6 PG (ref 26–34)
MCHC RBC AUTO-ENTMCNC: 33 G/DL (ref 30–36.5)
MCHC RBC AUTO-ENTMCNC: 33.4 G/DL (ref 30–36.5)
MCV RBC AUTO: 85.5 FL (ref 80–99)
MCV RBC AUTO: 86.8 FL (ref 80–99)
MONOCYTES # BLD: 0.81 K/UL (ref 0–1)
MONOCYTES NFR BLD: 8 % (ref 5–13)
NEUTS SEG # BLD: 6.81 K/UL (ref 1.8–8)
NEUTS SEG NFR BLD: 67.2 % (ref 32–75)
NRBC # BLD: 0 K/UL (ref 0–0.01)
NRBC # BLD: 0 K/UL (ref 0–0.01)
NRBC BLD-RTO: 0 PER 100 WBC
NRBC BLD-RTO: 0 PER 100 WBC
PLATELET # BLD AUTO: 347 K/UL (ref 150–400)
PLATELET # BLD AUTO: 372 K/UL (ref 150–400)
PMV BLD AUTO: 10.6 FL (ref 8.9–12.9)
PMV BLD AUTO: 10.8 FL (ref 8.9–12.9)
POTASSIUM SERPL-SCNC: 4.6 MMOL/L (ref 3.5–5.1)
PROT SERPL-MCNC: 6.3 G/DL (ref 6.4–8.2)
PROTHROMBIN TIME: 13.2 SEC (ref 11.9–14.6)
RBC # BLD AUTO: 4.13 M/UL (ref 4.1–5.7)
RBC # BLD AUTO: 4.23 M/UL (ref 4.1–5.7)
SODIUM SERPL-SCNC: 141 MMOL/L (ref 136–145)
THERAPEUTIC RANGE: NORMAL SEC (ref 82–109)
TROPONIN I SERPL HS-MCNC: 33 NG/L (ref 0–76)
UFH PPP CHRO-ACNC: <0.1 IU/ML
WBC # BLD AUTO: 10.1 K/UL (ref 4.1–11.1)
WBC # BLD AUTO: 9.2 K/UL (ref 4.1–11.1)

## 2025-03-12 PROCEDURE — 85027 COMPLETE CBC AUTOMATED: CPT

## 2025-03-12 PROCEDURE — 84484 ASSAY OF TROPONIN QUANT: CPT

## 2025-03-12 PROCEDURE — 36415 COLL VENOUS BLD VENIPUNCTURE: CPT

## 2025-03-12 PROCEDURE — 96374 THER/PROPH/DIAG INJ IV PUSH: CPT

## 2025-03-12 PROCEDURE — 2580000003 HC RX 258

## 2025-03-12 PROCEDURE — 85025 COMPLETE CBC W/AUTO DIFF WBC: CPT

## 2025-03-12 PROCEDURE — 93005 ELECTROCARDIOGRAM TRACING: CPT

## 2025-03-12 PROCEDURE — 85730 THROMBOPLASTIN TIME PARTIAL: CPT

## 2025-03-12 PROCEDURE — 6370000000 HC RX 637 (ALT 250 FOR IP)

## 2025-03-12 PROCEDURE — 6360000004 HC RX CONTRAST MEDICATION

## 2025-03-12 PROCEDURE — 99285 EMERGENCY DEPT VISIT HI MDM: CPT

## 2025-03-12 PROCEDURE — 74177 CT ABD & PELVIS W/CONTRAST: CPT

## 2025-03-12 PROCEDURE — 80053 COMPREHEN METABOLIC PANEL: CPT

## 2025-03-12 PROCEDURE — 74174 CTA ABD&PLVS W/CONTRAST: CPT

## 2025-03-12 PROCEDURE — 6360000002 HC RX W HCPCS

## 2025-03-12 PROCEDURE — 85610 PROTHROMBIN TIME: CPT

## 2025-03-12 PROCEDURE — 1100000000 HC RM PRIVATE

## 2025-03-12 PROCEDURE — 85520 HEPARIN ASSAY: CPT

## 2025-03-12 RX ORDER — OLANZAPINE 5 MG/1
5 TABLET, ORALLY DISINTEGRATING ORAL NIGHTLY
Status: DISCONTINUED | OUTPATIENT
Start: 2025-03-12 | End: 2025-03-13

## 2025-03-12 RX ORDER — HEPARIN SODIUM 1000 [USP'U]/ML
80 INJECTION, SOLUTION INTRAVENOUS; SUBCUTANEOUS PRN
Status: DISCONTINUED | OUTPATIENT
Start: 2025-03-12 | End: 2025-03-14

## 2025-03-12 RX ORDER — TAMSULOSIN HYDROCHLORIDE 0.4 MG/1
0.4 CAPSULE ORAL DAILY
Status: DISCONTINUED | OUTPATIENT
Start: 2025-03-13 | End: 2025-03-15 | Stop reason: HOSPADM

## 2025-03-12 RX ORDER — 0.9 % SODIUM CHLORIDE 0.9 %
1000 INTRAVENOUS SOLUTION INTRAVENOUS ONCE
Status: COMPLETED | OUTPATIENT
Start: 2025-03-12 | End: 2025-03-12

## 2025-03-12 RX ORDER — MIRTAZAPINE 15 MG/1
15 TABLET, FILM COATED ORAL NIGHTLY
Status: DISCONTINUED | OUTPATIENT
Start: 2025-03-12 | End: 2025-03-15 | Stop reason: HOSPADM

## 2025-03-12 RX ORDER — HYDROXYZINE HYDROCHLORIDE 25 MG/1
25 TABLET, FILM COATED ORAL ONCE
Status: COMPLETED | OUTPATIENT
Start: 2025-03-12 | End: 2025-03-12

## 2025-03-12 RX ORDER — MIRTAZAPINE 15 MG/1
15 TABLET, FILM COATED ORAL NIGHTLY
Status: DISCONTINUED | OUTPATIENT
Start: 2025-03-13 | End: 2025-03-13 | Stop reason: SDUPTHER

## 2025-03-12 RX ORDER — HEPARIN SODIUM 10000 [USP'U]/100ML
5-30 INJECTION, SOLUTION INTRAVENOUS CONTINUOUS
Status: DISCONTINUED | OUTPATIENT
Start: 2025-03-12 | End: 2025-03-14

## 2025-03-12 RX ORDER — IOPAMIDOL 755 MG/ML
100 INJECTION, SOLUTION INTRAVASCULAR
Status: COMPLETED | OUTPATIENT
Start: 2025-03-12 | End: 2025-03-12

## 2025-03-12 RX ORDER — HEPARIN SODIUM 1000 [USP'U]/ML
80 INJECTION, SOLUTION INTRAVENOUS; SUBCUTANEOUS ONCE
Status: COMPLETED | OUTPATIENT
Start: 2025-03-12 | End: 2025-03-12

## 2025-03-12 RX ORDER — DULOXETIN HYDROCHLORIDE 20 MG/1
20 CAPSULE, DELAYED RELEASE ORAL DAILY
Status: DISCONTINUED | OUTPATIENT
Start: 2025-03-13 | End: 2025-03-13

## 2025-03-12 RX ORDER — HEPARIN SODIUM 1000 [USP'U]/ML
40 INJECTION, SOLUTION INTRAVENOUS; SUBCUTANEOUS PRN
Status: DISCONTINUED | OUTPATIENT
Start: 2025-03-12 | End: 2025-03-14

## 2025-03-12 RX ADMIN — SODIUM CHLORIDE 1000 ML: 0.9 INJECTION, SOLUTION INTRAVENOUS at 20:17

## 2025-03-12 RX ADMIN — IOPAMIDOL 100 ML: 755 INJECTION, SOLUTION INTRAVENOUS at 18:10

## 2025-03-12 RX ADMIN — HEPARIN SODIUM 18 UNITS/KG/HR: 10000 INJECTION, SOLUTION INTRAVENOUS at 20:13

## 2025-03-12 RX ADMIN — HEPARIN SODIUM 5700 UNITS: 1000 INJECTION INTRAVENOUS; SUBCUTANEOUS at 20:07

## 2025-03-12 RX ADMIN — HYDROXYZINE HYDROCHLORIDE 25 MG: 25 TABLET, FILM COATED ORAL at 19:27

## 2025-03-12 ASSESSMENT — LIFESTYLE VARIABLES
HOW OFTEN DO YOU HAVE A DRINK CONTAINING ALCOHOL: NEVER
HOW MANY STANDARD DRINKS CONTAINING ALCOHOL DO YOU HAVE ON A TYPICAL DAY: PATIENT DOES NOT DRINK

## 2025-03-12 ASSESSMENT — PAIN - FUNCTIONAL ASSESSMENT: PAIN_FUNCTIONAL_ASSESSMENT: NONE - DENIES PAIN

## 2025-03-12 NOTE — ED TRIAGE NOTES
Per ems, pt has been complaining of abdominal pain \"for a long time.\" According to ems, he was here for a mass and [facility] decided to send him out    Pt has no complaints of N/V

## 2025-03-13 ENCOUNTER — APPOINTMENT (OUTPATIENT)
Facility: HOSPITAL | Age: 60
End: 2025-03-13
Attending: FAMILY MEDICINE
Payer: MEDICAID

## 2025-03-13 LAB
ECHO AO ROOT DIAM: 3.2 CM
ECHO AO ROOT INDEX: 1.67 CM/M2
ECHO AV AREA PEAK VELOCITY: 2.6 CM2
ECHO AV AREA VTI: 2.7 CM2
ECHO AV AREA/BSA PEAK VELOCITY: 1.4 CM2/M2
ECHO AV AREA/BSA VTI: 1.4 CM2/M2
ECHO AV MEAN GRADIENT: 3 MMHG
ECHO AV MEAN VELOCITY: 0.8 M/S
ECHO AV PEAK GRADIENT: 5 MMHG
ECHO AV PEAK VELOCITY: 1.2 M/S
ECHO AV VELOCITY RATIO: 0.58
ECHO AV VTI: 21.9 CM
ECHO BSA: 1.9 M2
ECHO LA AREA 4C: 13.2 CM2
ECHO LA DIAMETER INDEX: 1.61 CM/M2
ECHO LA DIAMETER: 3.1 CM
ECHO LA MAJOR AXIS: 4.2 CM
ECHO LA TO AORTIC ROOT RATIO: 0.97
ECHO LA VOL MOD A4C: 31 ML (ref 18–58)
ECHO LA VOLUME INDEX MOD A4C: 16 ML/M2 (ref 16–34)
ECHO LV E' LATERAL VELOCITY: 10.4 CM/S
ECHO LV E' SEPTAL VELOCITY: 6.17 CM/S
ECHO LV EDV A4C: 187 ML
ECHO LV EDV INDEX A4C: 97 ML/M2
ECHO LV EF PHYSICIAN: 45 %
ECHO LV EJECTION FRACTION A4C: 47 %
ECHO LV ESV A4C: 99 ML
ECHO LV ESV INDEX A4C: 52 ML/M2
ECHO LV FRACTIONAL SHORTENING: 32 % (ref 28–44)
ECHO LV INTERNAL DIMENSION DIASTOLE INDEX: 2.97 CM/M2
ECHO LV INTERNAL DIMENSION DIASTOLIC: 5.7 CM (ref 4.2–5.9)
ECHO LV INTERNAL DIMENSION SYSTOLIC INDEX: 2.03 CM/M2
ECHO LV INTERNAL DIMENSION SYSTOLIC: 3.9 CM
ECHO LV IVSD: 1.1 CM (ref 0.6–1)
ECHO LV MASS 2D: 226.4 G (ref 88–224)
ECHO LV MASS INDEX 2D: 117.9 G/M2 (ref 49–115)
ECHO LV POSTERIOR WALL DIASTOLIC: 0.9 CM (ref 0.6–1)
ECHO LV RELATIVE WALL THICKNESS RATIO: 0.32
ECHO LVOT AREA: 4.2 CM2
ECHO LVOT AV VTI INDEX: 0.66
ECHO LVOT DIAM: 2.3 CM
ECHO LVOT MEAN GRADIENT: 1 MMHG
ECHO LVOT PEAK GRADIENT: 2 MMHG
ECHO LVOT PEAK VELOCITY: 0.7 M/S
ECHO LVOT STROKE VOLUME INDEX: 31.1 ML/M2
ECHO LVOT SV: 59.8 ML
ECHO LVOT VTI: 14.4 CM
ECHO MV A VELOCITY: 0.84 M/S
ECHO MV AREA VTI: 2.6 CM2
ECHO MV E DECELERATION TIME (DT): 169 MS
ECHO MV E VELOCITY: 0.54 M/S
ECHO MV E/A RATIO: 0.64
ECHO MV E/E' LATERAL: 5.19
ECHO MV E/E' RATIO (AVERAGED): 6.97
ECHO MV E/E' SEPTAL: 8.75
ECHO MV LVOT VTI INDEX: 1.62
ECHO MV MAX VELOCITY: 1 M/S
ECHO MV MEAN GRADIENT: 1 MMHG
ECHO MV MEAN VELOCITY: 0.5 M/S
ECHO MV PEAK GRADIENT: 4 MMHG
ECHO MV REGURGITANT PEAK GRADIENT: 23 MMHG
ECHO MV REGURGITANT PEAK VELOCITY: 2.4 M/S
ECHO MV VTI: 23.3 CM
ECHO PV MAX VELOCITY: 0.7 M/S
ECHO PV MEAN GRADIENT: 1 MMHG
ECHO PV MEAN VELOCITY: 0.5 M/S
ECHO PV PEAK GRADIENT: 2 MMHG
ECHO PV VTI: 13.8 CM
ECHO RA AREA 4C: 12.4 CM2
ECHO RA END SYSTOLIC VOLUME APICAL 4 CHAMBER INDEX BSA: 14 ML/M2
ECHO RA VOLUME: 27 ML
ECHO RV BASAL DIMENSION: 2.6 CM
ECHO RV FREE WALL PEAK S': 14.8 CM/S
ECHO RV TAPSE: 1.8 CM (ref 1.7–?)
EKG ATRIAL RATE: 68 BPM
EKG DIAGNOSIS: NORMAL
EKG P AXIS: 19 DEGREES
EKG P-R INTERVAL: 166 MS
EKG Q-T INTERVAL: 396 MS
EKG QRS DURATION: 86 MS
EKG QTC CALCULATION (BAZETT): 421 MS
EKG R AXIS: 17 DEGREES
EKG T AXIS: -24 DEGREES
EKG VENTRICULAR RATE: 68 BPM
INR PPP: 1 (ref 0.9–1.1)
PROTHROMBIN TIME: 13.9 SEC (ref 11.9–14.6)
UFH PPP CHRO-ACNC: 0.37 IU/ML
UFH PPP CHRO-ACNC: 0.67 IU/ML
UFH PPP CHRO-ACNC: >1.1 IU/ML

## 2025-03-13 PROCEDURE — 85610 PROTHROMBIN TIME: CPT

## 2025-03-13 PROCEDURE — 94761 N-INVAS EAR/PLS OXIMETRY MLT: CPT

## 2025-03-13 PROCEDURE — 76376 3D RENDER W/INTRP POSTPROCES: CPT

## 2025-03-13 PROCEDURE — 36415 COLL VENOUS BLD VENIPUNCTURE: CPT

## 2025-03-13 PROCEDURE — 85520 HEPARIN ASSAY: CPT

## 2025-03-13 PROCEDURE — 6370000000 HC RX 637 (ALT 250 FOR IP): Performed by: FAMILY MEDICINE

## 2025-03-13 PROCEDURE — 6360000002 HC RX W HCPCS: Performed by: FAMILY MEDICINE

## 2025-03-13 PROCEDURE — 6360000004 HC RX CONTRAST MEDICATION

## 2025-03-13 PROCEDURE — 2060000000 HC ICU INTERMEDIATE R&B

## 2025-03-13 RX ORDER — MAGNESIUM SULFATE IN WATER 40 MG/ML
2000 INJECTION, SOLUTION INTRAVENOUS PRN
Status: DISCONTINUED | OUTPATIENT
Start: 2025-03-13 | End: 2025-03-15 | Stop reason: HOSPADM

## 2025-03-13 RX ORDER — POLYETHYLENE GLYCOL 3350 17 G/17G
17 POWDER, FOR SOLUTION ORAL DAILY PRN
Status: DISCONTINUED | OUTPATIENT
Start: 2025-03-13 | End: 2025-03-15 | Stop reason: HOSPADM

## 2025-03-13 RX ORDER — SODIUM CHLORIDE 0.9 % (FLUSH) 0.9 %
5-40 SYRINGE (ML) INJECTION EVERY 12 HOURS SCHEDULED
Status: DISCONTINUED | OUTPATIENT
Start: 2025-03-13 | End: 2025-03-15 | Stop reason: HOSPADM

## 2025-03-13 RX ORDER — ONDANSETRON 2 MG/ML
4 INJECTION INTRAMUSCULAR; INTRAVENOUS EVERY 6 HOURS PRN
Status: DISCONTINUED | OUTPATIENT
Start: 2025-03-13 | End: 2025-03-15 | Stop reason: HOSPADM

## 2025-03-13 RX ORDER — ONDANSETRON 4 MG/1
4 TABLET, ORALLY DISINTEGRATING ORAL EVERY 8 HOURS PRN
Status: DISCONTINUED | OUTPATIENT
Start: 2025-03-13 | End: 2025-03-15 | Stop reason: HOSPADM

## 2025-03-13 RX ORDER — POTASSIUM CHLORIDE 7.45 MG/ML
10 INJECTION INTRAVENOUS PRN
Status: DISCONTINUED | OUTPATIENT
Start: 2025-03-13 | End: 2025-03-15 | Stop reason: HOSPADM

## 2025-03-13 RX ORDER — SODIUM CHLORIDE 0.9 % (FLUSH) 0.9 %
5-40 SYRINGE (ML) INJECTION PRN
Status: DISCONTINUED | OUTPATIENT
Start: 2025-03-13 | End: 2025-03-15 | Stop reason: HOSPADM

## 2025-03-13 RX ORDER — HYDROXYZINE PAMOATE 25 MG/1
25 CAPSULE ORAL 3 TIMES DAILY PRN
Status: DISCONTINUED | OUTPATIENT
Start: 2025-03-13 | End: 2025-03-15 | Stop reason: HOSPADM

## 2025-03-13 RX ORDER — ACETAMINOPHEN 325 MG/1
650 TABLET ORAL EVERY 6 HOURS PRN
Status: DISCONTINUED | OUTPATIENT
Start: 2025-03-13 | End: 2025-03-15 | Stop reason: HOSPADM

## 2025-03-13 RX ORDER — POTASSIUM CHLORIDE 1500 MG/1
40 TABLET, EXTENDED RELEASE ORAL PRN
Status: DISCONTINUED | OUTPATIENT
Start: 2025-03-13 | End: 2025-03-15 | Stop reason: HOSPADM

## 2025-03-13 RX ORDER — ACETAMINOPHEN 650 MG/1
650 SUPPOSITORY RECTAL EVERY 6 HOURS PRN
Status: DISCONTINUED | OUTPATIENT
Start: 2025-03-13 | End: 2025-03-15 | Stop reason: HOSPADM

## 2025-03-13 RX ORDER — ZOLPIDEM TARTRATE 5 MG/1
5 TABLET ORAL NIGHTLY PRN
Status: DISCONTINUED | OUTPATIENT
Start: 2025-03-13 | End: 2025-03-15 | Stop reason: HOSPADM

## 2025-03-13 RX ORDER — SODIUM CHLORIDE 9 MG/ML
INJECTION, SOLUTION INTRAVENOUS PRN
Status: DISCONTINUED | OUTPATIENT
Start: 2025-03-13 | End: 2025-03-15 | Stop reason: HOSPADM

## 2025-03-13 RX ADMIN — SULFUR HEXAFLUORIDE 5 ML: 60.7; .19; .19 INJECTION, POWDER, LYOPHILIZED, FOR SUSPENSION INTRAVENOUS; INTRAVESICAL at 12:50

## 2025-03-13 RX ADMIN — FLUOXETINE HYDROCHLORIDE 40 MG: 20 CAPSULE ORAL at 10:14

## 2025-03-13 RX ADMIN — HEPARIN SODIUM 2800 UNITS: 1000 INJECTION INTRAVENOUS; SUBCUTANEOUS at 03:40

## 2025-03-13 RX ADMIN — MIRTAZAPINE 15 MG: 15 TABLET, FILM COATED ORAL at 00:59

## 2025-03-13 RX ADMIN — BREXPIPRAZOLE 2 MG: 1 TABLET ORAL at 10:14

## 2025-03-13 RX ADMIN — HEPARIN SODIUM 15 UNITS/KG/HR: 10000 INJECTION, SOLUTION INTRAVENOUS at 19:52

## 2025-03-13 RX ADMIN — TAMSULOSIN HYDROCHLORIDE 0.4 MG: 0.4 CAPSULE ORAL at 10:14

## 2025-03-13 ASSESSMENT — ENCOUNTER SYMPTOMS
SHORTNESS OF BREATH: 1
ABDOMINAL PAIN: 1

## 2025-03-13 NOTE — H&P
Hospitalist Admission Note    NAME: Bony Smith   :  1965   MRN:  [id number]     Date/Time:  3/12/2025 11:38 PM    Patient PCP: Mikel Nelson MD    ______________________________________________________________________  Given the patient's current clinical presentation, I have a high level of concern for decompensation if discharged from the emergency department.  Complex decision making was performed, which includes reviewing the patient's available past medical records, laboratory results, and x-ray films.       My assessment of this patient's clinical condition and my plan of care is as follows.    Assessment / Plan:    LV thrombus/consult cardiology  Right renal cyst  Bipolar disorder/consult psych  Anxiety  Hx of diverticulitis  BPH    Received IV heparin, Atarax 25 mg PO, and IV fluids in ED     Order Echo after CTA abdomen showed LV thrombus. Consult cardiology for further workup    Patient admitted to medical telemetry floor  Continue at home medications     Current Facility-Administered Medications:     heparin (porcine) injection 5,700 Units, 80 Units/kg, IntraVENous, PRN, Mike Louis MD    heparin (porcine) injection 2,800 Units, 40 Units/kg, IntraVENous, PRN, Mike Louis MD    heparin 25,000 units in dextrose 5% 250 mL (premix) infusion, 5-30 Units/kg/hr, IntraVENous, Continuous, Mike Louis MD, Last Rate: 12.7 mL/hr at 25, 18 Units/kg/hr at 25    [START ON 3/13/2025] brexpiprazole (REXULTI) tablet 1.5 mg, 1.5 mg, Oral, Daily, Mike Louis MD    [START ON 3/13/2025] DULoxetine (CYMBALTA) extended release capsule 20 mg, 20 mg, Oral, Daily, Mike Louis MD    [START ON 3/13/2025] FLUoxetine (PROZAC) capsule 40 mg, 40 mg, Oral, Daily, Mike Louis MD    mirtazapine (REMERON) tablet 15 mg, 15 mg, Oral, Nightly, Mike Louis MD    OLANZapine zydis (ZYPREXA) disintegrating tablet 5 mg, 5 mg, Oral, Nightly, Mike Louis MD

## 2025-03-13 NOTE — H&P
Hospitalist Admission Note    NAME: Bony Smith   :  1965   MRN:  [id number]     Date/Time:  3/12/2025 11:54 PM    Patient PCP: Mikel Nelson MD    ______________________________________________________________________  Given the patient's current clinical presentation, I have a high level of concern for decompensation if discharged from the emergency department.  Complex decision making was performed, which includes reviewing the patient's available past medical records, laboratory results, and x-ray films.       My assessment of this patient's clinical condition and my plan of care is as follows.    Assessment / Plan:    LV thrombus/consult cardiology  Right renal cyst/no further imaging is recommended as per radiology  Bipolar disorder/consult psych  Anxiety  Hx of diverticulitis  BPH    Started on heparin drip  Cardiology consulted 2D echo    Order Echo after CTA abdomen showed LV thrombus. Consult cardiology for further workup    Continue all psych medications  And will do psych consult  Also discussed with the pharmacist regarding psych medications    Current Facility-Administered Medications:     heparin (porcine) injection 5,700 Units, 80 Units/kg, IntraVENous, PRN, Mike Louis MD    heparin (porcine) injection 2,800 Units, 40 Units/kg, IntraVENous, PRN, Mike Louis MD    heparin 25,000 units in dextrose 5% 250 mL (premix) infusion, 5-30 Units/kg/hr, IntraVENous, Continuous, Mike Louis MD, Last Rate: 12.7 mL/hr at 25, 18 Units/kg/hr at 25    [START ON 3/13/2025] brexpiprazole (REXULTI) tablet 1.5 mg, 1.5 mg, Oral, Daily, Mike Louis MD    [START ON 3/13/2025] DULoxetine (CYMBALTA) extended release capsule 20 mg, 20 mg, Oral, Daily, Mike Louis MD    [START ON 3/13/2025] FLUoxetine (PROZAC) capsule 40 mg, 40 mg, Oral, Daily, Mike Louis MD    mirtazapine (REMERON) tablet 15 mg, 15 mg, Oral, Nightly, Mike Louis MD     OLANZapine zydis (ZYPREXA) disintegrating tablet 5 mg, 5 mg, Oral, Nightly, Mike Louis MD    [START ON 3/13/2025] tamsulosin (FLOMAX) capsule 0.4 mg, 0.4 mg, Oral, Daily, Mike Louis MD    Current Outpatient Medications:     busPIRone (BUSPAR) 7.5 MG tablet, Take 1 tablet by mouth 2 times daily (Patient not taking: Reported on 2/13/2025), Disp: 60 tablet, Rfl: 0    DULoxetine (CYMBALTA) 60 MG extended release capsule, Take 1 capsule by mouth daily (Patient not taking: Reported on 2/13/2025), Disp: 30 capsule, Rfl: 0    OLANZapine (ZYPREXA) 10 MG tablet, Take 1 tablet by mouth nightly (Patient not taking: Reported on 2/13/2025), Disp: 30 tablet, Rfl: 0    benztropine (COGENTIN) 0.5 MG tablet, Take 1 tablet by mouth daily (Patient not taking: Reported on 2/13/2025), Disp: 30 tablet, Rfl: 0     Patient admitted to medical telemetry floor  Continue at home medications     Current Facility-Administered Medications:     heparin (porcine) injection 5,700 Units, 80 Units/kg, IntraVENous, PRN, Mike Louis MD    heparin (porcine) injection 2,800 Units, 40 Units/kg, IntraVENous, PRN, Mike Louis MD    heparin 25,000 units in dextrose 5% 250 mL (premix) infusion, 5-30 Units/kg/hr, IntraVENous, Continuous, Mike Louis MD, Last Rate: 12.7 mL/hr at 03/12/25 2013, 18 Units/kg/hr at 03/12/25 2013    [START ON 3/13/2025] brexpiprazole (REXULTI) tablet 1.5 mg, 1.5 mg, Oral, Daily, Mike Louis MD    [START ON 3/13/2025] DULoxetine (CYMBALTA) extended release capsule 20 mg, 20 mg, Oral, Daily, Mike Louis MD    [START ON 3/13/2025] FLUoxetine (PROZAC) capsule 40 mg, 40 mg, Oral, Daily, Mike Louis MD    mirtazapine (REMERON) tablet 15 mg, 15 mg, Oral, Nightly, Mike Louis MD    OLANZapine zydis (ZYPREXA) disintegrating tablet 5 mg, 5 mg, Oral, Nightly, Mike Louis MD    [START ON 3/13/2025] tamsulosin (FLOMAX) capsule 0.4 mg, 0.4 mg, Oral, Daily, Mike Louis,

## 2025-03-13 NOTE — ED NOTES
0331H - Anti Xa result was released.      0340H - This writer used this   [Anti-Xa 0.1-0.29  units/mL    Heparin 40 units/kg bolus (Max=5,000 units), then increase infusion by 2 units/kg/hr] to adjust the rate. Hence, bolus was given and infusion was increased as well. Patient's sitter was sleeping inside the room.      0343H - Patient stated \" I had an accident.\" Found a pool of pee on the floor. This writer asked patient why he did not tell anyone that he wants to pee (prior to this, patient always inform his sitter whenever he wants to pee and he will ambulate to the restroom with minimal assistance).Patient stated \"I told her multiple times\". Patient's sitter from [facility] spoke to this writer and stated that she will leave already because patient is already admitted. This writer told this sitter that patient still does not have a bed upstairs so they she still need to sit with the patient and that this writer already told that to Carlitos's supervisor when she called.      0345H CN and supervisor was informed. Informed supervisor that [place]'s sup wants to speak to him.     0357H Sitter (from [place]) left.      0400H This writer was reviewing the MAR and realized a med error occurred and corrected it immediately CN was informed. MD made aware with order to do STAT PT INR.      0429H PT INR result relayed to the Provider.

## 2025-03-13 NOTE — PLAN OF CARE
Problem: Discharge Planning  Goal: Discharge to home or other facility with appropriate resources  Outcome: Progressing     Problem: Skin/Tissue Integrity  Goal: Skin integrity remains intact  Description: 1.  Monitor for areas of redness and/or skin breakdown  2.  Assess vascular access sites hourly  3.  Every 4-6 hours minimum:  Change oxygen saturation probe site  4.  Every 4-6 hours:  If on nasal continuous positive airway pressure, respiratory therapy assess nares and determine need for appliance change or resting period  Outcome: Progressing

## 2025-03-13 NOTE — PROGRESS NOTES
Hospitalist Progress Note    NAME: Bony Smith   : 1965   MRN: [id number]     Date/Time: 3/13/2025 2:10 PM  Patient PCP: Mikel Nelson MD    Estimated discharge date: 48  Barriers: Echo results, psych consult, active SI    Hospital Course:  Bony Smith is a 59 y.o.  male with PMHx significant for bipolar disorder, anxiety, hx of diverticulitis, and BPH.  He presents to the ED from [facility] for generalized abdominal pain for months.  Pain is associated with shortness of breath.  Patient is a former smoker, no EtOH or illicit drug use.  Denies chest pain, N/V/D, loss of appetite, fever, chills.  Patient is poor historian, offers limited history.  CTA A/P showed left ventricular apical filling defect concerning for thrombus.  Cardiology was consulted.  Patient started on heparin drip.  Echo ordered and pending results.  Troponin 33.  Psych consulted for active suicidal ideation.  Patient had requested patient  to kill him.  Currently on home medications for bipolar disorder and anxiety.    Assessment / Plan:  LV thrombus on CT/CTA  Low-density thrombus in the left ventricular apex, 2.4 x 1.5 x 4.2 cm  Troponin WNL 33  EKG negative for STEMI  Continue heparin drip  Pending TTE results  Cardio consulted, has ruled out for MI, will follow-up with echo results    Abdominal pain  Hx diverticulitis  CT A/P - no dilatation or wall thickening of small bowel or colon, scattered colonic diverticula, no evidence of acute diverticulitis  Liver panel unremarkable    Bipolar disorder  Anxiety  Patient on Rexulti, Cymbalta, Prozac, Zyprexa, Remeron  Psych consulted, active suicidal ideation    BPH  Continue Flomax    Right renal cysts  No further imaging eval recommended    Code Status: Full  DVT Prophylaxis: Heparin drip  GI Prophylaxis:    Subjective:   Chief Complaint:  Patient states he continues to have generalized abdominal pain.  No nausea, vomiting, constipation, diarrhea.  No

## 2025-03-13 NOTE — ED TRIAGE NOTES
0331H - Anti Xa result was released.     0340H - This writer used this   [Anti-Xa 0.1-0.29  units/mL    Heparin 40 units/kg bolus (Max=5,000 units), then increase infusion by 2 units/kg/hr] to adjust the rate. Hence, bolus was given and infusion was increased as well. Patient's sitter was sleeping inside the room.     0343H - Patient stated \" I had an accident.\" Found a pool of pee on the floor. This writer asked patient why he did not tell anyone that he wants to pee (prior to this, patient always inform his sitter whenever he wants to pee and he will ambulate to the restroom with minimal assistance).Patient stated \"I told her multiple times\". Patient's sitter from [facility] spoke to this writer and stated that she will leave already because patient is already admitted. This writer told this sitter that patient still does not have a bed upstairs so they she still need to sit with the patient and that this writer already told that to Carlitos's supervisor when she called.     0345H CN and supervisor was informed. Informed supervisor that Oakley's sup wants to speak to him.    0357H Sitter (from [place]) left.     0400H This writer was reviewing the MAR and realized a med error occurred and corrected it immediately CN was informed. MD made aware with order to do STAT PT INR.     0429H PT INR result relayed to the Provider.

## 2025-03-13 NOTE — CONSULTS
3/13/2025, 1:46 PM        [facility] at [address] [address]  Phone: [phone]      Cardiology Consult      3/13/2025, 1:46 PM      Bony Smith  59 y.o. male  1965      Admit Date:  3/12/2025    Primary Cardiologist:  Dr. Douglass  Reason for consult:  LV thrombus as seen on CT scan  Requesting provider: Dr. Louis    Impression:      Incidental finding of LV thrombus as seen on recent CT scan for abdominal pain.  Patient clinical history does not suggest any recent episode of cardiac thromboembolism.  History of bipolar disorder  Anxiety disorder  Diverticulitis  Plan:      I will follow-up on the echocardiogram ordered earlier for further evaluation of LV thrombus.  For now, agree with IV heparin for anticoagulation.  Further recommendation as dictated by patient's according to findings.    Subjective     This is a 59-year-old man with history of bipolar disorder, anxiety, diverticulitis and no significant past cardiac history who was brought to [facility] ED from [facility] with complaints of generalized abdominal pain for past few weeks.  Patient's  CT scan subsequently for abdominal pain was notable for LV thrombus resulting in this cardiology consultation.  The patient on close questioning denies any signs of neurologic deficit or visual changes.  Denies any chest pain, unusual dyspnea, palpitation, chest fluttering or fainting spell.  Patient hospital has ruled out for MI.  Urine tox screen is positive for cannabinol.        Past medical, Family & Social History   The following medical history was reviewed    Past Medical History:   Past Medical History:   Diagnosis Date    Anxiety     Blind right eye     Detached retina, right     Diverticulitis of intestine with abscess     Family history of diverticulitis of colon     mother and brother    Psychiatric disorder     Bipolar      Past Surgical History:   Procedure Laterality Date    COLONOSCOPY

## 2025-03-13 NOTE — ED NOTES
ED TO INPATIENT SBAR HANDOFF    Patient Name: Bony Smith   Preferred Name: Bony  : 1965  59 y.o.   Family/Caregiver Present: no   Code Status Order: Prior  PO Status: NPO:No  Telemetry Order:   C-SSRS: Risk of Suicide: No Risk  Sitter no Safety  Restraints:     Sepsis Risk Score      Situation  No chief complaint on file.    Brief Description of Patient's Condition: Depression (Wife  3 months ago), declining mental health, arrives from [facility]. Pt chief complaint abd pain. Left ventrical thrombus found on abd CT. Heparin drip started. Pt denies SI but remains as confused as upon arrival last night.       NEXT Xa pull at 1600!      Mental Status: disoriented  Ar[facility] from:Psych[facility] Facility  Imaging:   CTA ABDOMEN PELVIS W CONTRAST   Final Result   No end organ infarct, bowel ischemia, or mesenteric embolic disease.   Left ventricular apical filling defect concerning for thrombus redemonstrated.      Electronically signed by KEILA RABAGO      CT ABDOMEN PELVIS W IV CONTRAST Additional Contrast? None   Final Result      1. There are simple or mildly complex cysts of the right kidney for which no   further imaging evaluation is recommended.      2. There is a low-density thrombus in the heart left ventricular apex measuring   2.4 x 1.5 x 4.2 cm.      The findings of LV thrombus were sent to Dr. MICHAELLE Rubalcava via [employer] on    3/12/2025 at 1850 hours EST by Dr. Masters.      789         Electronically signed by BAKARI MASTERS        Abnormal labs:   Abnormal Labs Reviewed   CBC WITH AUTO DIFFERENTIAL - Abnormal; Notable for the following components:       Result Value    RDW 16.4 (*)     All other components within normal limits   COMPREHENSIVE METABOLIC PANEL - Abnormal; Notable for the following components:    Glucose 108 (*)     Total Protein 6.3 (*)     Albumin 3.3 (*)     All other components within normal limits   CBC - Abnormal; Notable for the following components:    Hemoglobin 11.8 (*)  disintegrating tablet 5 mg (has no administration in time range)   tamsulosin (FLOMAX) capsule 0.4 mg (0.4 mg Oral Given 3/13/25 1014)   brexpiprazole (REXULTI) tablet 2 mg (2 mg Oral Given 3/13/25 1014)   hydrOXYzine HCl (ATARAX) tablet 25 mg (25 mg Oral Given 3/12/25 1927)   iopamidol (ISOVUE-370) 76 % injection 100 mL (100 mLs IntraVENous Given 3/12/25 1810)   heparin (porcine) injection 5,700 Units (5,700 Units IntraVENous Given 3/12/25 2007)   sodium chloride 0.9 % bolus 1,000 mL (0 mLs IntraVENous Stopped 3/12/25 2202)   sulfur hexafluoride microspheres (LUMASON) 60.7-25 MG injection (5 mLs IntraVENous Given 3/13/25 1250)     Last documented pain medication administration: jessica  Pertinent or High Risk Medications/Drips: no   If Yes, please provide details: Heparin  Blood Product Administration: no  If Yes, please provide details: na  Process Protocols/Bundles: N/A    Recommendation  Incomplete STAT orders: na  Overdue Medications: na  Patient Belongings:    Additional Comments: na  If any further questions, please call Sending RN at [phone]      Admitting Unit Notification  Name of person notified and time: DALLAS Lopez      Electronically signed by: Electronically signed by Eduardo Coon RN on 3/13/2025 at 2:06 PM

## 2025-03-13 NOTE — ED PROVIDER NOTES
Alcohol Consumption: Never     Average Number of Drinks: Patient does not drink     Frequency of Binge Drinking: Never   Financial Resource Strain: Not on file   Food Insecurity: Not on file   Transportation Needs: Not on file   Physical Activity: Not on file   Stress: Not on file   Social Connections: Not on file   Intimate Partner Violence: Not on file   Depression: Not at risk (2/9/2021)    Received from [facility]  (prior to 6/17/2023), [facility]  (prior to 6/17/2023)    PHQ-2     PHQ-2 Score: 2   Housing Stability: Not on file   Interpersonal Safety: Not At Risk (3/12/2025)    Interpersonal Safety Domain Source: IP Abuse Screening     Physical abuse: Denies     Verbal abuse: Denies     Emotional abuse: Denies     Financial abuse: Denies     Sexual abuse: Denies   Utilities: Not on file       PHYSICAL EXAM   Physical Exam  Constitutional:       General: He is not in acute distress.  HENT:      Head: Normocephalic and atraumatic.      Mouth/Throat:      Mouth: Mucous membranes are moist.   Eyes:      Extraocular Movements: Extraocular movements intact.   Cardiovascular:      Rate and Rhythm: Normal rate and regular rhythm.   Pulmonary:      Effort: Pulmonary effort is normal. No respiratory distress.      Breath sounds: Normal breath sounds.   Abdominal:      General: There is no distension.      Palpations: Abdomen is soft. There is no mass.      Tenderness: There is no abdominal tenderness.   Musculoskeletal:      Cervical back: Neck supple.      Right lower leg: No edema.      Left lower leg: No edema.   Skin:     General: Skin is warm and dry.   Neurological:      General: No focal deficit present.      Mental Status: He is alert and oriented to person, place, and time.         SCREENINGS                No data recorded    LAB, EKG AND DIAGNOSTIC RESULTS   Labs:  Recent Results (from the past 12 hours)   CBC with Auto Differential    Collection Time: 03/12/25  5:22 PM  50.3 %    MCV 85.5 80.0 - 99.0 FL    MCH 28.6 26.0 - 34.0 PG    MCHC 33.4 30.0 - 36.5 g/dL    RDW 16.2 (H) 11.5 - 14.5 %    Platelets 347 150 - 400 K/uL    MPV 10.8 8.9 - 12.9 FL    Nucleated RBCs 0.0 0.0  WBC    nRBC 0.00 0.00 - 0.01 K/uL   APTT    Collection Time: 03/12/25  8:02 PM   Result Value Ref Range    APTT 23.9 21.2 - 34.1 sec    Therapeutic Range   82 - 109 sec   Protime-INR    Collection Time: 03/12/25  8:02 PM   Result Value Ref Range    Protime 13.2 11.9 - 14.6 sec    INR 1.0 0.9 - 1.1     Anti-Xa, Unfractionated Heparin    Collection Time: 03/12/25  8:02 PM   Result Value Ref Range    Heparin Xa,LMWH and Unfrac <0.10 IU/mL       EKG:.EKG interpreted by me. Shows NSR, rate 68, T wave inversions 3 and aVF, no STEMI    Radiologic Studies:  Non-plain film images such as CT, Ultrasound and MRI are read by the radiologist. Plain radiographic images are visualized and preliminarily interpreted by the ED Provider with the following findings: See ED Course Below    Interpretation per the Radiologist below, if available at the time of this note:  CTA ABDOMEN PELVIS W CONTRAST   Final Result   No end organ infarct, bowel ischemia, or mesenteric embolic disease.   Left ventricular apical filling defect concerning for thrombus redemonstrated.      Electronically signed by KEILA RABAGO      CT ABDOMEN PELVIS W IV CONTRAST Additional Contrast? None   Final Result      1. There are simple or mildly complex cysts of the right kidney for which no   further imaging evaluation is recommended.      2. There is a low-density thrombus in the heart left ventricular apex measuring   2.4 x 1.5 x 4.2 cm.      The findings of LV thrombus were sent to Dr. MICHAELLE Rubalcava via [employer] on    3/12/2025 at 1850 hours EST by Dr. Masters.      789         Electronically signed by BAKARI MASTERS           ED COURSE and DIFFERENTIAL DIAGNOSIS/MDM   Differential and Considerations: 59-year-old male presenting with abdominal pain.  Vitals

## 2025-03-13 NOTE — CARE COORDINATION
03/13/25 1132   Service Assessment   Patient Orientation Alert and Oriented  (Not speaking much.)   Cognition Alert   History Provided By Patient   Primary Caregiver Other (Comment)  (Facility)   Accompanied By/Relationship Pt alone/1:1 sitter.   Support Systems Spouse/Significant Other   Patient's Healthcare Decision Maker is: Legal Next of Kin   PCP Verified by CM Yes  (Seen by facility MD.)   Last Visit to PCP Within last 3 months   Prior Functional Level Independent in ADLs/IADLs   Current Functional Level Independent in ADLs/IADLs   Can patient return to prior living arrangement Yes   Ability to make needs known: Fair   Family able to assist with home care needs: Other (comment)  (Facility)   Would you like for me to discuss the discharge plan with any other family members/significant others, and if so, who? Yes  (Facility/wife)   Financial Resources Medicaid   Community Resources None   CM/SW Referral Other (see comment)  (None)   Social/Functional History   Lives With Other (Comment)  (Facility)   Type of Home Facility  (From [facility])   Home Layout One level   Home Access Level entry   Bathroom Shower/Tub Walk-in shower   Bathroom Toilet Handicap height   Bathroom Equipment Grab bars around toilet   Bathroom Accessibility Wheelchair accessible   Home Equipment None   Receives Help From Other (Comment)  (Facility)   Prior Level of Assist for ADLs Independent   Prior Level of Assist for Homemaking Independent   Homemaking Responsibilities Yes   Ambulation Assistance Independent   Prior Level of Assist for Transfers Independent   Active  No   Occupation Unemployed   Discharge Planning   Type of Residence Other (Comment);[facility]al Health  ([facility])   Living Arrangements Other (Comment)  (Facility)   Current Services Prior To Admission None   Potential Assistance Needed N/A   DME Ordered? No   Potential Assistance Purchasing Medications No   Type of Home Care Services None   Patient expects

## 2025-03-14 VITALS
HEIGHT: 72 IN | OXYGEN SATURATION: 95 % | BODY MASS INDEX: 22.69 KG/M2 | WEIGHT: 167.55 LBS | RESPIRATION RATE: 18 BRPM | HEART RATE: 71 BPM | DIASTOLIC BLOOD PRESSURE: 74 MMHG | TEMPERATURE: 97.3 F | SYSTOLIC BLOOD PRESSURE: 105 MMHG

## 2025-03-14 LAB
ALBUMIN SERPL-MCNC: 3.1 G/DL (ref 3.5–5)
ALBUMIN/GLOB SERPL: 1 (ref 1.1–2.2)
ALP SERPL-CCNC: 102 U/L (ref 45–117)
ALT SERPL-CCNC: 25 U/L (ref 12–78)
ANION GAP SERPL CALC-SCNC: 7 MMOL/L (ref 2–12)
AST SERPL W P-5'-P-CCNC: 20 U/L (ref 15–37)
BASOPHILS # BLD: 0.04 K/UL (ref 0–0.1)
BASOPHILS NFR BLD: 0.5 % (ref 0–1)
BILIRUB SERPL-MCNC: 0.5 MG/DL (ref 0.2–1)
BUN SERPL-MCNC: 9 MG/DL (ref 6–20)
BUN/CREAT SERPL: 11 (ref 12–20)
CA-I BLD-MCNC: 8.9 MG/DL (ref 8.5–10.1)
CHLORIDE SERPL-SCNC: 112 MMOL/L (ref 97–108)
CO2 SERPL-SCNC: 24 MMOL/L (ref 21–32)
CREAT SERPL-MCNC: 0.79 MG/DL (ref 0.7–1.3)
DIFFERENTIAL METHOD BLD: ABNORMAL
EOSINOPHIL # BLD: 0.07 K/UL (ref 0–0.4)
EOSINOPHIL NFR BLD: 0.9 % (ref 0–7)
ERYTHROCYTE [DISTWIDTH] IN BLOOD BY AUTOMATED COUNT: 16.3 % (ref 11.5–14.5)
GLOBULIN SER CALC-MCNC: 3.1 G/DL (ref 2–4)
GLUCOSE SERPL-MCNC: 85 MG/DL (ref 65–100)
HCT VFR BLD AUTO: 38.2 % (ref 36.6–50.3)
HGB BLD-MCNC: 12.7 G/DL (ref 12.1–17)
IMM GRANULOCYTES # BLD AUTO: 0.02 K/UL (ref 0–0.04)
IMM GRANULOCYTES NFR BLD AUTO: 0.3 % (ref 0–0.5)
INR PPP: 1.1 (ref 0.9–1.1)
LYMPHOCYTES # BLD: 2.14 K/UL (ref 0.8–3.5)
LYMPHOCYTES NFR BLD: 28.8 % (ref 12–49)
MCH RBC QN AUTO: 28.6 PG (ref 26–34)
MCHC RBC AUTO-ENTMCNC: 33.2 G/DL (ref 30–36.5)
MCV RBC AUTO: 86 FL (ref 80–99)
MONOCYTES # BLD: 0.66 K/UL (ref 0–1)
MONOCYTES NFR BLD: 8.9 % (ref 5–13)
NEUTS SEG # BLD: 4.51 K/UL (ref 1.8–8)
NEUTS SEG NFR BLD: 60.6 % (ref 32–75)
NRBC # BLD: 0 K/UL (ref 0–0.01)
NRBC BLD-RTO: 0 PER 100 WBC
PLATELET # BLD AUTO: 364 K/UL (ref 150–400)
PMV BLD AUTO: 10.5 FL (ref 8.9–12.9)
POTASSIUM SERPL-SCNC: 3.6 MMOL/L (ref 3.5–5.1)
PROT SERPL-MCNC: 6.2 G/DL (ref 6.4–8.2)
PROTHROMBIN TIME: 14.4 SEC (ref 11.9–14.6)
RBC # BLD AUTO: 4.44 M/UL (ref 4.1–5.7)
SODIUM SERPL-SCNC: 143 MMOL/L (ref 136–145)
UFH PPP CHRO-ACNC: 0.53 IU/ML
WBC # BLD AUTO: 7.4 K/UL (ref 4.1–11.1)

## 2025-03-14 PROCEDURE — 6370000000 HC RX 637 (ALT 250 FOR IP): Performed by: INTERNAL MEDICINE

## 2025-03-14 PROCEDURE — 80053 COMPREHEN METABOLIC PANEL: CPT

## 2025-03-14 PROCEDURE — 85610 PROTHROMBIN TIME: CPT

## 2025-03-14 PROCEDURE — 6370000000 HC RX 637 (ALT 250 FOR IP): Performed by: FAMILY MEDICINE

## 2025-03-14 PROCEDURE — 85025 COMPLETE CBC W/AUTO DIFF WBC: CPT

## 2025-03-14 PROCEDURE — 2060000000 HC ICU INTERMEDIATE R&B

## 2025-03-14 PROCEDURE — 2500000003 HC RX 250 WO HCPCS: Performed by: FAMILY MEDICINE

## 2025-03-14 PROCEDURE — 36415 COLL VENOUS BLD VENIPUNCTURE: CPT

## 2025-03-14 PROCEDURE — 6360000002 HC RX W HCPCS: Performed by: INTERNAL MEDICINE

## 2025-03-14 PROCEDURE — 85520 HEPARIN ASSAY: CPT

## 2025-03-14 RX ORDER — TAMSULOSIN HYDROCHLORIDE 0.4 MG/1
0.4 CAPSULE ORAL DAILY
Qty: 30 CAPSULE | Refills: 3 | Status: ON HOLD | OUTPATIENT
Start: 2025-03-14

## 2025-03-14 RX ORDER — CARVEDILOL 3.12 MG/1
6.25 TABLET ORAL 2 TIMES DAILY WITH MEALS
Status: DISCONTINUED | OUTPATIENT
Start: 2025-03-14 | End: 2025-03-15 | Stop reason: HOSPADM

## 2025-03-14 RX ORDER — LOSARTAN POTASSIUM 25 MG/1
25 TABLET ORAL DAILY
Status: DISCONTINUED | OUTPATIENT
Start: 2025-03-14 | End: 2025-03-15 | Stop reason: HOSPADM

## 2025-03-14 RX ORDER — ENOXAPARIN SODIUM 100 MG/ML
1 INJECTION SUBCUTANEOUS 2 TIMES DAILY
Status: DISCONTINUED | OUTPATIENT
Start: 2025-03-14 | End: 2025-03-15 | Stop reason: HOSPADM

## 2025-03-14 RX ORDER — HYDROXYZINE PAMOATE 25 MG/1
25 CAPSULE ORAL 3 TIMES DAILY PRN
Qty: 42 CAPSULE | Refills: 0 | Status: ON HOLD | OUTPATIENT
Start: 2025-03-14 | End: 2025-03-28

## 2025-03-14 RX ORDER — SPIRONOLACTONE 25 MG/1
12.5 TABLET ORAL DAILY
Status: DISCONTINUED | OUTPATIENT
Start: 2025-03-14 | End: 2025-03-15 | Stop reason: HOSPADM

## 2025-03-14 RX ORDER — FLUOXETINE HYDROCHLORIDE 40 MG/1
40 CAPSULE ORAL DAILY
Qty: 30 CAPSULE | Refills: 3 | Status: ON HOLD | OUTPATIENT
Start: 2025-03-14

## 2025-03-14 RX ORDER — MIRTAZAPINE 15 MG/1
15 TABLET, FILM COATED ORAL NIGHTLY
Qty: 30 TABLET | Refills: 3 | Status: ON HOLD | OUTPATIENT
Start: 2025-03-14

## 2025-03-14 RX ORDER — ENOXAPARIN SODIUM 100 MG/ML
1 INJECTION SUBCUTANEOUS 2 TIMES DAILY
Status: DISCONTINUED | OUTPATIENT
Start: 2025-03-14 | End: 2025-03-14

## 2025-03-14 RX ADMIN — TAMSULOSIN HYDROCHLORIDE 0.4 MG: 0.4 CAPSULE ORAL at 11:03

## 2025-03-14 RX ADMIN — CARVEDILOL 6.25 MG: 3.12 TABLET, FILM COATED ORAL at 18:41

## 2025-03-14 RX ADMIN — FLUOXETINE HYDROCHLORIDE 40 MG: 20 CAPSULE ORAL at 11:03

## 2025-03-14 RX ADMIN — SODIUM CHLORIDE, PRESERVATIVE FREE 10 ML: 5 INJECTION INTRAVENOUS at 11:05

## 2025-03-14 RX ADMIN — SPIRONOLACTONE 12.5 MG: 25 TABLET ORAL at 13:29

## 2025-03-14 RX ADMIN — BREXPIPRAZOLE 2 MG: 1 TABLET ORAL at 11:03

## 2025-03-14 RX ADMIN — WARFARIN SODIUM 7.5 MG: 5 TABLET ORAL at 22:52

## 2025-03-14 RX ADMIN — APIXABAN 5 MG: 5 TABLET, FILM COATED ORAL at 11:02

## 2025-03-14 RX ADMIN — CARVEDILOL 6.25 MG: 3.12 TABLET, FILM COATED ORAL at 13:29

## 2025-03-14 RX ADMIN — LOSARTAN POTASSIUM 25 MG: 25 TABLET, FILM COATED ORAL at 13:29

## 2025-03-14 ASSESSMENT — ENCOUNTER SYMPTOMS
SHORTNESS OF BREATH: 1
ABDOMINAL PAIN: 1

## 2025-03-14 ASSESSMENT — PAIN SCALES - GENERAL: PAINLEVEL_OUTOF10: 0

## 2025-03-14 NOTE — PROGRESS NOTES
Patient refused to allow his blood to be drawn for his Anti-xa lab. Patient is currently on heparin. This nurse entered the room and attempted to convince patient to allow labs to be drawn also discussed the risk involved with not monitoring his labs while on this medication. Patient continued to refuse. Provider made aware. No new orders at this time.

## 2025-03-14 NOTE — BH NOTE
Progress note for March 14, 2025 patient seen for follow-up chart reviewed patient is laying in the bed alert good eye contact talking he says he is from [place] depressed but not suicidal and has a place to return when the time comes still anxious depressed low energy level no psychosis apparently was told that he is medically cleared and understand Carlitos Cox's discharge from their books they will not take him back I believe patient needing further inpatient level of care and contact the [facility] for consideration of prescreening for a TDO and admission to  S.  Patient says he is on disability he do not know how much money he gets says he will has 1/10 grade education and did not go to special education says one time he worked as an  currently disabled not much family supports he did sleep good last night

## 2025-03-14 NOTE — DISCHARGE SUMMARY
Hospitalist Discharge Summary     Patient ID:  Bony Smith  [id number]  59 y.o.  1965  3/12/2025    PCP on record: Mikel Nelson MD    Admit date: 3/12/2025  Discharge date and time: 3/14/2025    DISCHARGE DIAGNOSIS:    LV thrombus  Abdominal pain  History of diverticulitis  Bipolar disorder  Anxiety  BPH  Right renal cysts    CONSULTATIONS:  IP CONSULT TO PSYCHIATRY  IP CONSULT TO CARDIOLOGY  IP CONSULT TO CARDIOLOGY  IP CONSULT TO CARDIOLOGY    Excerpted HPI from H&P of Mike Louis MD:  CHIEF COMPLAINT: generalized abdominal pain     HISTORY OF PRESENT ILLNESS:     Bony Smith is a 59 y.o.  male with PMHx significant for bipolar disorder, anxiety, hx of diverticulitis, and BPH presents to ED from [facility] for generalized abdominal pain x months. Associated SOB. Former smoker. No ETOH and illicit drug use.      No CP, N/V/D, loss of appetite, fever, and chills.      Patient is a poor historian. Limited history      CTA abdomen pelvis showed Left ventricular apical filling defect concerning for thrombus redemonstrated. Consult cardiology. Echo ordered.     ED recommended to admit patient for further workup.      We were asked to admit for work up and evaluation of the above problems.     ______________________________________________________________________  DISCHARGE SUMMARY/HOSPITAL COURSE:  for full details see H&P, daily progress notes, labs, consult notes.   Bony Smith is a 59 y.o.  male with PMHx significant for bipolar disorder, anxiety, hx of diverticulitis, and BPH.  He presents to the ED from [facility] for generalized abdominal pain for months.  Pain is associated with shortness of breath.  Patient is a former smoker, no EtOH or illicit drug use.  Denies chest pain, N/V/D, loss of appetite, fever, chills.  Patient is poor historian, offers limited history.  CTA A/P showed left ventricular apical filling defect concerning for thrombus.  Cardiology was

## 2025-03-14 NOTE — PLAN OF CARE
Problem: Discharge Planning  Goal: Discharge to home or other facility with appropriate resources  Outcome: Progressing     Problem: Skin/Tissue Integrity  Goal: Skin integrity remains intact  Description: 1.  Monitor for areas of redness and/or skin breakdown  2.  Assess vascular access sites hourly  3.  Every 4-6 hours minimum:  Change oxygen saturation probe site  4.  Every 4-6 hours:  If on nasal continuous positive airway pressure, respiratory therapy assess nares and determine need for appliance change or resting period  Outcome: Progressing  Flowsheets (Taken 3/14/2025 1102)  Skin Integrity Remains Intact: Monitor for areas of redness and/or skin breakdown     Problem: Safety - Adult  Goal: Free from fall injury  3/14/2025 1145 by Nikki Gómez, RN  Outcome: Progressing  3/14/2025 0010 by Imani Guo, RN  Outcome: Progressing     Problem: Confusion  Goal: Confusion, delirium, dementia, or psychosis is improved or at baseline  Description: INTERVENTIONS:  1. Assess for possible contributors to thought disturbance, including medications, impaired vision or hearing, underlying metabolic abnormalities, dehydration, psychiatric diagnoses, and notify attending LIP  2. [place] high risk fall precautions, as indicated  3. Provide frequent short contacts to provide reality reorientation, refocusing and direction  4. Decrease environmental stimuli, including noise as appropriate  5. Monitor and intervene to maintain adequate nutrition, hydration, elimination, sleep and activity  6. If unable to ensure safety without constant attention obtain sitter and review sitter guidelines with assigned personnel  7. Initiate Psychosocial CNS and Spiritual Care consult, as indicated  Outcome: Progressing  Flowsheets (Taken 3/14/2025 1102)  Effect of thought disturbance (confusion, delirium, dementia, or psychosis) are managed with adequate functional status: Assess for contributors to thought disturbance, including  medications, impaired vision or hearing, underlying metabolic abnormalities, dehydration, psychiatric diagnoses, notify LIP

## 2025-03-14 NOTE — PROGRESS NOTES
Phlebotomist unable to draw morning Anti-xa labs due to patient pulling arm back and then refusing. On-call provider aware. No new orders at this time.

## 2025-03-14 NOTE — BSMART NOTE
Initial BSMART Liaison Assessment Form     Section I - Integrated Summary    Reason for consult is: Bipolar .    LOS:  2     Presenting problem/Summary:  Liaison met wit patient brett to brett in medial room 484, patient dressed in green gown sitting up in bed jose alberto pierre at bedside.    Bony Smith is a 59 year old male admitted medially for abdominal pain and shortness of breath, came from [facility]. Pt appeared confused at times with palm to forehead stating multiple times \"I'm not thinking straight, can you leave me alone.\" Pt denies SI/HI/AH however reported VH of the devil. Pt reported feeling depressed 10/10 and anxiety \"extreme\" due \"a situation\" however could not elaborate because \"I can't think straight.\" Sitter reported patient sleep has been poor as he mostly tosses and turns though his appetite is normal. Pt reported he was diagnosed with Bipolar disorder, has an extensive history of psych admissions, no current outpatient providers. He also reported he has not been compliant with medication for quite some time. \"I don't take it because nothing works.\" Pt reported occasional use of marijuana. Pt denies history of S/P/V/E abuse and neglect. He has no support, is , with a  son as well. Pt reported he has a son that lives in [place] whom he has no contact with however the rest of his family is . Pt is currently homeless.     Precipitant Factors are No chief complaint on file.       The information is given by the patient.  Current Psychiatrist and/or  is none.  Previous Hospitalizations/Treatment: extensive history    Plan: Liaison was unable to get in contact with Lula at [facility]. Liaison spoke with Татьяна at [facility] Crisis Team who stated there was no prescreen nor TDO on record for any CSB. Liaison review pt with Dr Rivas whom requests patient be prescreened. Liaison followed up with Татьяна to request prescreen. Face sheet was sent to

## 2025-03-14 NOTE — PROGRESS NOTES
Hospitalist Progress Note    NAME: Bony Smith   : 1965   MRN: [id number]     Date/Time: 3/14/2025 9:46 AM  Patient PCP: Mikel Nelson MD    Estimated discharge date: 48  Barriers: Lovenox bridging to Coumadin    Hospital Course:  Bony Smith is a 59 y.o.  male with PMHx significant for bipolar disorder, anxiety, hx of diverticulitis, and BPH.  He presents to the ED from [facility] for generalized abdominal pain for months.  Pain is associated with shortness of breath.  Patient is a former smoker, no EtOH or illicit drug use.  Denies chest pain, N/V/D, loss of appetite, fever, chills.  Patient is poor historian, offers limited history.  CTA A/P showed left ventricular apical filling defect concerning for thrombus.  Cardiology was consulted.  Patient started on heparin drip.  Echo ordered and pending results.  Troponin 33.  Psych consulted for active suicidal ideation.  Patient had requested patient  to kill him.  Currently on home medications for bipolar disorder and anxiety.  Of note patient was noted to have left ventricular thrombus on echocardiogram, started on heparin gtt., spoke to cardiology attending recommended bridging to Coumadin, Lovenox Coumadin bridge ordered    Assessment / Plan:  LV thrombus on CT/CTA  Of note Case discussed with cardiology attending, patient has significant LV thrombus, recommending Lovenox bridging to Coumadin  Start Lovenox  Coumadin as per pharmacy dosing  Transthoracic echo reviewed  Cardiology consult appreciated, continue to follow recommendations    Abdominal pain  Hx diverticulitis  CT A/P - no dilatation or wall thickening of small bowel or colon, scattered colonic diverticula, no evidence of acute diverticulitis  Liver panel unremarkable    Bipolar disorder  Anxiety  Patient on Rexulti, Cymbalta, Prozac, Zyprexa, Remeron  Psych consulted, active suicidal ideation    BPH  Continue Flomax    Right renal cysts  No further imaging  CBC  [] Change in code status:    [] Decision to escalate care:    [] Major surgery/procedure with associated risk factors:     ----------------------------------------------------------------------  C. Data (any 2)  [x] Discussed current management and discharge planning options with Case Management.  [x] Discussed management of the case with: Cardiology Attending Dr. Hatch  [x] Telemetry personally reviewed and interpreted as documented above    [x] Imaging personally reviewed and interpreted, includes: CT A/P  [x] Data Review (any 3)  [x] All available Consultant notes from yesterday/today were reviewed  [x] All current labs were reviewed and interpreted for clinical significance   [x] Appropriate follow-up labs were ordered  [x] Collateral history obtained from: Patient     Procedures: see electronic medical records for all procedures/Xrays and details which were not copied into this note but were reviewed prior to creation of Plan.    Reviewed most current lab test results and cultures  YES  Reviewed most current radiology test results   YES  Review and summation of old records today    NO  Reviewed patient's current orders and MAR    YES  PMH/SH reviewed - no change compared to H&P  >50% of visit spent in counseling and coordination of care  ________________________________________________________________________  Total NON critical care TIME:  51  Minutes    Signed: Cezar Garza MD

## 2025-03-14 NOTE — CONSULTS
Cardiology Consult    NAME: Bony Smith   :  1965   MRN:  [id number]     Date/Time:  3/14/2025 8:03 AM    Patient PCP: Mikel Nelson MD  ________________________________________________________________________    Problem List  -Admitted to the hospital with abdominal pain  -Cardiology consult was invited following incidental finding of LV thrombus during the CT scan of abdomen.  -No known established coronary artery disease and no risk factors known.  -Diverticulitis of intestine with abscess  -Blind in the right eye  -Bipolar disorder  -Psychiatric disorder    Echocardiogram as of 3/13/2025 shows large LV thrombus of greater than 4 cm in the apex of the left ventricle with reduced LV function of 40 to 45% with apical hypokinesis.     Assessment and Plan:   Note dictated 2025  -59-year-old white male with no known established coronary artery disease admitted to the hospital with abdominal pain and found to have suicidal ideation.  -When I saw the patient this morning he was lying comfortably in the bed and he did talk to me in a depressive mode.  -Though he was communicative to me and denies any symptoms of chest pain shortness of breath dizziness palpitation or any other anginal equivalent.  -An echocardiogram done dated 3/13/2025 shows mildly reduced LV function of 40 to 45% with large apical thrombus measuring approximately 4 cm.  -Based on this I will take the liberty to start anticoagulation with Lovenox and Coumadin as Eliquis has not shown definite long-term beneficial outcome and long-term registries and trials per my limited knowledge.  -Will follow while patient is in the hospital along with the team and make further cardiovascular management decision as clinically warranted.    -Thank you for the consultation and letting me participate in the care of your patient.        []        High complexity decision making was performed        Subjective:   CHIEF COMPLAINT:  LVOT SV 59.8 ml    LA Major [place] 4.2 cm    LA Area 4C 13.2 cm2    LA Volume MOD A4C 31 18 - 58 mL    LA Diameter 3.1 cm    RA Area 4C 12.4 cm2    RA Volume 27 ml    AV Mean Gradient 3 mmHg    AV VTI 21.9 cm    AV Mean Velocity 0.8 m/s    AV Peak Velocity 1.2 m/s    AV Peak Gradient 5 mmHg    AV Area by VTI 2.7 cm2    AV Area by Peak Velocity 2.6 cm2    Aortic Root 3.2 cm    MR Peak Velocity 2.4 m/s    MR Peak Gradient 23 mmHg    MV Max Velocity 1.0 m/s    MV Peak Gradient 4 mmHg    MV E Wave Deceleration Time 169.0 ms    MV A Velocity 0.84 m/s    MV E Velocity 0.54 m/s    MV Mean Gradient 1 mmHg    MV VTI 23.3 cm    MV Mean Velocity 0.5 m/s    MV Area by VTI 2.6 cm2    PV Mean Gradient 1 mmHg    PV VTI 13.8 cm    PV Mean Velocity 0.5 m/s    PV Max Velocity 0.7 m/s    PV Peak Gradient 2 mmHg    RV Basal Dimension 2.6 cm    RV Free Wall Peak S' 14.8 cm/s    TAPSE 1.8 >=1.7 cm    Body Surface Area 1.9 m2    Fractional Shortening 2D 32 28 - 44 %    LV ESV Index A4C 52 mL/m2    LV EDV Index A4C 97 mL/m2    LVIDd Index 2.97 cm/m2    LVIDs Index 2.03 cm/m2    LV RWT Ratio 0.32     LV Mass 2D 226.4 (A) 88 - 224 g    LV Mass 2D Index 117.9 (A) 49 - 115 g/m2    MV E/A 0.64     E/E' Ratio (Averaged) 6.97     E/E' Lateral 5.19     E/E' Septal 8.75     LVOT Stroke Volume Index 31.1 mL/m2    LA Volume Index MOD A4C 16 16 - 34 ml/m2    LA Size Index 1.61 cm/m2    LA/AO Root Ratio 0.97     RA Volume Index A4C 14 mL/m2    Ao Root Index 1.67 cm/m2    AV Velocity Ratio 0.58     LVOT:AV VTI Index 0.66     SINGH/BSA VTI 1.4 cm2/m2    SINGH/BSA Peak Velocity 1.4 cm2/m2    MV:LVOT VTI Index 1.62     EF Physician 45 %   Anti-Xa, Unfractionated Heparin    Collection Time: 03/13/25  3:34 PM   Result Value Ref Range    Heparin Xa,LMWH and Unfrac 0.67 IU/mL        PAU CAR MD

## 2025-03-14 NOTE — CONSULTS
[address] [address] [place]  [address]                              CONSULTATION      PATIENT NAME: CANDY ROCHA            : 1965  MED REC NO: [id number]                       ROOM: 484  ACCOUNT NO: [id number]                       ADMIT DATE: 2025  PROVIDER: Rg Rivas MD    DATE OF SERVICE:  2025    ATTENDING PHYSICIAN:  TRUPTI HUA    REASON FOR CONSULT:  Bipolar and ordered by Dr. Mike Louis. Saw the patient.  Chart reviewed.    This is a 59-year-old  male patient, apparently sent from [facility] for abdominal pain, shortness of breath.  The patient is irritable.  There are many people ask us so many questions.  He was reluctant to talk.  He wanted to not have a conversation, but I asked about suicidality, he is still suicidal, asking one-to-one staff to kill him.  Only thing, he says he is not sleeping.  He wants some help with the sleep medication.  Again, very limited corporations and limited interview.  He could talk to me and tell me about the details about how he ended up at [facility] ***.  Chart indicates that he was taking Rexulti 2 mg and Prozac 40 mg, heparin, hydroxyzine 25 mg 3 times a day for anxiety, mirtazapine 15 mg at night, in spite of these medications, he says he is not sleeping.    ALLERGIES:  TO MEDICATIONS, NO KNOWN ALLERGIES.      TRAUMA HISTORY:  Unknown.    FAMILY HISTORY:  Unknown.    SUBSTANCE ABUSE HISTORY:  Unknown.    LABORATORY DATA:  CBC; RDW 16.4, otherwise unremarkable.  CMP; glucose 108, otherwise unremarkable.  Liver functions; total protein 6.3, albumin 3.3, globulin 3, A/G ratio 1.1.  Troponin 33.  Protime 13.2.  INR 1.  ***.    VITAL SIGNS:  Pulse 76, blood pressure 99/63, temperature 98.1, respirations 18, O2 saturation 99%.    MENTAL STATUS:  Only limited accomplished as he was irritable, not cooperative, wanted to

## 2025-03-15 ENCOUNTER — HOSPITAL ENCOUNTER (INPATIENT)
Facility: HOSPITAL | Age: 60
LOS: 1 days | Discharge: ANOTHER HOSPITAL | DRG: 204 | End: 2025-03-15
Attending: PSYCHIATRY & NEUROLOGY | Admitting: PSYCHIATRY & NEUROLOGY
Payer: MEDICAID

## 2025-03-15 ENCOUNTER — HOSPITAL ENCOUNTER (INPATIENT)
Facility: HOSPITAL | Age: 60
LOS: 13 days | Discharge: PSYCHIATRIC OR SUBSTANCE USE FACILITY | DRG: 204 | End: 2025-03-28
Attending: INTERNAL MEDICINE
Payer: MEDICAID

## 2025-03-15 VITALS
SYSTOLIC BLOOD PRESSURE: 83 MMHG | OXYGEN SATURATION: 100 % | RESPIRATION RATE: 18 BRPM | BODY MASS INDEX: 22.69 KG/M2 | HEART RATE: 71 BPM | HEIGHT: 72 IN | WEIGHT: 167.55 LBS | TEMPERATURE: 98.4 F | DIASTOLIC BLOOD PRESSURE: 63 MMHG

## 2025-03-15 DIAGNOSIS — I63.20 OCCLUSION AND STENOSIS OF MULTIPLE AND BILATERAL PRECEREBRAL ARTERIES WITH CEREBRAL INFARCTION (HCC): ICD-10-CM

## 2025-03-15 DIAGNOSIS — I82.90 THROMBUS: Primary | ICD-10-CM

## 2025-03-15 PROBLEM — R42 DIZZINESSES: Status: ACTIVE | Noted: 2025-03-15

## 2025-03-15 PROBLEM — Z86.59 HX OF BIPOLAR DISORDER: Status: ACTIVE | Noted: 2025-03-15

## 2025-03-15 PROBLEM — F29 PSYCHOSIS, UNSPECIFIED PSYCHOSIS TYPE (HCC): Status: ACTIVE | Noted: 2025-03-15

## 2025-03-15 LAB
GLUCOSE BLD STRIP.AUTO-MCNC: 89 MG/DL (ref 65–100)
PERFORMED BY:: NORMAL

## 2025-03-15 PROCEDURE — 6370000000 HC RX 637 (ALT 250 FOR IP): Performed by: PSYCHIATRY & NEUROLOGY

## 2025-03-15 PROCEDURE — 82962 GLUCOSE BLOOD TEST: CPT

## 2025-03-15 PROCEDURE — 1240000000 HC EMOTIONAL WELLNESS R&B

## 2025-03-15 PROCEDURE — 6370000000 HC RX 637 (ALT 250 FOR IP): Performed by: NURSE PRACTITIONER

## 2025-03-15 PROCEDURE — 2060000000 HC ICU INTERMEDIATE R&B

## 2025-03-15 PROCEDURE — 2580000003 HC RX 258: Performed by: NURSE PRACTITIONER

## 2025-03-15 PROCEDURE — 6370000000 HC RX 637 (ALT 250 FOR IP)

## 2025-03-15 RX ORDER — MIDODRINE HYDROCHLORIDE 5 MG/1
10 TABLET ORAL 3 TIMES DAILY PRN
Status: DISCONTINUED | OUTPATIENT
Start: 2025-03-15 | End: 2025-03-15 | Stop reason: HOSPADM

## 2025-03-15 RX ORDER — MIDODRINE HYDROCHLORIDE 5 MG/1
10 TABLET ORAL 3 TIMES DAILY PRN
Status: DISCONTINUED | OUTPATIENT
Start: 2025-03-15 | End: 2025-03-18

## 2025-03-15 RX ORDER — ACETAMINOPHEN 325 MG/1
650 TABLET ORAL EVERY 4 HOURS PRN
Status: DISCONTINUED | OUTPATIENT
Start: 2025-03-15 | End: 2025-03-28 | Stop reason: HOSPADM

## 2025-03-15 RX ORDER — MIDODRINE HYDROCHLORIDE 5 MG/1
5 TABLET ORAL ONCE
Status: COMPLETED | OUTPATIENT
Start: 2025-03-15 | End: 2025-03-15

## 2025-03-15 RX ORDER — TRAZODONE HYDROCHLORIDE 50 MG/1
50 TABLET ORAL NIGHTLY PRN
Status: DISCONTINUED | OUTPATIENT
Start: 2025-03-15 | End: 2025-03-19

## 2025-03-15 RX ORDER — 0.9 % SODIUM CHLORIDE 0.9 %
500 INTRAVENOUS SOLUTION INTRAVENOUS ONCE
Status: DISCONTINUED | OUTPATIENT
Start: 2025-03-15 | End: 2025-03-15 | Stop reason: HOSPADM

## 2025-03-15 RX ORDER — HYDROXYZINE PAMOATE 25 MG/1
25 CAPSULE ORAL 3 TIMES DAILY PRN
Status: DISCONTINUED | OUTPATIENT
Start: 2025-03-15 | End: 2025-03-15 | Stop reason: HOSPADM

## 2025-03-15 RX ORDER — ACETAMINOPHEN 325 MG/1
650 TABLET ORAL EVERY 4 HOURS PRN
Status: DISCONTINUED | OUTPATIENT
Start: 2025-03-15 | End: 2025-03-15 | Stop reason: HOSPADM

## 2025-03-15 RX ORDER — HYDROXYZINE HYDROCHLORIDE 50 MG/1
50 TABLET, FILM COATED ORAL 3 TIMES DAILY PRN
Status: DISCONTINUED | OUTPATIENT
Start: 2025-03-15 | End: 2025-03-15 | Stop reason: SDUPTHER

## 2025-03-15 RX ORDER — HYDROXYZINE PAMOATE 25 MG/1
25 CAPSULE ORAL 3 TIMES DAILY PRN
Status: CANCELLED | OUTPATIENT
Start: 2025-03-15

## 2025-03-15 RX ORDER — ACETAMINOPHEN 325 MG/1
650 TABLET ORAL EVERY 4 HOURS PRN
Status: CANCELLED | OUTPATIENT
Start: 2025-03-15

## 2025-03-15 RX ORDER — HYDROXYZINE PAMOATE 25 MG/1
25 CAPSULE ORAL 3 TIMES DAILY PRN
Status: DISCONTINUED | OUTPATIENT
Start: 2025-03-15 | End: 2025-03-19

## 2025-03-15 RX ORDER — MIDODRINE HYDROCHLORIDE 5 MG/1
5 TABLET ORAL 3 TIMES DAILY PRN
Status: DISCONTINUED | OUTPATIENT
Start: 2025-03-15 | End: 2025-03-15

## 2025-03-15 RX ORDER — MIRTAZAPINE 15 MG/1
15 TABLET, ORALLY DISINTEGRATING ORAL NIGHTLY
Status: CANCELLED | OUTPATIENT
Start: 2025-03-15

## 2025-03-15 RX ORDER — MIDODRINE HYDROCHLORIDE 5 MG/1
5 TABLET ORAL EVERY 8 HOURS
Status: ON HOLD | COMMUNITY
Start: 2024-03-22 | End: 2025-03-18 | Stop reason: HOSPADM

## 2025-03-15 RX ORDER — MIDODRINE HYDROCHLORIDE 5 MG/1
10 TABLET ORAL 3 TIMES DAILY PRN
Status: CANCELLED | OUTPATIENT
Start: 2025-03-15

## 2025-03-15 RX ORDER — TRAZODONE HYDROCHLORIDE 50 MG/1
50 TABLET ORAL NIGHTLY PRN
Status: CANCELLED | OUTPATIENT
Start: 2025-03-15

## 2025-03-15 RX ORDER — TRAZODONE HYDROCHLORIDE 50 MG/1
50 TABLET ORAL NIGHTLY PRN
Status: DISCONTINUED | OUTPATIENT
Start: 2025-03-15 | End: 2025-03-15 | Stop reason: HOSPADM

## 2025-03-15 RX ORDER — MIRTAZAPINE 15 MG/1
15 TABLET, ORALLY DISINTEGRATING ORAL NIGHTLY
Status: DISCONTINUED | OUTPATIENT
Start: 2025-03-15 | End: 2025-03-28 | Stop reason: HOSPADM

## 2025-03-15 RX ORDER — 0.9 % SODIUM CHLORIDE 0.9 %
500 INTRAVENOUS SOLUTION INTRAVENOUS ONCE
Status: CANCELLED | OUTPATIENT
Start: 2025-03-15 | End: 2025-03-15

## 2025-03-15 RX ORDER — TAMSULOSIN HYDROCHLORIDE 0.4 MG/1
0.4 CAPSULE ORAL DAILY
Status: DISCONTINUED | OUTPATIENT
Start: 2025-03-15 | End: 2025-03-15 | Stop reason: HOSPADM

## 2025-03-15 RX ORDER — 0.9 % SODIUM CHLORIDE 0.9 %
500 INTRAVENOUS SOLUTION INTRAVENOUS ONCE
Status: COMPLETED | OUTPATIENT
Start: 2025-03-15 | End: 2025-03-15

## 2025-03-15 RX ORDER — MIRTAZAPINE 15 MG/1
15 TABLET, ORALLY DISINTEGRATING ORAL NIGHTLY
Status: DISCONTINUED | OUTPATIENT
Start: 2025-03-15 | End: 2025-03-15 | Stop reason: HOSPADM

## 2025-03-15 RX ADMIN — MIRTAZAPINE 15 MG: 15 TABLET, ORALLY DISINTEGRATING ORAL at 22:19

## 2025-03-15 RX ADMIN — MIDODRINE HYDROCHLORIDE 5 MG: 5 TABLET ORAL at 00:54

## 2025-03-15 RX ADMIN — TAMSULOSIN HYDROCHLORIDE 0.4 MG: 0.4 CAPSULE ORAL at 09:51

## 2025-03-15 RX ADMIN — MIDODRINE HYDROCHLORIDE 5 MG: 5 TABLET ORAL at 09:51

## 2025-03-15 RX ADMIN — FLUOXETINE HYDROCHLORIDE 40 MG: 20 CAPSULE ORAL at 09:51

## 2025-03-15 RX ADMIN — BREXPIPRAZOLE 2 MG: 1 TABLET ORAL at 09:50

## 2025-03-15 RX ADMIN — APIXABAN 5 MG: 5 TABLET, FILM COATED ORAL at 09:51

## 2025-03-15 RX ADMIN — APIXABAN 5 MG: 5 TABLET, FILM COATED ORAL at 22:19

## 2025-03-15 RX ADMIN — MIDODRINE HYDROCHLORIDE 5 MG: 5 TABLET ORAL at 10:35

## 2025-03-15 RX ADMIN — SODIUM CHLORIDE 500 ML: 0.9 INJECTION, SOLUTION INTRAVENOUS at 15:03

## 2025-03-15 ASSESSMENT — SLEEP AND FATIGUE QUESTIONNAIRES
AVERAGE NUMBER OF SLEEP HOURS: 3
DO YOU USE A SLEEP AID: NO
DO YOU HAVE DIFFICULTY SLEEPING: YES

## 2025-03-15 ASSESSMENT — ENCOUNTER SYMPTOMS
RESPIRATORY NEGATIVE: 1
ABDOMINAL DISTENTION: 0
ABDOMINAL PAIN: 0
EYES NEGATIVE: 1
NAUSEA: 0
SINUS PAIN: 0

## 2025-03-15 ASSESSMENT — PAIN DESCRIPTION - LOCATION: LOCATION: GENERALIZED

## 2025-03-15 ASSESSMENT — PAIN SCALES - GENERAL: PAINLEVEL_OUTOF10: 3

## 2025-03-15 NOTE — PROGRESS NOTES
Patient remains hypotensive he is symptomatic with complaints of dizziness and feeling unwell  Will transfer to medical unit for continued management of hypotension

## 2025-03-15 NOTE — GROUP NOTE
Group Therapy Note    Date: 3/15/2025    Group Start Time: 1035  Group End Time: 1101  Group Topic: Activity    SSR 2 [facility] ACUTE    Ana María Milton        Group Therapy Note    Attendees:        Patient's Goal:      Notes:  Patient did not attend. BING0    Status After Intervention:     Participation Level:     Participation Quality:       Speech:        Thought Process/Content:       Affective Functioning:       Mood:       Level of consciousness:        Response to Learning:       Endings:     Modes of Intervention:       Discipline Responsible:       Signature:  Ana María Milton

## 2025-03-15 NOTE — PROGRESS NOTES
Received Order for Telemetry     Bony Huntleyerley   1965   [id number]   Hypoglycemia [E16.2]  Dizzinesses [R42]   Shakeel Moralez MD     Tele Box # [phone] placed on patient at  1458 pm  ER Room # 2SOUTH  Admitting to Room 477  Transferring Nurse N/A  Verified with Primary Nurse GT at  1458 pm

## 2025-03-15 NOTE — BH NOTE
0800 Patient assessed face to face in Room 234. 1:1 Sitter Sovereign at bedside. Patient has good eye contact, one word answers to questions. Per night shift he was Rapid Response on admission, transfer from [address] [place] [address]. RR due to weakness, fall risk, hypotension. Patient is currently complaining of feeling weak & tired, walker at bedside, encouraged patient to utilize abilities of sitter. Unit Director & Clinical Coordinator informed of RR & patient status @ 0801.   VS @ 0800 95/69 HR 66.     Patient breakfast tray taken to room. Consumed 75% - biscuit, eggs, delgado, coffee, orange juice.      0940 Patient laying in bed. His hair is greasy, he is malodorous. He is a poor historian, constricted affect. Per 1:1 sitter, he complains of feeling dizzy.   Patient states he has a house, in [place]. States he lives alone. Denies pain at this time. Denies any cardiac or hematology history. States he had never been to [place].   BP obtained in left upper arm, patient kept moving his arm despite staff instructions. Patient then sat up abruptly, stating \"I have to get up\". Cuff removed, patient calmed & re-directed to lay down, writer held patient arm steady while BP obtained 81/51, HR 73. PRN Midodrine administered with scheduled meds. Patient will abruptly stand up in room, walk to window, states he needs to go outside. Re-directed. Fluids provided. 1:1 sitter at bedside.     1005 Jorden NP on unit. Attempted to page Dr. Garza to inform of patient status. Per , no answer. Jonathan VERMA paged/perfect serve.  Jonathan returned call, made aware. Plan to increase midodrine, see DEVAN Castillo to see patient on shift.   1:1 sitter at bedside.      1030 NP Joanna on unit. She is point of contact for medical group.     @1225 -  BP 83/63 HR 71  Patient has been ambulating the hallway. He has consumed 30% of lunch tray (ice cream, beef stroganoff) and consumed approx 20 ounces of soda. He continues to  complain of feeling dizzy and states \"I do not know what is wrong with me\". 1:1 sitter remains at bedside.     1315 NP Joanna ordered transfer to medical floor due to symptomatic hypotension. Bed Coordinator notified. NATIVIDAD Leonardo notified. Patient is to be on tele with 1:1.   Awaiting bed assignment.   Per Reyna, Bed Coord, transfer bed inpatient tele room #[id number].    4351 Nurse to Nurse called to AungW, Kiera HAYNES. Patient transported to Room 477, escorted by Primary RN & Sovereign MHT 1:1 sitter.   Patient discharged from Behavioral Health, BH Mgmt notified.

## 2025-03-15 NOTE — DISCHARGE SUMMARY
Hospitalist Discharge Summary     Patient ID:  Bony Smith  [id number]  59 y.o.  1965  3/15/2025    PCP on record: Mikel Nelson MD    Admit date: 3/15/2025  Discharge date and time: 3/15/2025    DISCHARGE DIAGNOSIS:    LV thrombus  Abdominal pain  History of diverticulitis  Bipolar disorder  Anxiety  BPH  Right renal cysts    CONSULTATIONS:  IP CONSULT TO HOSPITALIST    Excerpted HPI from H&P of Rg Rivas MD:  CHIEF COMPLAINT: generalized abdominal pain     HISTORY OF PRESENT ILLNESS:     Bony Smith is a 59 y.o.  male with PMHx significant for bipolar disorder, anxiety, hx of diverticulitis, and BPH presents to ED from [facility] for generalized abdominal pain x months. Associated SOB. Former smoker. No ETOH and illicit drug use.      No CP, N/V/D, loss of appetite, fever, and chills.      Patient is a poor historian. Limited history      CTA abdomen pelvis showed Left ventricular apical filling defect concerning for thrombus redemonstrated. Consult cardiology. Echo ordered.     ED recommended to admit patient for further workup.      We were asked to admit for work up and evaluation of the above problems.     ______________________________________________________________________  DISCHARGE SUMMARY/HOSPITAL COURSE:  for full details see H&P, daily progress notes, labs, consult notes.   Bony Smith is a 59 y.o.  male with PMHx significant for bipolar disorder, anxiety, hx of diverticulitis, and BPH.  He presents to the ED from [facility] for generalized abdominal pain for months.  Pain is associated with shortness of breath.  Patient is a former smoker, no EtOH or illicit drug use.  Denies chest pain, N/V/D, loss of appetite, fever, chills.  Patient is poor historian, offers limited history.  CTA A/P showed left ventricular apical filling defect concerning for thrombus.  Cardiology was consulted.  Patient started on heparin drip.  Echo ordered and pending  results.  Troponin 33.  Psych consulted for active suicidal ideation.  Patient had requested patient  to kill him.  Currently on home medications for bipolar disorder and anxiety.  Of note patient was noted to have left ventricular thrombus on echocardiogram, started on heparin gtt., spoke to cardiology attending Dr. Douglass, who recommends patient can be discharged on Eliquis with close outpatient follow-up primary care physician as well as cardiology, plan was explained to the patient who is agreeable to current plan  3/15 patient with systolic blood pressures of 60-83 symptomatic with complaints of dizziness and weakness.  Transferred back to medical unit discharge from behavioral health.        _______________________________________________________________________  Patient seen and examined by me on discharge day.  Pertinent Findings:  Gen:    Not in distress  Chest: Clear lungs  CVS:   Regular rhythm, s1/s2 no m/r/g  No edema  Abd:  Soft, not distended, not tender  Neuro:  Alert, Oriented x 4  _______________________________________________________________________  DISCHARGE MEDICATIONS:      Medication List        ASK your doctor about these medications      apixaban 5 MG Tabs tablet  Commonly known as: Eliquis  Take 1 tablet by mouth 2 times daily     brexpiprazole 2 MG Tabs tablet  Commonly known as: REXULTI  Take 1 tablet by mouth daily     FLUoxetine 40 MG capsule  Commonly known as: PROzac  Take 1 capsule by mouth daily     hydrOXYzine pamoate 25 MG capsule  Commonly known as: VISTARIL  Take 1 capsule by mouth 3 times daily as needed for Anxiety     midodrine 5 MG tablet  Commonly known as: PROAMATINE     mirtazapine 15 MG tablet  Commonly known as: REMERON  Take 1 tablet by mouth nightly     tamsulosin 0.4 MG capsule  Commonly known as: FLOMAX  Take 1 capsule by mouth daily                Patient Follow Up Instructions:   Activity: activity as tolerated  Diet: cardiac diet  Wound Care: as  directed    Follow-up with PCP/Cardiology in 2 weeks.  Follow-up tests/labs As per below physicians  Follow-up Information    None       ________________________________________________________________    Risk of deterioration: Low    Condition at Discharge:  Stable  __________________________________________________________________    Disposition  Acute care hospital    ____________________________________________________________________    Code Status: Full Code  ___________________________________________________________________      Total time in minutes spent coordinating this discharge (includes going over instructions, follow-up, prescriptions, and preparing report for sign off to her PCP) :  45 minutes    Signed:  RACHELLE Corona NP

## 2025-03-15 NOTE — GROUP NOTE
Group Therapy Note    Date: 3/15/2025    Group Start Time: 0930  Group End Time: 1015  Group Topic: Education Group - Inpatient    SSR 2 BH NON ACUTE    Yolanda Perry        Group Therapy Note    Attendees: 5/8    Facilitated structured group to increase awareness of triggers, encourage pt. to explore places, people and situations that cause strong emotional responses and encourage pt to identify positive ways to manage triggers.          Notes:  Did not attend group despite encouragement    Discipline Responsible: Recreational Therapist      Signature:  MANISH Rebolledo

## 2025-03-15 NOTE — GROUP NOTE
Group Therapy Note    Date: 3/15/2025    Group Start Time: 1300  Group End Time: 1330  Group Topic: Relaxation    SSR 2 BH NON ACUTE    Yolanda Perry        Group Therapy Note    Attendees: 4/8    Facilitated structured relaxation group and introduced Guided Imagery as a positive way to manage stress.              Notes:  Did not attend group despite encouragement    Discipline Responsible: Recreational Therapist      Signature:  MANISH Rebolledo

## 2025-03-15 NOTE — PROGRESS NOTES
Hospitalist Progress Note    NAME:   Bony Smith   : 1965   MRN: [id number]     Date/Time: 3/15/2025 10:24 AM  Patient PCP: Mikel Nelson MD    Estimated discharge date: Clinical improvement  Barriers: Clearance from psych    Hospital course:  Bony Smith is a 59 y.o.  male with PMHx significant for bipolar disorder, anxiety, hx of diverticulitis, and BPH.  He presents to the ED from [facility] for generalized abdominal pain for months.  Pain is associated with shortness of breath.  Patient is a former smoker, no EtOH or illicit drug use.  Denies chest pain, N/V/D, loss of appetite, fever, chills.  Patient is poor historian, offers limited history.  CTA A/P showed left ventricular apical filling defect concerning for thrombus.  Cardiology was consulted.  Patient started on heparin drip.  Echo ordered and pending results.  Troponin 33.  Psych consulted for active suicidal ideation.  Patient had requested patient  to kill him.  Currently on home medications for bipolar disorder and anxiety.  Of note patient was noted to have left ventricular thrombus on echocardiogram, started on heparin gtt., spoke to cardiology attending Dr. Douglass, who recommends patient can be discharged on Eliquis with close outpatient follow-up primary care physician as well as cardiology, plan was explained to the patient who is agreeable to current plan       Assessment / Plan:    Hypotension  Status post RRT this morning for blood pressure of 67/40 improved to 102/76 with intervention  Continue midodrine increased to 10 mg  Complaining of lightheadedness otherwise asymptomatic  May require transfer back to medical if not able to maintain systolics        LV thrombus on CT/CTA  Low-density thrombus within the left ventricular apex 2.4 x 1.5 x 4.2 cm  Troponin 33 within normal limits  EKG negative for STEMI  Status post heparin drip  TTE showed EF of 40 to 45%  Appreciate cardiology input ruled out  reviewed prior to creation of Plan.      LABS:  I reviewed today's most current labs and imaging studies.  Pertinent labs include:  Recent Labs     03/12/25 1722 03/12/25 2002 03/14/25  0851   WBC 10.1 9.2 7.4   HGB 12.1 11.8* 12.7   HCT 36.7 35.3* 38.2    347 364     Recent Labs     03/12/25  1722 03/12/25 2002 03/13/25  0413 03/14/25  0851 03/14/25  1609     --   --  143  --    K 4.6  --   --  3.6  --      --   --  112*  --    CO2 30  --   --  24  --    BUN 14  --   --  9  --    ALT 28  --   --  25  --    INR  --  1.0 1.0  --  1.1       Signed: RACHELLE Corona NP

## 2025-03-15 NOTE — PROGRESS NOTES
4 Eyes Skin Assessment     NAME:  Bony Smith  YOB: 1965  MEDICAL RECORD NUMBER:  [id number]    The patient is being assessed for  Admission    I agree that at least one RN has performed a thorough Head to Toe Skin Assessment on the patient. ALL assessment sites listed below have been assessed.      Areas assessed by both nurses:    Head, Face, Ears, Shoulders, Back, Chest, Arms, Elbows, Hands, Sacrum. Buttock, Coccyx, Ischium, Legs. Feet and Heels, and Under Medical Devices         Does the Patient have a Wound? No noted wound(s)       Adi Prevention initiated by RN: yes  Wound Care Orders initiated by RN: No    Pressure Injury (Stage 3,4, Unstageable, DTI, NWPT, and Complex wounds) if present, place Wound referral order by RN under : No    New Ostomies, if present place, Ostomy referral order under : No     Nurse 1 eSignature: Electronically signed by Kiera Hernandez RN on 3/15/25 at 4:56 PM EDT    **SHARE this note so that the co-signing nurse can place an eSignature**    Nurse 2 eSignature: Electronically signed by Carloz Escalera RN on 3/15/25 at 5:23 PM EDT

## 2025-03-15 NOTE — PROGRESS NOTES
Warfarin Dosing Consult  Bony Smith is a 59 y.o. male with intracardiac throbus. Pharmacy was consulted by Dr. Jad Hatch to dose and monitor warfarin.    INR Goal: 2-3    PTA Dose: New start    Drugs that may increase INR:None  Drugs that may decrease INR: None  Other current anticoagulants/ drugs that may increase bleeding risk: Heparin  Risk factors: Decompensated Heart Failure  Daily INR ordered: Yes    Recent Labs     03/12/25  1722 03/12/25 2002 03/14/25  0851   HGB 12.1 11.8* 12.7    347 364     Recent Labs     03/12/25  1722 03/14/25  0851   ALT 28 25   AST 25 20     Recent Labs     03/12/25 2002 03/13/25  0413 03/14/25  1609   INR 1.0 1.0 1.1       Date INR Previous Dose   3/14  1.1 N/A     Assessment/ Plan:  Warfarin naive patient found with large LV thrombus. H/H stable at  this time. Baseline INR and age contribute to suspicion of moderate sensitivity.   Will order warfarin 7.5 mg PO x 1 dose.  CBC/INR ordered through 3/17  Pharmacy will continue to monitor daily and adjust therapy as indicated.

## 2025-03-15 NOTE — BH NOTE
Admission Note: 59 year old  male admitted to Dr. Rivas services from [facility] with a diagnosis of  unspecified psychosis. Patient was on a 1:1 on [facility] for SI. Kept asking staff to kill him. Came from [facility] because he was complaining of abdominal pain and saying he had a mass. In ED here found to have a Left ventricular thrombus. Was on a heparin drip which was discontinued yesterday because patient was refusing the anti XA lab work. Still supposed to be on warfarin with daily PT/INR labs. Not yet ordered.  Placed on 1:! On [facility] for safety because of unsteady gait.     Arrived to  at 2335. Compliant with search, and consents.  Refused assessment questions of any kind. Kept insisting upon going to lye down to sleep. Completed admission questions from medical admission on the 03/13/2025.    Legal Status: voluntary     Dx: Unspecified Psychosis     Psychiatrist: Dr. Rivas     H&P: Dr. Tate     Labs: Last INR 1.1     VS: 79/59 repeated 67/40, rapid response called at 0002     Mental Status: Flat affect, very lethargic. Insistent upon lying down. Pale with an unsteady when ambulating to bathroom for search. Taken straight to room where he complained of dizziness. Answered orientation questions correctly but slow to respond. Not really sleeping . Opens eyes immediately when approached if not already open.       Hx: Bipolar disorder, Anxiety, Depression, SI     Medical Hx: Left ventricular thrombus, right detached retina, Blind right eye, diverticulitis of intestine with abscess.     Medication Hx: See home med list in EPIC     ALLERGIES: NKDA     Appetite: Poor, Needs pushed fluids     Sleep: light sleeper            Search: see Behavioral Health Unit Patient Search, Personal Items Inventory Sheet, Evidence Chain Of Custody Tracking Form.      Plan: Safety Checks,  1:1 fall risk, Medication Management. Group Therapy.

## 2025-03-16 PROCEDURE — 6370000000 HC RX 637 (ALT 250 FOR IP): Performed by: NURSE PRACTITIONER

## 2025-03-16 PROCEDURE — 2060000000 HC ICU INTERMEDIATE R&B

## 2025-03-16 RX ADMIN — HYDROXYZINE PAMOATE 25 MG: 25 CAPSULE ORAL at 08:42

## 2025-03-16 RX ADMIN — APIXABAN 5 MG: 5 TABLET, FILM COATED ORAL at 23:12

## 2025-03-16 RX ADMIN — MIRTAZAPINE 15 MG: 15 TABLET, ORALLY DISINTEGRATING ORAL at 23:12

## 2025-03-16 RX ADMIN — HYDROXYZINE PAMOATE 25 MG: 25 CAPSULE ORAL at 12:05

## 2025-03-16 RX ADMIN — FLUOXETINE HYDROCHLORIDE 40 MG: 20 CAPSULE ORAL at 08:42

## 2025-03-16 RX ADMIN — HYDROXYZINE PAMOATE 25 MG: 25 CAPSULE ORAL at 17:41

## 2025-03-16 RX ADMIN — APIXABAN 5 MG: 5 TABLET, FILM COATED ORAL at 08:42

## 2025-03-16 RX ADMIN — TRAZODONE HYDROCHLORIDE 50 MG: 50 TABLET ORAL at 23:12

## 2025-03-16 RX ADMIN — ACETAMINOPHEN 650 MG: 325 TABLET, FILM COATED ORAL at 17:44

## 2025-03-16 RX ADMIN — BREXPIPRAZOLE 2 MG: 1 TABLET ORAL at 09:35

## 2025-03-16 ASSESSMENT — PAIN SCALES - GENERAL: PAINLEVEL_OUTOF10: 0

## 2025-03-16 ASSESSMENT — PAIN DESCRIPTION - DESCRIPTORS: DESCRIPTORS: ACHING

## 2025-03-16 ASSESSMENT — PAIN DESCRIPTION - LOCATION: LOCATION: ABDOMEN

## 2025-03-16 ASSESSMENT — PAIN - FUNCTIONAL ASSESSMENT: PAIN_FUNCTIONAL_ASSESSMENT: ACTIVITIES ARE NOT PREVENTED

## 2025-03-16 ASSESSMENT — PAIN SCALES - WONG BAKER: WONGBAKER_NUMERICALRESPONSE: NO HURT

## 2025-03-16 ASSESSMENT — PAIN DESCRIPTION - ORIENTATION: ORIENTATION: MID;RIGHT;LEFT;UPPER;LOWER

## 2025-03-16 NOTE — DISCHARGE SUMMARY
Admit date: 3/15/2025   Admitting Provider: Shakeel Moralez MD    Discharge date: 3/16/2025  Discharging Provider: Wes Vasquez PA-C      * Admission Diagnoses: Hypoglycemia [E16.2]  Dizzinesses [R42]    * Discharge Diagnoses:    Hypotension  Left ventricular thrombus  Bipolar disorder  Anxiety  BPH    * Hospital Course:   59-year-old male admitted on 3/15/2025 after being discharged from the hospital to the behavioral health unit.  Patient was found to have hypotension although has a history of hypotension and requires midodrine 3 times daily.  Apparently his Midodrine was not restarted.  His blood pressure has been stable while on Midodrine.  Patient is ready for discharge back to the behavioral health facility.    * Procedures: None  * No surgery found *      Consults:   None    Significant Diagnostic Studies: As discussed in hospital course    Discharge Exam:  /80   Pulse 68   Temp 98.1 °F (36.7 °C) (Oral)   Resp 18   Ht 1.826 m (5' 11.89\")   Wt 74.9 kg (165 lb 2 oz)   SpO2 96%   BMI 22.46 kg/m²   PHYSICAL EXAM:  Constitutional: Alert in no acute distress   HEENT: Sclerae anicteric, The neck is supple.   Cardiovascular: Regular rate and rhythm. No murmurs, gallops, or rubs. .   Respiratory: Clear breath sounds with no wheezes, rales, or rhonchi.   GI: Abdomen nondistended, soft, and nontender. Normal active bowel sounds.   Rectal: Deferred   Musculoskeletal: No pitting edema of the lower legs. Extremities have good range of motion.    Neurological:  Patient is alert a although not oriented to time or place   psychiatric: Mood appears appropriate with judgement intact.   Lymphatic: No cervical or supraclavicular adenopathy.   Skin: No rashes or breakdown of the skin      * Discharge Condition: Stable  * Disposition:  Behavioral health    Discharge Medications:     Medication List        CONTINUE taking these medications      apixaban 5 MG Tabs tablet  Commonly known as: Eliquis  Take 1 tablet  by mouth 2 times daily     brexpiprazole 2 MG Tabs tablet  Commonly known as: REXULTI  Take 1 tablet by mouth daily     FLUoxetine 40 MG capsule  Commonly known as: PROzac  Take 1 capsule by mouth daily     hydrOXYzine pamoate 25 MG capsule  Commonly known as: VISTARIL  Take 1 capsule by mouth 3 times daily as needed for Anxiety     midodrine 5 MG tablet  Commonly known as: PROAMATINE     mirtazapine 15 MG tablet  Commonly known as: REMERON  Take 1 tablet by mouth nightly     tamsulosin 0.4 MG capsule  Commonly known as: FLOMAX  Take 1 capsule by mouth daily              * Follow-up Care/Patient Instructions:  Activity: activity as tolerated  Diet: regular diet  Wound Care: none needed    Follow-up with Dr. Nelson in 1 week  Follow-up with cardiology in 1 week, Dr. Hatch    Discharge summary greater than 35 minutes spent with the patient performing discharge instructions, medication review and physical exam    Signed:  Wes Vasquez PA-C  3/16/2025  1:56 PM

## 2025-03-16 NOTE — PROGRESS NOTES
Was able to contact [facility] for medication list of the patient to be reviewed, per staff at [place], she cannot access patient's past  records and will have to follow-up with [facility]' medical  records department which is open only on weekdays.

## 2025-03-16 NOTE — CARE COORDINATION
1420: Discharge summary/order noted and case discussed with nurse.    CM telephoned [facility] [phone] speaking with Chika.     Patient is not on their radar; however she will review and call CM back.    1430: Chika from [facility] telephoned CM stating patient needs to be re-evaluated via psych for clear DCP recommendations.    [facility] will keep patient on their list for possible BH placement.    Nurse notified.

## 2025-03-16 NOTE — PROGRESS NOTES
CM noted discharge order, but then patient transferred to [facility].     CM will ask discharge order to be d/c.     Patient with hypotension.

## 2025-03-16 NOTE — PLAN OF CARE
Problem: Discharge Planning  Goal: Discharge to home or other facility with appropriate resources  3/16/2025 0739 by Kiera Hernandez RN  Outcome: Progressing  3/16/2025 0554 by Rina Enriquez RN  Outcome: Progressing     Problem: Pain  Goal: Verbalizes/displays adequate comfort level or baseline comfort level  3/16/2025 0739 by Kiera Hernandez RN  Outcome: Progressing  3/16/2025 0554 by Rina Enriquez RN  Outcome: Progressing     Problem: Safety - Adult  Goal: Free from fall injury  3/16/2025 0739 by Kiera Hernandez RN  Outcome: Progressing  3/16/2025 0554 by Rina Enriquez RN  Outcome: Progressing     Problem: Self Harm/Suicidality  Goal: Will have no self-injury during hospital stay  Description: INTERVENTIONS:  1.  Ensure constant observer at bedside with Q15M safety checks  2.  Maintain a safe environment  3.  Secure patient belongings  4.  Ensure family/visitors adhere to safety recommendations  5.  Ensure safety tray has been added to patient's diet order  6.  Every shift and PRN: Re-assess suicidal risk via Frequent Screener    3/16/2025 0739 by Kiera Hernandez RN  Outcome: Progressing  3/16/2025 0554 by Rina Enriquez RN  Outcome: Progressing

## 2025-03-16 NOTE — CARE COORDINATION
03/16/25 1610   Service Assessment   Patient Orientation   (answers questions appropriately. flat affect,)   Cognition Alert   History Provided By Patient   Primary Caregiver Self   Accompanied By/Relationship alone   Support Systems Family Members   Patient's Healthcare Decision Maker is: Legal Next of Kin   PCP Verified by CM No  (does not know PCP,)   Prior Functional Level Independent in ADLs/IADLs   Current Functional Level Independent in ADLs/IADLs   Can patient return to prior living arrangement Unknown at present   Ability to make needs known: Poor   Family able to assist with home care needs: No   Would you like for me to discuss the discharge plan with any other family members/significant others, and if so, who? Yes  (sister)   Financial Resources Medicaid   Social/Functional History   Lives With Alone   Type of Home Apartment   Home Layout One level   Receives Help From Family   Prior Level of Assist for ADLs Independent   Prior Level of Assist for Homemaking Independent   Ambulation Assistance Independent   Prior Level of Assist for Transfers Independent   Active  No   Discharge Planning   Type of Residence Apartment   Living Arrangements Alone   Current Services Prior To Admission None   Potential Assistance Needed N/A   Patient expects to be discharged to: Apartment   Services At/After Discharge   Confirm Follow Up Transport Family     Patient answering questions for CM. Answers appropriately, but flat affect, Patient says he lives in [place] and so does his sister. Patient says he no longer drives. He says he needs oxygen at home, not currently wearing oxygen but says he can't breathe.     CM attempted to call his sister [phone]. Message left for return call to complete DCP assessment.

## 2025-03-16 NOTE — BH NOTE
INITIAL PSYCHIATRIC EVALUATION            IDENTIFICATION:    Patient Name  Bony Smith   Date of Birth 1965   [facility] [id number]   Medical Record Number  [id number]      Age  59 y.o.   PCP Mikel Nelson MD   Admit date:  3/15/2025    Room Number  477/01  @ [facility]   Date of Service  3/16/2025            HISTORY         REASON FOR HOSPITALIZATION: Bipolar Disorder, SI, Anxiety    CC: \" I feel a lot slow today\"       HISTORY OF PRESENT ILLNESS:     The patient, Bony Smith, is a 59 y.o. male who has a hx of Bipolar Disorder,who was originally at [facility] for suicidal ideation and then sent to [facility] medical unit for shortness of breath, abdominal pain.  He was found to have a Left ventricular thrombus, was anticoagulated on a Heparin drip and then put on Eliquis, by cardiology.   Psych was consulted , Dr. Rivas saw him on 3/14, while he was on the medical floor and [facility] in [facility] day.  [facility] did not take him back.   When he was transferred to the BHU, the patient was hypotensive, unsteady on his feet, and required a walker as well as 1:1 sitter for safety.  This provider was contacted by staff of the patient's BP being in the 60's-70's and told staff to call a Rapid Response.  The patient was seen by the Hospitalist, Midodrine was started, encouraged to hydrate by staff.  Patient continued to be hypotensive, complaining of dizziness and SOB.  Was transferred back to the medical unit on 3/15 at round 1:30 PM per nursing staff.  Patient is from [facility] and has hx of inpatient hospitalization I 1985.  He uses marijuana and has a history of substance use treatment in the past.  His UDS was positive for THC. He \"thinks\" he is  and was very vague about where he had been living prior to hospitalization.   A note says he used to work as an  and is now on disability.  He is a poor  misael.  Patient is no longer actively suicidal or homicidal and has no command hallucinations.   Patient  is able to present with healthy ways to cope with current stressors.           ESTIMATED LENGTH OF STAY:  5-7 days                                SIGNED:    RACHELLE Alvarez - KAREN  3/16/2025

## 2025-03-17 PROCEDURE — 2060000000 HC ICU INTERMEDIATE R&B

## 2025-03-17 PROCEDURE — 6370000000 HC RX 637 (ALT 250 FOR IP): Performed by: NURSE PRACTITIONER

## 2025-03-17 RX ORDER — TRAZODONE HYDROCHLORIDE 50 MG/1
50 TABLET ORAL NIGHTLY PRN
Qty: 30 TABLET | Refills: 0 | Status: ON HOLD | OUTPATIENT
Start: 2025-03-17

## 2025-03-17 RX ADMIN — APIXABAN 5 MG: 5 TABLET, FILM COATED ORAL at 20:46

## 2025-03-17 RX ADMIN — MIDODRINE HYDROCHLORIDE 10 MG: 5 TABLET ORAL at 20:46

## 2025-03-17 RX ADMIN — APIXABAN 5 MG: 5 TABLET, FILM COATED ORAL at 09:33

## 2025-03-17 RX ADMIN — FLUOXETINE HYDROCHLORIDE 40 MG: 20 CAPSULE ORAL at 09:33

## 2025-03-17 RX ADMIN — BREXPIPRAZOLE 2 MG: 1 TABLET ORAL at 09:33

## 2025-03-17 RX ADMIN — TRAZODONE HYDROCHLORIDE 50 MG: 50 TABLET ORAL at 20:46

## 2025-03-17 RX ADMIN — MIRTAZAPINE 15 MG: 15 TABLET, ORALLY DISINTEGRATING ORAL at 20:46

## 2025-03-17 RX ADMIN — HYDROXYZINE PAMOATE 25 MG: 25 CAPSULE ORAL at 17:26

## 2025-03-17 ASSESSMENT — PAIN SCALES - GENERAL
PAINLEVEL_OUTOF10: 0

## 2025-03-17 NOTE — PROGRESS NOTES
Patient is able to walk, he is independently for his activities. Per today, the blood pressure is stable (104/52 - 100/70mmHg).

## 2025-03-17 NOTE — PROGRESS NOTES
Psych in to see patient and asked ROSA to call [facility] to take patient back as he continues to need further behavioral health treatment.     Patient remains on 1:1.     ROSA called [facility] and was transferred to the nursing supervisor there. No one answered, left message.

## 2025-03-17 NOTE — DISCHARGE SUMMARY
Hospitalist Discharge Summary     Patient ID:  Bony Smith  [id number]  59 y.o.  1965  3/15/2025    PCP on record: Mikel Nelson MD    Admit date: 3/15/2025  Discharge date and time: 3/17/2025    DISCHARGE DIAGNOSIS:    LV thrombus  Abdominal pain  History of diverticulitis  Bipolar disorder  Anxiety  BPH  Right renal cysts    CONSULTATIONS:  None    Excerpted HPI from H&P of Shakeel Moralez MD:  CHIEF COMPLAINT: generalized abdominal pain     HISTORY OF PRESENT ILLNESS:     Bony Smith is a 59 y.o.  male with PMHx significant for bipolar disorder, anxiety, hx of diverticulitis, and BPH presents to ED from [facility] for generalized abdominal pain x months. Associated SOB. Former smoker. No ETOH and illicit drug use.      No CP, N/V/D, loss of appetite, fever, and chills.      Patient is a poor historian. Limited history      CTA abdomen pelvis showed Left ventricular apical filling defect concerning for thrombus redemonstrated. Consult cardiology. Echo ordered.     ED recommended to admit patient for further workup.      We were asked to admit for work up and evaluation of the above problems.     ______________________________________________________________________  DISCHARGE SUMMARY/HOSPITAL COURSE:  for full details see H&P, daily progress notes, labs, consult notes.   Bony Smith is a 59 y.o.  male with PMHx significant for bipolar disorder, anxiety, hx of diverticulitis, and BPH.  He presents to the ED from [facility] for generalized abdominal pain for months.  Pain is associated with shortness of breath.  Patient is a former smoker, no EtOH or illicit drug use.  Denies chest pain, N/V/D, loss of appetite, fever, chills.  Patient is poor historian, offers limited history.  CTA A/P showed left ventricular apical filling defect concerning for thrombus.  Cardiology was consulted.  Patient started on heparin drip.  Echo ordered and pending results.  Troponin  33.  Psych consulted for active suicidal ideation.  Patient had requested patient  to kill him.  Currently on home medications for bipolar disorder and anxiety.  Of note patient was noted to have left ventricular thrombus on echocardiogram, started on heparin gtt., spoke to cardiology attending Dr. Douglass, who recommends patient can be discharged on Eliquis with close outpatient follow-up primary care physician as well as cardiology, plan was explained to the patient who is agreeable to current plan    3/17 patient seen and evaluated bedside, overnight events reviewed, of note patient currently remains stable for discharge to behavioral health facility      _______________________________________________________________________  Patient seen and examined by me on discharge day.  Pertinent Findings:  Gen:    Not in distress  Chest: Clear lungs  CVS:   Regular rhythm, s1/s2 no m/r/g  No edema  Abd:  Soft, not distended, not tender  Neuro:  Alert, Oriented x 4  _______________________________________________________________________  DISCHARGE MEDICATIONS:      Medication List        START taking these medications      traZODone 50 MG tablet  Commonly known as: DESYREL  Take 1 tablet by mouth nightly as needed for Sleep            CONTINUE taking these medications      apixaban 5 MG Tabs tablet  Commonly known as: Eliquis  Take 1 tablet by mouth 2 times daily     brexpiprazole 2 MG Tabs tablet  Commonly known as: REXULTI  Take 1 tablet by mouth daily     FLUoxetine 40 MG capsule  Commonly known as: PROzac  Take 1 capsule by mouth daily     hydrOXYzine pamoate 25 MG capsule  Commonly known as: VISTARIL  Take 1 capsule by mouth 3 times daily as needed for Anxiety     midodrine 5 MG tablet  Commonly known as: PROAMATINE     mirtazapine 15 MG tablet  Commonly known as: REMERON  Take 1 tablet by mouth nightly     tamsulosin 0.4 MG capsule  Commonly known as: FLOMAX  Take 1 capsule by mouth daily                Where to Get Your Medications        These medications were sent to [facility] [address] - P [phone] - F [phone] [address] [place] [address]      Phone: [phone]   traZODone 50 MG tablet           Patient Follow Up Instructions:   Activity: activity as tolerated  Diet: cardiac diet  Wound Care: as directed    Follow-up with PCP/Cardiology in 2 weeks.  Follow-up tests/labs As per below physicians  Follow-up Information       Follow up With Specialties Details Why Contact Info    Mikel Nelson MD Internal Medicine, Emergency Medicine Go on 3/25/2025 @ 11:00am. [address]  [address]  [facility] [address] [phone]      Jad Hatch MD Cardiovascular Disease Follow up in 1 week(s)  [address] [facility] [address]  [phone]            ________________________________________________________________    Risk of deterioration: Low    Condition at Discharge:  Stable  __________________________________________________________________    Disposition  [facility]    ____________________________________________________________________    Code Status: Full Code  ___________________________________________________________________      Total time in minutes spent coordinating this discharge (includes going over instructions, follow-up, prescriptions, and preparing report for sign off to her PCP) :  45 minutes    Signed:  Cezar Garza MD

## 2025-03-17 NOTE — BH NOTE
This writer went to assess the patient from [address] [place] [address] and patient is laying in the bed with a flat affect with 1:1 sitter in room. Patient states that he is still feeling dizzy while laying down and when getting up. Patient states he has a fair appetite and he states he has been drinking plenty of water. Per the 1:1 staff patient is off balance and unsteady when he gets up and requires assistance. Patient states that he is trying to do better but he does not know what is wrong with him. Patient continues to feel depressed and states he is suicidal but \"not intentionally.\" Patient will state again, \"I don't know what is wrong with me.\" 1:1 staff in patient room.

## 2025-03-17 NOTE — BSMART NOTE
BSMART Liaison Team Note     LOS:  2     Patient goal(s) for today:  take medications as prescribed, make needs known in an appropriate manner, engage in supportive counseling as needed.     BSMART Liaison team focus/goals: assess needs, provide support and education, coordinate care, provided supportive counseling as needed.    Progress note: Liaison met with patient face to face in medical room 477. Pt dressed in green gown laying in bed with sitter at bedside. Pt presents depressed, withdrawn with a flat affect and poor eye contact. He presents unkept with poor hygiene. Per nurse Ritter pt has not been compliant with medical treatment and ripped equipment off.     Bony  Endorsed SI reportedly stating \"I just want to die.\" He denies HI, endorsed AVH however was guarded stating \"I don't want to talk about it.\" Patient reported he has been unable to rest though his appetite is normal. He reported always having difficulty focusing, his anxiety is 10/10, depression 10/10 due to life \"I can't do anything for myself even when I try, no matter what I fail.\" Patient is adamant he does not wish to return to [facility] however is voluntary for admission to [facility].      Barriers to Discharge: psychiatric clearance  Guns in the home: No     Outpatient provider(s):  unknown  Insurance info/prescription coverage:  Payor: [facility] MEDICAID /  /  /      Diagnosis:   Past Medical History:   Diagnosis Date    Anxiety     Blind right eye     Detached retina, right     Diverticulitis of intestine with abscess     Family history of diverticulitis of colon     mother and brother    Psychiatric disorder     Bipolar        Plan:  Per Nurse Ritter pt is medically cleared and has not been cooperative with treatment. Liaison is recommending inpatient psych treatment due to ongoing depression, anxiety, SI, AVH. Liaison reviewed patient with Dr. Rivas, who will round on patient and provide disposition for admission to [facility]. UPDATE

## 2025-03-17 NOTE — PLAN OF CARE
Problem: Discharge Planning  Goal: Discharge to home or other facility with appropriate resources  3/17/2025 1016 by Stone Cruz RN  Outcome: Progressing  3/17/2025 0605 by Rina Enriquez RN  Outcome: Progressing     Problem: Pain  Goal: Verbalizes/displays adequate comfort level or baseline comfort level  3/17/2025 1016 by Stone Cruz RN  Outcome: Progressing  3/17/2025 0605 by Rina Enriquez RN  Outcome: Progressing     Problem: Safety - Adult  Goal: Free from fall injury  3/17/2025 1016 by Stone Cruz RN  Outcome: Progressing  3/17/2025 0605 by Rina Enriquez RN  Outcome: Progressing     Problem: Self Harm/Suicidality  Goal: Will have no self-injury during hospital stay  Description: INTERVENTIONS:  1.  Ensure constant observer at bedside with Q15M safety checks  2.  Maintain a safe environment  3.  Secure patient belongings  4.  Ensure family/visitors adhere to safety recommendations  5.  Ensure safety tray has been added to patient's diet order  6.  Every shift and PRN: Re-assess suicidal risk via Frequent Screener    3/17/2025 1016 by Stone Cruz RN  Outcome: Progressing  3/17/2025 0605 by Rina Enriquez RN  Outcome: Progressing

## 2025-03-18 ENCOUNTER — APPOINTMENT (OUTPATIENT)
Facility: HOSPITAL | Age: 60
DRG: 204 | End: 2025-03-18
Payer: MEDICAID

## 2025-03-18 PROCEDURE — 97161 PT EVAL LOW COMPLEX 20 MIN: CPT

## 2025-03-18 PROCEDURE — 6370000000 HC RX 637 (ALT 250 FOR IP)

## 2025-03-18 PROCEDURE — 6370000000 HC RX 637 (ALT 250 FOR IP): Performed by: NURSE PRACTITIONER

## 2025-03-18 PROCEDURE — 70450 CT HEAD/BRAIN W/O DYE: CPT

## 2025-03-18 PROCEDURE — 2060000000 HC ICU INTERMEDIATE R&B

## 2025-03-18 PROCEDURE — 97530 THERAPEUTIC ACTIVITIES: CPT

## 2025-03-18 RX ORDER — MIDODRINE HYDROCHLORIDE 5 MG/1
5 TABLET ORAL 3 TIMES DAILY
Status: DISCONTINUED | OUTPATIENT
Start: 2025-03-18 | End: 2025-03-18

## 2025-03-18 RX ORDER — MIDODRINE HYDROCHLORIDE 10 MG/1
10 TABLET ORAL 3 TIMES DAILY
Qty: 90 TABLET | Refills: 0 | Status: SHIPPED | OUTPATIENT
Start: 2025-03-18 | End: 2025-03-28 | Stop reason: HOSPADM

## 2025-03-18 RX ORDER — MIDODRINE HYDROCHLORIDE 5 MG/1
10 TABLET ORAL 3 TIMES DAILY
Status: DISCONTINUED | OUTPATIENT
Start: 2025-03-18 | End: 2025-03-27

## 2025-03-18 RX ADMIN — MAGNESIUM HYDROXIDE 30 ML: 400 SUSPENSION ORAL at 10:40

## 2025-03-18 RX ADMIN — TRAZODONE HYDROCHLORIDE 50 MG: 50 TABLET ORAL at 20:27

## 2025-03-18 RX ADMIN — MIDODRINE HYDROCHLORIDE 5 MG: 5 TABLET ORAL at 11:00

## 2025-03-18 RX ADMIN — BREXPIPRAZOLE 2 MG: 1 TABLET ORAL at 08:58

## 2025-03-18 RX ADMIN — MIDODRINE HYDROCHLORIDE 5 MG: 5 TABLET ORAL at 15:21

## 2025-03-18 RX ADMIN — MIRTAZAPINE 15 MG: 15 TABLET, ORALLY DISINTEGRATING ORAL at 20:27

## 2025-03-18 RX ADMIN — FLUOXETINE HYDROCHLORIDE 40 MG: 20 CAPSULE ORAL at 08:44

## 2025-03-18 RX ADMIN — APIXABAN 5 MG: 5 TABLET, FILM COATED ORAL at 08:44

## 2025-03-18 RX ADMIN — APIXABAN 5 MG: 5 TABLET, FILM COATED ORAL at 20:30

## 2025-03-18 ASSESSMENT — PAIN SCALES - GENERAL
PAINLEVEL_OUTOF10: 0
PAINLEVEL_OUTOF10: 0

## 2025-03-18 NOTE — DISCHARGE SUMMARY
Physician Discharge Summary     Patient ID:    Bony Smith  [id number]  59 y.o.  1965    Admit date: 3/15/2025    Discharge date : 3/18/2025      Final Diagnoses:   Hypoglycemia [E16.2]  Dizzinesses [R42]  LV thrombus  Abdominal pain  History of diverticulitis  Bipolar disorder  Anxiety  BPH  Right renal cysts    Reason for Hospitalization: abdominal pain         Hospital Course:   Excerpted HPI from H&P of Shakeel Moralez MD:  CHIEF COMPLAINT: generalized abdominal pain     HISTORY OF PRESENT ILLNESS:     Bony Smith is a 59 y.o.  male with PMHx significant for bipolar disorder, anxiety, hx of diverticulitis, and BPH presents to ED from [facility] for generalized abdominal pain x months. Associated SOB. Former smoker. No ETOH and illicit drug use.      No CP, N/V/D, loss of appetite, fever, and chills.      Patient is a poor historian. Limited history      CTA abdomen pelvis showed Left ventricular apical filling defect concerning for thrombus redemonstrated. Consult cardiology. Echo ordered.     ED recommended to admit patient for further workup.      We were asked to admit for work up and evaluation of the above problems.     Patient is poor historian, offers limited history.  CTA A/P showed left ventricular apical filling defect concerning for thrombus.  Cardiology was consulted.  Patient started on heparin drip.  Echo ordered and pending results.  Troponin 33.  Psych consulted for active suicidal ideation.  Patient had requested patient  to kill him.  Currently on home medications for bipolar disorder and anxiety.  Of note patient was noted to have left ventricular thrombus on echocardiogram, started on heparin gtt., spoke to cardiology attending Dr. Douglass, who recommends patient can be discharged on Eliquis with close outpatient follow-up primary care physician as well as cardiology, plan was explained to the patient who is agreeable to current plan     3/17  patient seen and evaluated bedside, overnight events reviewed, of note patient currently remains stable for discharge to behavioral health facility         3/18 -patient was supposed to be discharged today but had dizziness with blood pressures in the 70s, per PT.  Orthostats at that time were positive.  Patient was scheduled on midodrine.  Repeat orthostats were negative.  However, patient continued to report dizziness when standing.  PT reevaluation pending.  Will order CT head as patient appears to be a poor historian and unsure how long this has been going on.  Will also order CBC/BMP.  If CT head is negative and labs are okay along with PT evaluation, then patient would be safe to transfer to behavioral health.    Discharge Medications:      Medication List        START taking these medications      traZODone 50 MG tablet  Commonly known as: DESYREL  Take 1 tablet by mouth nightly as needed for Sleep            CHANGE how you take these medications      midodrine 10 MG tablet  Commonly known as: PROAMATINE  Take 1 tablet by mouth in the morning, at noon, and at bedtime  What changed:   medication strength  how much to take  when to take this            CONTINUE taking these medications      apixaban 5 MG Tabs tablet  Commonly known as: Eliquis  Take 1 tablet by mouth 2 times daily     brexpiprazole 2 MG Tabs tablet  Commonly known as: REXULTI  Take 1 tablet by mouth daily     FLUoxetine 40 MG capsule  Commonly known as: PROzac  Take 1 capsule by mouth daily     hydrOXYzine pamoate 25 MG capsule  Commonly known as: VISTARIL  Take 1 capsule by mouth 3 times daily as needed for Anxiety     mirtazapine 15 MG tablet  Commonly known as: REMERON  Take 1 tablet by mouth nightly     tamsulosin 0.4 MG capsule  Commonly known as: FLOMAX  Take 1 capsule by mouth daily               Where to Get Your Medications        These medications were sent to [facility] - [place], [place] - [address] [place] [address] [place] - P  [phone] - F [phone]  [address] [facility] [address]      Phone: [phone]   midodrine 10 MG tablet  traZODone 50 MG tablet           Follow up Care:    Follow-up Information       Follow up With Specialties Details Why Contact Info    Mikel Nelson MD Internal Medicine, Emergency Medicine Go on 3/25/2025 @ 11:00am. [address]  [address]  [facility] [address]  [phone]      Jad Hatch MD Cardiovascular Disease Follow up in 1 week(s)  [address] [facility] [address] [phone]                   Diet:  regular     Disposition:  Psych floor     Advanced Directive:   FULL    DNR      Discharge Exam:                     Vitals:    03/18/25 1551   BP: 90/65   Pulse: 69   Resp: 16   Temp:    SpO2: 97%      General: In no acute distress  HEENT: NC/AT. No obvious deformities.Nares patent. Oral mucosa moist.   Cardiovascular: S1, S2 present. No murmurs noted.   Respiratory: Normal respiratory effort. Breath sounds CTA in all lung fields b/l. No retractions noted  Abdomen: BS+. Soft, nontender. No TTP in all quadrants. No guarding or rigidity   Lower Extremity: No edema   Neurological: CN III-XII grossly intact. Muscle strength 5/5 in UE and 5/5 in LE b/l. No facial drooping or slurring of words.   Psychiatric: Flat affect               Significant Diagnostic Studies:   No results found for requested labs within first 48 hours of the last admission day.  No results for input(s): \"WBC\", \"HGB\", \"HCT\", \"PLT\" in the last 72 hours.  No results for input(s): \"NA\", \"K\", \"CL\", \"CO2\", \"BUN\", \"CREATININE\", \"GLUCOSE\", \"CALCIUM\", \"MG\", \"PHOS\", \"URICACID\", \"LABURIC\" in the last 72 hours.  No results for input(s): \"AST\", \"ALT\", \"ALKPHOS\", \"BILITOT\", \"LABALBU\", \"GLOB\", \"GGT\", \"AMYLASE\", \"LIPASE\" in the last 72 hours.    Invalid input(s): \"GPT\", \"PROT\"  No results for input(s): \"INR\", \"PROTIME\", \"APTT\" in the last 72 hours.   No results for input(s): \"IRON\", \"TIBC\" in the

## 2025-03-18 NOTE — PLAN OF CARE
PHYSICAL THERAPY EVALUATION  Patient: Bony Smith (59 y.o. male)  Date: 3/18/2025  Primary Diagnosis: Hypoglycemia [E16.2]  Dizzinesses [R42]       Precautions: Fall Risk, Other (Comment) (1-1)                      Recommendations for nursing mobility: Encourage HEP in prep for ADLs/mobility; see handout for details and Amb to bathroom with AD and gait belt    In place during session: Peripheral IV    ASSESSMENT  Pt is a 59 y.o. male admitted on 3/15/2025 for weakness; pt currently being treated for hypoglycemia . Pt semi supine upon PT arrival, agreeable to evaluation. Pt A&O x 4.  Patient lives alone and indep at home.    Based on the objective data described below, the patient currently presents with impaired ability to perform high-level IADLs, impaired strength, decreased activity tolerance, poor safety awareness, impaired balance, and impaired posture. (See below for objective details and assist levels).     Overall pt tolerated session fair/good today with PT. Pt required sba  for bed mobility and supine>sit>stand transfers. Pt amb 18 ft and felt unsteady,required sba/cg for return to bed with slower pace than initial,felt off balance,No LOB.return to bed with cg.Patient demonstrates decreased endurance to activities. BP taken in sitting 101/69 and standing 79/65.RN and MD informed.Patient was given meds and RN took BP again blood pressure was WFL but RN felt patient being unsteady.We will see patient again tomorrow morning.will coordinate with meds and BP check. Pt will benefit from continued skilled PT to address above deficits and return to PLOF. Current PT DC recommendation Continue to assess pending progress /MHUonce medically appropriate.    GOALS:    Problem: Physical Therapy - Adult  Goal: By Discharge: Performs mobility at highest level of function for planned discharge setting.  See evaluation for individualized goals.  Description: FUNCTIONAL STATUS PRIOR TO ADMISSION: Patient was independent  updated.    COMMUNICATION/EDUCATION:   The patient’s plan of care was discussed with: Registered nurse, Physician, and Case management    Patient Education  Education Given To: Patient  Education Provided: Role of Therapy;Plan of Care;Home Exercise Program;Transfer Training;Fall Prevention Strategies;Mobility Training;Family Education;Precautions;Equipment  Education Method: Demonstration;Verbal;Teach Back  Barriers to Learning: Readiness to Learn  Education Outcome: Verbalized understanding;Continued education needed         Thank you for this referral.  Rupinder Araya, PT  Minutes: 20

## 2025-03-18 NOTE — PROGRESS NOTES
Pt not able to transfer to [address] [place] [address] at this time due to low BP. Pt is asymptomatic. PRN Midodrine dose given. Nursing supervisor informed.

## 2025-03-18 NOTE — PROGRESS NOTES
CM noted that patient came from [facility] and per patient lives alone in [place], [place].     Patient accepted on [facility] for behavior health once BP stable and he can walk on his own. BP low today while ambulating with PT. Still c/o dizziness.     Patient dose of midodrine given.     CM will continue to follow.

## 2025-03-18 NOTE — PROGRESS NOTES
Physician Progress Note      PATIENT:               CANDY ROCHA  CSN #:                  [id number]  :                       1965  ADMIT DATE:       3/12/2025 4:11 PM  DISCH DATE:        3/14/2025 11:00 PM  RESPONDING  PROVIDER #:        Graciela Stewart          QUERY TEXT:    Patient admitted with LV thrombus. Pt noted to have BPH and was treated with   Flomax.?If possible, please document in progress notes and discharge summary   if you are evaluating and/or treating any of the following:  The medical record reflects the following:    Risk Factors: 59 year old male, right renal cyst, abdominal pain  Clinical Indicators: 3/13 Attending PN - BPH  Continue Flomax  Treatment: Flomax 0.4mg daily continued from home medications      Please email [email or web address] with any questions  Options provided:  -- BPH with partial/complete urinary obstruction  -- BPH with urinary retention without obstruction  -- Other - I will add my own diagnosis  -- Disagree - Not applicable / Not valid  -- Disagree - Clinically unable to determine / Unknown  -- Refer to Clinical Documentation Reviewer    PROVIDER RESPONSE TEXT:    Provider is clinically unable to determine a response to this query.  BPH with hx of prostate calcifications, no evident obstruction or retention    Query created by: Miranda Heath on 3/14/2025 6:01 AM      Electronically signed by:  Graciela Stewart 3/17/2025 11:38 PM

## 2025-03-19 LAB
ANION GAP SERPL CALC-SCNC: 4 MMOL/L (ref 2–12)
BASOPHILS # BLD: 0.05 K/UL (ref 0–0.1)
BASOPHILS NFR BLD: 0.6 % (ref 0–1)
BUN SERPL-MCNC: 16 MG/DL (ref 6–20)
BUN/CREAT SERPL: 22 (ref 12–20)
CA-I BLD-MCNC: 9.2 MG/DL (ref 8.5–10.1)
CHLORIDE SERPL-SCNC: 112 MMOL/L (ref 97–108)
CO2 SERPL-SCNC: 26 MMOL/L (ref 21–32)
CREAT SERPL-MCNC: 0.74 MG/DL (ref 0.7–1.3)
DIFFERENTIAL METHOD BLD: ABNORMAL
EOSINOPHIL # BLD: 0.18 K/UL (ref 0–0.4)
EOSINOPHIL NFR BLD: 2.1 % (ref 0–7)
ERYTHROCYTE [DISTWIDTH] IN BLOOD BY AUTOMATED COUNT: 16.4 % (ref 11.5–14.5)
GLUCOSE SERPL-MCNC: 89 MG/DL (ref 65–100)
HCT VFR BLD AUTO: 41.5 % (ref 36.6–50.3)
HGB BLD-MCNC: 13.5 G/DL (ref 12.1–17)
IMM GRANULOCYTES # BLD AUTO: 0.02 K/UL (ref 0–0.04)
IMM GRANULOCYTES NFR BLD AUTO: 0.2 % (ref 0–0.5)
LYMPHOCYTES # BLD: 3.46 K/UL (ref 0.8–3.5)
LYMPHOCYTES NFR BLD: 41.1 % (ref 12–49)
MCH RBC QN AUTO: 28.5 PG (ref 26–34)
MCHC RBC AUTO-ENTMCNC: 32.5 G/DL (ref 30–36.5)
MCV RBC AUTO: 87.6 FL (ref 80–99)
MONOCYTES # BLD: 0.63 K/UL (ref 0–1)
MONOCYTES NFR BLD: 7.5 % (ref 5–13)
NEUTS SEG # BLD: 4.07 K/UL (ref 1.8–8)
NEUTS SEG NFR BLD: 48.5 % (ref 32–75)
NRBC # BLD: 0 K/UL (ref 0–0.01)
NRBC BLD-RTO: 0 PER 100 WBC
PLATELET # BLD AUTO: 402 K/UL (ref 150–400)
PMV BLD AUTO: 10.8 FL (ref 8.9–12.9)
POTASSIUM SERPL-SCNC: 3.8 MMOL/L (ref 3.5–5.1)
RBC # BLD AUTO: 4.74 M/UL (ref 4.1–5.7)
SODIUM SERPL-SCNC: 142 MMOL/L (ref 136–145)
WBC # BLD AUTO: 8.4 K/UL (ref 4.1–11.1)

## 2025-03-19 PROCEDURE — 85025 COMPLETE CBC W/AUTO DIFF WBC: CPT

## 2025-03-19 PROCEDURE — 36415 COLL VENOUS BLD VENIPUNCTURE: CPT

## 2025-03-19 PROCEDURE — 6370000000 HC RX 637 (ALT 250 FOR IP)

## 2025-03-19 PROCEDURE — 2060000000 HC ICU INTERMEDIATE R&B

## 2025-03-19 PROCEDURE — 97530 THERAPEUTIC ACTIVITIES: CPT

## 2025-03-19 PROCEDURE — 6370000000 HC RX 637 (ALT 250 FOR IP): Performed by: NURSE PRACTITIONER

## 2025-03-19 PROCEDURE — 80048 BASIC METABOLIC PNL TOTAL CA: CPT

## 2025-03-19 RX ADMIN — MIDODRINE HYDROCHLORIDE 10 MG: 5 TABLET ORAL at 15:04

## 2025-03-19 RX ADMIN — TRAZODONE HYDROCHLORIDE 50 MG: 50 TABLET ORAL at 21:39

## 2025-03-19 RX ADMIN — APIXABAN 5 MG: 5 TABLET, FILM COATED ORAL at 08:50

## 2025-03-19 RX ADMIN — MIDODRINE HYDROCHLORIDE 10 MG: 5 TABLET ORAL at 11:22

## 2025-03-19 RX ADMIN — MIRTAZAPINE 15 MG: 15 TABLET, ORALLY DISINTEGRATING ORAL at 21:39

## 2025-03-19 RX ADMIN — APIXABAN 5 MG: 5 TABLET, FILM COATED ORAL at 21:39

## 2025-03-19 RX ADMIN — MIDODRINE HYDROCHLORIDE 10 MG: 5 TABLET ORAL at 21:39

## 2025-03-19 RX ADMIN — FLUOXETINE HYDROCHLORIDE 40 MG: 20 CAPSULE ORAL at 08:50

## 2025-03-19 RX ADMIN — BREXPIPRAZOLE 2 MG: 1 TABLET ORAL at 08:50

## 2025-03-19 ASSESSMENT — PAIN SCALES - GENERAL: PAINLEVEL_OUTOF10: 0

## 2025-03-19 NOTE — PLAN OF CARE
Problem: Discharge Planning  Goal: Discharge to home or other facility with appropriate resources  3/19/2025 1953 by Franko Singh RN  Outcome: Progressing  3/19/2025 0928 by Shelia Astudillo RN  Outcome: Progressing     Problem: Pain  Goal: Verbalizes/displays adequate comfort level or baseline comfort level  3/19/2025 1953 by Franko Singh RN  Outcome: Progressing  3/19/2025 0928 by Shelia Astudillo RN  Outcome: Progressing     Problem: Safety - Adult  Goal: Free from fall injury  3/19/2025 1953 by Franko Singh RN  Outcome: Progressing  3/19/2025 0928 by Shelia Astudillo RN  Outcome: Progressing     Problem: Self Harm/Suicidality  Goal: Will have no self-injury during hospital stay  Description: INTERVENTIONS:  1.  Ensure constant observer at bedside with Q15M safety checks  2.  Maintain a safe environment  3.  Secure patient belongings  4.  Ensure family/visitors adhere to safety recommendations  5.  Ensure safety tray has been added to patient's diet order  6.  Every shift and PRN: Re-assess suicidal risk via Frequent Screener    3/19/2025 1953 by Franko Singh RN  Outcome: Progressing  3/19/2025 0928 by Shelia Astudillo RN  Outcome: Progressing     Problem: Skin/Tissue Integrity  Goal: Skin integrity remains intact  Description: 1.  Monitor for areas of redness and/or skin breakdown  2.  Assess vascular access sites hourly  3.  Every 4-6 hours minimum:  Change oxygen saturation probe site  4.  Every 4-6 hours:  If on nasal continuous positive airway pressure, respiratory therapy assess nares and determine need for appliance change or resting period  3/19/2025 1953 by Franko Singh RN  Outcome: Progressing  3/19/2025 0928 by Shelia Astudillo RN  Outcome: Progressing  Flowsheets (Taken 3/18/2025 2030 by Xenia Oh RN)  Skin Integrity Remains Intact: Monitor for areas of redness and/or skin breakdown     Problem: Physical Therapy - Adult  Goal: By Discharge:  Performs mobility at highest level of function for planned discharge setting.  See evaluation for individualized goals.  Description: FUNCTIONAL STATUS PRIOR TO ADMISSION: Patient was independent and active without use of DME.    HOME SUPPORT PRIOR TO ADMISSION: The patient lived alone with family to provide assistance.    Physical Therapy Goals  Initiated 3/18/2025  Pt stated goal: Get better  Pt will be I with LE HEP in 7 days.  Pt will perform bed mobility with Modified [place] in 7 days.  Pt will perform transfers with Modified [place] in 7 days.   Pt will amb 100 feet with LRAD safely with Modified [place] in 7 days.  Pt will verbalize and demonstrate compliance with fall precautions  in 7 days.   Pt will demonstrate improvement in standing/gait balance from fair to good in 7 days.    3/19/2025 1326 by Rupinder Araya R, PT  Outcome: Progressing  3/19/2025 1323 by Rupinder Araya R, PT  Outcome: Progressing

## 2025-03-19 NOTE — PLAN OF CARE
PHYSICAL THERAPY TREATMENT     Patient: Bony Smith (59 y.o. male)  Date: 3/19/2025  Diagnosis: Hypoglycemia [E16.2]  Dizzinesses [R42] Dizzinesses      Precautions: Fall Risk, Other (Comment) (1-1)                      Recommendations for nursing mobility: Amb to bathroom with AD and gait belt and Amb in hallway    In place during session: Peripheral IV  Chart, physical therapy assessment, plan of care and goals were reviewed.  ASSESSMENT  Patient continues with skilled PT services and is progressing towards goals. Pt semi supine upon PT arrival, agreeable to session. Pt A&O x 2. (See below for objective details and assist levels).     Overall pt tolerated session fair today with PT.Patient able to perform bed mob, transfers Modified indep supine BP taken.101/72 with HR 70.Upon sitting patient reported he did not feel good,wanted to lay down.BP 98/68 HR 68.Patient than agreed to let therapy check BP in standing but standing also reported not feeling well. BP 76/65 and HR 78.patient required to return to bed.Patient remain indep physically.Uneasy with standing but no lob or off balance  at this time.Patient does ambulate to the door with supervision as per 1-1 CNA.  Will continue to benefit from skilled PT services, and will continue to progress as tolerated. Current PT DC recommendation Continue to assess pending progress /Await MHU adm.once medically appropriate.      GOALS:    Problem: Physical Therapy - Adult  Goal: By Discharge: Performs mobility at highest level of function for planned discharge setting.  See evaluation for individualized goals.  Description: FUNCTIONAL STATUS PRIOR TO ADMISSION: Patient was independent and active without use of DME.    HOME SUPPORT PRIOR TO ADMISSION: The patient lived alone with family to provide assistance.    Physical Therapy Goals  Initiated 3/18/2025  Pt stated goal: Get better  Pt will be I with LE HEP in 7 days.  Pt will perform bed mobility with

## 2025-03-19 NOTE — PROGRESS NOTES
Patient was seen for Re-Eval as per DR michel's request.Patient remain indep in mobility for supine>sit >stand.  Upon sitting at EOB patient reported \"I don't feel good,I need to lay back down\"  Agreeable to checking BP.  Supine   106/72 HR 70  Sitting      98/68           68  Standing  76/65      78.  Patient physically does not need  therapy.He is also able to walk to the door in his room.We will monitor patient if need be.

## 2025-03-19 NOTE — PROGRESS NOTES
Patient lives alone in [place], [place], but came to [facility] from [facility] due to hypotension and dizziness.     Patient is followed by psych and needs further behavioral health treatment, but refusing to go back to [facility]. [facility] will not take him back as well.     Patient remains on a 1:1 for suicidal ideations, safety.     Patient has worked with PT and is noted to be physically able to walk. BP improving for patient to go to 2 south today. CM will call Lou in [employer] to see if patient eligible today for 2 south.       1445 pm  Patient unable to go to 2 south today as they do not have 1:1 coverage.

## 2025-03-19 NOTE — BSMART NOTE
BSMART Liaison Team Note     LOS:  4     Patient goal(s) for today:  take medications as prescribed, make needs known in an appropriate manner, engage in supportive counseling as needed.     BSMART Liaison team focus/goals: assess needs, provide support and education, coordinate care, provided supportive counseling as needed.    Progress note: Liaison met with patient earlier today face to face  In medical room 477. Pt dressed in green gown with sitter at bedside.     Bony Smith denies SI to writer however sitter reported patient stated if he had a gun he would shoot himself in the head because he doesn't want to live. Patient denies HI/AVH answered most questions \"I don't know.\" Patient reported feeling anxious and depressed, per sitter his appetite and sleep are normal. Patient appears withdrawn and confused at times looking around. When sitter provides information, patient states \"I said that?\"    Barriers to Discharge: psychiatric clearance  Guns in the home: No     Outpatient provider(s):  none  Insurance info/prescription coverage:  Payor: [facility] MEDICAID / Plan: [facility] HEALTHKEEPERS PLUS / Product Type: *No Product type* /      Diagnosis:   Past Medical History:   Diagnosis Date    Anxiety     Blind right eye     Detached retina, right     Diverticulitis of intestine with abscess     Family history of diverticulitis of colon     mother and brother    Psychiatric disorder     Bipolar        Plan:  Spoke with Nurse leader Rupali, patient is medically cleared and noted to have Chronic low blood pressure with parameters in place. Pt is completely mobile-able to move around freely has walked with staff multiple times. UPDATE: Dr. Rivas has accepted patient to [facility] pending a 1:1.       Follow up Psych Consult placed? Yes .   Psychiatrist updated? Yes  , Rob      Participating treatment team members: Bony Smith, Lou Perdue, JAMISON-R

## 2025-03-19 NOTE — DISCHARGE SUMMARY
Physician Discharge Summary     Patient ID:    Bony Smith  [id number]  59 y.o.  1965    Admit date: 3/15/2025    Discharge date : 3/19/2025      Final Diagnoses:   Hypoglycemia [E16.2]  Dizzinesses [R42]  LV thrombus  Abdominal pain  History of diverticulitis  Bipolar disorder  Anxiety  BPH  Right renal cysts    Reason for Hospitalization: abdominal pain         Hospital Course:   Excerpted HPI from H&P of Shakeel Moralez MD:  CHIEF COMPLAINT: generalized abdominal pain     HISTORY OF PRESENT ILLNESS:     Bony Smith is a 59 y.o.  male with PMHx significant for bipolar disorder, anxiety, hx of diverticulitis, and BPH presents to ED from [facility] for generalized abdominal pain x months. Associated SOB. Former smoker. No ETOH and illicit drug use.      No CP, N/V/D, loss of appetite, fever, and chills.      Patient is a poor historian. Limited history      CTA abdomen pelvis showed Left ventricular apical filling defect concerning for thrombus redemonstrated. Consult cardiology. Echo ordered.     ED recommended to admit patient for further workup.      We were asked to admit for work up and evaluation of the above problems.     Patient is poor historian, offers limited history.  CTA A/P showed left ventricular apical filling defect concerning for thrombus.  Cardiology was consulted.  Patient started on heparin drip.  Echo ordered and pending results.  Troponin 33.  Psych consulted for active suicidal ideation.  Patient had requested patient  to kill him.  Currently on home medications for bipolar disorder and anxiety.  Of note patient was noted to have left ventricular thrombus on echocardiogram, started on heparin gtt., spoke to cardiology attending Dr. Douglass, who recommends patient can be discharged on Eliquis with close outpatient follow-up primary care physician as well as cardiology, plan was explained to the patient who is agreeable to current plan     3/17    No results for input(s): \"IRON\", \"TIBC\" in the last 72 hours.    Invalid input(s): \"PSAT\", \"FERR\"   No results for input(s): \"PH\", \"PCO2\", \"PO2\" in the last 72 hours.  No results for input(s): \"CKTOTAL\", \"CKMB\", \"TROPONINI\" in the last 72 hours.  Lab Results   Component Value Date/Time    POCGLU 89 03/15/2025 03:28 AM         Discharge time spent 35 minutes    Signed:  Pastora Araya MD  3/19/2025  12:23 PM

## 2025-03-20 ENCOUNTER — APPOINTMENT (OUTPATIENT)
Facility: HOSPITAL | Age: 60
DRG: 204 | End: 2025-03-20
Attending: INTERNAL MEDICINE
Payer: MEDICAID

## 2025-03-20 ENCOUNTER — TELEPHONE (OUTPATIENT)
Facility: CLINIC | Age: 60
End: 2025-03-20

## 2025-03-20 LAB
ANION GAP SERPL CALC-SCNC: 4 MMOL/L (ref 2–12)
APPEARANCE UR: ABNORMAL
BACTERIA URNS QL MICRO: NEGATIVE /HPF
BASOPHILS # BLD: 0.06 K/UL (ref 0–0.1)
BASOPHILS NFR BLD: 0.7 % (ref 0–1)
BILIRUB UR QL: NEGATIVE
BUN SERPL-MCNC: 16 MG/DL (ref 6–20)
BUN/CREAT SERPL: 19 (ref 12–20)
CA-I BLD-MCNC: 9.5 MG/DL (ref 8.5–10.1)
CHLORIDE SERPL-SCNC: 111 MMOL/L (ref 97–108)
CO2 SERPL-SCNC: 27 MMOL/L (ref 21–32)
COLOR UR: ABNORMAL
CORTIS AM PEAK SERPL-MCNC: 10.8 UG/DL (ref 4.3–22.45)
CREAT SERPL-MCNC: 0.84 MG/DL (ref 0.7–1.3)
DIFFERENTIAL METHOD BLD: ABNORMAL
EOSINOPHIL # BLD: 0.1 K/UL (ref 0–0.4)
EOSINOPHIL NFR BLD: 1.2 % (ref 0–7)
EPITH CASTS URNS QL MICRO: ABNORMAL /LPF
ERYTHROCYTE [DISTWIDTH] IN BLOOD BY AUTOMATED COUNT: 16.5 % (ref 11.5–14.5)
GLUCOSE SERPL-MCNC: 103 MG/DL (ref 65–100)
GLUCOSE UR STRIP.AUTO-MCNC: NEGATIVE MG/DL
HCT VFR BLD AUTO: 41.5 % (ref 36.6–50.3)
HGB BLD-MCNC: 13.8 G/DL (ref 12.1–17)
HGB UR QL STRIP: NEGATIVE
IMM GRANULOCYTES # BLD AUTO: 0.03 K/UL (ref 0–0.04)
IMM GRANULOCYTES NFR BLD AUTO: 0.4 % (ref 0–0.5)
KETONES UR QL STRIP.AUTO: NEGATIVE MG/DL
LEUKOCYTE ESTERASE UR QL STRIP.AUTO: NEGATIVE
LYMPHOCYTES # BLD: 2.72 K/UL (ref 0.8–3.5)
LYMPHOCYTES NFR BLD: 32.1 % (ref 12–49)
MCH RBC QN AUTO: 28.7 PG (ref 26–34)
MCHC RBC AUTO-ENTMCNC: 33.3 G/DL (ref 30–36.5)
MCV RBC AUTO: 86.3 FL (ref 80–99)
MONOCYTES # BLD: 0.46 K/UL (ref 0–1)
MONOCYTES NFR BLD: 5.4 % (ref 5–13)
NEUTS SEG # BLD: 5.11 K/UL (ref 1.8–8)
NEUTS SEG NFR BLD: 60.2 % (ref 32–75)
NITRITE UR QL STRIP.AUTO: NEGATIVE
NRBC # BLD: 0 K/UL (ref 0–0.01)
NRBC BLD-RTO: 0 PER 100 WBC
PH UR STRIP: 7 (ref 5–8)
PLATELET # BLD AUTO: 402 K/UL (ref 150–400)
PMV BLD AUTO: 10.3 FL (ref 8.9–12.9)
POTASSIUM SERPL-SCNC: 4.4 MMOL/L (ref 3.5–5.1)
PROT UR STRIP-MCNC: NEGATIVE MG/DL
RBC # BLD AUTO: 4.81 M/UL (ref 4.1–5.7)
RBC #/AREA URNS HPF: ABNORMAL /HPF (ref 0–5)
SODIUM SERPL-SCNC: 142 MMOL/L (ref 136–145)
SP GR UR REFRACTOMETRY: 1.02 (ref 1–1.03)
TROPONIN I SERPL HS-MCNC: 19 NG/L (ref 0–76)
TSH SERPL DL<=0.05 MIU/L-ACNC: 2.85 UIU/ML (ref 0.36–3.74)
URINE CULTURE IF INDICATED: ABNORMAL
UROBILINOGEN UR QL STRIP.AUTO: 2 EU/DL (ref 0.1–1)
WBC # BLD AUTO: 8.5 K/UL (ref 4.1–11.1)
WBC URNS QL MICRO: ABNORMAL /HPF (ref 0–4)

## 2025-03-20 PROCEDURE — 6370000000 HC RX 637 (ALT 250 FOR IP): Performed by: INTERNAL MEDICINE

## 2025-03-20 PROCEDURE — 71045 X-RAY EXAM CHEST 1 VIEW: CPT

## 2025-03-20 PROCEDURE — 2580000003 HC RX 258: Performed by: INTERNAL MEDICINE

## 2025-03-20 PROCEDURE — 74018 RADEX ABDOMEN 1 VIEW: CPT

## 2025-03-20 PROCEDURE — 80048 BASIC METABOLIC PNL TOTAL CA: CPT

## 2025-03-20 PROCEDURE — 84484 ASSAY OF TROPONIN QUANT: CPT

## 2025-03-20 PROCEDURE — 82088 ASSAY OF ALDOSTERONE: CPT

## 2025-03-20 PROCEDURE — 6370000000 HC RX 637 (ALT 250 FOR IP): Performed by: NURSE PRACTITIONER

## 2025-03-20 PROCEDURE — 6360000002 HC RX W HCPCS: Performed by: INTERNAL MEDICINE

## 2025-03-20 PROCEDURE — P9047 ALBUMIN (HUMAN), 25%, 50ML: HCPCS | Performed by: INTERNAL MEDICINE

## 2025-03-20 PROCEDURE — 6370000000 HC RX 637 (ALT 250 FOR IP)

## 2025-03-20 PROCEDURE — 82024 ASSAY OF ACTH: CPT

## 2025-03-20 PROCEDURE — 36415 COLL VENOUS BLD VENIPUNCTURE: CPT

## 2025-03-20 PROCEDURE — 2580000003 HC RX 258

## 2025-03-20 PROCEDURE — 82533 TOTAL CORTISOL: CPT

## 2025-03-20 PROCEDURE — 84443 ASSAY THYROID STIM HORMONE: CPT

## 2025-03-20 PROCEDURE — 85025 COMPLETE CBC W/AUTO DIFF WBC: CPT

## 2025-03-20 PROCEDURE — C8924 2D TTE W OR W/O FOL W/CON,FU: HCPCS

## 2025-03-20 PROCEDURE — 2060000000 HC ICU INTERMEDIATE R&B

## 2025-03-20 PROCEDURE — 6360000004 HC RX CONTRAST MEDICATION

## 2025-03-20 PROCEDURE — 81001 URINALYSIS AUTO W/SCOPE: CPT

## 2025-03-20 PROCEDURE — 84244 ASSAY OF RENIN: CPT

## 2025-03-20 RX ORDER — GAUZE BANDAGE 2" X 2"
100 BANDAGE TOPICAL DAILY
Status: DISCONTINUED | OUTPATIENT
Start: 2025-03-20 | End: 2025-03-28 | Stop reason: HOSPADM

## 2025-03-20 RX ORDER — 0.9 % SODIUM CHLORIDE 0.9 %
500 INTRAVENOUS SOLUTION INTRAVENOUS ONCE
Status: COMPLETED | OUTPATIENT
Start: 2025-03-20 | End: 2025-03-20

## 2025-03-20 RX ORDER — ALBUMIN (HUMAN) 12.5 G/50ML
25 SOLUTION INTRAVENOUS ONCE
Status: COMPLETED | OUTPATIENT
Start: 2025-03-20 | End: 2025-03-20

## 2025-03-20 RX ADMIN — THIAMINE HCL TAB 100 MG 100 MG: 100 TAB at 12:42

## 2025-03-20 RX ADMIN — FLUOXETINE HYDROCHLORIDE 40 MG: 20 CAPSULE ORAL at 08:36

## 2025-03-20 RX ADMIN — ALBUMIN (HUMAN) 25 G: 0.25 INJECTION, SOLUTION INTRAVENOUS at 13:54

## 2025-03-20 RX ADMIN — SODIUM CHLORIDE: 9 INJECTION, SOLUTION INTRAVENOUS at 18:14

## 2025-03-20 RX ADMIN — BREXPIPRAZOLE 2 MG: 1 TABLET ORAL at 08:36

## 2025-03-20 RX ADMIN — MIDODRINE HYDROCHLORIDE 10 MG: 5 TABLET ORAL at 19:56

## 2025-03-20 RX ADMIN — SULFUR HEXAFLUORIDE 5 ML: 60.7; .19; .19 INJECTION, POWDER, LYOPHILIZED, FOR SUSPENSION INTRAVENOUS; INTRAVESICAL at 14:40

## 2025-03-20 RX ADMIN — APIXABAN 5 MG: 5 TABLET, FILM COATED ORAL at 08:36

## 2025-03-20 RX ADMIN — MIDODRINE HYDROCHLORIDE 10 MG: 5 TABLET ORAL at 13:50

## 2025-03-20 RX ADMIN — SODIUM CHLORIDE 500 ML: 0.9 INJECTION, SOLUTION INTRAVENOUS at 10:55

## 2025-03-20 RX ADMIN — APIXABAN 5 MG: 5 TABLET, FILM COATED ORAL at 19:56

## 2025-03-20 RX ADMIN — MIDODRINE HYDROCHLORIDE 10 MG: 5 TABLET ORAL at 08:36

## 2025-03-20 RX ADMIN — SODIUM CHLORIDE: 9 INJECTION, SOLUTION INTRAVENOUS at 12:45

## 2025-03-20 RX ADMIN — MIRTAZAPINE 15 MG: 15 TABLET, ORALLY DISINTEGRATING ORAL at 19:56

## 2025-03-20 NOTE — PROGRESS NOTES
Charge nurse informed RN that Nursing Supervisor is inquiring about patient being discharged to behavioral unit. RN informed charge nurse and supervisor about CM's note and Hospitalist note about patient's discharge but also informed about day shift RN's endorsement regarding patient being hypotensive.    Ultimately, Dr. Rivas contacted and informed of patient's condition and clarified if MD wanted patient to be transferred to behavioral unit tonight or tomorrow. MD said to monitor patient tonight and evaluate tomorrow.    Patient stable and not in distress.    Patient monitored accordingly.    Endorsed.

## 2025-03-20 NOTE — CONSULTS
NAME:  Bony Smith   :   1965   MRN:   [id number]     ATTENDING: Shakeel Moralez MD  PCP:  Mikel Nelson MD    Date/Time:  3/20/2025       Subjective:   REQUESTING PHYSICIAN: Dr. Pastora Araya  REASON FOR CONSULT:    Hypotension, persistent    History of presenting illness: Patient is a 59-year-old male with past medical history of bipolar disorder, BPH, history of EtOH use admitted on 3/15/2025 with complaints of abdominal pain.  On initial workup patient found to have a left ventricular apical filling defect suggestive of thrombus started on heparin and followed by cardiology.  Patient had borderline hypotension and he has been complaining of dizziness with significant drop in systolic blood pressures with postural changes.  Orthostatic changes persisted despite use of midodrine and a nephrology consultation is requested for further evaluation and management.  Patient had a postural drop in blood pressure to 67/54 this morning, last blood pressure is 97/65.  Patient seen at bedside, he is alert awake comfortably laying in the bed complaining of dizziness on getting up.  Patient is a poor historian and limited verbal interaction, poor memory.  He admits to chronic EtOH use for many years in the past.  Patient was not on any antihypertensive medications prior to hospitalization.   No definite history of any hormonal disorders.  Patient admits to poor p.o. intake.        Past Medical History:   Diagnosis Date    Anxiety     Blind right eye     Detached retina, right     Diverticulitis of intestine with abscess     Family history of diverticulitis of colon     mother and brother    Psychiatric disorder     Bipolar      Past Surgical History:   Procedure Laterality Date    COLONOSCOPY N/A 2022    COLONOSCOPY WITH POLYPECTOMIES performed by Chevy Barroso MD at [facility] ENDOSCOPY    CT RETROPERITONEAL PERC DRAIN  2020    CT RETROPERITONEAL PERC DRAIN 2020 [facility] RAD CT    HEENT Right     Repair  Detached Retina    HERNIA REPAIR  2012    inguinal repair with mesh     Social History     Tobacco Use    Smoking status: Every Day     Current packs/day: 1.00     Types: Cigarettes    Smokeless tobacco: Never   Substance Use Topics    Alcohol use: Yes     Comment: rare      Family History   Problem Relation Age of Onset    Colon Polyps Maternal Grandfather         pre cancerous polyp removed       No Known Allergies   Prior to Admission medications    Medication Sig Start Date End Date Taking? Authorizing Provider   midodrine (PROAMATINE) 10 MG tablet Take 1 tablet by mouth in the morning, at noon, and at bedtime 3/18/25  Yes Pastora Araya MD   traZODone (DESYREL) 50 MG tablet Take 1 tablet by mouth nightly as needed for Sleep 3/17/25  Yes Cezar Garza MD   brexpiprazole (REXULTI) 2 MG TABS tablet Take 1 tablet by mouth daily 3/14/25   Cezar Garza MD   FLUoxetine (PROZAC) 40 MG capsule Take 1 capsule by mouth daily 3/14/25   Cezar Garza MD   hydrOXYzine pamoate (VISTARIL) 25 MG capsule Take 1 capsule by mouth 3 times daily as needed for Anxiety 3/14/25 3/28/25  Cezar Garza MD   mirtazapine (REMERON) 15 MG tablet Take 1 tablet by mouth nightly 3/14/25   Cezar Garza MD   tamsulosin (FLOMAX) 0.4 MG capsule Take 1 capsule by mouth daily 3/14/25   Cezar Garza MD   apixaban (ELIQUIS) 5 MG TABS tablet Take 1 tablet by mouth 2 times daily 3/14/25   Cezar Garza MD     Current Facility-Administered Medications   Medication Dose Route Frequency    sodium chloride 0.9 % bolus 500 mL  500 mL IntraVENous Once    midodrine (PROAMATINE) tablet 10 mg  10 mg Oral TID    acetaminophen (TYLENOL) tablet 650 mg  650 mg Oral Q4H PRN    apixaban (ELIQUIS) tablet 5 mg  5 mg Oral BID    brexpiprazole (REXULTI) tablet 2 mg  2 mg Oral Daily    FLUoxetine (PROZAC) capsule 40 mg  40 mg Oral Daily    magnesium hydroxide (MILK OF  use: Possible autonomic neuropathy  Add thiamine 100mg daily, folate level high    DC plan based on clinical response to above measures      Thank you for the consult    Signed: Rikki Miguel MD

## 2025-03-20 NOTE — BH NOTE
Progress note for 2025 patient seen for follow-up chart reviewed spoke with the one-to-one staff and Access staff.  Patient is still depressed disheveled anti-D apathetic withdrawn low energy level apparently told the staff that if he had a shoot gun he will shoot himself patient did acknowledge depression since his from [place] he got a house the however his wife  son  in vehicular accident and some friends some support.  Consideration given for transfer to the behavioral health unit however his blood pressure fluctuates even though it was chronic his blood pressure goes as low as 76/65 with feeling uneasy when he is getting physical therapy says he is uneasy and want to go to bed and rest.  I believe he needs one-to-one supervision and his safety risk of fall is a significant concern with the blood pressure going as low as 76/65 we will reassess the case tomorrow in the event he needed one-to-one supervision thank you supportive therapy and consider Lexapro 5 mg thank you

## 2025-03-20 NOTE — PROGRESS NOTES
Hospitalist Progress Note    NAME: Bony Smith   : 1965   MRN: [id number]     Date/Time: 3/20/2025 11:06 AM  Patient PCP: Mikel Nelson MD    Estimated discharge date: 48  Barriers: Echo results, psych consult, active SI    Hospital Course:  Bony Smith is a 59 y.o.  male with PMHx significant for bipolar disorder, anxiety, hx of diverticulitis, and BPH.  He presents to the ED from [facility] for generalized abdominal pain for months.  Pain is associated with shortness of breath.  Patient is a former smoker, no EtOH or illicit drug use.  Denies chest pain, N/V/D, loss of appetite, fever, chills.  Patient is poor historian, offers limited history.  CTA A/P showed left ventricular apical filling defect concerning for thrombus.  Cardiology was consulted.  Patient started on heparin drip.  Echo ordered and pending results.  Troponin 33.  Psych consulted for active suicidal ideation.  Patient had requested patient  to kill him.  Currently on home medications for bipolar disorder and anxiety.  Of note patient was noted to have left ventricular thrombus on echocardiogram, started on heparin gtt., spoke to cardiology attending Dr. Douglass, who recommends patient can be discharged on Eliquis with close outpatient follow-up primary care physician as well as cardiology, plan was explained to the patient who is agreeable to current plan.  However, patient noted to have ongoing orthostatic hypotension despite volume resuscitation and with scheduled midodrine.  Therefore, cardiology was reconsulted and repeat echo was ordered.  At the same time, nephrology was consulted.  Currently, cortisol and ACTH were ordered.  Nephrology will do further workup.  Otherwise, patient was given albumin.       Assessment / Plan:    Orthostatic hypotension  -Noted this AM once again despite scheduled midodrine  -Good oral intake but did order bolus  -Troponin ordered  -UA pending. No signs of sepsis so  will hold off on further workup   -Will need additional workup including cortisol and ACT  -Nephrology consulted and further workup ordered as well including renin/aldosterone    LV thrombus on CT/CTA  Low-density thrombus in the left ventricular apex, 2.4 x 1.5 x 4.2 cm  Troponin WNL 33  EKG negative for STEMI  Continue heparin drip  Echo 3/13: EF 40 to 45%, regional wall motion abnormalities, hypokinesis of apical anterior, inferior, lateral, septal aspect, normal diastolic function, 4 cm thrombus in apex  Cardiology re-consulted given recurrent orthostatic hypotension despite adequate oral intake and midodrine scheduled    Abdominal pain  Hx diverticulitis  CT A/P - no dilatation or wall thickening of small bowel or colon, scattered colonic diverticula, no evidence of acute diverticulitis  Liver panel unremarkable  -KUB ordered as reports mild abdominal pain, but then states he does not have it? Unclear history. Tolerating PO. No n/v or BM changes. Nurse states was running up and down the hallway earlier     Bipolar disorder  Anxiety  Patient on Rexulti, Cymbalta, Prozac, Zyprexa, Remeron  Psych consulted, active suicidal ideation    BPH  Continue Flomax    Right renal cysts  No further imaging eval recommended    Code Status: Full  DVT Prophylaxis: Heparin drip  GI Prophylaxis:    Subjective:   Chief Complaint: Abdominal pain      This morning, patient has significant low blood pressures of 67/54.  Patient was given midodrine and was running up and down the hallways okay per nursing.  He has had good oral intake per report.  Patient states that he does have some mild abdominal pain that is generalized.  Will get a KUB.  Per nursing, he has been eating and drinking okay with no issues.  Regular bowel movements.  Patient states he is passing gas.  Denies headache, chest pain/palpitations, shortness of breath, Alex pain, urinary symptoms.  Objective:     VITALS:   Last 24hrs VS reviewed since prior progress  note. Most recent are:  Patient Vitals for the past 24 hrs:   BP Temp Temp src Pulse Resp SpO2 Weight   03/20/25 0730 97/65 -- -- -- -- -- --   03/20/25 0721 (!) 67/54 -- -- -- -- -- --   03/20/25 0719 (!) 77/52 -- -- 68 -- 94 % --   03/20/25 0716 92/66 97.3 °F (36.3 °C) Oral 59 18 92 % --   03/20/25 0700 -- -- -- 63 -- -- --   03/20/25 0545 -- -- -- -- -- -- 76 kg (167 lb 8.8 oz)   03/20/25 0251 98/70 97.7 °F (36.5 °C) Oral 53 20 96 % --   03/20/25 0015 100/74 98.4 °F (36.9 °C) Oral 58 20 96 % --   03/20/25 0002 -- -- -- 52 -- -- --   03/19/25 1921 100/71 98.2 °F (36.8 °C) Oral 59 22 95 % --   03/19/25 1546 93/75 98.2 °F (36.8 °C) Oral 74 18 97 % --   03/19/25 1503 -- -- -- 66 -- -- --   03/19/25 1124 94/70 97.5 °F (36.4 °C) Oral 66 18 93 % --         Intake/Output Summary (Last 24 hours) at 3/20/2025 1106  Last data filed at 3/20/2025 0938  Gross per 24 hour   Intake 947 ml   Output 100 ml   Net 847 ml        I had a face to face encounter and independently examined this patient on 3/20/2025, as outlined below:    Review of Systems   Constitutional: Negative.    Respiratory:  Positive for shortness of breath.    Cardiovascular: Negative.    Gastrointestinal:  Positive for abdominal pain.   Genitourinary: Negative.    Musculoskeletal: Negative.    Neurological: Negative.    Psychiatric/Behavioral:  Positive for suicidal ideas.         PHYSICAL EXAM:  Physical Exam  Constitutional:       General: He is not in acute distress.  Cardiovascular:      Rate and Rhythm: Normal rate.   Pulmonary:      Effort: Pulmonary effort is normal.      Breath sounds: Normal breath sounds.   Abdominal:      General: Bowel sounds are normal.      Palpations: Abdomen is soft.      Tenderness: There is abdominal tenderness (Generalized).   Skin:     General: Skin is warm and dry.   Neurological:      Mental Status: Mental status is at baseline.   Psychiatric:         Mood and Affect: Affect is flat.         Thought Content: Thought

## 2025-03-20 NOTE — PROGRESS NOTES
OT eval order received and acknowledged. Per PT, pt  demonstrating baseline independence for self care tasks and functional mobility/transfers, has been ambulating to door and back. No need for skilled OT services at this time. OT evaluation order will therefore be discontinued this pt has no acute OT needs. Please reorder OT if pt's functional status changes. Thank you.

## 2025-03-21 LAB
ALBUMIN SERPL-MCNC: 3.2 G/DL (ref 3.5–5)
ANION GAP SERPL CALC-SCNC: 2 MMOL/L (ref 2–12)
BASOPHILS # BLD: 0.05 K/UL (ref 0–0.1)
BASOPHILS NFR BLD: 0.5 % (ref 0–1)
BUN SERPL-MCNC: 13 MG/DL (ref 6–20)
BUN/CREAT SERPL: 17 (ref 12–20)
CA-I BLD-MCNC: 9.2 MG/DL (ref 8.5–10.1)
CHLORIDE SERPL-SCNC: 113 MMOL/L (ref 97–108)
CO2 SERPL-SCNC: 27 MMOL/L (ref 21–32)
CORTIS SERPL-MCNC: 3.6 UG/DL
CREAT SERPL-MCNC: 0.78 MG/DL (ref 0.7–1.3)
DIFFERENTIAL METHOD BLD: ABNORMAL
ECHO BSA: 1.95 M2
ECHO LV EF PHYSICIAN: 35 %
EOSINOPHIL # BLD: 0.06 K/UL (ref 0–0.4)
EOSINOPHIL NFR BLD: 0.6 % (ref 0–7)
ERYTHROCYTE [DISTWIDTH] IN BLOOD BY AUTOMATED COUNT: 16.1 % (ref 11.5–14.5)
GLUCOSE SERPL-MCNC: 101 MG/DL (ref 65–100)
HCT VFR BLD AUTO: 39.4 % (ref 36.6–50.3)
HGB BLD-MCNC: 12.8 G/DL (ref 12.1–17)
IMM GRANULOCYTES # BLD AUTO: 0.04 K/UL (ref 0–0.04)
IMM GRANULOCYTES NFR BLD AUTO: 0.4 % (ref 0–0.5)
INR PPP: 1.2 (ref 0.9–1.1)
LYMPHOCYTES # BLD: 2.14 K/UL (ref 0.8–3.5)
LYMPHOCYTES NFR BLD: 21.8 % (ref 12–49)
MCH RBC QN AUTO: 28.1 PG (ref 26–34)
MCHC RBC AUTO-ENTMCNC: 32.5 G/DL (ref 30–36.5)
MCV RBC AUTO: 86.4 FL (ref 80–99)
MONOCYTES # BLD: 0.5 K/UL (ref 0–1)
MONOCYTES NFR BLD: 5.1 % (ref 5–13)
NEUTS SEG # BLD: 7.01 K/UL (ref 1.8–8)
NEUTS SEG NFR BLD: 71.6 % (ref 32–75)
NRBC # BLD: 0 K/UL (ref 0–0.01)
NRBC BLD-RTO: 0 PER 100 WBC
PHOSPHATE SERPL-MCNC: 2.8 MG/DL (ref 2.6–4.7)
PLATELET # BLD AUTO: 379 K/UL (ref 150–400)
PMV BLD AUTO: 10.7 FL (ref 8.9–12.9)
POTASSIUM SERPL-SCNC: 3.9 MMOL/L (ref 3.5–5.1)
PROTHROMBIN TIME: 15.4 SEC (ref 11.9–14.6)
RBC # BLD AUTO: 4.56 M/UL (ref 4.1–5.7)
SODIUM SERPL-SCNC: 142 MMOL/L (ref 136–145)
WBC # BLD AUTO: 9.8 K/UL (ref 4.1–11.1)

## 2025-03-21 PROCEDURE — 6370000000 HC RX 637 (ALT 250 FOR IP): Performed by: NURSE PRACTITIONER

## 2025-03-21 PROCEDURE — 6370000000 HC RX 637 (ALT 250 FOR IP)

## 2025-03-21 PROCEDURE — 36415 COLL VENOUS BLD VENIPUNCTURE: CPT

## 2025-03-21 PROCEDURE — 87040 BLOOD CULTURE FOR BACTERIA: CPT

## 2025-03-21 PROCEDURE — 6360000002 HC RX W HCPCS

## 2025-03-21 PROCEDURE — 6370000000 HC RX 637 (ALT 250 FOR IP): Performed by: INTERNAL MEDICINE

## 2025-03-21 PROCEDURE — 2060000000 HC ICU INTERMEDIATE R&B

## 2025-03-21 PROCEDURE — 85025 COMPLETE CBC W/AUTO DIFF WBC: CPT

## 2025-03-21 PROCEDURE — 2580000003 HC RX 258: Performed by: INTERNAL MEDICINE

## 2025-03-21 PROCEDURE — 6370000000 HC RX 637 (ALT 250 FOR IP): Performed by: PSYCHIATRY & NEUROLOGY

## 2025-03-21 PROCEDURE — 85610 PROTHROMBIN TIME: CPT

## 2025-03-21 PROCEDURE — 80069 RENAL FUNCTION PANEL: CPT

## 2025-03-21 RX ORDER — OLANZAPINE 5 MG/1
5 TABLET, ORALLY DISINTEGRATING ORAL NIGHTLY
Status: DISCONTINUED | OUTPATIENT
Start: 2025-03-21 | End: 2025-03-28 | Stop reason: HOSPADM

## 2025-03-21 RX ORDER — ENOXAPARIN SODIUM 100 MG/ML
1 INJECTION SUBCUTANEOUS 2 TIMES DAILY
Status: DISCONTINUED | OUTPATIENT
Start: 2025-03-21 | End: 2025-03-28 | Stop reason: HOSPADM

## 2025-03-21 RX ORDER — HEPARIN SODIUM 1000 [USP'U]/ML
4000 INJECTION, SOLUTION INTRAVENOUS; SUBCUTANEOUS ONCE
Status: CANCELLED | OUTPATIENT
Start: 2025-03-21 | End: 2025-03-21

## 2025-03-21 RX ORDER — HEPARIN SODIUM 1000 [USP'U]/ML
2000 INJECTION, SOLUTION INTRAVENOUS; SUBCUTANEOUS PRN
Status: CANCELLED | OUTPATIENT
Start: 2025-03-21

## 2025-03-21 RX ORDER — WARFARIN SODIUM 5 MG/1
TABLET ORAL DAILY
Status: CANCELLED | OUTPATIENT
Start: 2025-03-21

## 2025-03-21 RX ORDER — HEPARIN SODIUM 1000 [USP'U]/ML
40 INJECTION, SOLUTION INTRAVENOUS; SUBCUTANEOUS PRN
Status: DISCONTINUED | OUTPATIENT
Start: 2025-03-21 | End: 2025-03-21

## 2025-03-21 RX ORDER — HEPARIN SODIUM 1000 [USP'U]/ML
80 INJECTION, SOLUTION INTRAVENOUS; SUBCUTANEOUS ONCE
Status: DISCONTINUED | OUTPATIENT
Start: 2025-03-21 | End: 2025-03-21 | Stop reason: SDUPTHER

## 2025-03-21 RX ORDER — HEPARIN SODIUM 1000 [USP'U]/ML
80 INJECTION, SOLUTION INTRAVENOUS; SUBCUTANEOUS PRN
Status: DISCONTINUED | OUTPATIENT
Start: 2025-03-21 | End: 2025-03-21

## 2025-03-21 RX ORDER — DULOXETIN HYDROCHLORIDE 30 MG/1
30 CAPSULE, DELAYED RELEASE ORAL DAILY
Status: DISCONTINUED | OUTPATIENT
Start: 2025-03-21 | End: 2025-03-28 | Stop reason: HOSPADM

## 2025-03-21 RX ORDER — HEPARIN SODIUM 1000 [USP'U]/ML
4000 INJECTION, SOLUTION INTRAVENOUS; SUBCUTANEOUS PRN
Status: CANCELLED | OUTPATIENT
Start: 2025-03-21

## 2025-03-21 RX ORDER — HEPARIN SODIUM 10000 [USP'U]/100ML
5-30 INJECTION, SOLUTION INTRAVENOUS CONTINUOUS
Status: DISCONTINUED | OUTPATIENT
Start: 2025-03-21 | End: 2025-03-21

## 2025-03-21 RX ORDER — WARFARIN SODIUM 5 MG/1
7.5 TABLET ORAL
Status: COMPLETED | OUTPATIENT
Start: 2025-03-21 | End: 2025-03-21

## 2025-03-21 RX ORDER — HEPARIN SODIUM 10000 [USP'U]/100ML
5-30 INJECTION, SOLUTION INTRAVENOUS CONTINUOUS
Status: CANCELLED | OUTPATIENT
Start: 2025-03-21

## 2025-03-21 RX ORDER — BUSPIRONE HYDROCHLORIDE 5 MG/1
7.5 TABLET ORAL 2 TIMES DAILY
Status: DISCONTINUED | OUTPATIENT
Start: 2025-03-21 | End: 2025-03-28 | Stop reason: HOSPADM

## 2025-03-21 RX ADMIN — SODIUM CHLORIDE: 9 INJECTION, SOLUTION INTRAVENOUS at 02:34

## 2025-03-21 RX ADMIN — BUSPIRONE HYDROCHLORIDE 7.5 MG: 5 TABLET ORAL at 20:49

## 2025-03-21 RX ADMIN — MIRTAZAPINE 15 MG: 15 TABLET, ORALLY DISINTEGRATING ORAL at 20:49

## 2025-03-21 RX ADMIN — MIDODRINE HYDROCHLORIDE 10 MG: 5 TABLET ORAL at 14:00

## 2025-03-21 RX ADMIN — FLUOXETINE HYDROCHLORIDE 40 MG: 20 CAPSULE ORAL at 08:26

## 2025-03-21 RX ADMIN — WARFARIN SODIUM 7.5 MG: 5 TABLET ORAL at 20:50

## 2025-03-21 RX ADMIN — MIDODRINE HYDROCHLORIDE 10 MG: 5 TABLET ORAL at 08:26

## 2025-03-21 RX ADMIN — APIXABAN 5 MG: 5 TABLET, FILM COATED ORAL at 08:27

## 2025-03-21 RX ADMIN — ENOXAPARIN SODIUM 80 MG: 100 INJECTION SUBCUTANEOUS at 20:52

## 2025-03-21 RX ADMIN — THIAMINE HCL TAB 100 MG 100 MG: 100 TAB at 08:27

## 2025-03-21 RX ADMIN — OLANZAPINE 5 MG: 5 TABLET, ORALLY DISINTEGRATING ORAL at 20:49

## 2025-03-21 RX ADMIN — DULOXETINE HYDROCHLORIDE 30 MG: 30 CAPSULE, DELAYED RELEASE ORAL at 15:35

## 2025-03-21 RX ADMIN — MIDODRINE HYDROCHLORIDE 10 MG: 5 TABLET ORAL at 20:48

## 2025-03-21 ASSESSMENT — PAIN SCALES - GENERAL
PAINLEVEL_OUTOF10: 0
PAINLEVEL_OUTOF10: 0

## 2025-03-21 NOTE — BSMART NOTE
BSMART Liaison Team Note     LOS:  6     Patient goal(s) for today:  take medications as prescribed, make needs known in an appropriate manner, engage in supportive counseling as needed.     BSMART Liaison team focus/goals: assess needs, provide support and education, coordinate care, provided supportive counseling as needed.    Progress note: Liaison met with patient face to face in medical room 482 with sitjeannette and Dr. Rivas. Patient dressed in green gown laying in bed, presents withdrawn.     Bony denies SI/HI however endorsing AVH heard sister voice and cursed her out (believes she is dead, stated he only had one sister named Ruth). Patient reported anxiety 10/10 due to being admitted to the hospital, appetite I about 1 meal a day and not getting sleep. Patient is having issues with his memory. Per Dr. Rivas request, patient walked around his room without assistance.     Barriers to Discharge: medical and psychiatric clearance  Guns in the home: No     Outpatient provider(s):  none  Insurance info/prescription coverage:  Payor: [facility] MEDICAID /  /  /      Diagnosis:   Past Medical History:   Diagnosis Date    Anxiety     Blind right eye     Detached retina, right     Diverticulitis of intestine with abscess     Family history of diverticulitis of colon     mother and brother    Psychiatric disorder     Bipolar        Plan:  Per Nurse Welch, patient is receiving 3L of fluid by IV from Nephrology. Dr. Rivas will be making adjustments to medications.     Follow up Psych Consult placed? Yes .   Psychiatrist updated? Yes  , Rob      Participating treatment team members: Bony Smith, Lou Perdue, [facility]

## 2025-03-21 NOTE — CONSULTS
Cardiology Consult    NAME: Bony Smith   :  1965   MRN:  [id number]     Date/Time:  3/21/2025 8:32 AM    Patient PCP: None, None  ________________________________________________________________________    Problem List  Admitted to the hospital from another facility with blood pressure of 67 over 54 mmHg.  -Patient was recently in our hospital and diagnosed with LV thrombus and was discharged on anticoagulation.  -Cardiomyopathy with LV function of 45% as of 3/13/2025  -LV thrombus present  -Psych disorder  -Bipolar disorder  -Blindness in the right eye       Assessment and Plan:   Note dictated 2025  -59-year-old white male admitted to the hospital from psych facility where he was admitted recently with LV thrombus readmitted to the hospital with hypotension.  -When I saw the patient he was lying comfortably in the bed and denies any symptoms of chest pain shortness of breath or any other anginal equivalent.  -Reduced LV function of 45% as of 2025  -No known established coronary artery disease  -Will request pharmacy for LV thrombus management with Coumadin rather than Eliquis as based on my current clinical knowledge Coumadin is better than Eliquis for intracardiac thrombus resolution.  -No further cardiovascular testing based on my current clinical assessment and will continue current medical management.  -Thank you for the consultation and letting me participate in the care of your patient.      []        High complexity decision making was performed        Subjective:   CHIEF COMPLAINT: Hypotension      REASON FOR CONSULT: Intracardiac thrombus      HISTORY OF PRESENT ILLNESS:     Bony Smith is a 59 y.o. White (non-) male who has no known established coronary artery disease and readmitted to the hospital with hypotension.  Patient was discharged with a diagnosis of intracardiac thrombus and is started on anticoagulation.  Based on my current clinical  Negative mg/dL    Glucose, Ur Negative Negative mg/dL    Ketones, Urine Negative Negative mg/dL    Bilirubin, Urine Negative Negative      Blood, Urine Negative Negative      Urobilinogen, Urine 2.0 (H) 0.1 - 1.0 EU/dL    Nitrite, Urine Negative Negative      Leukocyte Esterase, Urine Negative Negative      WBC, UA 0-4 0 - 4 /hpf    RBC, UA 0-5 0 - 5 /hpf    Epithelial Cells, UA Few Few /lpf    BACTERIA, URINE Negative Negative /hpf    Urine Culture if Indicated Culture not indicated by UA result Culture not indicated by UA result          PAU CAR MD

## 2025-03-21 NOTE — PROGRESS NOTES
Hospitalist Progress Note    NAME: Bony Smith   : 1965   MRN: [id number]     Date/Time: 3/21/2025 6:42 PM  Patient PCP: None, None    Estimated discharge date: 48  Barriers: Echo results, psych consult, active SI    Hospital Course:  Bony Smith is a 59 y.o.  male with PMHx significant for bipolar disorder, anxiety, hx of diverticulitis, and BPH.  He presents to the ED from [facility] for generalized abdominal pain for months.  Pain is associated with shortness of breath.  Patient is a former smoker, no EtOH or illicit drug use.  Denies chest pain, N/V/D, loss of appetite, fever, chills.  Patient is poor historian, offers limited history.  CTA A/P showed left ventricular apical filling defect concerning for thrombus.  Cardiology was consulted.  Patient started on heparin drip.  Echo ordered and pending results.  Troponin 33.  Psych consulted for active suicidal ideation.  Patient had requested patient  to kill him.  Currently on home medications for bipolar disorder and anxiety.  Of note patient was noted to have left ventricular thrombus on echocardiogram, started on heparin gtt., spoke to cardiology attending Dr. Douglass, who recommends patient can be discharged on Eliquis with close outpatient follow-up primary care physician as well as cardiology, plan was explained to the patient who is agreeable to current plan.  However, patient noted to have ongoing orthostatic hypotension despite volume resuscitation and with scheduled midodrine.  Therefore, cardiology was reconsulted and repeat echo was ordered.  At the same time, nephrology was consulted.  Currently, cortisol and ACTH were ordered.  Nephrology will do further workup.  Otherwise, patient was given albumin.       Assessment / Plan:    Orthostatic hypotension  -Noted this AM once again despite scheduled midodrine  -Good oral intake but did order bolus  -Troponin ordered  -UA pending. No signs of sepsis so will hold  as documented above    [x] Imaging personally reviewed and interpreted, includes: CT A/P  [x] Data Review (any 3)  [x] All available Consultant notes from yesterday/today were reviewed  [x] All current labs were reviewed and interpreted for clinical significance   [x] Appropriate follow-up labs were ordered  [x] Collateral history obtained from: Patient     Procedures: see electronic medical records for all procedures/Xrays and details which were not copied into this note but were reviewed prior to creation of Plan.    Reviewed most current lab test results and cultures  YES  Reviewed most current radiology test results   YES  Review and summation of old records today    NO  Reviewed patient's current orders and MAR    YES  PMH/ reviewed - no change compared to H&P  >50% of visit spent in counseling and coordination of care  ________________________________________________________________________  Total NON critical care TIME:  35  Minutes    Signed: Pastora Araya MD

## 2025-03-21 NOTE — PROGRESS NOTES
Renal Progress Note    Patient: Bony Smith MRN: [id number]  SSN: [id number]    YOB: 1965  Age: 59 y.o.  Sex: male      Admit Date: 3/15/2025    LOS: 6 days     Subjective:   Patient seen at bedside. Alert and awake, no acute distress.   Patient is not giving any new complaints today.   Remains hypotensive with SBPs in 90s but no orthostatic changes today      Current Facility-Administered Medications   Medication Dose Route Frequency    sodium chloride 0.9 % 1,000 mL infusion   IntraVENous Continuous    warfarin placeholder: dosing by pharmacy   Oral RX Placeholder    thiamine mononitrate tablet 100 mg  100 mg Oral Daily    midodrine (PROAMATINE) tablet 10 mg  10 mg Oral TID    acetaminophen (TYLENOL) tablet 650 mg  650 mg Oral Q4H PRN    FLUoxetine (PROZAC) capsule 40 mg  40 mg Oral Daily    magnesium hydroxide (MILK OF MAGNESIA) 400 MG/5ML suspension 30 mL  30 mL Oral Daily PRN    mirtazapine (REMERON SOL-TAB) disintegrating tablet 15 mg  15 mg Oral Nightly        Vitals:    03/21/25 0615 03/21/25 0700 03/21/25 0927 03/21/25 1112   BP:   98/76 99/64   Pulse:  63 59 57   Resp:   18 18   Temp:   98.2 °F (36.8 °C) 97.5 °F (36.4 °C)   TempSrc:       SpO2:   95% 96%   Weight: 76 kg (167 lb 8.8 oz)      Height:         Objective:   General: alert awake well-oriented, no acute distress.  HEENT: EOMI, no Icterus, no Pallor,  mucosa moist.  Neck: Neck is supple, No JVD  Lungs: breathsounds normal, no respiratory distress on inspection, no rhonchi, no rales,   CVS: heart sounds normal, regular rate and rhythm, no murmurs, no rubs.   GI: soft, nontender, normal BS.  Extremeties: no cyanosis, no edema,   Neuro: Alert, awake, oriented x3, No new focal deficits, moving all extremeties well.   Skin: normal skin turgor, no skin rashes.        Intake and Output:  Current Shift: 03/21 0701 - 03/21 1900  In: 240 [P.O.:240]  Out: 550 [Urine:550]  Last three shifts: 03/19 1901 - 03/21 0700  In: 945  saline at 75 ml per hour for 1 more day  Check urine random electrolytes-pending    random cortisol 10.8   plasma renin aldosterone--pending,   TSH level is normal     Hypoalbuminemia noted, received 1 dose of 25 g of IV albumin 3/20, albumin 3.2 today     2.  Suspected cardiac thrombus/left apical mass  On anticoagulation and cardiac evaluations pending  Blood cultures pending     3.  CHF: Low EF of 40 to 45% with left ventricular dilation, ??  Alcoholic cardiomyopathy  No evidence of any fluid overload at this time  Recommend to give IV hydration at 75 for 1 more day  Cardiology following     4.  History of chronic EtOH use: Possible autonomic neuropathy  Added thiamine 100mg daily, folate level high    Plan discussed with Dr. Araya    Signed By: Rikki Miguel MD     March 21, 2025

## 2025-03-21 NOTE — PROGRESS NOTES
Patient not able to go to the [facility] per psych. Patient meds have been changed around to help improve hypotension.     CM will continue to follow and with BSMART and psych support find placement for this patient if needed.

## 2025-03-22 ENCOUNTER — APPOINTMENT (OUTPATIENT)
Facility: HOSPITAL | Age: 60
DRG: 204 | End: 2025-03-22
Attending: INTERNAL MEDICINE
Payer: MEDICAID

## 2025-03-22 LAB
ACTH PLAS-MCNC: 8.2 PG/ML (ref 7.2–63.3)
ALBUMIN SERPL-MCNC: 3 G/DL (ref 3.5–5)
ANION GAP SERPL CALC-SCNC: 5 MMOL/L (ref 2–12)
BASOPHILS # BLD: 0.04 K/UL (ref 0–0.1)
BASOPHILS NFR BLD: 0.4 % (ref 0–1)
BUN SERPL-MCNC: 11 MG/DL (ref 6–20)
BUN/CREAT SERPL: 14 (ref 12–20)
CA-I BLD-MCNC: 8.4 MG/DL (ref 8.5–10.1)
CHLORIDE SERPL-SCNC: 115 MMOL/L (ref 97–108)
CHLORIDE UR-SCNC: 176 MMOL/L
CO2 SERPL-SCNC: 24 MMOL/L (ref 21–32)
CREAT SERPL-MCNC: 0.79 MG/DL (ref 0.7–1.3)
DIFFERENTIAL METHOD BLD: ABNORMAL
EOSINOPHIL # BLD: 0.16 K/UL (ref 0–0.4)
EOSINOPHIL NFR BLD: 1.5 % (ref 0–7)
ERYTHROCYTE [DISTWIDTH] IN BLOOD BY AUTOMATED COUNT: 15.9 % (ref 11.5–14.5)
GLUCOSE SERPL-MCNC: 75 MG/DL (ref 65–100)
HCT VFR BLD AUTO: 40.7 % (ref 36.6–50.3)
HGB BLD-MCNC: 13.5 G/DL (ref 12.1–17)
IMM GRANULOCYTES # BLD AUTO: 0.04 K/UL (ref 0–0.04)
IMM GRANULOCYTES NFR BLD AUTO: 0.4 % (ref 0–0.5)
INR PPP: 1.2 (ref 0.9–1.1)
LYMPHOCYTES # BLD: 3.4 K/UL (ref 0.8–3.5)
LYMPHOCYTES NFR BLD: 32.6 % (ref 12–49)
MCH RBC QN AUTO: 28.3 PG (ref 26–34)
MCHC RBC AUTO-ENTMCNC: 33.2 G/DL (ref 30–36.5)
MCV RBC AUTO: 85.3 FL (ref 80–99)
MONOCYTES # BLD: 0.6 K/UL (ref 0–1)
MONOCYTES NFR BLD: 5.8 % (ref 5–13)
NEUTS SEG # BLD: 6.18 K/UL (ref 1.8–8)
NEUTS SEG NFR BLD: 59.3 % (ref 32–75)
NRBC # BLD: 0 K/UL (ref 0–0.01)
NRBC BLD-RTO: 0 PER 100 WBC
PHOSPHATE SERPL-MCNC: 3.2 MG/DL (ref 2.6–4.7)
PLATELET # BLD AUTO: 381 K/UL (ref 150–400)
PMV BLD AUTO: 10.7 FL (ref 8.9–12.9)
POTASSIUM SERPL-SCNC: 3.8 MMOL/L (ref 3.5–5.1)
PROTHROMBIN TIME: 15.1 SEC (ref 11.9–14.6)
RBC # BLD AUTO: 4.77 M/UL (ref 4.1–5.7)
SODIUM SERPL-SCNC: 144 MMOL/L (ref 136–145)
SODIUM UR-SCNC: 149 MMOL/L
WBC # BLD AUTO: 10.4 K/UL (ref 4.1–11.1)

## 2025-03-22 PROCEDURE — 2580000003 HC RX 258

## 2025-03-22 PROCEDURE — 80069 RENAL FUNCTION PANEL: CPT

## 2025-03-22 PROCEDURE — 82436 ASSAY OF URINE CHLORIDE: CPT

## 2025-03-22 PROCEDURE — 85025 COMPLETE CBC W/AUTO DIFF WBC: CPT

## 2025-03-22 PROCEDURE — 36415 COLL VENOUS BLD VENIPUNCTURE: CPT

## 2025-03-22 PROCEDURE — 74176 CT ABD & PELVIS W/O CONTRAST: CPT

## 2025-03-22 PROCEDURE — 6360000002 HC RX W HCPCS

## 2025-03-22 PROCEDURE — 6370000000 HC RX 637 (ALT 250 FOR IP): Performed by: INTERNAL MEDICINE

## 2025-03-22 PROCEDURE — 6370000000 HC RX 637 (ALT 250 FOR IP)

## 2025-03-22 PROCEDURE — 2060000000 HC ICU INTERMEDIATE R&B

## 2025-03-22 PROCEDURE — 6370000000 HC RX 637 (ALT 250 FOR IP): Performed by: PSYCHIATRY & NEUROLOGY

## 2025-03-22 PROCEDURE — 6370000000 HC RX 637 (ALT 250 FOR IP): Performed by: NURSE PRACTITIONER

## 2025-03-22 PROCEDURE — 84300 ASSAY OF URINE SODIUM: CPT

## 2025-03-22 PROCEDURE — 85610 PROTHROMBIN TIME: CPT

## 2025-03-22 RX ORDER — WARFARIN SODIUM 5 MG/1
7.5 TABLET ORAL
Status: COMPLETED | OUTPATIENT
Start: 2025-03-22 | End: 2025-03-22

## 2025-03-22 RX ADMIN — MIRTAZAPINE 15 MG: 15 TABLET, ORALLY DISINTEGRATING ORAL at 22:11

## 2025-03-22 RX ADMIN — MIDODRINE HYDROCHLORIDE 10 MG: 5 TABLET ORAL at 22:10

## 2025-03-22 RX ADMIN — MIDODRINE HYDROCHLORIDE 10 MG: 5 TABLET ORAL at 16:35

## 2025-03-22 RX ADMIN — FLUOXETINE HYDROCHLORIDE 40 MG: 20 CAPSULE ORAL at 09:50

## 2025-03-22 RX ADMIN — DULOXETINE HYDROCHLORIDE 30 MG: 30 CAPSULE, DELAYED RELEASE ORAL at 09:50

## 2025-03-22 RX ADMIN — THIAMINE HCL TAB 100 MG 100 MG: 100 TAB at 09:50

## 2025-03-22 RX ADMIN — BUSPIRONE HYDROCHLORIDE 7.5 MG: 5 TABLET ORAL at 09:50

## 2025-03-22 RX ADMIN — SODIUM CHLORIDE: 9 INJECTION, SOLUTION INTRAVENOUS at 09:54

## 2025-03-22 RX ADMIN — WARFARIN SODIUM 7.5 MG: 5 TABLET ORAL at 20:06

## 2025-03-22 RX ADMIN — OLANZAPINE 5 MG: 5 TABLET, ORALLY DISINTEGRATING ORAL at 22:10

## 2025-03-22 RX ADMIN — BUSPIRONE HYDROCHLORIDE 7.5 MG: 5 TABLET ORAL at 22:11

## 2025-03-22 RX ADMIN — MIDODRINE HYDROCHLORIDE 10 MG: 5 TABLET ORAL at 09:50

## 2025-03-22 RX ADMIN — ENOXAPARIN SODIUM 80 MG: 100 INJECTION SUBCUTANEOUS at 22:11

## 2025-03-22 RX ADMIN — ENOXAPARIN SODIUM 80 MG: 100 INJECTION SUBCUTANEOUS at 09:53

## 2025-03-22 ASSESSMENT — PAIN SCALES - GENERAL
PAINLEVEL_OUTOF10: 0
PAINLEVEL_OUTOF10: 5

## 2025-03-22 ASSESSMENT — PAIN DESCRIPTION - DESCRIPTORS: DESCRIPTORS: PATIENT UNABLE TO DESCRIBE

## 2025-03-22 ASSESSMENT — PAIN DESCRIPTION - LOCATION: LOCATION: GENERALIZED

## 2025-03-22 NOTE — PROGRESS NOTES
03/22/25 0742 127/80 97.5 °F (36.4 °C) Oral 58 18 94 % --   03/22/25 0700 -- -- -- 62 -- -- --   03/22/25 0640 -- -- -- -- -- -- 76.6 kg (168 lb 14 oz)   03/22/25 0400 113/82 97.2 °F (36.2 °C) Oral 57 16 94 % --   03/22/25 0005 -- -- -- 59 -- -- --   03/21/25 2255 117/79 98.1 °F (36.7 °C) Oral 56 16 96 % --   03/21/25 1912 115/79 98.6 °F (37 °C) Oral 54 16 95 % --   03/21/25 1508 113/81 97.4 °F (36.3 °C) -- 54 18 96 % --   03/21/25 1500 -- -- -- 54 -- -- --   03/21/25 1112 99/64 97.5 °F (36.4 °C) -- 57 18 96 % --         Intake/Output Summary (Last 24 hours) at 3/22/2025 0946  Last data filed at 3/22/2025 0900  Gross per 24 hour   Intake 660 ml   Output 1850 ml   Net -1190 ml        I had a face to face encounter and independently examined this patient on 3/22/2025, as outlined below:    Review of Systems   Constitutional: Negative.    Respiratory:  Positive for shortness of breath.    Cardiovascular: Negative.    Gastrointestinal:  Positive for abdominal pain.   Genitourinary: Negative.    Musculoskeletal: Negative.    Neurological: Negative.    Psychiatric/Behavioral:  Positive for suicidal ideas.         General: In no acute distress  HEENT: NC/AT. No obvious deformities.Nares patent. Oral mucosa moist.   Cardiovascular: S1, S2 present. No murmurs noted.   Respiratory: Normal respiratory effort. Breath sounds CTA in all lung fields b/l. No retractions noted  Abdomen: BS+. Soft, nontender. No TTP in all quadrants. No guarding or rigidity   Lower Extremity: No edema   Neurological: CN III-XII grossly intact. Muscle strength 5/5 in UE and 5/5 in LE b/l. No facial drooping or slurring of words.   Psychiatric: +Flat affect     LABS:  I reviewed today's most current labs and imaging studies.  Pertinent labs include:  Recent Labs     03/20/25  0954 03/21/25  1006 03/22/25  0358   WBC 8.5 9.8 10.4   HGB 13.8 12.8 13.5   HCT 41.5 39.4 40.7   * 379 381     Recent Labs     03/20/25  0954 03/21/25  1006  03/21/25  1007 03/22/25  0358     --  142 144   K 4.4  --  3.9 3.8   *  --  113* 115*   CO2 27  --  27 24   BUN 16  --  13 11   PHOS  --   --  2.8 3.2   INR  --  1.2*  --   --        Care Plan discussed with:    Comments   Patient x    Family      RN x    Care Manager     Consultant                        Multidiciplinary team rounds were held today with , nursing, pharmacist and clinical coordinator.  Patient's plan of care was discussed; medications were reviewed and discharge planning was addressed.       Medical Decision Making   [x] High (any 2)  A. Problems (any 1)  [x] Acute/Chronic Illness/injury posing threat to life or bodily function:    [] Severe exacerbation of chronic illness:    ---------------------------------------------------------------------  B. Risk of Treatment (any 1)   [] Drugs/treatments that require intensive monitoring for toxicity include:    [] IV ABX requiring serial renal monitoring for nephrotoxicity:     [] IV Narcotic analgesia for adverse drug reaction  [] Aggressive IV diuresis requiring serial monitoring for renal impairment and electrolyte derangements  [] Critical electrolyte abnormalities requiring IV replacement and close serial monitoring  [] Insulin - monitoring serial FSBS for Hypoglycemic adverse drug reaction  [] Other -   [] Change in code status:    [] Decision to escalate care:    [] Major surgery/procedure with associated risk factors:     ----------------------------------------------------------------------  C. Data (any 2)  [] Discussed current management and discharge planning options with Case Management.  [x] Discussed management of the case with: Nurse, patient  [x] Telemetry personally reviewed and interpreted as documented above    [x] Imaging personally reviewed and interpreted, includes: CT A/P  [x] Data Review (any 3)  [x] All available Consultant notes from yesterday/today were reviewed  [x] All current labs were reviewed and  interpreted for clinical significance   [x] Appropriate follow-up labs were ordered  [x] Collateral history obtained from: Patient     Procedures: see electronic medical records for all procedures/Xrays and details which were not copied into this note but were reviewed prior to creation of Plan.    Reviewed most current lab test results and cultures  YES  Reviewed most current radiology test results   YES  Review and summation of old records today    NO  Reviewed patient's current orders and MAR    YES  PMH/SH reviewed - no change compared to H&P  >50% of visit spent in counseling and coordination of care  ________________________________________________________________________  Total NON critical care TIME:  35  Minutes    Signed: Pastora Araya MD

## 2025-03-22 NOTE — PROGRESS NOTES
Warfarin Dosing Consult  Bony Smith is a 59 y.o. male with LV thrombus. Pharmacy was consulted by Dr Jad Hatch to dose and monitor warfarin.    INR Goal: 2.5-3.5    PTA Dose: N/A    Drugs that may increase INR:None  Drugs that may decrease INR: None  Other current anticoagulants/ drugs that may increase bleeding risk: Apixaban  Risk factors: None  Daily INR ordered: Yes    Recent Labs     03/20/25  0954 03/21/25  1006 03/22/25  0358   HGB 13.8 12.8 13.5   * 379 381     No results for input(s): \"ALT\", \"AST\" in the last 72 hours.  Recent Labs     03/21/25  1006 03/22/25  1651   INR 1.2* 1.2*       Date INR Previous Dose   3/22 1.2 7.5     Assessment/ Plan:  Pt with LV thrombus was started on heparin drip and then switched to Eliquis in anticipation of discharging 3/19. Pt experienced recurring orthostatic hypotension which lead to prolonging admission and re-consultation by cardiology, who requested a switch from Eliquis to warfarin.  Continue with warfarin 7.5 mg x 1 dose  INR ordered through 3/29, LFTs scheduled with AM labs tomorrow  Pharmacy will continue to monitor daily and adjust therapy as indicated.

## 2025-03-22 NOTE — PROGRESS NOTES
Progress Note      3/22/2025 2:31 PM  NAME: Bony Smith   MRN:  [id number]   Admit Diagnosis: Hypoglycemia [E16.2]  Dizzinesses [R42]      Problem List:   Admitted to the hospital from another facility with blood pressure of 67 over 54 mmHg.  -Patient was recently in our hospital and diagnosed with LV thrombus and was discharged on anticoagulation.  -Cardiomyopathy with LV function of 45% as of 3/13/2025  -LV thrombus present  -Psych disorder  -Bipolar disorder  -Blindness in the right eye       Assessment/Plan:   Note dictated March 22, 2025  -Follow-up visit from cardiology.  Patient was readmitted to the hospital.  Intracardiac thrombus still present and has not decreased in size being on Eliquis.  -Based on my clinical assessment patient should be on Lovenox and Coumadin and INR should be around 3.  -Continue current medical management with no further cardiovascular testing and once bridging is completed and patient INR is 3 or above he can be discharged to follow-up with us as an outpatient.  -I understand compliance is an issue based on patient's psych problems but he will be going to an inpatient either psych unit or rehab so I am not concerned about the compliance initially and later on after obtaining an echocardiogram we will make further cardiovascular management decision for LV thrombus and its future management in 4 weeks or early as needed.  -This has been discussed with the primary team.  ===============================================================  Note dictated March 21, 2025  -59-year-old white male admitted to the hospital from psych facility where he was admitted recently with LV thrombus readmitted to the hospital with hypotension.  -When I saw the patient he was lying comfortably in the bed and denies any symptoms of chest pain shortness of breath or any other anginal equivalent.  -Reduced LV function of 45% as of March 13, 2025  -No known established coronary artery  disease  -Will request pharmacy for LV thrombus management with Coumadin rather than Eliquis as based on my current clinical knowledge Coumadin is better than Eliquis for intracardiac thrombus resolution.  -No further cardiovascular testing based on my current clinical assessment and will continue current medical management.         []       High complexity decision making was performed in this patient at high risk for decompensation with multiple organ involvement.    Subjective:     Bony Smith is a 59 y.o. White (non-) male who has no known established coronary artery disease and readmitted to the hospital with hypotension.  Patient was discharged with a diagnosis of intracardiac thrombus and is started on anticoagulation.  Based on my current clinical assessment and knowledge based on his LV thrombus he should be on Coumadin rather than Eliquis.  For now continue Lovenox and Coumadin until we achieve INR of 3 or more.  The recommendation is to keep the INR between 2.5 and 3.5 in his case scenario.  Will continue to follow while patient is in the hospital along with the team and make further cardiovascular management decision as clinically warranted including close follow-up and repeat echocardiogram in 4 weeks.  Review of Systems:   Negative except for as noted above.    Objective:      Physical Exam:    Last 24hrs VS reviewed since prior progress note. Most recent are:    BP (!) 123/90   Pulse 70   Temp 97.7 °F (36.5 °C) (Oral)   Resp 18   Ht 1.826 m (5' 11.89\")   Wt 76.6 kg (168 lb 14 oz)   SpO2 94%   BMI 22.97 kg/m²     Intake/Output Summary (Last 24 hours) at 3/22/2025 1431  Last data filed at 3/22/2025 0940  Gross per 24 hour   Intake 420 ml   Output 1500 ml   Net -1080 ml        General Appearance: Alert; no acute distress.  Ears/Nose/Mouth/Throat: moist mucous membranes  Neck: Supple.  Chest: Lungs clear to auscultation bilaterally.  Cardiovascular: Regular rate and rhythm, S1S2

## 2025-03-22 NOTE — PLAN OF CARE
Problem: Discharge Planning  Goal: Discharge to home or other facility with appropriate resources  3/22/2025 1115 by Pia Moody RN  Outcome: Progressing  3/21/2025 2150 by Bianca Valera RN  Outcome: Progressing  Flowsheets (Taken 3/21/2025 2057)  Discharge to home or other facility with appropriate resources: Identify barriers to discharge with patient and caregiver     Problem: Pain  Goal: Verbalizes/displays adequate comfort level or baseline comfort level  3/22/2025 1115 by Pia Moody RN  Outcome: Progressing  3/21/2025 2150 by Bianca Valera RN  Outcome: Progressing     Problem: Safety - Adult  Goal: Free from fall injury  3/22/2025 1115 by Pia Moody RN  Outcome: Progressing  3/21/2025 2150 by Bianca Valera RN  Outcome: Progressing     Problem: Self Harm/Suicidality  Goal: Will have no self-injury during hospital stay  Description: INTERVENTIONS:  1.  Ensure constant observer at bedside with Q15M safety checks  2.  Maintain a safe environment  3.  Secure patient belongings  4.  Ensure family/visitors adhere to safety recommendations  5.  Ensure safety tray has been added to patient's diet order  6.  Every shift and PRN: Re-assess suicidal risk via Frequent Screener    3/22/2025 1115 by Pia Moody RN  Outcome: Progressing  3/21/2025 2150 by Bianca Valera RN  Outcome: Progressing  Flowsheets (Taken 3/21/2025 2057)  Will have no self-injury during hospital stay:   Ensure constant observer at bedside with Q15M safety checks   Maintain a safe environment   Secure patient belongings   Every shift and PRN: Re-assess suicidal risk via Frequent Screener     Problem: Skin/Tissue Integrity  Goal: Skin integrity remains intact  Description: 1.  Monitor for areas of redness and/or skin breakdown  2.  Assess vascular access sites hourly  3.  Every 4-6 hours minimum:  Change oxygen saturation probe site  4.  Every 4-6 hours:  If on nasal continuous positive airway pressure,

## 2025-03-22 NOTE — BSMART NOTE
Patient rounded on in room 482 with sitter noted in room. Patient dressed in green hospital gown, laying in hospital bed during interaction. Patient presents as guarded/withdrawn and somewhat irritable with congruent affect. Patient presents with good eye contact. Patient presents with clear speech. Patient does not appear to be responding to internal stimuli during interaction. Patient denies current SI, HI, and AVH. When writer asked about how patient was feeling, patient reports continued dizziness while sitting and standing. When writer attempted to discuss plan of care/future U treatment, patient states \"maybe If I could sleep here for one more night.\" Before writer could respond, patient turned to writer and stated \"get out, leave me alone\" and then proceeded to cross his arms.    Per chart, cardiology and nephrology are involved in patient's case. It does not appear that patient has been medically cleared at this time. Psychiatry has also been following patient while on medical unit. [facility]on call[facility] of patient- states she will round on him today.

## 2025-03-23 LAB
ALBUMIN SERPL-MCNC: 3.4 G/DL (ref 3.5–5)
ALBUMIN/GLOB SERPL: 1.1 (ref 1.1–2.2)
ALP SERPL-CCNC: 93 U/L (ref 45–117)
ALT SERPL-CCNC: 37 U/L (ref 12–78)
AST SERPL W P-5'-P-CCNC: 21 U/L (ref 15–37)
BILIRUB DIRECT SERPL-MCNC: <0.1 MG/DL (ref 0–0.2)
BILIRUB SERPL-MCNC: 0.3 MG/DL (ref 0.2–1)
GLOBULIN SER CALC-MCNC: 3.2 G/DL (ref 2–4)
INR PPP: 1.2 (ref 0.9–1.1)
PROT SERPL-MCNC: 6.6 G/DL (ref 6.4–8.2)
PROTHROMBIN TIME: 15.2 SEC (ref 11.9–14.6)

## 2025-03-23 PROCEDURE — 85610 PROTHROMBIN TIME: CPT

## 2025-03-23 PROCEDURE — 6370000000 HC RX 637 (ALT 250 FOR IP): Performed by: NURSE PRACTITIONER

## 2025-03-23 PROCEDURE — 80076 HEPATIC FUNCTION PANEL: CPT

## 2025-03-23 PROCEDURE — 2060000000 HC ICU INTERMEDIATE R&B

## 2025-03-23 PROCEDURE — 6370000000 HC RX 637 (ALT 250 FOR IP): Performed by: INTERNAL MEDICINE

## 2025-03-23 PROCEDURE — 36415 COLL VENOUS BLD VENIPUNCTURE: CPT

## 2025-03-23 PROCEDURE — 6360000002 HC RX W HCPCS

## 2025-03-23 PROCEDURE — 6370000000 HC RX 637 (ALT 250 FOR IP)

## 2025-03-23 PROCEDURE — 6370000000 HC RX 637 (ALT 250 FOR IP): Performed by: PSYCHIATRY & NEUROLOGY

## 2025-03-23 RX ORDER — WARFARIN SODIUM 5 MG/1
10 TABLET ORAL
Status: COMPLETED | OUTPATIENT
Start: 2025-03-23 | End: 2025-03-23

## 2025-03-23 RX ADMIN — MIDODRINE HYDROCHLORIDE 10 MG: 5 TABLET ORAL at 21:14

## 2025-03-23 RX ADMIN — MIDODRINE HYDROCHLORIDE 10 MG: 5 TABLET ORAL at 09:36

## 2025-03-23 RX ADMIN — MIDODRINE HYDROCHLORIDE 10 MG: 5 TABLET ORAL at 16:00

## 2025-03-23 RX ADMIN — MIRTAZAPINE 15 MG: 15 TABLET, ORALLY DISINTEGRATING ORAL at 21:14

## 2025-03-23 RX ADMIN — BUSPIRONE HYDROCHLORIDE 7.5 MG: 5 TABLET ORAL at 21:14

## 2025-03-23 RX ADMIN — ENOXAPARIN SODIUM 80 MG: 100 INJECTION SUBCUTANEOUS at 21:16

## 2025-03-23 RX ADMIN — WARFARIN SODIUM 10 MG: 5 TABLET ORAL at 19:07

## 2025-03-23 RX ADMIN — FLUOXETINE HYDROCHLORIDE 40 MG: 20 CAPSULE ORAL at 09:37

## 2025-03-23 RX ADMIN — DULOXETINE HYDROCHLORIDE 30 MG: 30 CAPSULE, DELAYED RELEASE ORAL at 09:37

## 2025-03-23 RX ADMIN — THIAMINE HCL TAB 100 MG 100 MG: 100 TAB at 09:37

## 2025-03-23 RX ADMIN — OLANZAPINE 5 MG: 5 TABLET, ORALLY DISINTEGRATING ORAL at 21:14

## 2025-03-23 RX ADMIN — BUSPIRONE HYDROCHLORIDE 7.5 MG: 5 TABLET ORAL at 09:36

## 2025-03-23 RX ADMIN — ENOXAPARIN SODIUM 80 MG: 100 INJECTION SUBCUTANEOUS at 09:38

## 2025-03-23 ASSESSMENT — PAIN SCALES - GENERAL: PAINLEVEL_OUTOF10: 0

## 2025-03-23 ASSESSMENT — PAIN DESCRIPTION - LOCATION: LOCATION: GENERALIZED

## 2025-03-23 ASSESSMENT — PAIN DESCRIPTION - DESCRIPTORS: DESCRIPTORS: PATIENT UNABLE TO DESCRIBE

## 2025-03-23 NOTE — PROGRESS NOTES
Hospitalist Progress Note    NAME: Bony Smith   : 1965   MRN: [id number]     Date/Time: 3/23/2025 2:38 PM  Patient PCP: None, None    Estimated discharge date: 48  Barriers: Echo results, psych consult, active SI    Hospital Course:  Bony Smith is a 59 y.o.  male with PMHx significant for bipolar disorder, anxiety, hx of diverticulitis, and BPH.  He presents to the ED from [facility] for generalized abdominal pain for months.  Pain is associated with shortness of breath.  Patient is a former smoker, no EtOH or illicit drug use.  Denies chest pain, N/V/D, loss of appetite, fever, chills.  Patient is poor historian, offers limited history.  CTA A/P showed left ventricular apical filling defect concerning for thrombus.  Cardiology was consulted.  Patient started on heparin drip.  Echo ordered and pending results.  Troponin 33.  Psych consulted for active suicidal ideation.  Patient had requested patient  to kill him.  Currently on home medications for bipolar disorder and anxiety.  Of note patient was noted to have left ventricular thrombus on echocardiogram, started on heparin gtt., spoke to cardiology attending Dr. Douglass, who recommends patient can be discharged on Eliquis with close outpatient follow-up primary care physician as well as cardiology, plan was explained to the patient who is agreeable to current plan.  However, patient noted to have ongoing orthostatic hypotension despite volume resuscitation and with scheduled midodrine.  Therefore, cardiology was reconsulted and repeat echo was ordered.  At the same time, nephrology was consulted.  Currently, cortisol and ACTH were ordered.  Nephrology will do further workup.  Otherwise, patient was given albumin.     Cardiology recommended Warfarin due to better efficacy in treatment of thrombus. Pending psych facility and getting lovenox bridge. May be difficult disposition.       Assessment / Plan:    Orthostatic  A/P  [x] Data Review (any 3)  [x] All available Consultant notes from yesterday/today were reviewed  [x] All current labs were reviewed and interpreted for clinical significance   [x] Appropriate follow-up labs were ordered  [x] Collateral history obtained from: Patient     Procedures: see electronic medical records for all procedures/Xrays and details which were not copied into this note but were reviewed prior to creation of Plan.    Reviewed most current lab test results and cultures  YES  Reviewed most current radiology test results   YES  Review and summation of old records today    NO  Reviewed patient's current orders and MAR    YES  PMH/ reviewed - no change compared to H&P  >50% of visit spent in counseling and coordination of care  ________________________________________________________________________  Total NON critical care TIME:  35  Minutes    Signed: Pastora Araya MD

## 2025-03-23 NOTE — PLAN OF CARE
Problem: Discharge Planning  Goal: Discharge to home or other facility with appropriate resources  3/22/2025 2308 by Magy Gallardo RN  Outcome: Progressing  3/22/2025 1115 by Pia Moody RN  Outcome: Progressing  Flowsheets (Taken 3/22/2025 0930)  Discharge to home or other facility with appropriate resources:   Identify barriers to discharge with patient and caregiver   Arrange for needed discharge resources and transportation as appropriate   Identify discharge learning needs (meds, wound care, etc)   Refer to discharge planning if patient needs post-hospital services based on physician order or complex needs related to functional status, cognitive ability or social support system     Problem: Pain  Goal: Verbalizes/displays adequate comfort level or baseline comfort level  3/22/2025 2308 by Magy Gallardo RN  Outcome: Progressing  3/22/2025 1115 by Pia Moody RN  Outcome: Progressing     Problem: Safety - Adult  Goal: Free from fall injury  3/22/2025 2308 by Magy Gallardo RN  Outcome: Progressing  3/22/2025 1115 by Pia Moody RN  Outcome: Progressing     Problem: Self Harm/Suicidality  Goal: Will have no self-injury during hospital stay  Description: INTERVENTIONS:  1.  Ensure constant observer at bedside with Q15M safety checks  2.  Maintain a safe environment  3.  Secure patient belongings  4.  Ensure family/visitors adhere to safety recommendations  5.  Ensure safety tray has been added to patient's diet order  6.  Every shift and PRN: Re-assess suicidal risk via Frequent Screener    3/22/2025 2308 by Magy Gallardo RN  Outcome: Progressing  Flowsheets (Taken 3/22/2025 2030)  Will have no self-injury during hospital stay:   Ensure constant observer at bedside with Q15M safety checks   Maintain a safe environment   Secure patient belongings   Ensure family/visitors adhere to safety recommendations   Ensure safety tray has been added to patient's diet  symptoms on level of functioning, self care needs and offer support as indicated   Assess patient/family knowledge of depression, impact on illness and need for teaching   Provide emotional support, presence and reassurance   Assess for suicidal thoughts or ideation. If patient expresses suicidal thoughts or statements do not leave alone, initiate Suicide Precautions, move near nurse station, obtain sitter   Initiate consults as appropriate with Mental Health Professional, Spiritual Care, Psychosocial CNS, and consider a recommendation to the [facility] for a Psychiatric Consultation  3/22/2025 1115 by Pia Moody RN  Outcome: Progressing  Flowsheets  Taken 3/22/2025 0930 by Pia Moody, RN  Effect of psychiatric condition will be minimized and patient will be protected from self harm:   Assess impact of patient’s symptoms on level of functioning, self care needs and offer support as indicated   Provide emotional support, presence and reassurance   Assess for suicidal thoughts or ideation. If patient expresses suicidal thoughts or statements do not leave alone, initiate Suicide Precautions, move near nurse station, obtain sitter  Taken 3/21/2025 2344 by Bianca Valera, RN  Effect of psychiatric condition will be minimized and patient will be protected from self harm: Assess impact of patient’s symptoms on level of functioning, self care needs and offer support as indicated

## 2025-03-23 NOTE — PROGRESS NOTES
Renal Progress Note    Patient: Bony Smith MRN: [id number]  SSN: [id number]    YOB: 1965  Age: 59 y.o.  Sex: male      Admit Date: 3/15/2025    LOS: 8 days     Subjective:   Patient seen at bedside. Alert and awake, no acute distress.    not giving any new complaints today.   Improved hemodynamic status, SBPs remained above 100 in last 48 hrs  No complaints of dizziness      Current Facility-Administered Medications   Medication Dose Route Frequency    warfarin (COUMADIN) tablet 10 mg  10 mg Oral Once    warfarin placeholder: dosing by pharmacy   Oral RX Placeholder    busPIRone (BUSPAR) tablet 7.5 mg  7.5 mg Oral BID    OLANZapine zydis (ZYPREXA) disintegrating tablet 5 mg  5 mg Oral Nightly    DULoxetine (CYMBALTA) extended release capsule 30 mg  30 mg Oral Daily    enoxaparin (LOVENOX) injection 80 mg  1 mg/kg SubCUTAneous BID    thiamine mononitrate tablet 100 mg  100 mg Oral Daily    midodrine (PROAMATINE) tablet 10 mg  10 mg Oral TID    acetaminophen (TYLENOL) tablet 650 mg  650 mg Oral Q4H PRN    FLUoxetine (PROZAC) capsule 40 mg  40 mg Oral Daily    magnesium hydroxide (MILK OF MAGNESIA) 400 MG/5ML suspension 30 mL  30 mL Oral Daily PRN    mirtazapine (REMERON SOL-TAB) disintegrating tablet 15 mg  15 mg Oral Nightly        Vitals:    03/23/25 0738 03/23/25 1143 03/23/25 1500 03/23/25 1559   BP: 116/86 112/82  (!) 112/93   Pulse: 65 72 76 69   Resp: 14 14  15   Temp: 97.5 °F (36.4 °C) 98.4 °F (36.9 °C)     TempSrc: Oral Oral     SpO2: 95% 95%  96%   Weight:       Height:         Objective:   General: alert awake, no acute distress.  HEENT: EOMI, no Icterus, no Pallor,  mucosa moist.  Neck: Neck is supple, No JVD  Lungs: breathsounds normal, no respiratory distress on inspection, no rhonchi, no rales,   CVS: heart sounds normal, regular rate and rhythm,  GI: soft, nontender, normal BS.  Extremeties: no cyanosis, no edema,   Neuro: Alert, awake, oriented x3,  moving all extremeties well.

## 2025-03-23 NOTE — PROGRESS NOTES
Warfarin Dosing Consult  Bony Smith is a 59 y.o. male with LV thrombus. Pharmacy was consulted by Dr Jad Hatch to dose and monitor warfarin.    INR Goal: 2.5-3.5    PTA Dose: N/A    Drugs that may increase INR:None  Drugs that may decrease INR: None  Other current anticoagulants/ drugs that may increase bleeding risk: Apixaban  Risk factors: None  Daily INR ordered: Yes    Recent Labs     03/21/25  1006 03/22/25  0358   HGB 12.8 13.5    381     Recent Labs     03/23/25  0442   ALT 37   AST 21     Recent Labs     03/21/25  1006 03/22/25  1651 03/23/25  0442   INR 1.2* 1.2* 1.2*       Date INR Previous Dose   3/22 1.2 7.5   3/23 1.2 7.5     Assessment/ Plan:  Pt with LV thrombus was started on heparin drip and then switched to Eliquis in anticipation of discharging 3/19. Pt experienced recurring orthostatic hypotension which lead to prolonging admission and re-consultation by cardiology, who requested a switch from Eliquis to warfarin.  INR remains subtherapeutic. Will order warfarin 10 mg x 1 dose tonight.  INR ordered through 3/29, CBC ordered for tomorrow.  Pharmacy will continue to monitor daily and adjust therapy as indicated.

## 2025-03-23 NOTE — PROGRESS NOTES
1540 Patient refused vitals at this time. RN educated patient on purpose of vitals and due Midodrine. Patient is agitated, irritable, and stated, \"I just want to be dead.\" Patient verbalized he does not have intentions to hurt himself or others.     1559 Patient verbalized not wanting to leave, RN assured patient he was not being discharged today and would be here overnight. Patient agreeable to vitals but refused temperature.     1600 Patient agreeable to taking medication. Patient asked what would happen if he dumped his drink on himself, RN responded I would assist in cleaning him up. Patient dumped ginger ale on self. RN assisted with cleaning patient.

## 2025-03-23 NOTE — PLAN OF CARE
Problem: Discharge Planning  Goal: Discharge to home or other facility with appropriate resources  3/23/2025 1056 by Pia Moody RN  Outcome: Progressing  Flowsheets (Taken 3/23/2025 0836)  Discharge to home or other facility with appropriate resources:   Identify barriers to discharge with patient and caregiver   Arrange for needed discharge resources and transportation as appropriate   Identify discharge learning needs (meds, wound care, etc)   Refer to discharge planning if patient needs post-hospital services based on physician order or complex needs related to functional status, cognitive ability or social support system  3/22/2025 2308 by Magy Gallardo RN  Outcome: Progressing     Problem: Pain  Goal: Verbalizes/displays adequate comfort level or baseline comfort level  3/23/2025 1056 by Pia Moody RN  Outcome: Progressing  3/22/2025 2308 by Magy Gallardo RN  Outcome: Progressing     Problem: Safety - Adult  Goal: Free from fall injury  3/23/2025 1056 by Pia Moody RN  Outcome: Progressing  3/22/2025 2308 by Magy Gallardo RN  Outcome: Progressing     Problem: Self Harm/Suicidality  Goal: Will have no self-injury during hospital stay  Description: INTERVENTIONS:  1.  Ensure constant observer at bedside with Q15M safety checks  2.  Maintain a safe environment  3.  Secure patient belongings  4.  Ensure family/visitors adhere to safety recommendations  5.  Ensure safety tray has been added to patient's diet order  6.  Every shift and PRN: Re-assess suicidal risk via Frequent Screener    3/23/2025 1056 by Pia Moody RN  Outcome: Progressing  3/22/2025 2308 by Magy Gallardo RN  Outcome: Progressing  Flowsheets (Taken 3/22/2025 2030)  Will have no self-injury during hospital stay:   Ensure constant observer at bedside with Q15M safety checks   Maintain a safe environment   Secure patient belongings   Ensure family/visitors adhere to safety  level of function  3/23/2025 1056 by Pia Moody RN  Outcome: Progressing  3/22/2025 2308 by Magy Gallardo RN  Outcome: Progressing  Goal: Return ADL status to a safe level of function  3/23/2025 1056 by Pia Moody RN  Outcome: Progressing  3/22/2025 2308 by Magy Gallardo RN  Outcome: Progressing     Problem: Gastrointestinal - Adult  Goal: Minimal or absence of nausea and vomiting  3/23/2025 1056 by Pia Moody RN  Outcome: Progressing  3/22/2025 2308 by Magy Gallardo RN  Outcome: Progressing  Goal: Maintains or returns to baseline bowel function  3/23/2025 1056 by Pia Moody RN  Outcome: Progressing  3/22/2025 2308 by Magy Gallardo RN  Outcome: Progressing  Goal: Maintains adequate nutritional intake  3/23/2025 1056 by Pia Moody RN  Outcome: Progressing  3/22/2025 2308 by Magy Gallardo RN  Outcome: Progressing     Problem: Depression/Self Harm  Goal: Effect of psychiatric condition will be minimized and patient will be protected from self harm  Description: INTERVENTIONS:  1. Assess impact of patient's symptoms on level of functioning, self care needs and offer support as indicated  2. Assess patient/family knowledge of depression, impact on illness and need for teaching  3. Provide emotional support, presence and reassurance  4. Assess for possible suicidal thoughts or ideation. If patient expresses suicidal thoughts or statements do not leave alone, initiate Suicide Precautions, move to a room close to the nursing station and obtain sitter  5. Initiate consults as appropriate with Mental Health Professional, Spiritual Care, Psychosocial CNS, and consider a recommendation to the LIP for a Psychiatric Consultation  3/23/2025 1056 by Pia Moody RN  Outcome: Progressing  Flowsheets (Taken 3/23/2025 0836)  Effect of psychiatric condition will be minimized and patient will be protected from self harm:   Assess impact of  patient’s symptoms on level of functioning, self care needs and offer support as indicated   Assess patient/family knowledge of depression, impact on illness and need for teaching   Provide emotional support, presence and reassurance   Assess for suicidal thoughts or ideation. If patient expresses suicidal thoughts or statements do not leave alone, initiate Suicide Precautions, move near nurse station, obtain sitter   Initiate consults as appropriate with Mental Health Professional, Spiritual Care, Psychosocial CNS, and consider a recommendation to the LIP for a Psychiatric Consultation  3/22/2025 2308 by Magy Gallardo, RN  Outcome: Progressing  Flowsheets (Taken 3/22/2025 2030)  Effect of psychiatric condition will be minimized and patient will be protected from self harm:   Assess impact of patient’s symptoms on level of functioning, self care needs and offer support as indicated   Assess patient/family knowledge of depression, impact on illness and need for teaching   Provide emotional support, presence and reassurance   Assess for suicidal thoughts or ideation. If patient expresses suicidal thoughts or statements do not leave alone, initiate Suicide Precautions, move near nurse station, obtain sitter   Initiate consults as appropriate with Mental Health Professional, Spiritual Care, Psychosocial CNS, and consider a recommendation to the LIP for a Psychiatric Consultation

## 2025-03-23 NOTE — PROGRESS NOTES
Renal Progress Note    Patient: Bony Smith MRN: [id number]  SSN: [id number]    YOB: 1965  Age: 59 y.o.  Sex: male      Admit Date: 3/15/2025    LOS: 7 days     Subjective:   Patient seen at bedside. Alert and awake, no acute distress.    not giving any new complaints today.   Improved hemodynamic status  No complaints of any swelling in his lower extremities  Repeat labs showed stable electrolytes and normal renal functions  No complaints of dizziness      Current Facility-Administered Medications   Medication Dose Route Frequency    warfarin placeholder: dosing by pharmacy   Oral RX Placeholder    busPIRone (BUSPAR) tablet 7.5 mg  7.5 mg Oral BID    OLANZapine zydis (ZYPREXA) disintegrating tablet 5 mg  5 mg Oral Nightly    DULoxetine (CYMBALTA) extended release capsule 30 mg  30 mg Oral Daily    enoxaparin (LOVENOX) injection 80 mg  1 mg/kg SubCUTAneous BID    thiamine mononitrate tablet 100 mg  100 mg Oral Daily    midodrine (PROAMATINE) tablet 10 mg  10 mg Oral TID    acetaminophen (TYLENOL) tablet 650 mg  650 mg Oral Q4H PRN    FLUoxetine (PROZAC) capsule 40 mg  40 mg Oral Daily    magnesium hydroxide (MILK OF MAGNESIA) 400 MG/5ML suspension 30 mL  30 mL Oral Daily PRN    mirtazapine (REMERON SOL-TAB) disintegrating tablet 15 mg  15 mg Oral Nightly        Vitals:    03/22/25 1500 03/22/25 1547 03/22/25 1932 03/22/25 2018   BP:  116/85 124/88    Pulse: 65 69 63    Resp:  18 18    Temp:  98.4 °F (36.9 °C) (!) 94.1 °F (34.5 °C) 97.3 °F (36.3 °C)   TempSrc:  Oral Axillary    SpO2:  96% 94%    Weight:       Height:         Objective:   General: alert awake well-oriented, no acute distress.  HEENT: EOMI, no Icterus, no Pallor,  mucosa moist.  Neck: Neck is supple, No JVD  Lungs: breathsounds normal, no respiratory distress on inspection, no rhonchi, no rales,   CVS: heart sounds normal, regular rate and rhythm, no murmurs, no rubs.   GI: soft, nontender, normal BS.  Extremeties: no cyanosis, no  0.00 - 1.00 K/UL    Eosinophils Absolute 0.16 0.00 - 0.40 K/UL    Basophils Absolute 0.04 0.00 - 0.10 K/UL    Immature Granulocytes Absolute 0.04 0.00 - 0.04 K/UL    Differential Type AUTOMATED     Renal Function Panel    Collection Time: 03/22/25  3:58 AM   Result Value Ref Range    Sodium 144 136 - 145 mmol/L    Potassium 3.8 3.5 - 5.1 mmol/L    Chloride 115 (H) 97 - 108 mmol/L    CO2 24 21 - 32 mmol/L    Anion Gap 5 2 - 12 mmol/L    Glucose 75 65 - 100 mg/dL    BUN 11 6 - 20 mg/dL    Creatinine 0.79 0.70 - 1.30 mg/dL    BUN/Creatinine Ratio 14 12 - 20      Est, Glom Filt Rate >90 >60 ml/min/1.73m2    Calcium 8.4 (L) 8.5 - 10.1 mg/dL    Phosphorus 3.2 2.6 - 4.7 mg/dL    Albumin 3.0 (L) 3.5 - 5.0 g/dL   Sodium, urine, random    Collection Time: 03/22/25  4:15 AM   Result Value Ref Range    SODIUM, RANDOM URINE 149 mmol/L   Chloride, Random Urine    Collection Time: 03/22/25  4:15 AM   Result Value Ref Range    Chloride 176 mmol/L   Protime-INR    Collection Time: 03/22/25  4:51 PM   Result Value Ref Range    Protime 15.1 (H) 11.9 - 14.6 sec    INR 1.2 (H) 0.9 - 1.1          Assessment and Plan:     1.  Orthostatic hypotension:   Contributing factors appears to be dehydration with poor intake, cardiac issues with borderline low ejection fraction and questionable thrombus,   autonomic neuropathy probably contributed by EtOH use  Possible endocrine issues like hypercortisolism, hyperaldosteronism, hypothyroidism    Improved orthostatic changes with IVF and albumin  Improved blood pressures today  Will discontinue IV fluids, continue to monitor hemodynamic status    random cortisol 10.8   plasma renin aldosterone--pending,   TSH level is normal     Hypoalbuminemia noted, received 1 dose of 25 g of IV albumin 3/20, albumin 3.0 today     2.  Suspected cardiac thrombus/left apical mass  On anticoagulation and cardiac evaluations pending  Blood cultures pending     3.  CHF: Low EF of 40 to 45% with left ventricular  dilation, ??  Alcoholic cardiomyopathy  No evidence of any fluid overload at this time  Tolerated IV hydration well, will DC IV fluids today  Cardiology following     4.  History of chronic EtOH use: Possible autonomic neuropathy  Added thiamine 100mg daily, folate level high    Plan discussed with Dr. Araya    Signed By: Rikki Miguel MD     March 22, 2025

## 2025-03-24 LAB
ANION GAP SERPL CALC-SCNC: 6 MMOL/L (ref 2–12)
BASOPHILS # BLD: 0.08 K/UL (ref 0–0.1)
BASOPHILS NFR BLD: 0.9 % (ref 0–1)
BUN SERPL-MCNC: 19 MG/DL (ref 6–20)
BUN/CREAT SERPL: 26 (ref 12–20)
CA-I BLD-MCNC: 9.5 MG/DL (ref 8.5–10.1)
CHLORIDE SERPL-SCNC: 113 MMOL/L (ref 97–108)
CO2 SERPL-SCNC: 22 MMOL/L (ref 21–32)
CREAT SERPL-MCNC: 0.73 MG/DL (ref 0.7–1.3)
DIFFERENTIAL METHOD BLD: ABNORMAL
EOSINOPHIL # BLD: 0.16 K/UL (ref 0–0.4)
EOSINOPHIL NFR BLD: 1.8 % (ref 0–7)
ERYTHROCYTE [DISTWIDTH] IN BLOOD BY AUTOMATED COUNT: 16.4 % (ref 11.5–14.5)
GLUCOSE SERPL-MCNC: 93 MG/DL (ref 65–100)
HCT VFR BLD AUTO: 43.3 % (ref 36.6–50.3)
HGB BLD-MCNC: 14.4 G/DL (ref 12.1–17)
IMM GRANULOCYTES # BLD AUTO: 0.04 K/UL (ref 0–0.04)
IMM GRANULOCYTES NFR BLD AUTO: 0.4 % (ref 0–0.5)
INR PPP: 1.4 (ref 0.9–1.1)
LYMPHOCYTES # BLD: 3.04 K/UL (ref 0.8–3.5)
LYMPHOCYTES NFR BLD: 33.6 % (ref 12–49)
MCH RBC QN AUTO: 29 PG (ref 26–34)
MCHC RBC AUTO-ENTMCNC: 33.3 G/DL (ref 30–36.5)
MCV RBC AUTO: 87.1 FL (ref 80–99)
MONOCYTES # BLD: 0.71 K/UL (ref 0–1)
MONOCYTES NFR BLD: 7.9 % (ref 5–13)
NEUTS SEG # BLD: 5.01 K/UL (ref 1.8–8)
NEUTS SEG NFR BLD: 55.4 % (ref 32–75)
NRBC # BLD: 0 K/UL (ref 0–0.01)
NRBC BLD-RTO: 0 PER 100 WBC
PLATELET # BLD AUTO: 334 K/UL (ref 150–400)
PMV BLD AUTO: 11.2 FL (ref 8.9–12.9)
POTASSIUM SERPL-SCNC: 3.8 MMOL/L (ref 3.5–5.1)
PROTHROMBIN TIME: 17.7 SEC (ref 11.9–14.6)
RBC # BLD AUTO: 4.97 M/UL (ref 4.1–5.7)
SODIUM SERPL-SCNC: 141 MMOL/L (ref 136–145)
WBC # BLD AUTO: 9 K/UL (ref 4.1–11.1)

## 2025-03-24 PROCEDURE — 85025 COMPLETE CBC W/AUTO DIFF WBC: CPT

## 2025-03-24 PROCEDURE — 6370000000 HC RX 637 (ALT 250 FOR IP): Performed by: INTERNAL MEDICINE

## 2025-03-24 PROCEDURE — 6370000000 HC RX 637 (ALT 250 FOR IP): Performed by: PSYCHIATRY & NEUROLOGY

## 2025-03-24 PROCEDURE — 2580000003 HC RX 258

## 2025-03-24 PROCEDURE — 85610 PROTHROMBIN TIME: CPT

## 2025-03-24 PROCEDURE — 6360000002 HC RX W HCPCS

## 2025-03-24 PROCEDURE — 6370000000 HC RX 637 (ALT 250 FOR IP)

## 2025-03-24 PROCEDURE — P9047 ALBUMIN (HUMAN), 25%, 50ML: HCPCS

## 2025-03-24 PROCEDURE — 6370000000 HC RX 637 (ALT 250 FOR IP): Performed by: NURSE PRACTITIONER

## 2025-03-24 PROCEDURE — 36415 COLL VENOUS BLD VENIPUNCTURE: CPT

## 2025-03-24 PROCEDURE — 80048 BASIC METABOLIC PNL TOTAL CA: CPT

## 2025-03-24 PROCEDURE — 2060000000 HC ICU INTERMEDIATE R&B

## 2025-03-24 PROCEDURE — 97530 THERAPEUTIC ACTIVITIES: CPT

## 2025-03-24 RX ORDER — HALOPERIDOL 5 MG/ML
5 INJECTION INTRAMUSCULAR ONCE
Status: COMPLETED | OUTPATIENT
Start: 2025-03-24 | End: 2025-03-24

## 2025-03-24 RX ORDER — WARFARIN SODIUM 5 MG/1
10 TABLET ORAL
Status: COMPLETED | OUTPATIENT
Start: 2025-03-24 | End: 2025-03-25

## 2025-03-24 RX ORDER — ALBUMIN (HUMAN) 12.5 G/50ML
25 SOLUTION INTRAVENOUS ONCE
Status: COMPLETED | OUTPATIENT
Start: 2025-03-24 | End: 2025-03-24

## 2025-03-24 RX ORDER — HALOPERIDOL 5 MG/ML
1 INJECTION INTRAMUSCULAR EVERY 6 HOURS PRN
Status: DISCONTINUED | OUTPATIENT
Start: 2025-03-24 | End: 2025-03-27

## 2025-03-24 RX ORDER — LORAZEPAM 2 MG/ML
1 INJECTION INTRAMUSCULAR ONCE
Status: COMPLETED | OUTPATIENT
Start: 2025-03-24 | End: 2025-03-24

## 2025-03-24 RX ORDER — SODIUM CHLORIDE 9 MG/ML
INJECTION, SOLUTION INTRAVENOUS CONTINUOUS
Status: DISPENSED | OUTPATIENT
Start: 2025-03-24 | End: 2025-03-24

## 2025-03-24 RX ADMIN — DULOXETINE HYDROCHLORIDE 30 MG: 30 CAPSULE, DELAYED RELEASE ORAL at 10:45

## 2025-03-24 RX ADMIN — MIDODRINE HYDROCHLORIDE 10 MG: 5 TABLET ORAL at 10:45

## 2025-03-24 RX ADMIN — BUSPIRONE HYDROCHLORIDE 7.5 MG: 5 TABLET ORAL at 10:45

## 2025-03-24 RX ADMIN — LORAZEPAM 1 MG: 2 INJECTION INTRAMUSCULAR; INTRAVENOUS at 15:42

## 2025-03-24 RX ADMIN — HALOPERIDOL LACTATE 5 MG: 5 INJECTION, SOLUTION INTRAMUSCULAR at 15:22

## 2025-03-24 RX ADMIN — THIAMINE HCL TAB 100 MG 100 MG: 100 TAB at 10:45

## 2025-03-24 RX ADMIN — SODIUM CHLORIDE: 0.9 INJECTION, SOLUTION INTRAVENOUS at 13:05

## 2025-03-24 RX ADMIN — ALBUMIN (HUMAN) 25 G: 0.25 INJECTION, SOLUTION INTRAVENOUS at 17:44

## 2025-03-24 RX ADMIN — FLUOXETINE HYDROCHLORIDE 40 MG: 20 CAPSULE ORAL at 10:45

## 2025-03-24 RX ADMIN — ENOXAPARIN SODIUM 80 MG: 100 INJECTION SUBCUTANEOUS at 10:46

## 2025-03-24 ASSESSMENT — ENCOUNTER SYMPTOMS
ABDOMINAL PAIN: 1
SHORTNESS OF BREATH: 1

## 2025-03-24 NOTE — PLAN OF CARE
PHYSICAL THERAPY TREATMENT     Patient: Bony Smith (59 y.o. male)  Date: 3/24/2025  Diagnosis: Hypoglycemia [E16.2]  Dizzinesses [R42] Dizzinesses      Precautions: Fall Risk, Other (Comment) (1-1)                      Recommendations for nursing mobility: Encourage HEP in prep for ADLs/mobility; see handout for details and Use of bed/chair alarm for safety    In place during session: Peripheral IV and EKG/telemetry   Chart, physical therapy assessment, plan of care and goals were reviewed.  ASSESSMENT  Patient continues with skilled PT services and is progressing towards goals. Pt semi supine upon PT arrival, agreeable to session. Pt A&O x 3. (See below for objective details and assist levels).     Overall pt tolerated session fair today with PT. Patient performed supine to sit with sup.sba.Patient performed le exs at eob.sit to stand with Sba and felt dizzy so off balance with few steps forward .assisted back steps to bed,Pt took BP,sitting 103/86 and standing 87/70.RN informed Will continue to benefit from skilled PT services, and will continue to progress as tolerated. Current PT DC recommendation Moderate intensity short-term skilled physical therapy up to 5x/week once medically appropriate.  GOALS:      Problem: Physical Therapy - Adult  Goal: By Discharge: Performs mobility at highest level of function for planned discharge setting.  See evaluation for individualized goals.  Description: FUNCTIONAL STATUS PRIOR TO ADMISSION: Patient was independent and active without use of DME.    HOME SUPPORT PRIOR TO ADMISSION: The patient lived alone with family to provide assistance.    Physical Therapy Goals  Initiated 3/18/2025  Pt stated goal: Get better  Pt will be I with [facility] HEP in 7 days.  Pt will perform bed mobility with Modified [place] in 7 days.  Pt will perform transfers with [facility] [place] in 7 days.   Pt will amb 100 feet with LRAD safely with Modified [place] in 7 days.  Pt

## 2025-03-24 NOTE — PROGRESS NOTES
Comprehensive Nutrition Assessment    Type and Reason for Visit:  Initial, LOS    Nutrition Recommendations/Plan:   Continue Current Diet  Continue Ensure Plus HP TID (1050kcal, 60gpro)  Encourage >50% PO intakes  Monitor/document wt, BM, PO+ONS% in I/O     Malnutrition Assessment:  Malnutrition Status:  Insufficient data (03/24/25 1416)    Context:  Social/Environmental Circumstances     Findings of the 6 clinical characteristics of malnutrition:  Energy Intake:  Mild decrease in energy intake (while IP)  Weight Loss:  No weight loss     Body Fat Loss:  Unable to assess     Muscle Mass Loss:  Unable to assess    Fluid Accumulation:  No fluid accumulation     Strength:  Not Performed    Nutrition Assessment:    Sent from [employer] for c/o ongoing +abd pain. Admit for dizziness. UDS +THC. Prev admit to [facility] but then d/c and xfer to [facility] (3/15) for hypotension. Assessed for LOS. Currently working on xfer to facility w/ medical psych. Psych and BSMART following for SI. 1:1 sitter in place. Per I/O, variable PO intakes w/ 1-25% x3 documented meals, 16-50% x5 documented meals, 51-75% x2 documented meals, % x4 documented meals. ONS intakes % x3 documented. Limited wt hx in EMR; it did show a 10# wt gain x1mo. Labs reviewed. Meds: warfarin, lovenox, midodrine, thiamine, NS @ 75ml/hr, PRN MoM      Food Insecurity: Patient Declined (3/15/2025)    Hunger Vital Sign     Worried About Running Out of Food in the Last Year: Patient declined     Ran Out of Food in the Last Year: Patient declined   Recent Concern: Food Insecurity - Food Insecurity Present (3/15/2025)    Hunger Vital Sign     Worried About Running Out of Food in the Last Year: Sometimes true     Ran Out of Food in the Last Year: Patient unable to answer      Nutrition Related Findings:    NFPE deferred d/t working remotely. +Edentulous. No c/s difficulty noted. Blind in R eye. Reduced appetite d/t abd pain. Last BM 3/24. No edema. Wound Type:  None       Current Nutrition Intake & Therapies:    Average Meal Intake: 1-25%, 26-50%, 51-75%, %  Average Supplements Intake: %  ADULT ORAL NUTRITION SUPPLEMENT; Breakfast, Lunch, Dinner; Standard High Calorie/High Protein Oral Supplement  DIET ONE TIME MESSAGE;  ADULT DIET; Regular; Safety Tray; Safety Tray (Disposables)    Anthropometric Measures:  Height: 182.6 cm (5' 11.89\")  Ideal Body Weight (IBW): 177 lbs (80 kg)       Current Body Weight: 75.5 kg (166 lb 7.2 oz), 94 % IBW. Weight Source: Bed scale  Current BMI (kg/m2): 22.6           Weight Adjustment For: No Adjustment                 BMI Categories: Normal Weight (BMI 18.5-24.9)    Estimated Daily Nutrient Needs:  Energy Requirements Based On: Kcal/kg  Weight Used for Energy Requirements: Current  Energy (kcal/day): 1888-2265kcals/day (25-30kcal/kg)  Weight Used for Protein Requirements: Current  Protein (g/day): 76-91g/day (1.0-1.2g/kg)  Method Used for Fluid Requirements: 1 ml/kcal  Fluid (ml/day): 1888-2265ml/day (1ml/kcal)    Nutrition Diagnosis:   Inadequate protein-energy intake related to psychological cause or life stress as evidenced by intake 0-25%, intake 26-50%, intake 51-75%    Nutrition Interventions:   Food and/or Nutrient Delivery: Continue Current Diet, Continue Oral Nutrition Supplement  Nutrition Education/Counseling: No recommendation at this time  Coordination of Nutrition Care: Continue to monitor while inpatient       Goals:  Goals: Meet at least 75% of estimated needs, by next RD assessment  Type of Goal: New goal       Nutrition Monitoring and Evaluation:   Behavioral-Environmental Outcomes: None Identified  Food/Nutrient Intake Outcomes: Food and Nutrient Intake, Supplement Intake  Physical Signs/Symptoms Outcomes: Biochemical Data, GI Status, Meal Time Behavior, Weight    Discharge Planning:    Continue current diet     Sri Bhatt  Contact: ext [phone] or via [employer]

## 2025-03-24 NOTE — PROGRESS NOTES
Over the shift multiple code BERTs were called on patient. patient became combative, ripped out IV, trying to leave the unit, attempting to head-butt and swing at staff. Asking staff to kill him, threatening to kill staff, hallucinating \"man in the corner that's come to get me.\" PRN medications ordered, bilateral soft wrist restraints ordered and applied, see orders, see documentation. Patient refusing food and fluids. IV fluids ordered and administered. [place] applied for comfort. Care ongoing.

## 2025-03-24 NOTE — PROGRESS NOTES
Renal Progress Note    Patient: Bony Smith MRN: [id number]  SSN: [id number]    YOB: 1965  Age: 59 y.o.  Sex: male      Admit Date: 3/15/2025    LOS: 9 days     Subjective:   Patient seen at bedside. Alert and awake, no acute distress.  Denies any active complaints.  Most recent SBP is 93/69    Current Facility-Administered Medications   Medication Dose Route Frequency    0.9 % sodium chloride infusion   IntraVENous Continuous    warfarin placeholder: dosing by pharmacy   Oral RX Placeholder    busPIRone (BUSPAR) tablet 7.5 mg  7.5 mg Oral BID    OLANZapine zydis (ZYPREXA) disintegrating tablet 5 mg  5 mg Oral Nightly    DULoxetine (CYMBALTA) extended release capsule 30 mg  30 mg Oral Daily    enoxaparin (LOVENOX) injection 80 mg  1 mg/kg SubCUTAneous BID    thiamine mononitrate tablet 100 mg  100 mg Oral Daily    midodrine (PROAMATINE) tablet 10 mg  10 mg Oral TID    acetaminophen (TYLENOL) tablet 650 mg  650 mg Oral Q4H PRN    FLUoxetine (PROZAC) capsule 40 mg  40 mg Oral Daily    magnesium hydroxide (MILK OF MAGNESIA) 400 MG/5ML suspension 30 mL  30 mL Oral Daily PRN    mirtazapine (REMERON SOL-TAB) disintegrating tablet 15 mg  15 mg Oral Nightly        Vitals:    03/24/25 0700 03/24/25 0720 03/24/25 1106 03/24/25 1408   BP:  116/86 93/69    Pulse: 60 57 77    Resp:  18 18    Temp:  97.5 °F (36.4 °C) 98.1 °F (36.7 °C)    TempSrc:  Oral Oral    SpO2:  96% 95%    Weight:       Height:    1.826 m (5' 11.89\")     Objective:   General: alert, awake, no acute distress.  HEENT: EOMI, no icterus, no pallor, mucosa moist.  Neck: Neck is supple, No JVD.  Lungs: breathsounds normal, no respiratory distress on inspection, no rhonchi, no rales.  CVS: heart sounds normal, regular rate and rhythm.  GI: soft, nontender, normal BS.  Extremities: no cyanosis, no edema.  Neuro: Alert, awake, oriented x3,  moving all extremities well.   Skin: normal skin turgor, no skin rashes.        Intake and  MPV 11.2 8.9 - 12.9 FL    Nucleated RBCs 0.0 0.0  WBC    nRBC 0.00 0.00 - 0.01 K/uL    Neutrophils % 55.4 32.0 - 75.0 %    Lymphocytes % 33.6 12.0 - 49.0 %    Monocytes % 7.9 5.0 - 13.0 %    Eosinophils % 1.8 0.0 - 7.0 %    Basophils % 0.9 0.0 - 1.0 %    Immature Granulocytes % 0.4 0 - 0.5 %    Neutrophils Absolute 5.01 1.80 - 8.00 K/UL    Lymphocytes Absolute 3.04 0.80 - 3.50 K/UL    Monocytes Absolute 0.71 0.00 - 1.00 K/UL    Eosinophils Absolute 0.16 0.00 - 0.40 K/UL    Basophils Absolute 0.08 0.00 - 0.10 K/UL    Immature Granulocytes Absolute 0.04 0.00 - 0.04 K/UL    Differential Type AUTOMATED          Assessment and Plan:     1.  Orthostatic hypotension:   Contributing factors appears to be dehydration with poor intake, cardiac issues with borderline low ejection fraction and thrombus,   autonomic neuropathy probably contributed by EtOH use  Possible endocrine issues like hypercortisolism, hyperaldosteronism, hypothyroidism    Improved orthostatic changes with IVF and albumin  Improved blood pressures   IV fluids restarted by primary, will give dose of albumin today  Continue to monitor hemodynamic status    Random cortisol 10.8, ACTH 8.2  Plasma renin aldosterone--pending  TSH level is normal     Hypoalbuminemia: received 1 dose of 25 g of IV albumin 3/20 with improvement in Orthostatics      2.  LV cardiac thrombus  On anticoagulation and cardiology following  Blood cultures negative for 72 hrs  3.  CHF: Low EF of 30 to 35% with left ventricular dilation, ??  Alcoholic cardiomyopathy  No evidence of any fluid overload at this time     4.  History of chronic EtOH use: Possible autonomic neuropathy  Continue thiamine 100mg daily, folate level high    Signed By: Treva Andre, APRN - CNP     March 24, 2025    Addendum:    Rounds made along with Treva Andre NP  Patient seen and examined at bedside,   Labs and treatments reviewed  Plan discussed with patient and NP  Reviewed NP's  notes, amended and agree

## 2025-03-24 NOTE — PROGRESS NOTES
Spoke with Loly at the [facility] and she stated there are no [facility] facilities that accept medical/BH patients and the  would need to seek another facility.    Spoke with Audra at the [facility] and she is putting in the transfer for the patient to go for medical/BH to meet his needs.      Audra called back and stated they will try to get him a bed at [facility].

## 2025-03-24 NOTE — PROGRESS NOTES
Warfarin Dosing Consult  Bony Smith is a 59 y.o. male with LV thrombus. Pharmacy was consulted by Dr Jad Hatch to dose and monitor warfarin.    INR Goal: 2.5-3.5    PTA Dose: N/A    Drugs that may increase INR:None  Drugs that may decrease INR: None  Other current anticoagulants/ drugs that may increase bleeding risk: Enoxaparin  Risk factors: None  Daily INR ordered: Yes    Recent Labs     03/22/25  0358 03/24/25  0712   HGB 13.5 14.4    334     Recent Labs     03/23/25  0442   ALT 37   AST 21     Recent Labs     03/22/25  1651 03/23/25  0442 03/24/25  0712   INR 1.2* 1.2* 1.4*       Date INR Previous Dose   3/22 1.2 7.5   3/23 1.2 7.5   3/24 1.4 10     Assessment/ Plan:  H/H and platelets stable.  INR remains subtherapeutic. Started on Lovenox bridge  Will order warfarin 10 mg x 1 dose tonight.  INR ordered through 3/29  Pharmacy will continue to monitor daily and adjust therapy as indicated.

## 2025-03-24 NOTE — PROGRESS NOTES
Progress Note      3/24/2025 8:16 AM  NAME: Bony Smith   MRN:  [id number]   Admit Diagnosis: Hypoglycemia [E16.2]  Dizzinesses [R42]      Problem List:   Admitted to the hospital from another facility with blood pressure of 67 over 54 mmHg.  -Patient was recently in our hospital and diagnosed with LV thrombus and was discharged on anticoagulation.  -Cardiomyopathy with LV function of 45% as of 3/13/2025  -LV thrombus present  -Psych disorder  -Bipolar disorder  -Blindness in the right eye       Assessment/Plan:   Note dictated March 24, 2025  -Follow up with visit cardiology.  -Patient is lying comfortably in the bed.  He stated that he has no complaints.  Patient is currently in the hospital secondary to left ventricular thrombus and was readmitted to the [facility] from the psych unit.  Apparently he has no suicidal ideation anymore and can be discharged home.  -Once patient INR is more than 3 and he is otherwise clinically and hemodynamically stable I will take the liberty to perform a limited echocardiogram for LV thrombus evaluation and then make further cardiovascular management decision as clinically warranted.  -Will follow closely while he is in the hospital along with the team and thereafter as an outpatient for LV thrombus evaluation and its management for a minimum of 3 months.  ===============================================================  Note dictated March 22, 2025  -Follow-up visit from cardiology.  Patient was readmitted to the hospital.  Intracardiac thrombus still present and has not decreased in size being on Eliquis.  -Based on my clinical assessment patient should be on Lovenox and Coumadin and INR should be around 3.  -Continue current medical management with no further cardiovascular testing and once bridging is completed and patient INR is 3 or above he can be discharged to follow-up with us as an outpatient.  -I understand compliance is an issue based on patient's  psych problems but he will be going to an inpatient either psych unit or rehab so I am not concerned about the compliance initially and later on after obtaining an echocardiogram we will make further cardiovascular management decision for LV thrombus and its future management in 4 weeks or early as needed.  -This has been discussed with the primary team.  ===============================================================  Note dictated March 21, 2025  -59-year-old white male admitted to the hospital from psych facility where he was admitted recently with LV thrombus readmitted to the hospital with hypotension.  -When I saw the patient he was lying comfortably in the bed and denies any symptoms of chest pain shortness of breath or any other anginal equivalent.  -Reduced LV function of 45% as of March 13, 2025  -No known established coronary artery disease  -Will request pharmacy for LV thrombus management with Coumadin rather than Eliquis as based on my current clinical knowledge Coumadin is better than Eliquis for intracardiac thrombus resolution.  -No further cardiovascular testing based on my current clinical assessment and will continue current medical management.         []       High complexity decision making was performed in this patient at high risk for decompensation with multiple organ involvement.    Subjective:     Bony Smith is a 59 y.o. White (non-) male who has no known established coronary artery disease and readmitted to the hospital with hypotension.  Patient was discharged with a diagnosis of intracardiac thrombus and is started on anticoagulation.  Based on my current clinical assessment and knowledge based on his LV thrombus he should be on Coumadin rather than Eliquis.  For now continue Lovenox and Coumadin until we achieve INR of 3 or more.  The recommendation is to keep the INR between 2.5 and 3.5 in his case scenario.  Will continue to follow while patient is in the hospital

## 2025-03-24 NOTE — BSMART NOTE
BSMART Liaison Team Note     LOS:  9     Patient goal(s) for today:  take medications as prescribed, make needs known in an appropriate manner, engage in supportive counseling as needed.     BSMART Liaison team focus/goals: assess needs, provide support and education, coordinate care, provided supportive counseling as needed.    Progress note: Liaison responded to code LILIBETH earlier today. Patient dressed in green gown with sitter at bedside. Per staff patient was not able to be redirected by staff, snatch IV out, walking off the unit. Staff got patient back to room and connected to IV.    Liaison and  provided support. Bony troy SI/HI/AVH, reported feeling depression 10/10 and anxious 10/10/ He was unable to identify any stressors. Per sitter patient sleep remains irregular, tossing and turning though his appetite is normal. Patient has been having an Ensure with his meals. Patient kept eyes closed during most if conversation, answered \"I don't know\" to several questions. Per sitter patient experiencing bizzare thought about the staff from the lab giving him blood from other patients.     Barriers to Discharge: medical and psychiatric clearance  Guns in the home: No     Outpatient provider(s):  none  Insurance info/prescription coverage:  Payor: [facility] MEDICAID /  /  /      Diagnosis:   Past Medical History:   Diagnosis Date    Anxiety     Blind right eye     Detached retina, right     Diverticulitis of intestine with abscess     Family history of diverticulitis of colon     mother and brother    Psychiatric disorder     Bipolar        Plan:  Spoke with Dr. Rivas, who recommend patient transfer to [facility] unit or [facility]. TIMOTHY Leyva made aware, informed of process to have patient admitted. Autumn will reach out to Patient ACCESS/ transfer center.    Follow up Psych Consult placed? Yes .   Psychiatrist updated? Yes  , Rob      Participating treatment team members: Lou Brantley

## 2025-03-24 NOTE — PLAN OF CARE
Problem: Discharge Planning  Goal: Discharge to home or other facility with appropriate resources  3/24/2025 0041 by Magy Gallardo RN  Outcome: Progressing  3/23/2025 1056 by Pia Moody RN  Outcome: Progressing  Flowsheets (Taken 3/23/2025 0836)  Discharge to home or other facility with appropriate resources:   Identify barriers to discharge with patient and caregiver   Arrange for needed discharge resources and transportation as appropriate   Identify discharge learning needs (meds, wound care, etc)   Refer to discharge planning if patient needs post-hospital services based on physician order or complex needs related to functional status, cognitive ability or social support system     Problem: Pain  Goal: Verbalizes/displays adequate comfort level or baseline comfort level  3/24/2025 0041 by Magy Gallardo RN  Outcome: Progressing  3/23/2025 1056 by Pia Moody RN  Outcome: Progressing     Problem: Safety - Adult  Goal: Free from fall injury  3/24/2025 0041 by Magy Gallardo RN  Outcome: Progressing  3/23/2025 1056 by Pia Moody RN  Outcome: Progressing     Problem: Self Harm/Suicidality  Goal: Will have no self-injury during hospital stay  Description: INTERVENTIONS:  1.  Ensure constant observer at bedside with Q15M safety checks  2.  Maintain a safe environment  3.  Secure patient belongings  4.  Ensure family/visitors adhere to safety recommendations  5.  Ensure safety tray has been added to patient's diet order  6.  Every shift and PRN: Re-assess suicidal risk via Frequent Screener    3/24/2025 0041 by Magy Gallardo RN  Outcome: Progressing  Flowsheets (Taken 3/23/2025 1950)  Will have no self-injury during hospital stay:   Ensure constant observer at bedside with Q15M safety checks   Maintain a safe environment   Secure patient belongings   Ensure family/visitors adhere to safety recommendations   Ensure safety tray has been added to patient's diet

## 2025-03-24 NOTE — CONSULTS
[address]  [address]                              CONSULTATION      PATIENT NAME: CANDY ROCHA            : 1965  MED REC NO: [id number]                       ROOM: 482  ACCOUNT NO: [id number]                       ADMIT DATE: 03/15/2025  PROVIDER: Vinita Watkins MD    DATE OF SERVICE:  2025    ATTENDING PHYSICIAN:  BERNARDO LIN    This is a psych consult followup.    Saw the patient.  Chart reviewed.  The patient moved to room 482, seen along with [employer] liaison staff.  The patient has a one-to-one staff alert, very apathetic, withdrawn, depressed, disheveled.  He says when sister called he cursed at her, now she is dead.  He is delusional.  There is a telephone number in the chart for his sister, [phone].  There is a message.  There is no room for voicemail.  The patient has not made much progress.  Issues of orthostatic hypotension, seen by Dr. Miguel, Nephrology, and have some recommendations and suggestions, and also Dr. Douglass.  I have reviewed his admission and discharge in [facility].  Tried to see whether those medication regimen may be helpful.  Dr. Douglass ordered Cymbalta 30 mg daily, BuSpar 7.5 mg twice a day, Zyprexa, he was taking 10 mg, I cautiously added 5 mg.  He did get up, gone to the bathroom when I asked little bit insecure in his walking.  Delusional, depressed, but not suicidal.  No hallucinations.  Continued inpatient level of care indicated.        VINITA WATKINS MD      RK/SUDHAS  D:  2025 00:02:00  T:  2025 00:59:33  JOB #:  [id number]

## 2025-03-24 NOTE — PROGRESS NOTES
Hospitalist Progress Note    NAME: Bony Smith   : 1965   MRN: [id number]     Date/Time: 3/24/2025 4:35 PM  Patient PCP: None, None    Estimated discharge date: 48  Barriers: Echo results, psych consult, active SI    Hospital Course:  Bony Smith is a 59 y.o.  male with PMHx significant for bipolar disorder, anxiety, hx of diverticulitis, and BPH.  He presents to the ED from [facility] for generalized abdominal pain for months.  Pain is associated with shortness of breath.  Patient is a former smoker, no EtOH or illicit drug use.  Denies chest pain, N/V/D, loss of appetite, fever, chills.  Patient is poor historian, offers limited history.  CTA A/P showed left ventricular apical filling defect concerning for thrombus.  Cardiology was consulted.  Patient started on heparin drip.  Echo ordered and pending results.  Troponin 33.  Psych consulted for active suicidal ideation.  Patient had requested patient  to kill him.  Currently on home medications for bipolar disorder and anxiety.  Of note patient was noted to have left ventricular thrombus on echocardiogram, started on heparin gtt., spoke to cardiology attending Dr. Douglass, who recommends patient can be discharged on Eliquis with close outpatient follow-up primary care physician as well as cardiology, plan was explained to the patient who is agreeable to current plan.  However, patient noted to have ongoing orthostatic hypotension despite volume resuscitation and with scheduled midodrine.  Therefore, cardiology was reconsulted and repeat echo was ordered.  At the same time, nephrology was consulted.  Currently, cortisol and ACTH were ordered.  Nephrology will do further workup.  Otherwise, patient was given albumin.     Cardiology recommended Warfarin due to better efficacy in treatment of thrombus. Pending psych facility and getting lovenox bridge. May be difficult disposition.       Assessment / Plan:    Orthostatic  him alone.  He has olanzapine scheduled at nighttime.  Haldol ordered.  One-time Ativan given.  Sitter at bedside.  Unfortunately, due to trying to leave the room twice despite redirection, restraints had to be placed on patient temporarily.  Psychiatry is following and was present during the CODE LILIBETH first time. He did get dizzy and lightheadedness when sitting down so fluid started again due to positive orthostat     SI  -Psych re-eval as no more SI. Can possible discharge when INR therapeutic     Code Status: Full  DVT Prophylaxis: Heparin drip  GI Prophylaxis:    Subjective:   Chief Complaint: Abdominal pain      He is doing well and no complaints today. No n/v, fevers, urinary sx, BM changes. Eating and drinking. Flat affect. No chest pain or SOB.    Nurse with no concerns.   Objective:     VITALS:   Last 24hrs VS reviewed since prior progress note. Most recent are:  Patient Vitals for the past 24 hrs:   BP Temp Temp src Pulse Resp SpO2 Height Weight   03/24/25 1509 111/89 98.6 °F (37 °C) Oral 67 18 97 % -- --   03/24/25 1408 -- -- -- -- -- -- 1.826 m (5' 11.89\") --   03/24/25 1106 93/69 98.1 °F (36.7 °C) Oral 77 18 95 % -- --   03/24/25 0720 116/86 97.5 °F (36.4 °C) Oral 57 18 96 % -- --   03/24/25 0700 -- -- -- 60 -- -- -- --   03/24/25 0643 -- -- -- -- -- -- -- 75.5 kg (166 lb 7.2 oz)   03/24/25 0504 107/77 97.1 °F (36.2 °C) -- 66 15 98 % -- --   03/24/25 0131 105/77 97.5 °F (36.4 °C) -- 66 15 98 % -- --   03/24/25 0000 -- -- -- 62 -- -- -- --   03/23/25 2007 111/82 98.2 °F (36.8 °C) Oral 70 14 93 % -- --         Intake/Output Summary (Last 24 hours) at 3/24/2025 1635  Last data filed at 3/24/2025 0845  Gross per 24 hour   Intake 457 ml   Output --   Net 457 ml          I had a face to face encounter and independently examined this patient on 3/24/2025, as outlined below:    Review of Systems   Constitutional: Negative.    Respiratory:  Positive for shortness of breath.    Cardiovascular: Negative.   monitoring for nephrotoxicity:     [] IV Narcotic analgesia for adverse drug reaction  [] Aggressive IV diuresis requiring serial monitoring for renal impairment and electrolyte derangements  [] Critical electrolyte abnormalities requiring IV replacement and close serial monitoring  [] Insulin - monitoring serial FSBS for Hypoglycemic adverse drug reaction  [] Other -   [] Change in code status:    [] Decision to escalate care:    [] Major surgery/procedure with associated risk factors:     ----------------------------------------------------------------------  C. Data (any 2)  [] Discussed current management and discharge planning options with Case Management.  [x] Discussed management of the case with: Nurse, patient  [x] Telemetry personally reviewed and interpreted as documented above    [x] Imaging personally reviewed and interpreted, includes: CT A/P  [x] Data Review (any 3)  [x] All available Consultant notes from yesterday/today were reviewed  [x] All current labs were reviewed and interpreted for clinical significance   [x] Appropriate follow-up labs were ordered  [x] Collateral history obtained from: Patient     Procedures: see electronic medical records for all procedures/Xrays and details which were not copied into this note but were reviewed prior to creation of Plan.    Reviewed most current lab test results and cultures  YES  Reviewed most current radiology test results   YES  Review and summation of old records today    NO  Reviewed patient's current orders and MAR    YES  PMH/ reviewed - no change compared to H&P  >50% of visit spent in counseling and coordination of care  ________________________________________________________________________  Total NON critical care TIME:  35  Minutes    Signed: Pastora Araya MD

## 2025-03-25 LAB
ANION GAP SERPL CALC-SCNC: 5 MMOL/L (ref 2–12)
BASOPHILS # BLD: 0.07 K/UL (ref 0–0.1)
BASOPHILS NFR BLD: 0.7 % (ref 0–1)
BUN SERPL-MCNC: 18 MG/DL (ref 6–20)
BUN/CREAT SERPL: 26 (ref 12–20)
CA-I BLD-MCNC: 9.4 MG/DL (ref 8.5–10.1)
CHLORIDE SERPL-SCNC: 113 MMOL/L (ref 97–108)
CO2 SERPL-SCNC: 24 MMOL/L (ref 21–32)
CREAT SERPL-MCNC: 0.69 MG/DL (ref 0.7–1.3)
DIFFERENTIAL METHOD BLD: ABNORMAL
EOSINOPHIL # BLD: 0.12 K/UL (ref 0–0.4)
EOSINOPHIL NFR BLD: 1.2 % (ref 0–7)
ERYTHROCYTE [DISTWIDTH] IN BLOOD BY AUTOMATED COUNT: 16.4 % (ref 11.5–14.5)
GLUCOSE SERPL-MCNC: 80 MG/DL (ref 65–100)
HCT VFR BLD AUTO: 40.4 % (ref 36.6–50.3)
HGB BLD-MCNC: 13.2 G/DL (ref 12.1–17)
IMM GRANULOCYTES # BLD AUTO: 0.03 K/UL (ref 0–0.04)
IMM GRANULOCYTES NFR BLD AUTO: 0.3 % (ref 0–0.5)
INR PPP: 1.7 (ref 0.9–1.1)
LYMPHOCYTES # BLD: 3.4 K/UL (ref 0.8–3.5)
LYMPHOCYTES NFR BLD: 33.9 % (ref 12–49)
MCH RBC QN AUTO: 28.2 PG (ref 26–34)
MCHC RBC AUTO-ENTMCNC: 32.7 G/DL (ref 30–36.5)
MCV RBC AUTO: 86.3 FL (ref 80–99)
MONOCYTES # BLD: 0.64 K/UL (ref 0–1)
MONOCYTES NFR BLD: 6.4 % (ref 5–13)
NEUTS SEG # BLD: 5.77 K/UL (ref 1.8–8)
NEUTS SEG NFR BLD: 57.5 % (ref 32–75)
NRBC # BLD: 0 K/UL (ref 0–0.01)
NRBC BLD-RTO: 0 PER 100 WBC
PLATELET # BLD AUTO: 393 K/UL (ref 150–400)
PMV BLD AUTO: 10.7 FL (ref 8.9–12.9)
POTASSIUM SERPL-SCNC: 3.7 MMOL/L (ref 3.5–5.1)
PROTHROMBIN TIME: 20.4 SEC (ref 11.9–14.6)
RBC # BLD AUTO: 4.68 M/UL (ref 4.1–5.7)
SODIUM SERPL-SCNC: 142 MMOL/L (ref 136–145)
WBC # BLD AUTO: 10 K/UL (ref 4.1–11.1)

## 2025-03-25 PROCEDURE — 36415 COLL VENOUS BLD VENIPUNCTURE: CPT

## 2025-03-25 PROCEDURE — 80048 BASIC METABOLIC PNL TOTAL CA: CPT

## 2025-03-25 PROCEDURE — 2580000003 HC RX 258

## 2025-03-25 PROCEDURE — 6370000000 HC RX 637 (ALT 250 FOR IP)

## 2025-03-25 PROCEDURE — 85610 PROTHROMBIN TIME: CPT

## 2025-03-25 PROCEDURE — 85025 COMPLETE CBC W/AUTO DIFF WBC: CPT

## 2025-03-25 PROCEDURE — 6370000000 HC RX 637 (ALT 250 FOR IP): Performed by: INTERNAL MEDICINE

## 2025-03-25 PROCEDURE — 2060000000 HC ICU INTERMEDIATE R&B

## 2025-03-25 PROCEDURE — 6370000000 HC RX 637 (ALT 250 FOR IP): Performed by: NURSE PRACTITIONER

## 2025-03-25 PROCEDURE — 6370000000 HC RX 637 (ALT 250 FOR IP): Performed by: PSYCHIATRY & NEUROLOGY

## 2025-03-25 PROCEDURE — 6360000002 HC RX W HCPCS

## 2025-03-25 RX ORDER — WARFARIN SODIUM 5 MG/1
5 TABLET ORAL
Status: COMPLETED | OUTPATIENT
Start: 2025-03-25 | End: 2025-03-25

## 2025-03-25 RX ORDER — LORAZEPAM 2 MG/ML
1 INJECTION INTRAMUSCULAR ONCE
Status: COMPLETED | OUTPATIENT
Start: 2025-03-25 | End: 2025-03-25

## 2025-03-25 RX ORDER — LORAZEPAM 2 MG/ML
1 INJECTION INTRAMUSCULAR
Status: DISCONTINUED | OUTPATIENT
Start: 2025-03-25 | End: 2025-03-28 | Stop reason: HOSPADM

## 2025-03-25 RX ORDER — SODIUM CHLORIDE 9 MG/ML
INJECTION, SOLUTION INTRAVENOUS CONTINUOUS
Status: DISCONTINUED | OUTPATIENT
Start: 2025-03-25 | End: 2025-03-25

## 2025-03-25 RX ORDER — ARIPIPRAZOLE 2 MG/1
2 TABLET ORAL DAILY
Status: DISCONTINUED | OUTPATIENT
Start: 2025-03-25 | End: 2025-03-28 | Stop reason: HOSPADM

## 2025-03-25 RX ORDER — SODIUM CHLORIDE 9 MG/ML
INJECTION, SOLUTION INTRAVENOUS CONTINUOUS
Status: DISCONTINUED | OUTPATIENT
Start: 2025-03-25 | End: 2025-03-27

## 2025-03-25 RX ADMIN — ACETAMINOPHEN 650 MG: 325 TABLET, FILM COATED ORAL at 01:14

## 2025-03-25 RX ADMIN — SODIUM CHLORIDE: 0.9 INJECTION, SOLUTION INTRAVENOUS at 17:03

## 2025-03-25 RX ADMIN — SODIUM CHLORIDE: 0.9 INJECTION, SOLUTION INTRAVENOUS at 11:16

## 2025-03-25 RX ADMIN — WARFARIN SODIUM 5 MG: 5 TABLET ORAL at 17:02

## 2025-03-25 RX ADMIN — ENOXAPARIN SODIUM 80 MG: 100 INJECTION SUBCUTANEOUS at 21:18

## 2025-03-25 RX ADMIN — DULOXETINE HYDROCHLORIDE 30 MG: 30 CAPSULE, DELAYED RELEASE ORAL at 09:34

## 2025-03-25 RX ADMIN — BUSPIRONE HYDROCHLORIDE 7.5 MG: 5 TABLET ORAL at 21:18

## 2025-03-25 RX ADMIN — MIDODRINE HYDROCHLORIDE 10 MG: 5 TABLET ORAL at 01:15

## 2025-03-25 RX ADMIN — OLANZAPINE 5 MG: 5 TABLET, ORALLY DISINTEGRATING ORAL at 01:15

## 2025-03-25 RX ADMIN — SODIUM CHLORIDE: 0.9 INJECTION, SOLUTION INTRAVENOUS at 21:18

## 2025-03-25 RX ADMIN — MIDODRINE HYDROCHLORIDE 10 MG: 5 TABLET ORAL at 21:17

## 2025-03-25 RX ADMIN — ENOXAPARIN SODIUM 80 MG: 100 INJECTION SUBCUTANEOUS at 01:19

## 2025-03-25 RX ADMIN — WARFARIN SODIUM 10 MG: 5 TABLET ORAL at 01:15

## 2025-03-25 RX ADMIN — ENOXAPARIN SODIUM 80 MG: 100 INJECTION SUBCUTANEOUS at 09:34

## 2025-03-25 RX ADMIN — ARIPIPRAZOLE 2 MG: 2 TABLET ORAL at 17:02

## 2025-03-25 RX ADMIN — MIDODRINE HYDROCHLORIDE 10 MG: 5 TABLET ORAL at 15:22

## 2025-03-25 RX ADMIN — BUSPIRONE HYDROCHLORIDE 7.5 MG: 5 TABLET ORAL at 09:33

## 2025-03-25 RX ADMIN — THIAMINE HCL TAB 100 MG 100 MG: 100 TAB at 09:34

## 2025-03-25 RX ADMIN — MIRTAZAPINE 15 MG: 15 TABLET, ORALLY DISINTEGRATING ORAL at 01:14

## 2025-03-25 RX ADMIN — MIRTAZAPINE 15 MG: 15 TABLET, ORALLY DISINTEGRATING ORAL at 21:17

## 2025-03-25 RX ADMIN — BUSPIRONE HYDROCHLORIDE 7.5 MG: 5 TABLET ORAL at 01:15

## 2025-03-25 RX ADMIN — MIDODRINE HYDROCHLORIDE 10 MG: 5 TABLET ORAL at 09:33

## 2025-03-25 RX ADMIN — LORAZEPAM 1 MG: 2 INJECTION INTRAMUSCULAR; INTRAVENOUS at 11:23

## 2025-03-25 RX ADMIN — FLUOXETINE HYDROCHLORIDE 40 MG: 20 CAPSULE ORAL at 09:34

## 2025-03-25 RX ADMIN — OLANZAPINE 5 MG: 5 TABLET, ORALLY DISINTEGRATING ORAL at 21:17

## 2025-03-25 ASSESSMENT — PAIN SCALES - GENERAL
PAINLEVEL_OUTOF10: 0
PAINLEVEL_OUTOF10: 0
PAINLEVEL_OUTOF10: 3
PAINLEVEL_OUTOF10: 0

## 2025-03-25 ASSESSMENT — PAIN DESCRIPTION - LOCATION: LOCATION: HEAD

## 2025-03-25 ASSESSMENT — ENCOUNTER SYMPTOMS
ABDOMINAL PAIN: 1
SHORTNESS OF BREATH: 1

## 2025-03-25 ASSESSMENT — PAIN DESCRIPTION - DESCRIPTORS: DESCRIPTORS: ACHING

## 2025-03-25 NOTE — PROGRESS NOTES
Hospitalist Progress Note    NAME: Bony Smith   : 1965   MRN: [id number]     Date/Time: 3/25/2025 3:53 PM  Patient PCP: None, None    Estimated discharge date: 48  Barriers: Echo results, psych consult, active SI    Hospital Course:  Bony Smith is a 59 y.o.  male with PMHx significant for bipolar disorder, anxiety, hx of diverticulitis, and BPH.  He presents to the ED from [facility] for generalized abdominal pain for months.  Pain is associated with shortness of breath.  Patient is a former smoker, no EtOH or illicit drug use.  Denies chest pain, N/V/D, loss of appetite, fever, chills.  Patient is poor historian, offers limited history.  CTA A/P showed left ventricular apical filling defect concerning for thrombus.  Cardiology was consulted.  Patient started on heparin drip.  Echo ordered and pending results.  Troponin 33.  Psych consulted for active suicidal ideation.  Patient had requested patient  to kill him.  Currently on home medications for bipolar disorder and anxiety.  Of note patient was noted to have left ventricular thrombus on echocardiogram, started on heparin gtt., spoke to cardiology attending Dr. Douglass, who recommends patient can be discharged on Eliquis with close outpatient follow-up primary care physician as well as cardiology, plan was explained to the patient who is agreeable to current plan.  However, patient noted to have ongoing orthostatic hypotension despite volume resuscitation and with scheduled midodrine.  Therefore, cardiology was reconsulted and repeat echo was ordered.  At the same time, nephrology was consulted.  Currently, cortisol and ACTH were ordered.  Nephrology will do further workup.  Otherwise, patient was given albumin.     Cardiology recommended Warfarin due to better efficacy in treatment of thrombus. Pending psych facility and getting lovenox bridge. May be difficult disposition.       Assessment / Plan:    Orthostatic  ordered.  One-time Ativan given.  Sitter at bedside.  Unfortunately, due to trying to leave the room twice despite redirection, restraints had to be placed on patient temporarily.  Psychiatry is following and was present during the CODE LILIBETH first time. He did get dizzy and lightheadedness when sitting down so fluid started again due to positive orthostat   -3/26 CODE LILIBETH again as trying to leave. Felt overwhelmed. Ativan given  -Needs Med-Psych unit  and CCL/CM attempting to find placement   -May need to consider Abilify    SI  -Psych re-eval as no more SI. Can possible discharge when INR therapeutic     Code Status: Full  DVT Prophylaxis: Heparin drip  GI Prophylaxis:    Subjective:   Chief Complaint: Abdominal pain      Saw patient during CODE LILIBETH. He was directable and sat down. Felt very overwhelmed. Wanted to be left alone. Denied any pain.   Ativan 0.5 and fluids restarted again.     Nurse with no concerns.   Objective:     VITALS:   Last 24hrs VS reviewed since prior progress note. Most recent are:  Patient Vitals for the past 24 hrs:   BP Temp Temp src Pulse Resp SpO2 Weight   03/25/25 1457 103/78 97.5 °F (36.4 °C) Oral 77 17 96 % --   03/25/25 1130 109/76 97.5 °F (36.4 °C) Oral 74 17 94 % --   03/25/25 0850 107/83 98.1 °F (36.7 °C) Oral 71 18 95 % --   03/25/25 0827 -- -- -- -- -- -- 76.6 kg (168 lb 14 oz)   03/25/25 0700 -- -- -- 78 -- -- --   03/25/25 0328 122/86 97.5 °F (36.4 °C) Oral 73 18 97 % --   03/25/25 0005 -- -- -- 64 -- -- --   03/24/25 2333 (!) 127/90 97.5 °F (36.4 °C) -- 66 18 95 % --   03/24/25 1910 121/76 97.2 °F (36.2 °C) Oral 64 17 98 % --         Intake/Output Summary (Last 24 hours) at 3/25/2025 1553  Last data filed at 3/25/2025 1330  Gross per 24 hour   Intake 500 ml   Output --   Net 500 ml          I had a face to face encounter and independently examined this patient on 3/25/2025, as outlined below:    Review of Systems   Constitutional: Negative.    Respiratory:  Positive for

## 2025-03-25 NOTE — PROGRESS NOTES
Warfarin Dosing Consult  Bony Smith is a 59 y.o. male with LV thrombus. Pharmacy was consulted by Dr Jad Hatch to dose and monitor warfarin.    INR Goal: 2.5-3.5    PTA Dose: N/A    Drugs that may increase INR:None  Drugs that may decrease INR: None  Other current anticoagulants/ drugs that may increase bleeding risk: Enoxaparin  Risk factors: None  Daily INR ordered: Yes    Recent Labs     03/24/25  0712 03/25/25  0611   HGB 14.4 13.2    393     Recent Labs     03/23/25  0442   ALT 37   AST 21     Recent Labs     03/22/25  1651 03/23/25  0442 03/24/25  0712 03/25/25  0611   INR 1.2* 1.2* 1.4* 1.7*       Date INR Previous Dose   3/22 1.2 7.5   3/23 1.2 7.5   3/24 1.4 10   3/25 1.7 10     Assessment/ Plan:  Pt with LV thrombus was started on heparin drip and then switched to Eliquis in anticipation of discharging 3/19. Pt experienced recurring orthostatic hypotension which lead to prolonging admission and re-consultation by cardiology, who requested a switch from Eliquis to warfarin.   H/H and platelets stable  INR remains subtherapeutic. However significant jump from 1.4 -> 1.7.  Continue on Lovenox bridge until INR therapeutic x 2  Given jump and not typically seeing INR impact until 3-5 days after initial dose, and given still distant from INR goal, will order warfarin 5mg x 1 today.   CBC & INR ordered through 3/29  Pharmacy will continue to monitor daily and adjust therapy as indicated.

## 2025-03-25 NOTE — PROGRESS NOTES
Patient continues to refuse medications at this time.  Patient agreed to take meds at 0030.  Will attempt at that time.

## 2025-03-25 NOTE — PROGRESS NOTES
Renal Progress Note    Patient: Bony Smith MRN: [id number]  SSN: [id number]    YOB: 1965  Age: 59 y.o.  Sex: male      Admit Date: 3/15/2025    LOS: 10 days     Subjective:   Patient seen at bedside. Alert and awake, no acute distress. Appears comfortable.  SBPs over past 24 hrs have ranged from 107-127.     Current Facility-Administered Medications   Medication Dose Route Frequency    0.9 % sodium chloride infusion   IntraVENous Continuous    haloperidol lactate (HALDOL) injection 1 mg  1 mg IntraVENous Q6H PRN    warfarin placeholder: dosing by pharmacy   Oral RX Placeholder    busPIRone (BUSPAR) tablet 7.5 mg  7.5 mg Oral BID    OLANZapine zydis (ZYPREXA) disintegrating tablet 5 mg  5 mg Oral Nightly    DULoxetine (CYMBALTA) extended release capsule 30 mg  30 mg Oral Daily    enoxaparin (LOVENOX) injection 80 mg  1 mg/kg SubCUTAneous BID    thiamine mononitrate tablet 100 mg  100 mg Oral Daily    midodrine (PROAMATINE) tablet 10 mg  10 mg Oral TID    acetaminophen (TYLENOL) tablet 650 mg  650 mg Oral Q4H PRN    FLUoxetine (PROZAC) capsule 40 mg  40 mg Oral Daily    magnesium hydroxide (MILK OF MAGNESIA) 400 MG/5ML suspension 30 mL  30 mL Oral Daily PRN    mirtazapine (REMERON SOL-TAB) disintegrating tablet 15 mg  15 mg Oral Nightly        Vitals:    03/25/25 0700 03/25/25 0827 03/25/25 0850 03/25/25 1130   BP:   107/83 109/76   Pulse: 78  71 74   Resp:   18 17   Temp:   98.1 °F (36.7 °C) 97.5 °F (36.4 °C)   TempSrc:   Oral    SpO2:   95% 94%   Weight:  76.6 kg (168 lb 14 oz)     Height:         Objective:   General: alert, awake, flat affect, no acute distress.  HEENT: EOMI, no icterus, no pallor, mucosa moist.  Neck: Neck is supple, No JVD.  Lungs: breathsounds normal, no respiratory distress on inspection, no rhonchi, no rales.  CVS: heart sounds normal, regular rate and rhythm.  GI: soft, nontender, normal BS.  Extremities: no cyanosis, no edema.  Neuro: Alert, awake, oriented x3, moving  with assessment and plan     Dione Isidro MD

## 2025-03-25 NOTE — BH NOTE
Progress note for March 24, 2025 patient seen around 1:10 PM along with discontinuing liaison  patient had a bird DART apparently tried to walk her hallway tried to run away done walking for a while while coming back he started become hypotensive dizzy    A staff member has to intervene and protect him from fall.  Patient came back in the bed alert but tired depressed denies suicidal thoughts denied hallucinations still depressed says depression 10 out of 10 anxiety 10 out of 10 he could not put his finger on what he is depressed about his blood pressure obviously still gets hypotensive output.  Follow-up he has a one-to-one staff.  And he is getting IV fluids midodrine he is being followed by cardiology and nephrology at the thought of fluoroscopy transit consider transferring him into a med psych unit is entertained 5 to search for 1 such facility he is also from [place] area he has been at May reviewing you believe last year it might be worthwhile to think about whether he can be transferred there close to his family and resources supposed to have a sister there but when I called last week nobody picked up the telephone he has been in and a new treatment regimen BuSpar 7.5 twice daily Zyprexa 5 mg at night and Wellbutrin his energy remains low continued inpatient level of care needed thank you

## 2025-03-25 NOTE — CARE COORDINATION
Chart reviewed, DCP pending psyc and medical clearance, possible transfer to other hospital/psyc unit.    CM continues to follow.

## 2025-03-25 NOTE — PROGRESS NOTES
Progress Note      3/25/2025 8:45 AM  NAME: Bony Smith   MRN:  [id number]   Admit Diagnosis: Hypoglycemia [E16.2]  Dizzinesses [R42]      Problem List:   Admitted to the hospital from another facility with blood pressure of 67 over 54 mmHg.  -Patient was recently in our hospital and diagnosed with LV thrombus and was discharged on anticoagulation.  -Cardiomyopathy with LV function of 45% as of 3/13/2025  -LV thrombus present  -Psych disorder  -Bipolar disorder  -Blindness in the right eye       Assessment/Plan:   Note dictated March 25, 2025  -Follow-up visit from cardiology.  Patient is lying comfortably in the bed.  -Continue Coumadin as INR is 1.7 and PT is 20.4.  Once INR is greater than 2.5 and close to 3 we will check limited echocardiogram for LV thrombus prior to discharge.  -No further cardiovascular testing and the plan is to anticoagulate him for intracardiac thrombus and repeat echocardiogram prior to discharge and in 3 months being on anticoagulation with INR close to 3.  ===============================================================  Note dictated March 24, 2025  -Follow up with visit cardiology.  -Patient is lying comfortably in the bed.  He stated that he has no complaints.  Patient is currently in the hospital secondary to left ventricular thrombus and was readmitted to the low blood pressure from the psych unit.  Apparently he has no suicidal ideation anymore and can be discharged home.  -Once patient INR is more than 3 and he is otherwise clinically and hemodynamically stable I will take the liberty to perform a limited echocardiogram for LV thrombus evaluation and then make further cardiovascular management decision as clinically warranted.  -Will follow closely while he is in the hospital along with the team and thereafter as an outpatient for LV thrombus evaluation and its management for a minimum of 3 months.  ===============================================================  Note  who has no known established coronary artery disease and readmitted to the hospital with hypotension.  Patient was discharged with a diagnosis of intracardiac thrombus and is started on anticoagulation.  Based on my current clinical assessment and knowledge based on his LV thrombus he should be on Coumadin rather than Eliquis.  For now continue Lovenox and Coumadin until we achieve INR of 3 or more.  The recommendation is to keep the INR between 2.5 and 3.5 in his case scenario.  Will continue to follow while patient is in the hospital along with the team and make further cardiovascular management decision as clinically warranted including close follow-up and repeat echocardiogram in 4 weeks.  Review of Systems:   Negative except for as noted above.    Objective:      Physical Exam:    Last 24hrs VS reviewed since prior progress note. Most recent are:    /86   Pulse 73   Temp 97.5 °F (36.4 °C) (Oral)   Resp 18   Ht 1.826 m (5' 11.89\")   Wt 76.6 kg (168 lb 14 oz)   SpO2 97%   BMI 22.97 kg/m²   No intake or output data in the 24 hours ending 03/25/25 0845       General Appearance: Alert; no acute distress.  Ears/Nose/Mouth/Throat: moist mucous membranes  Neck: Supple.  Chest: Lungs clear to auscultation bilaterally.  Cardiovascular: Regular rate and rhythm, S1S2 normal  Abdomen: Soft, non-tender, bowel sounds are active.  Extremities: No edema bilaterally.  Skin: Warm and dry.      PMH/SH reviewed - no change compared to H&P    Telemetry: Sinus rhythm    EKG:     No valid procedures specified.    [x]  No new EKG for review    Lab Data Personally Reviewed:    Recent Labs     03/24/25  0712 03/25/25  0611   WBC 9.0 10.0   HGB 14.4 13.2   HCT 43.3 40.4    393     Recent Labs     03/23/25  0442 03/24/25  0712 03/25/25  0611   INR 1.2* 1.4* 1.7*      Recent Labs     03/24/25  0712 03/25/25  0611    142   K 3.8 3.7   * 113*   CO2 22 24   BUN 19 18     No results for input(s): \"CPK\" in the  last 72 hours.    Invalid input(s): \"CPKMB\", \"CKNDX\", \"TROIQ\"  Lab Results   Component Value Date/Time    CHOL 127 04/20/2024 07:08 AM    HDL 44 04/20/2024 07:08 AM    LDL 66.2 04/20/2024 07:08 AM       Recent Labs     03/23/25  0442   GLOB 3.2     No results for input(s): \"PH\", \"PCO2\", \"PO2\" in the last 72 hours.    Medications Personally Reviewed:    Current Facility-Administered Medications   Medication Dose Route Frequency    haloperidol lactate (HALDOL) injection 1 mg  1 mg IntraVENous Q6H PRN    warfarin placeholder: dosing by pharmacy   Oral RX Placeholder    busPIRone (BUSPAR) tablet 7.5 mg  7.5 mg Oral BID    OLANZapine zydis (ZYPREXA) disintegrating tablet 5 mg  5 mg Oral Nightly    DULoxetine (CYMBALTA) extended release capsule 30 mg  30 mg Oral Daily    enoxaparin (LOVENOX) injection 80 mg  1 mg/kg SubCUTAneous BID    thiamine mononitrate tablet 100 mg  100 mg Oral Daily    midodrine (PROAMATINE) tablet 10 mg  10 mg Oral TID    acetaminophen (TYLENOL) tablet 650 mg  650 mg Oral Q4H PRN    FLUoxetine (PROZAC) capsule 40 mg  40 mg Oral Daily    magnesium hydroxide (MILK OF MAGNESIA) 400 MG/5ML suspension 30 mL  30 mL Oral Daily PRN    mirtazapine (REMERON SOL-TAB) disintegrating tablet 15 mg  15 mg Oral Nightly         Electronically signed by    PAU CAR MD  March 25, 2025   8:45 AM

## 2025-03-25 NOTE — PLAN OF CARE
Problem: Discharge Planning  Goal: Discharge to home or other facility with appropriate resources  Outcome: Progressing  Flowsheets (Taken 3/24/2025 1946)  Discharge to home or other facility with appropriate resources:   Identify barriers to discharge with patient and caregiver   Arrange for needed discharge resources and transportation as appropriate   Identify discharge learning needs (meds, wound care, etc)   Arrange for interpreters to assist at discharge as needed   Refer to discharge planning if patient needs post-hospital services based on physician order or complex needs related to functional status, cognitive ability or social support system     Problem: Pain  Goal: Verbalizes/displays adequate comfort level or baseline comfort level  Outcome: Progressing     Problem: Safety - Adult  Goal: Free from fall injury  Outcome: Progressing     Problem: Self Harm/Suicidality  Goal: Will have no self-injury during hospital stay  Description: INTERVENTIONS:  1.  Ensure constant observer at bedside with Q15M safety checks  2.  Maintain a safe environment  3.  Secure patient belongings  4.  Ensure family/visitors adhere to safety recommendations  5.  Ensure safety tray has been added to patient's diet order  6.  Every shift and PRN: Re-assess suicidal risk via Frequent Screener    Outcome: Progressing     Problem: Skin/Tissue Integrity  Goal: Skin integrity remains intact  Description: 1.  Monitor for areas of redness and/or skin breakdown  2.  Assess vascular access sites hourly  3.  Every 4-6 hours minimum:  Change oxygen saturation probe site  4.  Every 4-6 hours:  If on nasal continuous positive airway pressure, respiratory therapy assess nares and determine need for appliance change or resting period  Outcome: Progressing  Flowsheets (Taken 3/24/2025 1946)  Skin Integrity Remains Intact: Monitor for areas of redness and/or skin breakdown     Problem: Physical Therapy - Adult  Goal: By Discharge: Performs  Maintains optimal cardiac output and hemodynamic stability  Outcome: Progressing  Flowsheets (Taken 3/24/2025 1946)  Maintains optimal cardiac output and hemodynamic stability:   Monitor blood pressure and heart rate   Monitor urine output and notify Licensed Independent Practitioner for values outside of normal range   Assess for signs of decreased cardiac output   Administer fluid and/or volume expanders as ordered  Goal: Absence of cardiac dysrhythmias or at baseline  Outcome: Progressing  Flowsheets (Taken 3/24/2025 1946)  Absence of cardiac dysrhythmias or at baseline:   Monitor cardiac rate and rhythm   Assess for signs of decreased cardiac output     Problem: Musculoskeletal - Adult  Goal: Return mobility to safest level of function  Outcome: Progressing  Flowsheets (Taken 3/24/2025 1946)  Return Mobility to Safest Level of Function:   Assess patient stability and activity tolerance for standing, transferring and ambulating with or without assistive devices   Assist with transfers and ambulation using safe patient handling equipment as needed   Ensure adequate protection for wounds/incisions during mobilization  Goal: Return ADL status to a safe level of function  Outcome: Progressing  Flowsheets (Taken 3/24/2025 1946)  Return ADL Status to a Safe Level of Function:   Assess activities of daily living deficits and provide assistive devices as needed   Administer medication as ordered     Problem: Gastrointestinal - Adult  Goal: Minimal or absence of nausea and vomiting  Outcome: Progressing  Flowsheets (Taken 3/24/2025 1946)  Minimal or absence of nausea and vomiting:   Administer IV fluids as ordered to ensure adequate hydration   Maintain NPO status until nausea and vomiting are resolved  Goal: Maintains or returns to baseline bowel function  Outcome: Progressing  Flowsheets (Taken 3/24/2025 1946)  Maintains or returns to baseline bowel function:   Assess bowel function   Encourage oral fluids to ensure

## 2025-03-25 NOTE — PROGRESS NOTES
During shift patient sister Alicia called requesting to speak with Dr Araya. Left phone number ([phone]), Dr Araya off floor at time of call, message was passed on to Dr Araya, advised she would reach out to family when done rounding.

## 2025-03-26 ENCOUNTER — APPOINTMENT (OUTPATIENT)
Facility: HOSPITAL | Age: 60
DRG: 204 | End: 2025-03-26
Attending: INTERNAL MEDICINE
Payer: MEDICAID

## 2025-03-26 ENCOUNTER — APPOINTMENT (OUTPATIENT)
Facility: HOSPITAL | Age: 60
DRG: 204 | End: 2025-03-26
Attending: PSYCHIATRY & NEUROLOGY
Payer: MEDICAID

## 2025-03-26 LAB
ANION GAP SERPL CALC-SCNC: 3 MMOL/L (ref 2–12)
BASOPHILS # BLD: 0.06 K/UL (ref 0–0.1)
BASOPHILS NFR BLD: 0.7 % (ref 0–1)
BUN SERPL-MCNC: 18 MG/DL (ref 6–20)
BUN/CREAT SERPL: 24 (ref 12–20)
CA-I BLD-MCNC: 9.2 MG/DL (ref 8.5–10.1)
CHLORIDE SERPL-SCNC: 114 MMOL/L (ref 97–108)
CO2 SERPL-SCNC: 26 MMOL/L (ref 21–32)
CREAT SERPL-MCNC: 0.76 MG/DL (ref 0.7–1.3)
DIFFERENTIAL METHOD BLD: ABNORMAL
EOSINOPHIL # BLD: 0.16 K/UL (ref 0–0.4)
EOSINOPHIL NFR BLD: 1.7 % (ref 0–7)
ERYTHROCYTE [DISTWIDTH] IN BLOOD BY AUTOMATED COUNT: 16.6 % (ref 11.5–14.5)
GLUCOSE SERPL-MCNC: 86 MG/DL (ref 65–100)
HCT VFR BLD AUTO: 39.8 % (ref 36.6–50.3)
HGB BLD-MCNC: 12.9 G/DL (ref 12.1–17)
IMM GRANULOCYTES # BLD AUTO: 0.02 K/UL (ref 0–0.04)
IMM GRANULOCYTES NFR BLD AUTO: 0.2 % (ref 0–0.5)
INR PPP: 2.3 (ref 0.9–1.1)
LYMPHOCYTES # BLD: 3.42 K/UL (ref 0.8–3.5)
LYMPHOCYTES NFR BLD: 37.3 % (ref 12–49)
MCH RBC QN AUTO: 28.2 PG (ref 26–34)
MCHC RBC AUTO-ENTMCNC: 32.4 G/DL (ref 30–36.5)
MCV RBC AUTO: 86.9 FL (ref 80–99)
MONOCYTES # BLD: 0.64 K/UL (ref 0–1)
MONOCYTES NFR BLD: 7 % (ref 5–13)
NEUTS SEG # BLD: 4.86 K/UL (ref 1.8–8)
NEUTS SEG NFR BLD: 53.1 % (ref 32–75)
NRBC # BLD: 0 K/UL (ref 0–0.01)
NRBC BLD-RTO: 0 PER 100 WBC
PLATELET # BLD AUTO: 369 K/UL (ref 150–400)
PMV BLD AUTO: 10.7 FL (ref 8.9–12.9)
POTASSIUM SERPL-SCNC: 3.8 MMOL/L (ref 3.5–5.1)
PROTHROMBIN TIME: 26.1 SEC (ref 11.9–14.6)
RBC # BLD AUTO: 4.58 M/UL (ref 4.1–5.7)
SODIUM SERPL-SCNC: 143 MMOL/L (ref 136–145)
WBC # BLD AUTO: 9.2 K/UL (ref 4.1–11.1)

## 2025-03-26 PROCEDURE — 2580000003 HC RX 258

## 2025-03-26 PROCEDURE — 6370000000 HC RX 637 (ALT 250 FOR IP): Performed by: PSYCHIATRY & NEUROLOGY

## 2025-03-26 PROCEDURE — 99223 1ST HOSP IP/OBS HIGH 75: CPT | Performed by: PSYCHIATRY & NEUROLOGY

## 2025-03-26 PROCEDURE — 70551 MRI BRAIN STEM W/O DYE: CPT

## 2025-03-26 PROCEDURE — 1100000000 HC RM PRIVATE

## 2025-03-26 PROCEDURE — 80048 BASIC METABOLIC PNL TOTAL CA: CPT

## 2025-03-26 PROCEDURE — 6370000000 HC RX 637 (ALT 250 FOR IP)

## 2025-03-26 PROCEDURE — 6360000002 HC RX W HCPCS

## 2025-03-26 PROCEDURE — 85025 COMPLETE CBC W/AUTO DIFF WBC: CPT

## 2025-03-26 PROCEDURE — 6370000000 HC RX 637 (ALT 250 FOR IP): Performed by: INTERNAL MEDICINE

## 2025-03-26 PROCEDURE — 93880 EXTRACRANIAL BILAT STUDY: CPT

## 2025-03-26 PROCEDURE — 6370000000 HC RX 637 (ALT 250 FOR IP): Performed by: NURSE PRACTITIONER

## 2025-03-26 PROCEDURE — 85610 PROTHROMBIN TIME: CPT

## 2025-03-26 PROCEDURE — 36415 COLL VENOUS BLD VENIPUNCTURE: CPT

## 2025-03-26 RX ORDER — ATORVASTATIN CALCIUM 40 MG/1
40 TABLET, FILM COATED ORAL NIGHTLY
Status: DISCONTINUED | OUTPATIENT
Start: 2025-03-26 | End: 2025-03-28 | Stop reason: HOSPADM

## 2025-03-26 RX ORDER — WARFARIN SODIUM 5 MG/1
5 TABLET ORAL
Status: COMPLETED | OUTPATIENT
Start: 2025-03-26 | End: 2025-03-26

## 2025-03-26 RX ADMIN — SODIUM CHLORIDE: 0.9 INJECTION, SOLUTION INTRAVENOUS at 20:22

## 2025-03-26 RX ADMIN — ENOXAPARIN SODIUM 80 MG: 100 INJECTION SUBCUTANEOUS at 20:12

## 2025-03-26 RX ADMIN — MIDODRINE HYDROCHLORIDE 10 MG: 5 TABLET ORAL at 17:22

## 2025-03-26 RX ADMIN — MIRTAZAPINE 15 MG: 15 TABLET, ORALLY DISINTEGRATING ORAL at 20:12

## 2025-03-26 RX ADMIN — ENOXAPARIN SODIUM 80 MG: 100 INJECTION SUBCUTANEOUS at 09:29

## 2025-03-26 RX ADMIN — BUSPIRONE HYDROCHLORIDE 7.5 MG: 5 TABLET ORAL at 20:11

## 2025-03-26 RX ADMIN — ARIPIPRAZOLE 2 MG: 2 TABLET ORAL at 09:29

## 2025-03-26 RX ADMIN — OLANZAPINE 5 MG: 5 TABLET, ORALLY DISINTEGRATING ORAL at 20:12

## 2025-03-26 RX ADMIN — THIAMINE HCL TAB 100 MG 100 MG: 100 TAB at 09:32

## 2025-03-26 RX ADMIN — FLUOXETINE HYDROCHLORIDE 40 MG: 20 CAPSULE ORAL at 09:28

## 2025-03-26 RX ADMIN — WARFARIN SODIUM 5 MG: 5 TABLET ORAL at 18:47

## 2025-03-26 RX ADMIN — MIDODRINE HYDROCHLORIDE 10 MG: 5 TABLET ORAL at 09:27

## 2025-03-26 RX ADMIN — SODIUM CHLORIDE: 0.9 INJECTION, SOLUTION INTRAVENOUS at 18:46

## 2025-03-26 RX ADMIN — LORAZEPAM 1 MG: 2 INJECTION INTRAMUSCULAR; INTRAVENOUS at 12:33

## 2025-03-26 RX ADMIN — DULOXETINE HYDROCHLORIDE 30 MG: 30 CAPSULE, DELAYED RELEASE ORAL at 09:28

## 2025-03-26 RX ADMIN — SODIUM CHLORIDE: 0.9 INJECTION, SOLUTION INTRAVENOUS at 09:24

## 2025-03-26 RX ADMIN — ATORVASTATIN CALCIUM 40 MG: 40 TABLET, FILM COATED ORAL at 20:12

## 2025-03-26 RX ADMIN — BUSPIRONE HYDROCHLORIDE 7.5 MG: 5 TABLET ORAL at 09:28

## 2025-03-26 RX ADMIN — MIDODRINE HYDROCHLORIDE 10 MG: 5 TABLET ORAL at 20:12

## 2025-03-26 ASSESSMENT — PAIN SCALES - GENERAL
PAINLEVEL_OUTOF10: 0

## 2025-03-26 ASSESSMENT — ENCOUNTER SYMPTOMS
ABDOMINAL PAIN: 1
SHORTNESS OF BREATH: 1

## 2025-03-26 NOTE — BH NOTE
Progress note for March 25, 2025 patient seen at lunchtime patient looks little better face looks ramos seems to be better hydrated affect a little brighter still anxious depressed getting IV fluids blood pressure is staying better does go to the bathroom and.  Apparently his sister did call the unit today so she is not dead he was under the impression she is dead he also says he has got 2 brothers Eduardo and Glynn.  He is not suicidal not homicidal is also better contact with orientation today a low dose Abilify has been no added.  Followed by cardiology and nephrology thank you

## 2025-03-26 NOTE — PLAN OF CARE
Problem: Discharge Planning  Goal: Discharge to home or other facility with appropriate resources  Outcome: Progressing  Flowsheets (Taken 3/25/2025 1915 by Juanita Duncan, RN)  Discharge to home or other facility with appropriate resources:   Identify barriers to discharge with patient and caregiver   Identify discharge learning needs (meds, wound care, etc)   Arrange for needed discharge resources and transportation as appropriate   Arrange for interpreters to assist at discharge as needed   Refer to discharge planning if patient needs post-hospital services based on physician order or complex needs related to functional status, cognitive ability or social support system     Problem: Pain  Goal: Verbalizes/displays adequate comfort level or baseline comfort level  Outcome: Progressing     Problem: Safety - Adult  Goal: Free from fall injury  Outcome: Progressing     Problem: Self Harm/Suicidality  Goal: Will have no self-injury during hospital stay  Description: INTERVENTIONS:  1.  Ensure constant observer at bedside with Q15M safety checks  2.  Maintain a safe environment  3.  Secure patient belongings  4.  Ensure family/visitors adhere to safety recommendations  5.  Ensure safety tray has been added to patient's diet order  6.  Every shift and PRN: Re-assess suicidal risk via Frequent Screener    Outcome: Progressing  Flowsheets (Taken 3/25/2025 1915 by Juanita Duncan, RN)  Will have no self-injury during hospital stay:   Ensure constant observer at bedside with Q15M safety checks   Maintain a safe environment   Secure patient belongings     Problem: Skin/Tissue Integrity  Goal: Skin integrity remains intact  Description: 1.  Monitor for areas of redness and/or skin breakdown  2.  Assess vascular access sites hourly  3.  Every 4-6 hours minimum:  Change oxygen saturation probe site  4.  Every 4-6 hours:  If on nasal continuous positive airway pressure, respiratory therapy assess nares and determine need  the nursing station and obtain sitter  5. Initiate consults as appropriate with Mental Health Professional, Spiritual Care, Psychosocial CNS, and consider a recommendation to the LIP for a Psychiatric Consultation  Outcome: Progressing  Flowsheets  Taken 3/25/2025 2145 by Juanita Duncan RN  Effect of psychiatric condition will be minimized and patient will be protected from self harm:   Assess impact of patient’s symptoms on level of functioning, self care needs and offer support as indicated   Assess patient/family knowledge of depression, impact on illness and need for teaching   Provide emotional support, presence and reassurance   Assess for suicidal thoughts or ideation. If patient expresses suicidal thoughts or statements do not leave alone, initiate Suicide Precautions, move near nurse station, obtain sitter  Taken 3/25/2025 1915 by Juanita Duncan RN  Effect of psychiatric condition will be minimized and patient will be protected from self harm:   Assess impact of patient’s symptoms on level of functioning, self care needs and offer support as indicated   Provide emotional support, presence and reassurance   Assess patient/family knowledge of depression, impact on illness and need for teaching     Problem: Nutrition Deficit:  Goal: Optimize nutritional status  Outcome: Progressing     Problem: Safety - Medical Restraint  Goal: Remains free of injury from restraints (Restraint for Interference with Medical Device)  Description: INTERVENTIONS:  1. Determine that other, less restrictive measures have been tried or would not be effective before applying the restraint  2. Evaluate the patient's condition at the time of restraint application  3. Inform patient/family regarding the reason for restraint  4. Q2H: Monitor safety, psychosocial status, comfort, nutrition and hydration  Outcome: Progressing     Problem: Chronic Conditions and Co-morbidities  Goal: Patient's chronic conditions and co-morbidity

## 2025-03-26 NOTE — PROGRESS NOTES
Warfarin Dosing Consult  Bony Smith is a 59 y.o. male with LV thrombus. Pharmacy was consulted by Dr Jad Hatch to dose and monitor warfarin.    INR Goal: 2.5-3.5    PTA Dose: N/A    Drugs that may increase INR:None  Drugs that may decrease INR: None  Other current anticoagulants/ drugs that may increase bleeding risk: Enoxaparin  Risk factors: None  Daily INR ordered: Yes    Recent Labs     03/24/25  0712 03/25/25  0611 03/26/25  0555   HGB 14.4 13.2 12.9    393 369     No results for input(s): \"ALT\", \"AST\" in the last 72 hours.    Recent Labs     03/24/25  0712 03/25/25  0611 03/26/25  0555   INR 1.4* 1.7* 2.3*       Date INR Previous Dose   3/22 1.2 7.5   3/23 1.2 7.5   3/24 1.4 10   3/25 1.7 10   3/26 2.3 5      Assessment/ Plan:  Pt with LV thrombus was started on heparin drip and then switched to Eliquis in anticipation of discharging 3/19. Pt experienced recurring orthostatic hypotension which lead to prolonging admission and re-consultation by cardiology, who requested a switch from Eliquis to warfarin.   H/H and platelets stable  No Ddx noted  INR remains subtherapeutic. Remains on therapeutic Lovenox. Continue on Lovenox bridge until INR therapeutic x 2  INR today at 2.3 a larger increase of 0.5 today (1.& -> 2.3) compared to 0.3 jump from yesterday  Given above jump, anticipate will continue to increase given 10mg doses a few days ago - Will order warfarin 5mg x 1 today.   CBC & INR ordered through 3/29  Pharmacy will continue to monitor daily and adjust therapy as indicated.

## 2025-03-26 NOTE — PROGRESS NOTES
4 Eyes Skin Assessment     NAME:  Bony Smith  YOB: 1965  MEDICAL RECORD NUMBER:  [id number]    The patient is being assessed for  Other weekly skin note    I agree that at least one RN has performed a thorough Head to Toe Skin Assessment on the patient. ALL assessment sites listed below have been assessed.      Areas assessed by both nurses:    Head, Face, Ears, Shoulders, Back, Chest, Arms, Elbows, Hands, Legs. Feet and Heels, and Under Medical Devices         Does the Patient have a Wound? No noted wound(s)       Adi Prevention initiated by RN: No  Wound Care Orders initiated by RN: No    Pressure Injury (Stage 3,4, Unstageable, DTI, NWPT, and Complex wounds) if present, place Wound referral order by RN under : No    New Ostomies, if present place, Ostomy referral order under : No     Nurse 1 eSignature: Electronically signed by Aicha Ty RN on 3/26/25 at 2:25 PM EDT    **SHARE this note so that the co-signing nurse can place an eSignature**    Nurse 2 eSignature: Electronically signed by Selin Marino RN on 3/26/25 at 2:29 PM EDT

## 2025-03-26 NOTE — PROGRESS NOTES
Attempted PT treatment,Rn reported patient not appropriateat this time, was very dizzy this morning,almost lost balance with transfers from BSC to bed.BP dropped but better than before as per RN.

## 2025-03-26 NOTE — BSMART NOTE
BSMART Liaison Team Note     LOS:  11     Patient goal(s) for today:  take medications as prescribed, make needs known in an appropriate manner, engage in supportive counseling as needed.     BSMART Liaison team focus/goals: assess needs, provide support and education, coordinate care, provided supportive counseling as needed.    Progress note: Liaison attempted to meet with patient who is resting dressed in green gown with sitter at bedside. Information was gathered from nursing staff. Patient had 2 BERTs today after attempting to beat his head against the window and punch the window.     Per nurse Ioana this is the patients first day eating all meals, however he is now NPO due to precautions. Patient had an MRI today which showed a possible Stroke. Nursing team talked to sister who indicated this is possibly a decline for the patient, she is very concerns about DCP. One year ago, patient was diagnosed with Bipolar D/O.     Spoke with Dr. Rivas who has a conversation with sister (confirmed she actually is alive). Brother and both of his son's are alive as well. Patient has one son that lives here and the other in [place] who he has no relationship with.  Patient was previous admitted to [facility] in  for 3 months. His wife  2024 and was very close to her so he feels he has no reason to live. Patient has been angry with sister wants nothing to do with her.  Patient has a former history of drug addiction.    Barriers to Discharge: medical and psychiatric clearance  Guns in the home: No       Outpatient provider(s):  none  Insurance info/prescription coverage:  Payor: [facility] MEDICAID /  /  /       Diagnosis:   Past Medical History:   Diagnosis Date    Anxiety     Blind right eye     Detached retina, right     Diverticulitis of intestine with abscess     Family history of diverticulitis of colon     mother and brother    Psychiatric disorder     Bipolar        Plan:  Per Dr. Rivas  staff should continue to work to medically clear patient. Potentially explore SNF in the future.      Follow up Psych Consult placed? Yes .   Psychiatrist updated? Yes  , Rob       Participating treatment team members: Lou Brantley LMHP-R

## 2025-03-26 NOTE — CONSULTS
[facility] NEUROLOGY CONSULTATION          Chief Complaint/Admission Diagnosis: Hypoglycemia [E16.2]  Dizzinesses [R42]     I have been asked to see this 59 y.o. male in neurological consultation by Mac Laura MD  to render advice and opinion regarding incidental stroke.     ?     Impression/Recommendations:   Bony Smith is a 59 y.o.  male with PMHx significant for bipolar disorder, anxiety, hx of diverticulitis, and BPH being evaluated for a new  left thalamic infarct. Echo shows decreased EF. CT head is negative for any acute change.  Patient neurological exam is non focal.    Patient being treated for LV thrombus with anticoagulation on warfarin.  Continue atorvastatin 40 mg qd.  Awaiting carotid US although patient has what appears to be a lacunar infarct.  PT/OT/ST  Neuro checks q4 hrs.  If US carotid negative for any stenosis then dc per PT/OT recommendations.  Call if questions.              Eriberto Sandoval MD  Teleneurologist    HPI:   History was obtained from a review of the electronic record and from the patient and family.??   Bony Smith is a 59 y.o.  male with PMHx significant for bipolar disorder, anxiety, hx of diverticulitis, and BPH.  He presents to the ED from [facility] for generalized abdominal pain for months.  Pain is associated with shortness of breath.  Patient is a former smoker, no EtOH or illicit drug use.  Denies chest pain, N/V/D, loss of appetite, fever, chills.  Patient is poor historian, offers limited history.  CTA A/P showed left ventricular apical filling defect concerning for thrombus.  Cardiology was consulted.  Patient started on heparin drip.  Echo ordered and pending results.  Troponin 33.  Psych consulted for active suicidal ideation.  Patient had requested patient  to kill him.  Currently on home medications for bipolar disorder and anxiety.  Of note patient was noted to have left ventricular thrombus on echocardiogram, started  equipment which allowed a live video connection between my location and the patient's location, [facility].? Aspects of the evaluation that could not be adequately evaluated by virtual assessment were shared with both the patient and the consulting provider.?          A total of 45 minutes of time was spent in direct care and coordination of care with the patient.

## 2025-03-26 NOTE — PROGRESS NOTES
Renal Progress Note    Patient: Bony Smith MRN: [id number]  SSN: [id number]    YOB: 1965  Age: 59 y.o.  Sex: male      Admit Date: 3/15/2025    LOS: 11 days     Subjective:   Patient seen at bedside. Alert and awake.   SBPs remaining stable.    Current Facility-Administered Medications   Medication Dose Route Frequency    atorvastatin (LIPITOR) tablet 40 mg  40 mg Oral Nightly    ARIPiprazole (ABILIFY) tablet 2 mg  2 mg Oral Daily    0.9 % sodium chloride infusion   IntraVENous Continuous    LORazepam (ATIVAN) injection 1 mg  1 mg IntraVENous Once PRN    haloperidol lactate (HALDOL) injection 1 mg  1 mg IntraVENous Q6H PRN    warfarin placeholder: dosing by pharmacy   Oral RX Placeholder    busPIRone (BUSPAR) tablet 7.5 mg  7.5 mg Oral BID    OLANZapine zydis (ZYPREXA) disintegrating tablet 5 mg  5 mg Oral Nightly    DULoxetine (CYMBALTA) extended release capsule 30 mg  30 mg Oral Daily    enoxaparin (LOVENOX) injection 80 mg  1 mg/kg SubCUTAneous BID    thiamine mononitrate tablet 100 mg  100 mg Oral Daily    midodrine (PROAMATINE) tablet 10 mg  10 mg Oral TID    acetaminophen (TYLENOL) tablet 650 mg  650 mg Oral Q4H PRN    FLUoxetine (PROZAC) capsule 40 mg  40 mg Oral Daily    magnesium hydroxide (MILK OF MAGNESIA) 400 MG/5ML suspension 30 mL  30 mL Oral Daily PRN    mirtazapine (REMERON SOL-TAB) disintegrating tablet 15 mg  15 mg Oral Nightly        Vitals:    03/26/25 0256 03/26/25 0700 03/26/25 0719 03/26/25 1113   BP: 108/76  114/82 114/83   Pulse: 67 70 65 65   Resp: 18   18   Temp: 97.5 °F (36.4 °C)  97.5 °F (36.4 °C) 97.9 °F (36.6 °C)   TempSrc: Oral  Oral Oral   SpO2: 97%  93% 95%   Weight: 78.3 kg (172 lb 9.9 oz)      Height:         Objective:   General: alert, awake, flat affect, no acute distress.  HEENT: EOMI, no icterus, no pallor, mucosa moist.  Neck: Neck is supple, No JVD.  Lungs: breathsounds normal, no respiratory distress on inspection, no rhonchi, no rales.  CVS: heart  NATIVIDAD Andre  Patient seen and examined at bedside,   Labs and treatments reviewed  Plan discussed with patient and NP  Reviewed NP's  notes, amended and agree with assessment and plan     Dione Isidro MD

## 2025-03-26 NOTE — PROGRESS NOTES
Hospitalist Progress Note    NAME: Bony Smith   : 1965   MRN: [id number]     Date/Time: 3/26/2025 1:53 PM  Patient PCP: None, None    Estimated discharge date: 48  Barriers: Echo results, psych consult, active SI    Hospital Course:  Bony Smith is a 59 y.o.  male with PMHx significant for bipolar disorder, anxiety, hx of diverticulitis, and BPH.  He presents to the ED from [facility] for generalized abdominal pain for months.  Pain is associated with shortness of breath.  Patient is a former smoker, no EtOH or illicit drug use.  Denies chest pain, N/V/D, loss of appetite, fever, chills.  Patient is poor historian, offers limited history.  CTA A/P showed left ventricular apical filling defect concerning for thrombus.  Cardiology was consulted.  Patient started on heparin drip.  Echo ordered and pending results.  Troponin 33.  Psych consulted for active suicidal ideation.  Patient had requested patient  to kill him.  Currently on home medications for bipolar disorder and anxiety.  Of note patient was noted to have left ventricular thrombus on echocardiogram, started on heparin gtt., spoke to cardiology attending Dr. Douglass, who recommends patient can be discharged on Eliquis with close outpatient follow-up primary care physician as well as cardiology, plan was explained to the patient who is agreeable to current plan.  However, patient noted to have ongoing orthostatic hypotension despite volume resuscitation and with scheduled midodrine.  Therefore, cardiology was reconsulted and repeat echo was ordered.  At the same time, nephrology was consulted.  Currently, cortisol and ACTH were ordered.  Nephrology will do further workup.  Otherwise, patient was given albumin.     Cardiology recommended Warfarin due to better efficacy in treatment of thrombus. Pending psych facility and getting lovenox bridge. May be difficult disposition.       Assessment / Plan:    Orthostatic  hypotension, from poor PO intake 2/2 to severe depression  -Noted this AM once again despite scheduled midodrine  -Good oral intake but did order bolus  -Troponin ordered  -UA pending. No signs of sepsis so will hold off on further workup   -Will need additional workup including cortisol and ACT  -Cortisol WNL.   -Off IVF but orthostats positive and again started on NS 75 mL/hr   -UA negative  -CXR negative  -Blood clx NGTD   -continue scheduled midodrine 10mg TID  -continue IVF NS  -albumin infusion prn  -nephrology following    Possible small acute left thalamic infarction  -3/24: Episode of dizziness, near fall. Suspect related to postural drops in BP but MRI brain was ordered : Technical factors as described, with a suspected small acute left thalamic infarction.  -could be related to his hypotensive episode vs from LV thrombus?  -already on warfarin with INR 2.3  -will consult Neurology for input regarding adding ASA  -will start Lipitor 40 HS  -get Lipid panel and A1c in AM.  Stroke orderset activated  -PT already following    LV thrombus on CT/CTA  Low-density thrombus in the left ventricular apex, 2.4 x 1.5 x 4.2 cm  Troponin WNL 33  EKG negative for STEMI  Continue heparin drip  Echo 3/13: EF 40 to 45%, regional wall motion abnormalities, hypokinesis of apical anterior, inferior, lateral, septal aspect, normal diastolic function, 4 cm thrombus in apex  Echo 3/20: EF 30 to 35%, 2.97 x 1.29 cm mass in the apex  Cardiology planning limited Echo in AM to assess LV thrombus.      Abdominal pain  Hx diverticulitis  CT A/P - no dilatation or wall thickening of small bowel or colon, scattered colonic diverticula, no evidence of acute diverticulitis  Liver panel unremarkable  -KUB negative   -CT abdomen 3/22: WNL    Bipolar disorder  Anxiety  Patient on Rexulti, Cymbalta, Prozac, Zyprexa, Remeron  Psych consulted, active suicidal ideation    BPH  Continue Flomax    Right renal cysts  No further imaging eval  chronic illness:    ---------------------------------------------------------------------  B. Risk of Treatment (any 1)   [] Drugs/treatments that require intensive monitoring for toxicity include:    [] IV ABX requiring serial renal monitoring for nephrotoxicity:     [] IV Narcotic analgesia for adverse drug reaction  [] Aggressive IV diuresis requiring serial monitoring for renal impairment and electrolyte derangements  [] Critical electrolyte abnormalities requiring IV replacement and close serial monitoring  [] Insulin - monitoring serial FSBS for Hypoglycemic adverse drug reaction  [] Other -   [] Change in code status:    [] Decision to escalate care:    [] Major surgery/procedure with associated risk factors:     ----------------------------------------------------------------------  C. Data (any 2)  [] Discussed current management and discharge planning options with Case Management.  [x] Discussed management of the case with: Nurse, patient  [x] Telemetry personally reviewed and interpreted as documented above    [x] Imaging personally reviewed and interpreted, includes: CT A/P  [x] Data Review (any 3)  [x] All available Consultant notes from yesterday/today were reviewed  [x] All current labs were reviewed and interpreted for clinical significance   [x] Appropriate follow-up labs were ordered  [x] Collateral history obtained from: Patient     Procedures: see electronic medical records for all procedures/Xrays and details which were not copied into this note but were reviewed prior to creation of Plan.    Reviewed most current lab test results and cultures  YES  Reviewed most current radiology test results   YES  Review and summation of old records today    NO  Reviewed patient's current orders and MAR    YES  PMH/ reviewed - no change compared to H&P  >50% of visit spent in counseling and coordination of care  ________________________________________________________________________  Total NON  critical care TIME:  35  Minutes    Signed: Mac Justice MD

## 2025-03-26 NOTE — PROGRESS NOTES
Progress Note      3/26/2025 8:07 AM  NAME: Bony Smith   MRN:  [id number]   Admit Diagnosis: Hypoglycemia [E16.2]  Dizzinesses [R42]      Problem List:   Admitted to the hospital from another facility with blood pressure of 67 over 54 mmHg.  -Patient was recently in our hospital and diagnosed with LV thrombus and was discharged on anticoagulation.  -Cardiomyopathy with LV function of 45% as of 3/13/2025  -LV thrombus present  -Psych disorder  -Bipolar disorder  -Blindness in the right eye       Assessment/Plan:   Note dictated March 26, 2025  -Follow-up visit from cardiology.  Patient is lying comfortably in the bed.  INR is 2.3 today.  -Will repeat limited echocardiogram for LV thrombus tomorrow to see the efficacy of Coumadin.  The main goal is to to check for LV thrombus size and efficacy of anticoagulation patient is being receiving for last 5 days and then make further management decision as clinically indicated  -Will follow after the echo results are available tomorrow  ===============================================================  Note dictated March 25, 2025  -Follow-up visit from cardiology.  Patient is lying comfortably in the bed.  -Continue Coumadin as INR is 1.7 and PT is 20.4.  Once INR is greater than 2.5 and close to 3 we will check limited echocardiogram for LV thrombus prior to discharge.  -No further cardiovascular testing and the plan is to anticoagulate him for intracardiac thrombus and repeat echocardiogram prior to discharge and in 3 months being on anticoagulation with INR close to 3.  ===============================================================  Note dictated March 24, 2025  -Follow up with visit cardiology.  -Patient is lying comfortably in the bed.  He stated that he has no complaints.  Patient is currently in the hospital secondary to left ventricular thrombus and was readmitted to the low blood [facility] from the psych unit.  Apparently he has no suicidal ideation  (TYLENOL) tablet 650 mg  650 mg Oral Q4H PRN    FLUoxetine (PROZAC) capsule 40 mg  40 mg Oral Daily    magnesium hydroxide (MILK OF MAGNESIA) 400 MG/5ML suspension 30 mL  30 mL Oral Daily PRN    mirtazapine (REMERON SOL-TAB) disintegrating tablet 15 mg  15 mg Oral Nightly         Electronically signed by    PAU CAR MD  March 26, 2025   8:07 AM

## 2025-03-27 ENCOUNTER — APPOINTMENT (OUTPATIENT)
Facility: HOSPITAL | Age: 60
DRG: 204 | End: 2025-03-27
Attending: INTERNAL MEDICINE
Payer: MEDICAID

## 2025-03-27 LAB
ANION GAP SERPL CALC-SCNC: 4 MMOL/L (ref 2–12)
BACTERIA SPEC CULT: NORMAL
BACTERIA SPEC CULT: NORMAL
BASOPHILS # BLD: 0.07 K/UL (ref 0–0.1)
BASOPHILS NFR BLD: 0.8 % (ref 0–1)
BUN SERPL-MCNC: 17 MG/DL (ref 6–20)
BUN/CREAT SERPL: 21 (ref 12–20)
CA-I BLD-MCNC: 9.2 MG/DL (ref 8.5–10.1)
CHLORIDE SERPL-SCNC: 112 MMOL/L (ref 97–108)
CO2 SERPL-SCNC: 26 MMOL/L (ref 21–32)
CREAT SERPL-MCNC: 0.82 MG/DL (ref 0.7–1.3)
DIFFERENTIAL METHOD BLD: ABNORMAL
ECHO BSA: 1.95 M2
ECHO BSA: 1.95 M2
ECHO LV EDV A2C: 170 ML
ECHO LV EDV A4C: 182 ML
ECHO LV EDV INDEX A4C: 91 ML/M2
ECHO LV EDV NDEX A2C: 85 ML/M2
ECHO LV EF PHYSICIAN: 40 %
ECHO LV EJECTION FRACTION A2C: 44 %
ECHO LV EJECTION FRACTION A4C: 41 %
ECHO LV EJECTION FRACTION BIPLANE: 43 % (ref 55–100)
ECHO LV ESV A2C: 95 ML
ECHO LV ESV A4C: 107 ML
ECHO LV ESV INDEX A2C: 48 ML/M2
ECHO LV ESV INDEX A4C: 54 ML/M2
ECHO LV FRACTIONAL SHORTENING: 38 % (ref 28–44)
ECHO LV INTERNAL DIMENSION DIASTOLE INDEX: 2.6 CM/M2
ECHO LV INTERNAL DIMENSION DIASTOLIC: 5.2 CM (ref 4.2–5.9)
ECHO LV INTERNAL DIMENSION SYSTOLIC INDEX: 1.6 CM/M2
ECHO LV INTERNAL DIMENSION SYSTOLIC: 3.2 CM
ECHO LV IVSD: 1.2 CM (ref 0.6–1)
ECHO LV MASS 2D: 234.6 G (ref 88–224)
ECHO LV MASS INDEX 2D: 117.3 G/M2 (ref 49–115)
ECHO LV POSTERIOR WALL DIASTOLIC: 1.1 CM (ref 0.6–1)
ECHO LV RELATIVE WALL THICKNESS RATIO: 0.42
ECHO RV TAPSE: 2.2 CM (ref 1.7–?)
EOSINOPHIL # BLD: 0.18 K/UL (ref 0–0.4)
EOSINOPHIL NFR BLD: 2.1 % (ref 0–7)
ERYTHROCYTE [DISTWIDTH] IN BLOOD BY AUTOMATED COUNT: 16.5 % (ref 11.5–14.5)
EST. AVERAGE GLUCOSE BLD GHB EST-MCNC: 111 MG/DL
GLUCOSE SERPL-MCNC: 88 MG/DL (ref 65–100)
HBA1C MFR BLD: 5.5 % (ref 4–5.6)
HCT VFR BLD AUTO: 39.3 % (ref 36.6–50.3)
HGB BLD-MCNC: 12.7 G/DL (ref 12.1–17)
IMM GRANULOCYTES # BLD AUTO: 0.03 K/UL (ref 0–0.04)
IMM GRANULOCYTES NFR BLD AUTO: 0.3 % (ref 0–0.5)
INR PPP: 2 (ref 0.9–1.1)
LYMPHOCYTES # BLD: 3.17 K/UL (ref 0.8–3.5)
LYMPHOCYTES NFR BLD: 36.5 % (ref 12–49)
Lab: NORMAL
Lab: NORMAL
MCH RBC QN AUTO: 28.3 PG (ref 26–34)
MCHC RBC AUTO-ENTMCNC: 32.3 G/DL (ref 30–36.5)
MCV RBC AUTO: 87.5 FL (ref 80–99)
MONOCYTES # BLD: 0.56 K/UL (ref 0–1)
MONOCYTES NFR BLD: 6.5 % (ref 5–13)
NEUTS SEG # BLD: 4.67 K/UL (ref 1.8–8)
NEUTS SEG NFR BLD: 53.8 % (ref 32–75)
NRBC # BLD: 0 K/UL (ref 0–0.01)
NRBC BLD-RTO: 0 PER 100 WBC
PLATELET # BLD AUTO: 389 K/UL (ref 150–400)
PMV BLD AUTO: 11 FL (ref 8.9–12.9)
POTASSIUM SERPL-SCNC: 3.9 MMOL/L (ref 3.5–5.1)
PROTHROMBIN TIME: 22.7 SEC (ref 11.9–14.6)
RBC # BLD AUTO: 4.49 M/UL (ref 4.1–5.7)
SODIUM SERPL-SCNC: 142 MMOL/L (ref 136–145)
VAS LEFT CCA DIST EDV: 17.1 CM/S
VAS LEFT CCA DIST PSV: 51.4 CM/S
VAS LEFT CCA PROX EDV: 22.3 CM/S
VAS LEFT CCA PROX PSV: 74.6 CM/S
VAS LEFT ECA EDV: 13.6 CM/S
VAS LEFT ECA PSV: 69.3 CM/S
VAS LEFT ICA DIST EDV: 24.5 CM/S
VAS LEFT ICA DIST PSV: 67.9 CM/S
VAS LEFT ICA PROX EDV: 26.5 CM/S
VAS LEFT ICA PROX PSV: 61.8 CM/S
VAS LEFT ICA/CCA PSV: 1.2
VAS LEFT SUBCLAVIAN PROX EDV: 1.7 CM/S
VAS LEFT SUBCLAVIAN PROX PSV: 82.3 CM/S
VAS LEFT VERTEBRAL EDV: 0 CM/S
VAS LEFT VERTEBRAL PSV: 34.1 CM/S
VAS RIGHT CCA DIST EDV: 21.6 CM/S
VAS RIGHT CCA DIST PSV: 72 CM/S
VAS RIGHT CCA PROX EDV: 16.8 CM/S
VAS RIGHT CCA PROX PSV: 69.6 CM/S
VAS RIGHT ECA EDV: 17.1 CM/S
VAS RIGHT ECA PSV: 86.6 CM/S
VAS RIGHT ICA DIST EDV: 24.9 CM/S
VAS RIGHT ICA DIST PSV: 71.2 CM/S
VAS RIGHT ICA PROX EDV: 15.4 CM/S
VAS RIGHT ICA PROX PSV: 46.3 CM/S
VAS RIGHT ICA/CCA PSV: 0.64
VAS RIGHT SUBCLAVIAN PROX EDV: 0 CM/S
VAS RIGHT SUBCLAVIAN PROX PSV: 56.4 CM/S
VAS RIGHT VERTEBRAL EDV: 12.9 CM/S
VAS RIGHT VERTEBRAL PSV: 45.2 CM/S
WBC # BLD AUTO: 8.7 K/UL (ref 4.1–11.1)

## 2025-03-27 PROCEDURE — 6370000000 HC RX 637 (ALT 250 FOR IP): Performed by: INTERNAL MEDICINE

## 2025-03-27 PROCEDURE — 93880 EXTRACRANIAL BILAT STUDY: CPT | Performed by: SURGERY

## 2025-03-27 PROCEDURE — 6360000002 HC RX W HCPCS: Performed by: INTERNAL MEDICINE

## 2025-03-27 PROCEDURE — 80048 BASIC METABOLIC PNL TOTAL CA: CPT

## 2025-03-27 PROCEDURE — 99232 SBSQ HOSP IP/OBS MODERATE 35: CPT | Performed by: PSYCHIATRY & NEUROLOGY

## 2025-03-27 PROCEDURE — 97530 THERAPEUTIC ACTIVITIES: CPT

## 2025-03-27 PROCEDURE — 6370000000 HC RX 637 (ALT 250 FOR IP): Performed by: NURSE PRACTITIONER

## 2025-03-27 PROCEDURE — 80061 LIPID PANEL: CPT

## 2025-03-27 PROCEDURE — 85025 COMPLETE CBC W/AUTO DIFF WBC: CPT

## 2025-03-27 PROCEDURE — 6370000000 HC RX 637 (ALT 250 FOR IP)

## 2025-03-27 PROCEDURE — 6360000002 HC RX W HCPCS

## 2025-03-27 PROCEDURE — 83036 HEMOGLOBIN GLYCOSYLATED A1C: CPT

## 2025-03-27 PROCEDURE — 93325 DOPPLER ECHO COLOR FLOW MAPG: CPT

## 2025-03-27 PROCEDURE — 36415 COLL VENOUS BLD VENIPUNCTURE: CPT

## 2025-03-27 PROCEDURE — 6360000004 HC RX CONTRAST MEDICATION

## 2025-03-27 PROCEDURE — 2580000003 HC RX 258

## 2025-03-27 PROCEDURE — 97165 OT EVAL LOW COMPLEX 30 MIN: CPT

## 2025-03-27 PROCEDURE — C8924 2D TTE W OR W/O FOL W/CON,FU: HCPCS

## 2025-03-27 PROCEDURE — 6370000000 HC RX 637 (ALT 250 FOR IP): Performed by: PSYCHIATRY & NEUROLOGY

## 2025-03-27 PROCEDURE — 85610 PROTHROMBIN TIME: CPT

## 2025-03-27 PROCEDURE — 1100000000 HC RM PRIVATE

## 2025-03-27 RX ORDER — WARFARIN SODIUM 5 MG/1
7.5 TABLET ORAL
Status: COMPLETED | OUTPATIENT
Start: 2025-03-27 | End: 2025-03-27

## 2025-03-27 RX ORDER — ZIPRASIDONE MESYLATE 20 MG/ML
10 INJECTION, POWDER, LYOPHILIZED, FOR SOLUTION INTRAMUSCULAR EVERY 6 HOURS PRN
Status: DISCONTINUED | OUTPATIENT
Start: 2025-03-27 | End: 2025-03-28 | Stop reason: HOSPADM

## 2025-03-27 RX ORDER — MIDODRINE HYDROCHLORIDE 5 MG/1
15 TABLET ORAL 3 TIMES DAILY
Status: DISCONTINUED | OUTPATIENT
Start: 2025-03-27 | End: 2025-03-28 | Stop reason: HOSPADM

## 2025-03-27 RX ADMIN — MIDODRINE HYDROCHLORIDE 10 MG: 5 TABLET ORAL at 09:42

## 2025-03-27 RX ADMIN — MIRTAZAPINE 15 MG: 15 TABLET, ORALLY DISINTEGRATING ORAL at 21:26

## 2025-03-27 RX ADMIN — ENOXAPARIN SODIUM 80 MG: 100 INJECTION SUBCUTANEOUS at 21:27

## 2025-03-27 RX ADMIN — WARFARIN SODIUM 7.5 MG: 5 TABLET ORAL at 18:09

## 2025-03-27 RX ADMIN — ATORVASTATIN CALCIUM 40 MG: 40 TABLET, FILM COATED ORAL at 21:27

## 2025-03-27 RX ADMIN — DULOXETINE HYDROCHLORIDE 30 MG: 30 CAPSULE, DELAYED RELEASE ORAL at 09:42

## 2025-03-27 RX ADMIN — SULFUR HEXAFLUORIDE 5 ML: 60.7; .19; .19 INJECTION, POWDER, LYOPHILIZED, FOR SUSPENSION INTRAVENOUS; INTRAVESICAL at 09:05

## 2025-03-27 RX ADMIN — THIAMINE HCL TAB 100 MG 100 MG: 100 TAB at 09:42

## 2025-03-27 RX ADMIN — SODIUM CHLORIDE: 0.9 INJECTION, SOLUTION INTRAVENOUS at 07:18

## 2025-03-27 RX ADMIN — BUSPIRONE HYDROCHLORIDE 7.5 MG: 5 TABLET ORAL at 21:26

## 2025-03-27 RX ADMIN — MIDODRINE HYDROCHLORIDE 15 MG: 5 TABLET ORAL at 21:26

## 2025-03-27 RX ADMIN — OLANZAPINE 5 MG: 5 TABLET, ORALLY DISINTEGRATING ORAL at 21:26

## 2025-03-27 RX ADMIN — ZIPRASIDONE MESYLATE 10 MG: 20 INJECTION, POWDER, LYOPHILIZED, FOR SOLUTION INTRAMUSCULAR at 15:59

## 2025-03-27 RX ADMIN — BUSPIRONE HYDROCHLORIDE 7.5 MG: 5 TABLET ORAL at 09:42

## 2025-03-27 RX ADMIN — MIDODRINE HYDROCHLORIDE 10 MG: 5 TABLET ORAL at 15:59

## 2025-03-27 RX ADMIN — ENOXAPARIN SODIUM 80 MG: 100 INJECTION SUBCUTANEOUS at 09:42

## 2025-03-27 RX ADMIN — ARIPIPRAZOLE 2 MG: 2 TABLET ORAL at 09:42

## 2025-03-27 RX ADMIN — FLUOXETINE HYDROCHLORIDE 40 MG: 20 CAPSULE ORAL at 09:42

## 2025-03-27 RX ADMIN — HALOPERIDOL LACTATE 1 MG: 5 INJECTION, SOLUTION INTRAMUSCULAR at 12:56

## 2025-03-27 ASSESSMENT — PAIN SCALES - GENERAL
PAINLEVEL_OUTOF10: 0

## 2025-03-27 ASSESSMENT — ENCOUNTER SYMPTOMS
ABDOMINAL PAIN: 1
SHORTNESS OF BREATH: 1

## 2025-03-27 NOTE — PROGRESS NOTES
Hospitalist Progress Note    NAME: Bony Smith   : 1965   MRN: [id number]     Date/Time: 3/27/2025 3:19 PM  Patient PCP: None, None    Estimated discharge date: 48  Barriers: Echo results, psych consult, active SI    Hospital Course:  Bony Smith is a 59 y.o.  male with PMHx significant for bipolar disorder, anxiety, hx of diverticulitis, and BPH.  He presents to the ED from [facility] for generalized abdominal pain for months.  Pain is associated with shortness of breath.  Patient is a former smoker, no EtOH or illicit drug use.  Denies chest pain, N/V/D, loss of appetite, fever, chills.  Patient is poor historian, offers limited history.  On presentation, CTA A/P showed left ventricular apical filling defect concerning for thrombus.  Cardiology was consulted.  Patient started on heparin drip.  ECHO confirmed CT findings.  Troponin 33, no further cardiovascular tests recommended for now.  Heparin drip subsequently transition to therapeutic dose Lovenox, p.o. warfarin started.  INR goal 2.5-3.5.     However, patient noted to have ongoing orthostatic hypotension despite volume resuscitation and with scheduled midodrine. Patient had an episode of dizziness and near fall on 3/24, MRI brain ordered to evaluate for acute CVA.  MRI showed possible small acute left thalamic infarction. Repeat ECHO showed improvement of thrombus size. At the same time, nephrology was consulted to assist with management of orthostatic hypotension.  Recommended IV albumin, which showed improvement in BP.  Cortisol level 10.8, non-diagnostic. However ACTH within normal limits. TSH also within normal limits. Neurology also consulted for CVA management, recommended to continue warfarin and statin.  Carotid ultrasound showed bilateral ICA patent, right proximal ICA has mild plaque without significant stenosis.     Psych consulted for active suicidal ideation.  Patient had requested patient  to kill him.   diastolic function, 4 cm thrombus in apex  Echo 3/20: EF 30 to 35%, 2.97 x 1.29 cm mass in the apex  ECHO 3/26 showed significant reduction in size of apical thrombus.     Abdominal pain  Hx diverticulitis  CT A/P - no dilatation or wall thickening of small bowel or colon, scattered colonic diverticula, no evidence of acute diverticulitis  Liver panel unremarkable  -KUB negative   -CT abdomen 3/22: WNL    Bipolar disorder  Anxiety  Patient on Rexulti, Cymbalta, Prozac, Zyprexa, Remeron  Psych consulted, active suicidal ideation    BPH  Continue Flomax    Right renal cysts  No further imaging eval recommended    Depression, severe  -3/25 2 CODE LILIBETH were called later on on this patient.  He was attempting to leave the room and stated to leave him alone.  He has olanzapine scheduled at nighttime.  Haldol ordered.  One-time Ativan given.  Sitter at bedside.  Unfortunately, due to trying to leave the room twice despite redirection, restraints had to be placed on patient temporarily.  Psychiatry is following and was present during the CODE LILIBETH first time. He did get dizzy and lightheadedness when sitting down so fluid started again due to positive orthostat   -3/26 CODE LILIBETH again as trying to leave. Felt overwhelmed. Ativan given  -Needs Med-Psych unit  and CCL/CM attempting to find placement   -continue abilify, buspar, prozac, zyprexa     Suicidal ideation  -Psych re-eval as no more SI. Can possible discharge when INR therapeutic     Code Status: Full  DVT Prophylaxis: Heparin drip  GI Prophylaxis:    Subjective:   Chief Complaint: Abdominal pain  Uneventful overnight. Reports some chest pain and shortness of breath, however not hypoxic. Soft BP, not tachycardic     Nurse with no concerns.   Objective:     VITALS:   Last 24hrs VS reviewed since prior progress note. Most recent are:  Patient Vitals for the past 24 hrs:   BP Temp Temp src Pulse Resp SpO2   03/27/25 1103 101/69 97.9 °F (36.6 °C) Oral 76 16 94 %    03/27/25 0701 -- -- -- 62 -- --   03/27/25 0701 115/85 97.5 °F (36.4 °C) Oral 59 16 95 %   03/27/25 0251 114/77 98.1 °F (36.7 °C) Oral 64 20 93 %   03/27/25 0001 -- -- -- 56 -- --   03/26/25 2309 (!) 134/90 98.1 °F (36.7 °C) Oral 57 18 94 %   03/26/25 1937 109/79 97.3 °F (36.3 °C) Oral 67 20 94 %   03/26/25 1845 118/80 97.9 °F (36.6 °C) Oral 66 17 --   03/26/25 1806 118/80 97.9 °F (36.6 °C) Oral 66 17 95 %   03/26/25 1641 107/75 97.9 °F (36.6 °C) Oral 66 18 96 %         Intake/Output Summary (Last 24 hours) at 3/27/2025 1519  Last data filed at 3/27/2025 0703  Gross per 24 hour   Intake 1842.56 ml   Output 1475 ml   Net 367.56 ml          I had a face to face encounter and independently examined this patient on 3/27/2025, as outlined below:    Review of Systems   Constitutional: Negative.    Respiratory:  Positive for shortness of breath.    Cardiovascular: Negative.    Gastrointestinal:  Positive for abdominal pain.   Genitourinary: Negative.    Musculoskeletal: Negative.    Neurological: Negative.    Psychiatric/Behavioral:  Positive for suicidal ideas.         General: In no acute distress  HEENT: NC/AT. No obvious deformities.Nares patent. Oral mucosa moist.   Cardiovascular: S1, S2 present. No murmurs noted.   Respiratory: Normal respiratory effort. Breath sounds CTA in all lung fields b/l. No retractions noted  Abdomen: BS+. Soft, nontender. No TTP in all quadrants. No guarding or rigidity   Lower Extremity: No edema   Neurological: CN III-XII grossly intact. Muscle strength 5/5 in UE and 5/5 in LE b/l. No facial drooping or slurring of words.   Psychiatric: +Flat affect , anxious appearing     LABS:  I reviewed today's most current labs and imaging studies.  Pertinent labs include:  Recent Labs     03/25/25  0611 03/26/25  0555 03/27/25  0622   WBC 10.0 9.2 8.7   HGB 13.2 12.9 12.7   HCT 40.4 39.8 39.3    369 389     Recent Labs     03/25/25  0611 03/26/25  0555 03/27/25 0622    143 142   K

## 2025-03-27 NOTE — PROGRESS NOTES
Consult received for bedside swallow evaluation. Patient passed swallow screen by nsg and tolerating regular diet. 1:1 denies dysphagia. Patient denies dysphagia and wants to leave. Will d/c ST at this time. Please re-consult as medically indicated.

## 2025-03-27 NOTE — PLAN OF CARE
OCCUPATIONAL THERAPY EVALUATION  Patient: Bony Smith (59 y.o. male)  Date: 3/27/2025  Primary Diagnosis: Hypoglycemia [E16.2]  Dizzinesses [R42]       Precautions: Fall Risk, Other (Comment) (1-1)                Recommendations for nursing mobility: Encourage HEP in prep for ADLs/mobility; see handout for details, Frequent repositioning to prevent skin breakdown, Use of bed/chair alarm for safety, AD and gt belt for bed to chair , and Assist x1    In place during session:Peripheral IV  ASSESSMENT  Pt is a 59 y.o. male presenting to [facility] with c/o weakness, admitted 3/15/25 and currently being treated for hypoglycemia and CVA work up. MRI brain 3/26 shows a suspected small acute L thalamic infarction. Pt received semi-supine in bed upon arrival, AXO x 4, and agreeable to OT evaluation.     Based on current observations, pt presents with decreased  functional mobility, independence in ADLs, high-level IADLs, strength, safety awareness, cognition, command following, attention/concentration (see below for objective details and assist levels).     Overall, pt tolerates session fair with no c/o pain. Pt completed bed mobility IND and OOB functional mobility with SBA for safety. Pt unable to be redirected during functional mobility and req'd max VC and an additional staff member to assist pt back to room. Grooming completed semi supine with set up A. Further activity deferred for safety s/t cognition and difficulty being redirected. Pt will benefit from continued skilled OT services to address current impairments and improve IND and safety with self cares and functional transfers/mobility. Current OT d/c recommendation No further OT needs once medically appropriate.    GOALS:    Problem: Occupational Therapy - Adult  Goal: By Discharge: Performs self-care activities at highest level of function for planned discharge setting.  See evaluation for individualized goals.  Description: FUNCTIONAL STATUS PRIOR TO ADMISSION:           Tone & Sensation:   Tone: Normal         Functional Mobility and Transfers for ADLs:  Bed Mobility:  Bed Mobility Training  Bed Mobility Training: Yes  Rolling: Modified independent  Supine to Sit: Modified independent  Sit to Supine: Modified independent  Scooting: Modified independent    Transfers:  Transfer Training  Transfer Training: Yes  Interventions: Verbal cues;Safety awareness training  Sit to Stand: Stand by assistance  Stand to Sit: Stand by assistance  Stand Pivot Transfers: Stand by assistance      Balance:  Balance  Sitting: Intact  Standing: Impaired  Standing - Static: Good  Standing - Dynamic: Fair      ADL Assessment:     Grooming: Setup  Grooming Skilled Clinical Factors: washing face semi supine in bed                                                   Therapeutic Intervention provided:   Bed level grooming and functional mobility/transfers in preparation for OOB ADLs    Functional Measure:    [facility] AM-PACTM \"6 Clicks\"                                                       Daily Activity Inpatient Short Form  How much help from another person does the patient currently need... Total; A Lot A Little None   1.  Putting on and taking off regular lower body clothing? []  1 []  2 [x]  3 []  4   2.  Bathing (including washing, rinsing, drying)? []  1 []  2 [x]  3 []  4   3.  Toileting, which includes using toilet, bedpan or urinal? [] 1 []  2 [x]  3 []  4   4.  Putting on and taking off regular upper body clothing? []  1 []  2 [x]  3 []  4   5.  Taking care of personal grooming such as brushing teeth? []  1 []  2 [x]  3 []  4   6.  Eating meals? []  1 []  2 [x]  3 []  4   © 2007, Trustees of [facility], under license to [employer]. All rights reserved     Score: 18/24     Interpretation of Tool:  Represents clinically-significant functional categories (i.e. Activities of daily living).  Percentage of Impairment CH    0%   CI    1-19% CJ    20-39% CK    40-59% CL    60-79%  CM    80-99% CN     100%   [facility]  Score 6-24 24 23 20-22 15-19 10-14 7-9 6     Occupational Therapy Evaluation Charge Determination   History Examination Decision-Making   LOW Complexity : Brief history review  MEDIUM Complexity: 3-5 Performance deficits relating to physical, cognitive, or psychosocial skills that result in activity limitations and/or participation restrictions MEDIUM Complexity: Patient may present with comorbidities that affect occupational performance. Minimal to moderate modifications of tasks or assist (eg. physical or verbal) with assist is necessary to enable pt to complete eval      Based on the above components, the patient evaluation is determined to be of the following complexity level: Low    Pain Ratin/10   Pain Intervention(s):       Activity Tolerance:   Fair     After treatment patient left in no apparent distress:    Patient left in no apparent distress in bed, Call bell within reach, Bed/ chair alarm activated, Side rails x3, and 1:1 sitter in room , bed locked and in lowest position    COMMUNICATION/EDUCATION:   The patient’s plan of care was discussed with: Registered nurse    Patient Education  Education Given To: Patient  Education Provided: Role of Therapy;Transfer Training;Mobility Training;ADL Adaptive Strategies  Education Method: Verbal  Barriers to Learning: Cognition  Education Outcome: Continued education needed    The supervising occupational therapist and treating occupational therapist assistant have met to review this patient’s progress and plan of care.    This patient’s plan of care is appropriate for delegation to [facility].     Thank you for this referral,  Selin Moore OT  Minutes: 18

## 2025-03-27 NOTE — PROGRESS NOTES
Pt states he does not understand why we are taking care of a \"dead man\". I asked him to explain why he feels that way. He states that we are giving him medicine to keep him alive. I advised him not to think of it that way, but to think of getting better. He states that he has been wanting to get better for a long time. I inquired as to whom his support system was outside the facility. He stated that all his close family friends were dead, and that he had no support. I advised him to speak to his  about resources on where to go for support and someone to talk to. He agreed. He maintained a flat affect during the entire conversation. However, he was cooperative and interactive.

## 2025-03-27 NOTE — PROGRESS NOTES
Warfarin Dosing Consult  Bony Smith is a 59 y.o. male with LV thrombus. Pharmacy was consulted by Dr Jad Hatch to dose and monitor warfarin.    INR Goal: 2.5-3.5    PTA Dose: N/A    Drugs that may increase INR:None  Drugs that may decrease INR: None  Other current anticoagulants/ drugs that may increase bleeding risk: Enoxaparin  Risk factors: None  Daily INR ordered: Yes    Recent Labs     03/25/25  0611 03/26/25  0555 03/27/25  0622   HGB 13.2 12.9 12.7    369 389     No results for input(s): \"ALT\", \"AST\" in the last 72 hours.    Recent Labs     03/25/25  0611 03/26/25  0555 03/27/25  0622   INR 1.7* 2.3* 2.0*       Date INR Previous Dose   3/22 1.2 7.5   3/23 1.2 7.5   3/24 1.4 10   3/25 1.7 10   3/26 2.3 5    3/27 2 5     Assessment/ Plan:  H/H and platelets stable  No Ddx noted  INR remains subtherapeutic. Remains on therapeutic Lovenox. Continue on Lovenox bridge until INR therapeutic x 2  Will order warfarin 7.5 mg x 1 today.   CBC & INR ordered through 3/29  Pharmacy will continue to monitor daily and adjust therapy as indicated.

## 2025-03-27 NOTE — PROGRESS NOTES
Comprehensive Nutrition Assessment    Type and Reason for Visit:  Reassess (Interim)    Nutrition Recommendations/Plan:   Continue ONS as ordered  Encourage intakes >50%, update diet preferences as able to incr intakes  Monitor weight, intakes, labs, and bowel fxn     Malnutrition Assessment:  Malnutrition Status:  At risk for malnutrition (03/27/25 0589)      Nutrition Assessment:    Sent from [facility] for c/o ongoing +abd pain. Admit for dizziness. UDS +THC. Prev admit to [facility] but then d/c and xfer to [facility] (3/15) for hypotension. Assessed for LOS. Currently working on xfer to facility w/ medical psych. Psych and BSMART following for SI. 1:1 sitter in place. Per I/O, variable PO intakes w/ 1-25% x3 documented meals, 16-50% x5 documented meals, 51-75% x2 documented meals, % x4 documented meals. ONS intakes % x3 documented. Limited wt hx in EMR; it did show a 10# wt gain x1mo. (3/27) Pt w/ cotinued goo itnakes of meals avg 50-75%, % [facility] ONS TID (1050 kcals, 60 g pro). Wt up slightly, do not suspect true wt gain, fluid vs bedscale inaccuracy. Pt w/o nutritional concerns at this time. Labs and meds reviewed.    Nutrition Related Findings:    No findings of muscle wasting/fat loss. +Edentulous. No c/s difficulty noted. Blind in R eye. Reduced appetite d/t abd pain. Last BM 3/24. No edema. Wound Type: None       Current Nutrition Intake & Therapies:    Average Meal Intake: 51-75%  Average Supplements Intake: %  ADULT ORAL NUTRITION SUPPLEMENT; Breakfast, Lunch, Dinner; Standard High Calorie/High Protein Oral Supplement  DIET ONE TIME MESSAGE;  ADULT DIET; Regular; Safety Tray; Safety Tray (Disposables)    Anthropometric Measures:  Height: 182.6 cm (5' 11.89\")  Ideal Body Weight (IBW): 177 lbs (80 kg)    Current Body Weight: 77.5 kg (170 lb 13.7 oz), 94 % IBW. Weight Source: Bed scale  Current BMI (kg/m2): 23.2  Weight Adjustment For: No Adjustment  BMI Categories: Normal Weight (BMI  18.5-24.9)    Estimated Daily Nutrient Needs:  Energy Requirements Based On: Kcal/kg  Weight Used for Energy Requirements: Current  Energy (kcal/day): 1888-2265kcals/day (25-30kcal/kg)  Weight Used for Protein Requirements: Current  Protein (g/day): 76-91g/day (1.0-1.2g/kg)  Method Used for Fluid Requirements: 1 ml/kcal  Fluid (ml/day): 1888-2265ml/day (1ml/kcal)    Nutrition Diagnosis:   Inadequate protein-energy intake related to psychological cause or life stress as evidenced by intake 0-25%, intake 26-50%, intake 51-75%    Nutrition Interventions:   Food and/or Nutrient Delivery: Continue Current Diet, Continue Oral Nutrition Supplement  Nutrition Education/Counseling: No recommendation at this time  Coordination of Nutrition Care: Continue to monitor while inpatient     Goals:  Goals: Maintain adequate nutrition status, prior to discharge  Type of Goal: New goal  Previous Goal Met: Goal(s) Achieved    Nutrition Monitoring and Evaluation:   Behavioral-Environmental Outcomes: None Identified  Food/Nutrient Intake Outcomes: Food and Nutrient Intake, Supplement Intake  Physical Signs/Symptoms Outcomes: Biochemical Data, GI Status, Meal Time Behavior, Weight    Discharge Planning:    Continue Oral Nutrition Supplement     Rina Finnegan RD  Contact: [phone]

## 2025-03-27 NOTE — PROGRESS NOTES
Progress Note:      [facility] NEUROLOGY PROGRESS NOTE          Impression/Recommendations:   Bony Smith is a 59 y.o.  male with PMHx significant for bipolar disorder, anxiety, hx of diverticulitis, and BPH being evaluated for a new  left thalamic infarct. Echo shows decreased EF. CT head is negative for any acute change.  Patient neurological exam is non focal. Carotid doppler did not reveal any significant internal carotid artery stenosis.       Patient being treated for LV thrombus with anticoagulation on warfarin.  Continue atorvastatin 40 mg qd.  Awaiting carotid US although patient has what appears to be a lacunar infarct.  PT/OT/ST  Neuro checks q4 hrs.  DC per PT/OT recommendations.  Call if questions.        ?     Eriberto Sandoval MD  Teleneurologist    SUBJECTIVE   Bony Smith is a 59 y.o. male being evaluated for incidental stroke.     ?     OBJECTIVE   ROS, PMH, FH, SH were all reviewed and are unchanged.   Neurological Examination:   /85   Pulse 62   Temp 97.5 °F (36.4 °C) (Oral)   Resp 16   Ht 1.826 m (5' 11.89\")   Wt 78.3 kg (172 lb 9.9 oz)   SpO2 95%   BMI 23.48 kg/m²    Assisted by DALLAS Rivera   Mental status:  Patient was Alert.  Speech was fluent and articulate. Mental status exam was grossly within normal limits.   Cranial nerves:   Pupils were equally reactive. EOMI.  There was no facial numbness or weakness.   There was a good shrug.   Tongue protruded on the midline.   Motor:   There was no pronator drift.   Legs could be lifted off the bed for 5 seconds.   No abnormal movements.  Sensory:   Sensation was intact to light touch.  Coordination:   There was no dysmetria.  Gait:   Not tested due to fall concerns     ?  Current Facility-Administered Medications   Medication Dose Route Frequency Provider Last Rate Last Admin    atorvastatin (LIPITOR) tablet 40 mg  40 mg Oral Nightly Mac Justice MD   40 mg at 03/26/25 2012    ARIPiprazole (ABILIFY) tablet 2 mg  2 mg  falls EXAMINATION:  MRI BRAIN WO CONTRAST COMPARISON: CT March 18, 2025 TECHNIQUE:  Multiplanar multisequence MRI acquisition without contrast of the brain. FINDINGS:  Image quality slightly degraded by motion artifact, as well as susceptibility artifact in the right temporal region (small metallic density on the skin surface on recent CT). Ventricles are midline without hydrocephalus. There is a small linear focus of subtle diffusion restriction in the left thalamus (304:128, 601:15). No evidence of acute hemorrhage. Basal cisterns are patent. Major intracranial vascular flow voids are patent.     Technical factors as described, with a suspected small acute left thalamic infarction. Electronically signed by Marco Olivia Attestation:        ?I discussed the risks and benefits of a telehealth visit and I obtained the patient's or legally authorized representative's informed verbal consent to conduct this assessment using telehealth tools.? All of the patient’s or legally authorized representative's questions regarding the telehealth interaction were addressed. I provided my name and disclosed to the patient or legally authorized representative my licensure, certification, or registration. ?This consultation was remotely performed at the request of Sahil Joiner MD with the assistance of a trained virtual health liaison working at the originating site.? The consultation used real time licensed and encrypted telehealth equipment which allowed a live video connection between my location and the patient's location.? Aspects of the evaluation that could not be adequately evaluated by virtual assessment were shared with both the patient and the consulting provider.?          A total of 45 minutes of time was spent in direct care and coordination of care with the patient.

## 2025-03-27 NOTE — PROGRESS NOTES
Bed alarm goes off and  yells for help. Pt jumps out of bed runs out of room and down hallway. Unable to direct pt. Keeps stating he wants to get out of hear. Flat affect noted. Pt stops at end of arora and looks out window. Nurse able to perform breathing exercises with pt and pt agreeable to return to room. Pt appears sad and states \"I don't know why I keep doing that.\" MD aware and placing order for geodon for agitation. Bed alarm on with  at bedside. Plan to move pt closer to nurse station for improved safety.

## 2025-03-27 NOTE — BH NOTE
Progress note for 2025 patient seen for follow-up chart reviewed spoke with the nursing staff taking care spoke with the one-to-one staff patient in the bed alert warble talk low soft tone of voice satirized depressed denies suicidal thought homicidal thoughts no hallucination still believes sister is dead but then question having the work that she call apparently she did talk to her.  She did talk to him.  Patient still depressed but denies suicidal thoughts no hallucinations knew the date.  That looks more depressed today.  Says he is depressed because he is here in the hospital but could not come up with the specific plans what he can do or where he can take care of himself in [place].  Apparently his sister Ruth did call the unit and talk to him I got the telephone number from the staff I did call the number with this patient permission Ruth her number is [phone] she did  the telephone shared information apparently when she talked to the brother he told what in the F he she was calling him.  She per her his angry he cannot live once his wife passed away and apparently his wife  of her heart attack on  and he has 2 sons patient keeps saying he one of the son  does not apparently sons have nothing to do with him he does have 2 brothers one of them have a stroke and he himself needs a lot of help but other brother along with the patient's sister tried to help him do the best she can but he talks about angry in the past he used to do drugs but not but apparently had some medication he done well but then refused to take patient sister gave a list of medication that she has which includes bupropion 75 mg 2 a day hydroxyzine 50 mg 4 times a day olanzapine 5 mg at night.  Of course he is not taking recently she also mentioned that he was in a hospital in  at [facility] up to 3 months.  Patient states he says he remains angry depressed not  cooperative not doing well prior to coming here at the hospital she and other brother tried to help him medical apparently with the sisters were sister was concerned about his neurological situation and been told that he is going to have an MRI of the brain.  Sometimes he gets restless per nursing staff the Haldol does not hold him continued inpatient level of care indicated his vital signs today weight 78.3 kg pulse 57 blood pressure 134/90 temperature 98.1 respiration 18 O2 saturation 94% thank you

## 2025-03-28 ENCOUNTER — HOSPITAL ENCOUNTER (INPATIENT)
Facility: HOSPITAL | Age: 60
LOS: 12 days | Discharge: HOME | DRG: 204 | End: 2025-04-09
Attending: PSYCHIATRY & NEUROLOGY | Admitting: PSYCHIATRY & NEUROLOGY
Payer: MEDICAID

## 2025-03-28 VITALS
OXYGEN SATURATION: 97 % | HEART RATE: 63 BPM | RESPIRATION RATE: 16 BRPM | DIASTOLIC BLOOD PRESSURE: 85 MMHG | BODY MASS INDEX: 23.15 KG/M2 | TEMPERATURE: 98.1 F | SYSTOLIC BLOOD PRESSURE: 125 MMHG | WEIGHT: 170.9 LBS | HEIGHT: 72 IN

## 2025-03-28 LAB
BASOPHILS # BLD: 0.05 K/UL (ref 0–0.1)
BASOPHILS NFR BLD: 0.5 % (ref 0–1)
DIFFERENTIAL METHOD BLD: ABNORMAL
EOSINOPHIL # BLD: 0.17 K/UL (ref 0–0.4)
EOSINOPHIL NFR BLD: 1.8 % (ref 0–7)
ERYTHROCYTE [DISTWIDTH] IN BLOOD BY AUTOMATED COUNT: 16.8 % (ref 11.5–14.5)
HCT VFR BLD AUTO: 39.5 % (ref 36.6–50.3)
HGB BLD-MCNC: 13 G/DL (ref 12.1–17)
IMM GRANULOCYTES # BLD AUTO: 0.03 K/UL (ref 0–0.04)
IMM GRANULOCYTES NFR BLD AUTO: 0.3 % (ref 0–0.5)
INR PPP: 2 (ref 0.9–1.1)
LYMPHOCYTES # BLD: 3.23 K/UL (ref 0.8–3.5)
LYMPHOCYTES NFR BLD: 33.5 % (ref 12–49)
MCH RBC QN AUTO: 29 PG (ref 26–34)
MCHC RBC AUTO-ENTMCNC: 32.9 G/DL (ref 30–36.5)
MCV RBC AUTO: 88.2 FL (ref 80–99)
MONOCYTES # BLD: 0.77 K/UL (ref 0–1)
MONOCYTES NFR BLD: 8 % (ref 5–13)
NEUTS SEG # BLD: 5.4 K/UL (ref 1.8–8)
NEUTS SEG NFR BLD: 55.9 % (ref 32–75)
NRBC # BLD: 0 K/UL (ref 0–0.01)
NRBC BLD-RTO: 0 PER 100 WBC
PLATELET # BLD AUTO: 373 K/UL (ref 150–400)
PMV BLD AUTO: 11.4 FL (ref 8.9–12.9)
PROTHROMBIN TIME: 23.1 SEC (ref 11.9–14.6)
RBC # BLD AUTO: 4.48 M/UL (ref 4.1–5.7)
WBC # BLD AUTO: 9.7 K/UL (ref 4.1–11.1)

## 2025-03-28 PROCEDURE — 85025 COMPLETE CBC W/AUTO DIFF WBC: CPT

## 2025-03-28 PROCEDURE — 6370000000 HC RX 637 (ALT 250 FOR IP): Performed by: PSYCHIATRY & NEUROLOGY

## 2025-03-28 PROCEDURE — 6370000000 HC RX 637 (ALT 250 FOR IP): Performed by: NURSE PRACTITIONER

## 2025-03-28 PROCEDURE — 6370000000 HC RX 637 (ALT 250 FOR IP): Performed by: INTERNAL MEDICINE

## 2025-03-28 PROCEDURE — 6360000002 HC RX W HCPCS

## 2025-03-28 PROCEDURE — 36415 COLL VENOUS BLD VENIPUNCTURE: CPT

## 2025-03-28 PROCEDURE — 6360000002 HC RX W HCPCS: Performed by: INTERNAL MEDICINE

## 2025-03-28 PROCEDURE — 6360000002 HC RX W HCPCS: Performed by: PSYCHIATRY & NEUROLOGY

## 2025-03-28 PROCEDURE — 85610 PROTHROMBIN TIME: CPT

## 2025-03-28 PROCEDURE — 97530 THERAPEUTIC ACTIVITIES: CPT

## 2025-03-28 PROCEDURE — 1240000000 HC EMOTIONAL WELLNESS R&B

## 2025-03-28 RX ORDER — OLANZAPINE 5 MG/1
5 TABLET, ORALLY DISINTEGRATING ORAL NIGHTLY
Status: DISCONTINUED | OUTPATIENT
Start: 2025-03-28 | End: 2025-03-31

## 2025-03-28 RX ORDER — BUSPIRONE HYDROCHLORIDE 7.5 MG/1
7.5 TABLET ORAL 2 TIMES DAILY
Qty: 30 TABLET | Refills: 2 | Status: ON HOLD | OUTPATIENT
Start: 2025-03-28

## 2025-03-28 RX ORDER — ATORVASTATIN CALCIUM 40 MG/1
40 TABLET, FILM COATED ORAL NIGHTLY
Status: DISCONTINUED | OUTPATIENT
Start: 2025-03-28 | End: 2025-04-09 | Stop reason: HOSPADM

## 2025-03-28 RX ORDER — OLANZAPINE 5 MG/1
5 TABLET, ORALLY DISINTEGRATING ORAL NIGHTLY
Qty: 30 TABLET | Refills: 3 | Status: ON HOLD | OUTPATIENT
Start: 2025-03-28

## 2025-03-28 RX ORDER — MIDODRINE HYDROCHLORIDE 5 MG/1
15 TABLET ORAL 3 TIMES DAILY
Qty: 90 TABLET | Refills: 3 | Status: ON HOLD | OUTPATIENT
Start: 2025-03-28

## 2025-03-28 RX ORDER — WARFARIN SODIUM 7.5 MG/1
TABLET ORAL
Qty: 30 TABLET | Refills: 0 | Status: ON HOLD | OUTPATIENT
Start: 2025-03-28

## 2025-03-28 RX ORDER — BUSPIRONE HYDROCHLORIDE 5 MG/1
7.5 TABLET ORAL 2 TIMES DAILY
Status: DISCONTINUED | OUTPATIENT
Start: 2025-03-28 | End: 2025-04-09 | Stop reason: HOSPADM

## 2025-03-28 RX ORDER — ARIPIPRAZOLE 2 MG/1
2 TABLET ORAL DAILY
Status: DISCONTINUED | OUTPATIENT
Start: 2025-03-29 | End: 2025-04-01

## 2025-03-28 RX ORDER — MIDODRINE HYDROCHLORIDE 5 MG/1
15 TABLET ORAL
Status: DISCONTINUED | OUTPATIENT
Start: 2025-03-29 | End: 2025-04-09 | Stop reason: HOSPADM

## 2025-03-28 RX ORDER — ENOXAPARIN SODIUM 100 MG/ML
1 INJECTION SUBCUTANEOUS 2 TIMES DAILY
Qty: 30 EACH | Refills: 0 | Status: ON HOLD | OUTPATIENT
Start: 2025-03-28

## 2025-03-28 RX ORDER — ZIPRASIDONE MESYLATE 20 MG/ML
20 INJECTION, POWDER, LYOPHILIZED, FOR SOLUTION INTRAMUSCULAR EVERY 12 HOURS PRN
Status: DISCONTINUED | OUTPATIENT
Start: 2025-03-28 | End: 2025-04-09 | Stop reason: HOSPADM

## 2025-03-28 RX ORDER — MAGNESIUM HYDROXIDE/ALUMINUM HYDROXICE/SIMETHICONE 120; 1200; 1200 MG/30ML; MG/30ML; MG/30ML
30 SUSPENSION ORAL EVERY 6 HOURS PRN
Status: DISCONTINUED | OUTPATIENT
Start: 2025-03-28 | End: 2025-04-02

## 2025-03-28 RX ORDER — GAUZE BANDAGE 2" X 2"
100 BANDAGE TOPICAL DAILY
Status: DISCONTINUED | OUTPATIENT
Start: 2025-03-29 | End: 2025-04-09 | Stop reason: HOSPADM

## 2025-03-28 RX ORDER — DULOXETIN HYDROCHLORIDE 30 MG/1
30 CAPSULE, DELAYED RELEASE ORAL DAILY
Qty: 30 CAPSULE | Refills: 3 | Status: ON HOLD | OUTPATIENT
Start: 2025-03-29

## 2025-03-28 RX ORDER — WARFARIN SODIUM 5 MG/1
10 TABLET ORAL
Status: COMPLETED | OUTPATIENT
Start: 2025-03-28 | End: 2025-03-28

## 2025-03-28 RX ORDER — HYDROXYZINE HYDROCHLORIDE 50 MG/1
50 TABLET, FILM COATED ORAL EVERY 6 HOURS PRN
Status: DISCONTINUED | OUTPATIENT
Start: 2025-03-28 | End: 2025-04-09 | Stop reason: HOSPADM

## 2025-03-28 RX ORDER — DULOXETIN HYDROCHLORIDE 30 MG/1
30 CAPSULE, DELAYED RELEASE ORAL DAILY
Status: DISCONTINUED | OUTPATIENT
Start: 2025-03-29 | End: 2025-04-09 | Stop reason: HOSPADM

## 2025-03-28 RX ORDER — ENOXAPARIN SODIUM 100 MG/ML
1 INJECTION SUBCUTANEOUS 2 TIMES DAILY
Status: DISCONTINUED | OUTPATIENT
Start: 2025-03-28 | End: 2025-03-29

## 2025-03-28 RX ORDER — THIAMINE MONONITRATE (VIT B1) 100 MG
100 TABLET ORAL DAILY
Qty: 90 TABLET | Refills: 0 | Status: ON HOLD | OUTPATIENT
Start: 2025-03-29

## 2025-03-28 RX ORDER — ATORVASTATIN CALCIUM 40 MG/1
40 TABLET, FILM COATED ORAL NIGHTLY
Qty: 30 TABLET | Refills: 3 | Status: ON HOLD | OUTPATIENT
Start: 2025-03-28

## 2025-03-28 RX ORDER — ARIPIPRAZOLE 2 MG/1
2 TABLET ORAL DAILY
Qty: 30 TABLET | Refills: 3 | Status: ON HOLD | OUTPATIENT
Start: 2025-03-29

## 2025-03-28 RX ORDER — ACETAMINOPHEN 325 MG/1
650 TABLET ORAL EVERY 4 HOURS PRN
Status: DISCONTINUED | OUTPATIENT
Start: 2025-03-28 | End: 2025-04-09 | Stop reason: HOSPADM

## 2025-03-28 RX ORDER — MIRTAZAPINE 15 MG/1
15 TABLET, FILM COATED ORAL NIGHTLY
Status: DISCONTINUED | OUTPATIENT
Start: 2025-03-28 | End: 2025-04-09 | Stop reason: HOSPADM

## 2025-03-28 RX ORDER — ACETAMINOPHEN 325 MG/1
650 TABLET ORAL EVERY 4 HOURS PRN
Status: DISCONTINUED | OUTPATIENT
Start: 2025-03-28 | End: 2025-03-28

## 2025-03-28 RX ADMIN — DULOXETINE HYDROCHLORIDE 30 MG: 30 CAPSULE, DELAYED RELEASE ORAL at 08:10

## 2025-03-28 RX ADMIN — ENOXAPARIN SODIUM 80 MG: 100 INJECTION SUBCUTANEOUS at 08:11

## 2025-03-28 RX ADMIN — ARIPIPRAZOLE 2 MG: 2 TABLET ORAL at 08:10

## 2025-03-28 RX ADMIN — BUSPIRONE HYDROCHLORIDE 7.5 MG: 5 TABLET ORAL at 08:10

## 2025-03-28 RX ADMIN — MIDODRINE HYDROCHLORIDE 15 MG: 5 TABLET ORAL at 08:09

## 2025-03-28 RX ADMIN — ZIPRASIDONE MESYLATE 10 MG: 20 INJECTION, POWDER, LYOPHILIZED, FOR SOLUTION INTRAMUSCULAR at 00:33

## 2025-03-28 RX ADMIN — THIAMINE HCL TAB 100 MG 100 MG: 100 TAB at 08:10

## 2025-03-28 RX ADMIN — ZIPRASIDONE MESYLATE 10 MG: 20 INJECTION, POWDER, LYOPHILIZED, FOR SOLUTION INTRAMUSCULAR at 06:48

## 2025-03-28 RX ADMIN — WARFARIN SODIUM 10 MG: 5 TABLET ORAL at 18:52

## 2025-03-28 RX ADMIN — FLUOXETINE HYDROCHLORIDE 40 MG: 20 CAPSULE ORAL at 08:09

## 2025-03-28 RX ADMIN — ZIPRASIDONE MESYLATE 20 MG: 20 INJECTION, POWDER, LYOPHILIZED, FOR SOLUTION INTRAMUSCULAR at 21:13

## 2025-03-28 RX ADMIN — HYDROXYZINE HYDROCHLORIDE 50 MG: 50 TABLET, FILM COATED ORAL at 17:53

## 2025-03-28 ASSESSMENT — PAIN SCALES - GENERAL
PAINLEVEL_OUTOF10: 0

## 2025-03-28 ASSESSMENT — SLEEP AND FATIGUE QUESTIONNAIRES
DO YOU USE A SLEEP AID: NO
DO YOU HAVE DIFFICULTY SLEEPING: YES
AVERAGE NUMBER OF SLEEP HOURS: 5

## 2025-03-28 NOTE — PLAN OF CARE
Problem: Discharge Planning  Goal: Discharge to home or other facility with appropriate resources  Outcome: Progressing  Flowsheets (Taken 3/27/2025 2000)  Discharge to home or other facility with appropriate resources: Identify barriers to discharge with patient and caregiver     Problem: Pain  Goal: Verbalizes/displays adequate comfort level or baseline comfort level  3/28/2025 0709 by Pradip Belcher RN  Outcome: Progressing  3/28/2025 0200 by Pradip Belcher RN  Outcome: Progressing     Problem: Safety - Adult  Goal: Free from fall injury  3/28/2025 0709 by Pradip Belcher RN  Outcome: Progressing  3/28/2025 0200 by Pradip Belcher RN  Outcome: Progressing  Flowsheets (Taken 3/27/2025 2000)  Free From Fall Injury: Instruct family/caregiver on patient safety     Problem: Self Harm/Suicidality  Goal: Will have no self-injury during hospital stay  Description: INTERVENTIONS:  1.  Ensure constant observer at bedside with Q15M safety checks  2.  Maintain a safe environment  3.  Secure patient belongings  4.  Ensure family/visitors adhere to safety recommendations  5.  Ensure safety tray has been added to patient's diet order  6.  Every shift and PRN: Re-assess suicidal risk via Frequent Screener    3/28/2025 0709 by Pradip Belcher RN  Outcome: Progressing  3/28/2025 0200 by Pradip Belcher RN  Outcome: Progressing

## 2025-03-28 NOTE — PLAN OF CARE
PHYSICAL THERAPY TREATMENT     Patient: Bony Smith (59 y.o. male)  Date: 3/28/2025  Diagnosis: Hypoglycemia [E16.2]  Dizzinesses [R42] Dizzinesses      Precautions: Fall Risk, Other (Comment) (1-1)                      Recommendations for nursing mobility: Use of bed/chair alarm for safety    In place during session: Peripheral IV and EKG/telemetry   Chart, physical therapy assessment, plan of care and goals were reviewed.  ASSESSMENT  Patient continues with skilled PT services and is slowly progressing towards goals. Pt semi supine upon PT arrival, agreeable to session. Pt A&O x 3. (See below for objective details and assist levels).     Overall pt tolerated session fair today with PT./83 in supine,97/75 in sitting.Patient started to stand up and started walking.Did not allow BP to read.Patient did not stop and required assist of 2 to get him back in bed.Patient on 1-1 and not safe as he wants to go home.He is unsafe with behavior.His stability is batter but not reliable.He can amb with nursing. Will continue to benefit from skilled PT services, and will continue to progress as tolerated. Current PT DC recommendation Continue to assess pending progress once medically appropriate.      GOALS:    Problem: Physical Therapy - Adult  Goal: By Discharge: Performs mobility at highest level of function for planned discharge setting.  See evaluation for individualized goals.  Description: FUNCTIONAL STATUS PRIOR TO ADMISSION: Patient was independent and active without use of DME.    HOME SUPPORT PRIOR TO ADMISSION: The patient lived alone with family to provide assistance.    Physical Therapy Goals  Initiated 3/18/2025  Pt stated goal: Get better  Pt will be I with [facility] in 7 days.  Pt will perform bed mobility with Modified [place] in 7 days.  Pt will perform transfers with Modified [place] in 7 days.   Pt will amb 100 feet with LRAD safely with Modified [place] in 7 days.  Pt will

## 2025-03-28 NOTE — PROGRESS NOTES
Cleared by Dr. Rivas for transfer to [facility]. Report given to DALLAS Verduzco from [facility]. IV cath removed, no signs of phlebitis noted. Dr. Joiner noted.

## 2025-03-28 NOTE — GROUP NOTE
Group Therapy Note    Date: 3/28/2025    Group Start Time: 1330  Group End Time: 1400  Group Topic: Process Group - Inpatient    [facility]    Eric Raya        Group Therapy Note: This writer provided each individual with a handout with a list of \"basic emotions.\" Each individual shared which emotion they were feeling and why in a group setting.     Attendees: 3       Patient's Goal:  to attend groups.     Notes:  Pt was not on the bhu at the time.     Signature:  Eric Raya

## 2025-03-28 NOTE — PROGRESS NOTES
Warfarin Dosing Consult  Bony Smith is a 59 y.o. male with LV thrombus. Pharmacy was consulted by Dr. Rivas to dose and monitor warfarin.    INR Goal: 2.5-3.5    PTA Dose: N/A    Drugs that may increase INR:None  Drugs that may decrease INR: None  Other current anticoagulants/ drugs that may increase bleeding risk: Enoxaparin  Risk factors: None  Daily INR ordered: Yes    Recent Labs     03/26/25  0555 03/27/25  0622 03/28/25  0534   HGB 12.9 12.7 13.0    389 373     No results for input(s): \"ALT\", \"AST\" in the last 72 hours.  Recent Labs     03/26/25  0555 03/27/25  0622 03/28/25  0534   INR 2.3* 2.0* 2.0*       Date INR Previous Dose   3/22 1.2 7.5   3/23 1.2 7.5   3/24 1.4 10   3/25 1.7 10   3/26 2.3 5   3/27 2 5   3/28 2 7.5     Assessment/ Plan:  H/H and platelets stable.  INR remains subtherapeutic. Remains on therapeutic Lovenox. Continue on Lovenox bridge until INR is therapeutic x 2.  Will order warfarin 10 mg PO x 1 dose.  Pharmacy will continue to monitor daily and adjust therapy as indicated.

## 2025-03-28 NOTE — PROGRESS NOTES
Progress Note      3/27/2025 9:41 PM  NAME: Bony Smith   MRN:  [id number]   Admit Diagnosis: Hypoglycemia [E16.2]  Dizzinesses [R42]      Problem List:   Admitted to the hospital from another facility with blood pressure of 67 over 54 mmHg.  -Patient was recently in our hospital and diagnosed with LV thrombus and was discharged on anticoagulation.  -Cardiomyopathy with LV function of 45% as of 3/13/2025  -LV thrombus present  -Psych disorder  -Bipolar disorder  -Blindness in the right eye       Assessment/Plan:   Note dictated March 27, 2025  -Follow-up visit from cardiology.  Patient is lying comfortably in the bed.  -The site of the thrombus is reduced compared to previous echo being done 2 weeks ago.  -Continue current medical management including anticoagulation for a minimum of 3 months and repeat the echocardiogram.  ===============================================================  Note dictated March 26, 2025  -Follow-up visit from cardiology.  Patient is lying comfortably in the bed.  INR is 2.3 today.  -Will repeat limited echocardiogram for LV thrombus tomorrow to see the efficacy of Coumadin.  The main goal is to to check for LV thrombus size and efficacy of anticoagulation patient is being receiving for last 5 days and then make further management decision as clinically indicated  -Will follow after the echo results are available tomorrow  ===============================================================  Note dictated March 25, 2025  -Follow-up visit from cardiology.  Patient is lying comfortably in the bed.  -Continue Coumadin as INR is 1.7 and PT is 20.4.  Once INR is greater than 2.5 and close to 3 we will check limited echocardiogram for LV thrombus prior to discharge.  -No further cardiovascular testing and the plan is to anticoagulate him for intracardiac thrombus and repeat echocardiogram prior to discharge and in 3 months being on anticoagulation with INR close to  3.  ===============================================================  Note dictated March 24, 2025  -Follow up with visit cardiology.  -Patient is lying comfortably in the bed.  He stated that he has no complaints.  Patient is currently in the hospital secondary to left ventricular thrombus and was readmitted to the low blood [facility] from the psych unit.  Apparently he has no suicidal ideation anymore and can be discharged home.  -Once patient INR is more than 3 and he is otherwise clinically and hemodynamically stable I will take the liberty to perform a limited echocardiogram for LV thrombus evaluation and then make further cardiovascular management decision as clinically warranted.  -Will follow closely while he is in the hospital along with the team and thereafter as an outpatient for LV thrombus evaluation and its management for a minimum of 3 months.  ===============================================================  Note dictated March 22, 2025  -Follow-up visit from cardiology.  Patient was readmitted to the hospital.  Intracardiac thrombus still present and has not decreased in size being on Eliquis.  -Based on my clinical assessment patient should be on Lovenox and Coumadin and INR should be around 3.  -Continue current medical management with no further cardiovascular testing and once bridging is completed and patient INR is 3 or above he can be discharged to follow-up with us as an outpatient.  -I understand compliance is an issue based on patient's psych problems but he will be going to an inpatient either psych unit or rehab so I am not concerned about the compliance initially and later on after obtaining an echocardiogram we will make further cardiovascular management decision for LV thrombus and its future management in 4 weeks or early as needed.  -This has been discussed with the primary team.  ===============================================================  Note dictated March 21, 2025  -59-year-old  white male admitted to the hospital from psych facility where he was admitted recently with LV thrombus readmitted to the hospital with hypotension.  -When I saw the patient he was lying comfortably in the bed and denies any symptoms of chest pain shortness of breath or any other anginal equivalent.  -Reduced LV function of 45% as of March 13, 2025  -No known established coronary artery disease  -Will request pharmacy for LV thrombus management with Coumadin rather than Eliquis as based on my current clinical knowledge Coumadin is better than Eliquis for intracardiac thrombus resolution.  -No further cardiovascular testing based on my current clinical assessment and will continue current medical management.         []       High complexity decision making was performed in this patient at high risk for decompensation with multiple organ involvement.    Subjective:     Bony Smith is a 59 y.o. White (non-) male who has no known established coronary artery disease and readmitted to the hospital with hypotension.  Patient was discharged with a diagnosis of intracardiac thrombus and is started on anticoagulation.  Based on my current clinical assessment and knowledge based on his LV thrombus he should be on Coumadin rather than Eliquis.  For now continue Lovenox and Coumadin until we achieve INR of 3 or more.  The recommendation is to keep the INR between 2.5 and 3.5 in his case scenario.  Will continue to follow while patient is in the hospital along with the team and make further cardiovascular management decision as clinically warranted including close follow-up and repeat echocardiogram in 4 weeks.  Review of Systems:   Negative except for as noted above.    Objective:      Physical Exam:    Last 24hrs VS reviewed since prior progress note. Most recent are:    /70   Pulse 71   Temp 98.2 °F (36.8 °C) (Oral)   Resp 16   Ht 1.826 m (5' 11.89\")   Wt 77.5 kg (170 lb 14.4 oz)   SpO2 93%   BMI  dosing by pharmacy   Oral RX Placeholder    busPIRone (BUSPAR) tablet 7.5 mg  7.5 mg Oral BID    OLANZapine zydis (ZYPREXA) disintegrating tablet 5 mg  5 mg Oral Nightly    DULoxetine (CYMBALTA) extended release capsule 30 mg  30 mg Oral Daily    enoxaparin (LOVENOX) injection 80 mg  1 mg/kg SubCUTAneous BID    thiamine mononitrate tablet 100 mg  100 mg Oral Daily    acetaminophen (TYLENOL) tablet 650 mg  650 mg Oral Q4H PRN    FLUoxetine (PROZAC) capsule 40 mg  40 mg Oral Daily    magnesium hydroxide (MILK OF MAGNESIA) 400 MG/5ML suspension 30 mL  30 mL Oral Daily PRN    mirtazapine (REMERON SOL-TAB) disintegrating tablet 15 mg  15 mg Oral Nightly         Electronically signed by    PAU CAR MD  March 27, 2025   9:41 PM

## 2025-03-28 NOTE — BH NOTE
ADMISSION NOTE    Arrival: 1423    Physician: Dr. Rivas    Diagnosis: Bipolar Disorder    Legal Status: voluntary    Past Psych Hx: Bipolar and anxiety    PMH: Per notes Diverticulitis, blind in right eye, BPH, cardiomyopathy with EF 35-40%    Labs: MRI brain small acute thalamic infarction, CTA left ventricular thrombus     Vitals: 126/74, 76, 95%, 16, 99.0    Admission: 59 year old , male voluntarily to [address] [place] [address] under the professional services of Dr. Dr. Rivas with a diagnosis of Bipolar.     Precipitating Events:  Pt originally brought in from [facility] for abdominal pain and SOB and was transferred to medical floor after rapid response due to hypotension.  Pt came back to unit after being medically clear.      Mental Status/Assessment: Upon arrival to unit pt is A&O x 2, pt appears to be confused at times.  Pt presents with flat affect.  Pt denies anxiety, depression, SI/HI, AH/VH.  Pt would not complete whole admission process stating \"Im tired and I just want to sleep\".  Pt refused to sign paperwork.  Pt did allow staff to complete safety search and VS.  Pt unsteady on feet and noted to stumble and/or hold on to wall when walking to room.  Pt was placed on fall precautions, given fall band, yellow socks, and walker.  Pt educated on importance of using walker when ambulating.  Pt appears restless and observed up walking on unit several times, but goes right back to room to lay down.            Search completed with second staff member.  Pt did not have any belongings when brought from medical floor to  unit.  Dr. Rivas made aware of pt arrival.  Provider gave verbal orders for diet, medical consult, fall precautions, 1:1 observation, and PT eval and consult.

## 2025-03-28 NOTE — GROUP NOTE
Group Therapy Note    Date: 3/28/2025    Group Start Time: 1555  Group End Time: 1640  Group Topic: Recreational    SSR 2 BH NON ACUTE    Katt Borrego        Group Therapy Note    Facilitated leisure skills group to reinforce positive coping and to manage mood through music, social interaction, group activities and art task       Attendees: 3/8       Notes:  Pt came to group for less than 3 minutes. Left group. Did not return    Discipline Responsible: Recreational Therapist      Signature:  JACKSON MillerS

## 2025-03-28 NOTE — PLAN OF CARE
Problem: Discharge Planning  Goal: Discharge to home or other facility with appropriate resources  Outcome: Progressing     Problem: Pain  Goal: Verbalizes/displays adequate comfort level or baseline comfort level  Outcome: Progressing     Problem: Safety - Adult  Goal: Free from fall injury  Outcome: Progressing     Problem: Self Harm/Suicidality  Goal: Will have no self-injury during hospital stay  Description: INTERVENTIONS:  1.  Ensure constant observer at bedside with Q15M safety checks  2.  Maintain a safe environment  3.  Secure patient belongings  4.  Ensure family/visitors adhere to safety recommendations  5.  Ensure safety tray has been added to patient's diet order  6.  Every shift and PRN: Re-assess suicidal risk via Frequent Screener    Outcome: Progressing     Problem: Occupational Therapy - Adult  Goal: By Discharge: Performs self-care activities at highest level of function for planned discharge setting.  See evaluation for individualized goals.  Description: FUNCTIONAL STATUS PRIOR TO ADMISSION: Patient was independent and active without use of DME. Patient was independent for ADLs.    HOME SUPPORT PRIOR TO ADMISSION: The patient lived alone with family to provide assistance.       Occupational Therapy Goals:  Initiated 3/27/2025  Patient/Family stated goal: N/A s/t cognition  1.  Patient will perform lower body dressing with Hamlin within 7 day(s).  2.  Patient will perform grooming with Hamlin within 7 day(s).  3.  Patient will perform upper body dressing with Hamlin within 7 day(s).  4.  Patient will perform toilet transfers with Hamlin  within 7 day(s).  5.  Patient will perform all aspects of toileting with Hamlin within 7 day(s).  6.  Patient will participate in upper extremity therapeutic exercise/activities with Hamlin within 7 day(s).    3/27/2025 1530 by Selin Moore OT  Outcome: Progressing

## 2025-03-28 NOTE — CARE COORDINATION
1428: CM noted discharge order. Patient going to [facility].    Transition of Care Plan:    RUR: 15%  Prior Level of Functioning: needs assistance  Disposition: U  FAREED: today  If SNF or IPR: Date FOC offered: n/a   Date FOC received: n/a  Accepting facility: [facility]  Date authorization started with reference number: n/a  Date authorization received and expires: n/a  Follow up appointments: Per MD  DME needed: n/a  Transportation at discharge:   IM/IMM Medicare/ letter given:   Is patient a  and connected with [facility]?    If yes, was  transfer form completed and [facility] notified?   Caregiver Contact:   Discharge Caregiver contacted prior to discharge?   Care Conference needed? no  Barriers to discharge: none      0851: CM reviewed chart. DCP pending psyc recs. CM will continue to follow.

## 2025-03-28 NOTE — PROGRESS NOTES
Progress Note      3/28/2025 9:11 AM  NAME: Bony Smith   MRN:  [id number]   Admit Diagnosis: Hypoglycemia [E16.2]  Dizzinesses [R42]      Problem List:   Admitted to the hospital from another facility with blood pressure of 67 over 54 mmHg.  -Patient was recently in our hospital and diagnosed with LV thrombus and was discharged on anticoagulation.  -Cardiomyopathy with LV function of 45% as of 3/13/2025  -LV thrombus present  -Psych disorder  -Bipolar disorder  -Blindness in the right eye       Assessment/Plan:   March 28, 2025  -Follow-up visit from cardiology. Patient is comfortably lying in bed in no acute respiratory distress. No overnight cardiovascular events.  -Cardiac telemetry reveals sinus rhythm with HR 75.  -He admits to occasional chest pain and SOB with exertion.  -Echocardiogram on 3/27/25 EF 35-40%.  -Continue current medical management including anticoagulation for a minimum of 3 months and repeat the echocardiogram.  ===============================================================  Note dictated March 27, 2025  -Follow-up visit from cardiology.  Patient is lying comfortably in the bed.  -The site of the thrombus is reduced compared to previous echo being done 2 weeks ago.  -Continue current medical management including anticoagulation for a minimum of 3 months and repeat the echocardiogram.  ===============================================================  Note dictated March 26, 2025  -Follow-up visit from cardiology.  Patient is lying comfortably in the bed.  INR is 2.3 today.  -Will repeat limited echocardiogram for LV thrombus tomorrow to see the efficacy of Coumadin.  The main goal is to to check for LV thrombus size and efficacy of anticoagulation patient is being receiving for last 5 days and then make further management decision as clinically indicated  -Will follow after the echo results are available  tomorrow  ===============================================================  Note dictated March 25, 2025  -Follow-up visit from cardiology.  Patient is lying comfortably in the bed.  -Continue Coumadin as INR is 1.7 and PT is 20.4.  Once INR is greater than 2.5 and close to 3 we will check limited echocardiogram for LV thrombus prior to discharge.  -No further cardiovascular testing and the plan is to anticoagulate him for intracardiac thrombus and repeat echocardiogram prior to discharge and in 3 months being on anticoagulation with INR close to 3.  ===============================================================  Note dictated March 24, 2025  -Follow up with visit cardiology.  -Patient is lying comfortably in the bed.  He stated that he has no complaints.  Patient is currently in the hospital secondary to left ventricular thrombus and was readmitted to the low blood pressure from the psych unit.  Apparently he has no suicidal ideation anymore and can be discharged home.  -Once patient INR is more than 3 and he is otherwise clinically and hemodynamically stable I will take the liberty to perform a limited echocardiogram for LV thrombus evaluation and then make further cardiovascular management decision as clinically warranted.  -Will follow closely while he is in the hospital along with the team and thereafter as an outpatient for LV thrombus evaluation and its management for a minimum of 3 months.  ===============================================================  Note dictated March 22, 2025  -Follow-up visit from cardiology.  Patient was readmitted to the hospital.  Intracardiac thrombus still present and has not decreased in size being on Eliquis.  -Based on my clinical assessment patient should be on Lovenox and Coumadin and INR should be around 3.  -Continue current medical management with no further cardiovascular testing and once bridging is completed and patient INR is 3 or above he can be discharged to  \"[facility]\"    No results for input(s): \"PH\", \"PCO2\", \"PO2\" in the last 72 hours.    Medications Personally Reviewed:    Current Facility-Administered Medications   Medication Dose Route Frequency    ziprasidone (GEODON) injection 10 mg  10 mg IntraMUSCular Q6H PRN    midodrine (PROAMATINE) tablet 15 mg  15 mg Oral TID    atorvastatin (LIPITOR) tablet 40 mg  40 mg Oral Nightly    ARIPiprazole (ABILIFY) tablet 2 mg  2 mg Oral Daily    LORazepam (ATIVAN) injection 1 mg  1 mg IntraVENous Once PRN    warfarin placeholder: dosing by pharmacy   Oral RX Placeholder    busPIRone (BUSPAR) tablet 7.5 mg  7.5 mg Oral BID    OLANZapine zydis (ZYPREXA) disintegrating tablet 5 mg  5 mg Oral Nightly    DULoxetine (CYMBALTA) extended release capsule 30 mg  30 mg Oral Daily    enoxaparin (LOVENOX) injection 80 mg  1 mg/kg SubCUTAneous BID    thiamine mononitrate tablet 100 mg  100 mg Oral Daily    acetaminophen (TYLENOL) tablet 650 mg  650 mg Oral Q4H PRN    FLUoxetine (PROZAC) capsule 40 mg  40 mg Oral Daily    magnesium hydroxide (MILK OF MAGNESIA) 400 MG/5ML suspension 30 mL  30 mL Oral Daily PRN    mirtazapine (REMERON SOL-TAB) disintegrating tablet 15 mg  15 mg Oral Nightly         Electronically signed by    PAU CAR MD  March 28, 2025   9:11 AM

## 2025-03-28 NOTE — PROGRESS NOTES
INR: 2.0. Called pharmacist for discharge dosing. S/T Ema: Warfarin 7.5mg once a day with daily INR.   DALLAS Verduzco from [address] [place] [address] informed and confirmed that they do give Warfarin on their floor with daily INR. Dr. Joiner noted.

## 2025-03-28 NOTE — DISCHARGE SUMMARY
Discharge Summary    Name: Bony Smith  [id number]  YOB: 1965 (Age: 59 y.o.)   Date of Admission: 3/15/2025  Date of Discharge: 3/28/2025  Attending Physician: Sahil Joiner MD    Discharge Diagnosis:   LV thrombus on CT/CTA   small acute left thalamic infarction   Orthostatic hypotension   Abdominal pain, resolved.   Right renal cysts   BPH  Bipolar disorder  Anxiety  Depression, severe   Suicidal ideation     Consultations:  IP CONSULT TO CARDIOLOGY  IP CONSULT TO NEPHROLOGY  IP CONSULT TO PSYCHIATRY  IP CONSULT TO TELE-NEUROLOGY    Brief Hospital Course by Main Problems:   Bony Smith is a 59 y.o.  male with PMHx significant for bipolar disorder, anxiety, hx of diverticulitis, and BPH.  He presents to the ED from [facility] for generalized abdominal pain for months.  Pain is associated with shortness of breath.  Patient is a former smoker, no EtOH or illicit drug use.  Denies chest pain, N/V/D, loss of appetite, fever, chills.  Patient is poor historian, offers limited history.  On presentation, CTA A/P showed left ventricular apical filling defect concerning for thrombus.  Cardiology was consulted.  Patient started on heparin drip.  ECHO confirmed CT findings.  Troponin 33, no further cardiovascular tests recommended for now.  Heparin drip subsequently transition to therapeutic dose Lovenox for bridging, p.o. warfarin started.  INR goal 2.5-3.5     However, patient noted to have ongoing orthostatic hypotension despite volume resuscitation and with scheduled midodrine. Patient had an episode of dizziness and near fall on 3/24, MRI brain ordered to evaluate for acute CVA.  MRI showed possible small acute left thalamic infarction. Repeat ECHO showed improvement of thrombus size. At the same time, nephrology was consulted to assist with management of orthostatic hypotension.  Recommended IV albumin, which showed improvement in BP.  Cortisol level  REXULTI  Take 1 tablet by mouth daily     FLUoxetine 40 MG capsule  Commonly known as: PROzac  Take 1 capsule by mouth daily     hydrOXYzine pamoate 25 MG capsule  Commonly known as: VISTARIL  Take 1 capsule by mouth 3 times daily as needed for Anxiety     mirtazapine 15 MG tablet  Commonly known as: REMERON  Take 1 tablet by mouth nightly     tamsulosin 0.4 MG capsule  Commonly known as: FLOMAX  Take 1 capsule by mouth daily            STOP taking these medications      apixaban 5 MG Tabs tablet  Commonly known as: Eliquis               Where to Get Your Medications        These medications were sent to [place], [place] - [facility] - P [phone] - F [phone]  [facility] [address]      Phone: [phone]   traZODone 50 MG tablet       Information about where to get these medications is not yet available    Ask your nurse or doctor about these medications  ARIPiprazole 2 MG tablet  atorvastatin 40 MG tablet  busPIRone 7.5 MG tablet  DULoxetine 30 MG extended release capsule  enoxaparin 80 MG/0.8ML  midodrine 5 MG tablet  OLANZapine zydis 5 MG disintegrating tablet  vitamin B-1 100 MG tablet  warfarin 7.5 MG tablet             DISPOSITION:    Home with Family:       Home with HH/PT/OT/RN:    SNF/LTC:    RODDY:    OTHER:   Psychiatric unit           Code status: full  Recommended diet: regular diet  Recommended activity: activity as tolerated  Wound care: None      Follow up with:   PCP : None, None    Mikel Nelson MD  [address]  [address]  [facility] [address]  [phone]    Go on 3/25/2025  @ 11:00am.    Jad Hatch MD  [address] [facility] [address] [phone]    Follow up in 3 month(s)            Total time in minutes spent coordinating this discharge (includes going over instructions, follow-up, prescriptions, and preparing report for sign off to her PCP) :  35 minutes

## 2025-03-28 NOTE — CONSULTS
[address] [address] [place]  [address]                              CONSULTATION      PATIENT NAME: CANDY ROCHA            : 1965  MED REC NO: [id number]                       ROOM:   ACCOUNT NO: [id number]                       ADMIT DATE: 2025  PROVIDER: Rg Rivas MD    DATE OF SERVICE:  2025    ATTENDING PHYSICIAN:  Jad Hatch MD    The patient is seen for followup.   Chart reviewed.  The patient is alert, awake, better eye contact, a little bit more focused engagement in conversation.  Apparently, he got up and tried to walk away, stating he want to go home.  The nursing staff brought him back, reminded him that our goal is also to get him better and safely returning to [place].  Informed that his sisters are very much interested in getting him back there and providing him necessary support.  Our goal is to get him back to his community but make sure he is safe.  When I told him his sister is alive and I spoke with her, his sister also mentioned that his 2 sons are alive.  He said he wanted to talk to one of the sons, but he does not have the telephone number, hope his sister may have access to him.  Focus on nutrition when he gets up, gets up slowly, follow the staff directives.  He is complying with medication.  His pulse 71, blood pressure 104/70, temperature 98.2, respirations 16, O2 saturation 93%.  He seems to understand and appreciate, seems to be motivated to get better and cooperate and go home.  While I was there, he grabbed the Ensure and started drinking.  He seems to making some turnaround and making some effort to get better, appears to be less depressed.        MD TARIK KIM/AQS  D:  2025 23:51:43  T:  2025 05:07:39  JOB #:  [id number]

## 2025-03-28 NOTE — PROGRESS NOTES
Behavioral Health access center called. They are familiar with patient and have been working on his care. They will call back with update.

## 2025-03-28 NOTE — BSMART NOTE
BSMART Liaison Team Note     LOS:  13     Patient goal(s) for today:  take medications as prescribed, make needs known in an appropriate manner, engage in supportive counseling as needed.     BSMART Liaison team focus/goals: assess needs, provide support and education, coordinate care, provided supportive counseling as needed.    Progress note: Liaison met withy patient at length in medical room 567, patient laying in bed dressed in green gown with sitter at bedside. Patient has poor eye contact, spoke in very low tone of voice, appears withdrawn at times.     Bony is endorsing SI \"want to die. I already feel dead. Why don't y'all just kill me.\" Patient denies HI/AVH however note AVH at times and is unsure of what is real. Per sitter, patient has not been sleeping and continues to try to leave him room stating \"I just want to go home.\" Patient appetite is very minimal. Per staff patient had a fall this morning, displays tantrum behavior-falling out. Patient blood pressure drops when moving around, he refused wit work with PT this morning. Liaison worked with Bony to identify stressor which he found difficult. He later identified missing his family-grieving the loss of his wife, has a son he wants to talk to though does not have the number, \"I just want to go home to [address] [facility] in [place], [place]\". He noted feeling depressed 10/10 and anxious 10/10. Liaison provided validation and support, educated the patient on the importance of having a safe D/C plan. Liaison discussed patient poor hygiene and potential effects on mood/mental health. He stated interest and agreed to shower, was hair, let sitter shave him. Liaison supported the patient as he discussed his frustrations with not being able to shave himself (on precautions).      Barriers to Discharge: medical and psychiatric clearance   Guns in the home: No     Outpatient provider(s):  none  Insurance info/prescription coverage:  Payor: [facility]  MEDICAID /  /  /      Diagnosis:   Past Medical History:   Diagnosis Date    Anxiety     Blind right eye     Detached retina, right     Diverticulitis of intestine with abscess     Family history of diverticulitis of colon     mother and brother    Psychiatric disorder     Bipolar        Plan:  Liaison spoke with Nurse Марина, will check with attending physician to see if patient is medically cleared. Spoke with DR Rivas who will round on patient shortly to provide [facility]    Follow up Psych Consult placed? Yes .   Psychiatrist updated? Yes  , JUNE      Participating treatment team members: Lou Brantley, LEONARDO-R

## 2025-03-28 NOTE — PROGRESS NOTES
Patient awake up ad about in the room , knocking the walls, states he want to die.forced himself out of the room Reassured and agreed to get back to bed .Intramuscular Geodon  10 mg given.

## 2025-03-29 LAB
ALDOST SERPL-MCNC: NORMAL NG/DL
ALDOST/RENIN PLAS-RTO: NORMAL
BASOPHILS # BLD: 0.06 K/UL (ref 0–0.1)
BASOPHILS NFR BLD: 0.7 % (ref 0–1)
DIFFERENTIAL METHOD BLD: ABNORMAL
EOSINOPHIL # BLD: 0.13 K/UL (ref 0–0.4)
EOSINOPHIL NFR BLD: 1.5 % (ref 0–7)
ERYTHROCYTE [DISTWIDTH] IN BLOOD BY AUTOMATED COUNT: 16.6 % (ref 11.5–14.5)
HCT VFR BLD AUTO: 39.4 % (ref 36.6–50.3)
HGB BLD-MCNC: 12.9 G/DL (ref 12.1–17)
IMM GRANULOCYTES # BLD AUTO: 0.02 K/UL (ref 0–0.04)
IMM GRANULOCYTES NFR BLD AUTO: 0.2 % (ref 0–0.5)
INR PPP: 3 (ref 0.9–1.1)
LYMPHOCYTES # BLD: 2.69 K/UL (ref 0.8–3.5)
LYMPHOCYTES NFR BLD: 31.4 % (ref 12–49)
MCH RBC QN AUTO: 28.2 PG (ref 26–34)
MCHC RBC AUTO-ENTMCNC: 32.7 G/DL (ref 30–36.5)
MCV RBC AUTO: 86 FL (ref 80–99)
MONOCYTES # BLD: 0.56 K/UL (ref 0–1)
MONOCYTES NFR BLD: 6.5 % (ref 5–13)
NEUTS SEG # BLD: 5.1 K/UL (ref 1.8–8)
NEUTS SEG NFR BLD: 59.7 % (ref 32–75)
NRBC # BLD: 0 K/UL (ref 0–0.01)
NRBC BLD-RTO: 0 PER 100 WBC
PLATELET # BLD AUTO: 391 K/UL (ref 150–400)
PMV BLD AUTO: 11.3 FL (ref 8.9–12.9)
PROTHROMBIN TIME: 31.3 SEC (ref 11.9–14.6)
RBC # BLD AUTO: 4.58 M/UL (ref 4.1–5.7)
RENIN PLAS-CCNC: 0.69 NG/ML/HR (ref 0.17–5.38)
WBC # BLD AUTO: 8.6 K/UL (ref 4.1–11.1)

## 2025-03-29 PROCEDURE — 6360000002 HC RX W HCPCS: Performed by: PSYCHIATRY & NEUROLOGY

## 2025-03-29 PROCEDURE — 85610 PROTHROMBIN TIME: CPT

## 2025-03-29 PROCEDURE — 97161 PT EVAL LOW COMPLEX 20 MIN: CPT

## 2025-03-29 PROCEDURE — 36415 COLL VENOUS BLD VENIPUNCTURE: CPT

## 2025-03-29 PROCEDURE — 6370000000 HC RX 637 (ALT 250 FOR IP): Performed by: PSYCHIATRY & NEUROLOGY

## 2025-03-29 PROCEDURE — 85025 COMPLETE CBC W/AUTO DIFF WBC: CPT

## 2025-03-29 PROCEDURE — 1240000000 HC EMOTIONAL WELLNESS R&B

## 2025-03-29 RX ADMIN — OLANZAPINE 5 MG: 5 TABLET, ORALLY DISINTEGRATING ORAL at 01:06

## 2025-03-29 RX ADMIN — BUSPIRONE HYDROCHLORIDE 7.5 MG: 5 TABLET ORAL at 01:06

## 2025-03-29 RX ADMIN — MIRTAZAPINE 15 MG: 15 TABLET, FILM COATED ORAL at 01:06

## 2025-03-29 RX ADMIN — ENOXAPARIN SODIUM 80 MG: 100 INJECTION SUBCUTANEOUS at 01:05

## 2025-03-29 RX ADMIN — ZIPRASIDONE MESYLATE 20 MG: 20 INJECTION, POWDER, LYOPHILIZED, FOR SOLUTION INTRAMUSCULAR at 20:08

## 2025-03-29 RX ADMIN — THIAMINE HCL TAB 100 MG 100 MG: 100 TAB at 08:49

## 2025-03-29 RX ADMIN — DULOXETINE HYDROCHLORIDE 30 MG: 30 CAPSULE, DELAYED RELEASE ORAL at 08:48

## 2025-03-29 RX ADMIN — MIDODRINE HYDROCHLORIDE 15 MG: 5 TABLET ORAL at 18:07

## 2025-03-29 RX ADMIN — ENOXAPARIN SODIUM 80 MG: 100 INJECTION SUBCUTANEOUS at 10:33

## 2025-03-29 RX ADMIN — MIDODRINE HYDROCHLORIDE 15 MG: 5 TABLET ORAL at 14:23

## 2025-03-29 RX ADMIN — BUSPIRONE HYDROCHLORIDE 7.5 MG: 5 TABLET ORAL at 08:49

## 2025-03-29 RX ADMIN — ATORVASTATIN CALCIUM 40 MG: 40 TABLET, FILM COATED ORAL at 01:06

## 2025-03-29 RX ADMIN — MIDODRINE HYDROCHLORIDE 15 MG: 5 TABLET ORAL at 08:48

## 2025-03-29 RX ADMIN — FLUOXETINE HYDROCHLORIDE 40 MG: 20 CAPSULE ORAL at 08:48

## 2025-03-29 RX ADMIN — ARIPIPRAZOLE 2 MG: 2 TABLET ORAL at 08:49

## 2025-03-29 NOTE — GROUP NOTE
Group Therapy Note    Date: 3/29/2025    Group Start Time: 1100  Group End Time: 1130  Group Topic: Education Group - Inpatient    SSR 2 BH NON ACUTE    Yolanda Perry        Group Therapy Note    Attendees: 4/9    Facilitated structured group to identify triggers, impact of triggers on mental health, and positive ways to manage triggers.            Notes:  did not attend group despite encouragement    Discipline Responsible: Recreational Therapist      Signature:  MANISH Rebolledo

## 2025-03-29 NOTE — GROUP NOTE
Group Therapy Note    Date: 3/29/2025    Group Start Time: 1545  Group End Time: 1630  Group Topic: Recreational    SSR 2 BH NON ACUTE    Yolanda Perry        Group Therapy Note    Attendees: 5/9        Recreational Therapist facilitated structured leisure skills group to introduce healthy leisure skills as positive way to cope and manage mood        Patient's Goal:  Client will use words to express feelings, wants, and needs     Notes:  Attended group. Listened to songs and selected songs to listen to with cues/prompts. Pt was receptive to intervention. PT initially attended group, left session, and then later returned.     Status After Intervention:  Improved    Participation Level: Active Listener    Participation Quality: Appropriate      Speech:  normal      Thought Process/Content: Logical      Affective Functioning: Congruent      Mood:  calm      Level of consciousness:  Alert      Response to Learning: Progressing to goal      Endings: None Reported    Modes of Intervention: Activity      Discipline Responsible: Recreational Therapist      Signature:  MANISH Rebolledo

## 2025-03-29 NOTE — BH NOTE
4pm  Pt resting in bed with eyes closed. Pt cooperative and pleasant with U/s, pt was seen by PT, pt used walker as encouraged  for safety thi evening. Remains safe from falls/injury. Pt continues to be monitored on 1:1.

## 2025-03-29 NOTE — BH NOTE
Patient has been confused, unsteady, and labile, erratic in movements. He answers very little, and refused his PO medications, saying \"I am not taking them, ya'll shove them down my throat\". Nurse explained we hadn't given his medications on this unit before and that she would only hand them to him in a cup with water and he can take them himself, slowly. Pt agreed at first but when nurse attempted to get them he became agitated again, jumping up from the bed, not giving a reason of where he needed to go, trying to walk without walker, being assisted by 1:1 sitter BHT. The tech and this writer had to help patient back to bed from falling multiple times, even after being assisted to the rest room. Patient was very confused, and could not tell staff why he would make such erratic jumps up from the bed, or why he would not inform staff of what he needed. He would not cooperate and began kicking and fighting with staff. Nurse then administered a prn Geodon IM to help with patient's agitation after refusing his PO meds. He did willingly take the injection in his left arm without a physical hold, but screamed out in pain and whimpered for a couple of minutes after. Patient then finally laid down and appears to be sleeping well with no signs of distress noted, respirations regular and unlabored. Will continue to monitor patient closely every 15 mins as per unit protocol       2025 - Patient quiet in bed, has attempted to get up a couple times erratically, 1:1 sitter     2120 - Patient given Geodon IM injection due to agitation; 1:1 sitter present and left at bedside for safety.     2200 - Patient sleeping quietly, no distress noted. 1:1 sitter at bedside for safety.    0000 - Patient asleep with 1:1  sitter at bedside for safety.     0200 - Patient resting quietly, 1:1 sitter at bedside.    0400 - Patient sleeping well, no signs of distress noted, 1:1 sitter at bedside for safety    0600 - Patient currently in  shower with 1:1 sitter assisting for safety. No distress noted at this time.    0630 - Patient in day room standing with 1:1 constant observer with walker, but very unsteady, not using it properly. States \"There's no help for me\".

## 2025-03-29 NOTE — BH NOTE
12p    Pt resting in bed with eyes open. Pt asked U/s \"why are you following me\" as he got up from his bed to exit his room, U/s explained to pt he is being supported for safety concerns with falls. Pt walked to the dayroom briefly then back to his room to lie down. Pt remains on 1:1      2pm  Pt resting quietly in bed with eyes open. Pt continues to be monitored on 1:1

## 2025-03-29 NOTE — BH NOTE
PSYCHOSOCIAL ASSESSMENT  :Patient identifying info:   Bony Smith is a 59 y.o., male admitted 3/28/2025  3:07 PM     Presenting problem and precipitating factors: Pt was transferred to [facility] from [facility] due to abdominal pain. Once medically cleared the pt was transferred to U but then had to be sent back to medical due to RR and weakness per nursing note on 3-15-25. Pt was originally admitted due to having SI stating that he did not want to live any more. It appears that the pt had intermittent SI thoughts while he was on the medical floor. He reported depression and the stress of losing his wife. This information was taken from prior notes due to pts frustration and mental status. Pt appeared confused and was observed taking pauses, not responding and starring straight ahead. He reported that he is here because of having a stroke and having \"severe brain damage\". He shared with the writer that he has no family and believes that his sons are dead but could not say why he thinks that. Pts thoughts and reporting is inconsistent throughout the assessment.     Mental status assessment: Pt presented with a liable, dysphoric, irritable affect and was catatonic at times. Pt did not respond when there writer assessed SI/HI/Avh and stated \"I'm tired of this and you all don't know what I going on with me'. He appeared to be thought blocking at times and then would  quickly respond. His speech was normal.  He appeared to have cognitive and memory impairment. He reported \"I can't remember anything anymore\". Pt has no insight and blaming/impulsive judgement.     Strengths/Recreation/Coping Skills:pt reported he used to like to feed the fish in the pond he built for his wife and pet his dog    Collateral information: Pt denied     Current psychiatric /substance abuse providers and contact info: Dr. Jenkins in [place]     Previous psychiatric/substance abuse providers and response to treatment: pt stated \"all I've been

## 2025-03-29 NOTE — CARE COORDINATION
03/29/25 1557   ITP   Date of Plan 03/29/25   Date of Next Review 04/04/25   Primary Diagnosis Code no treatment dx was placed   Barriers to Treatment Client resistance;Other (comment);Psychiatric symptom (comment)  (cognitive/memory impairment, physical weakness, lack support, grieving)   Strengths Incorporated in Plan Verbal;Other (comment)  (income)   Plan of Care   Long Term Goal (LTG) Stated in patient/guardian terms \"to go home'   Short Term Goal 1   Short Term Goal 1 Client will reduce frequency and intensity of trantrums or unhelpful reactions   Baseline Functioning pt was observed having liable and verbal alternations without being provoked   Target pt will minimize the verbal/physical altercations reported by staff/self   Objectives Client will participate in individual therapy   Intervention  Indvidual therapy   Frequency daily   Measured by Self report;Staff observation;Behavioral data   Staff Responsible [facility] staff   Intervention 2 Group therapy   Frequency daily   Measured by Behavioral data;Self report;Staff observation   Staff Responsible [facility] staff;Clinical staff   Intervention 3 Assess safety;Crisis support   Frequency as needed   Measured by Self report;Staff observation   Staff Responsible [facility] staff;Clinical staff   STG Goal 1 Status: Patient Appears to be  Partially meeting treatment plan goal   Short Term Goal 2   Short Term Goal 2 Client will use words to express feelings, wants, and needs   Baseline Functioning pt withholds his feelings and emotions about his wife's passing   Target pt will verbally speak with the writer and staff about his wife and how grief effects his ADLs   Objectives Client will participate in individual therapy;Client will participate in group therapy   Intervention  Indvidual therapy   Frequency daily   Measured by Behavioral data;Self report   Staff Responsible [facility] staff   Intervention 2 Group therapy   Frequency daily   Measured by Self report;Staff

## 2025-03-29 NOTE — BH NOTE
Patient was offered shower and accepted he received minimal help,he followed instruction,pt also ask to shave his face,his request was provided with care  He got tired easily ask to laid down. Patient was supervised throughout the night with a safe and secure environment

## 2025-03-29 NOTE — PLAN OF CARE
[x] 4   © , Trustees of [facility], under license to [employer]. All rights reserved     Score:  Initial:  Most Recent: X (Date: 3/29/2025)   Interpretation of Tool:  Represents activities that are increasingly more difficult (i.e. Bed mobility, Transfers, Gait).  Score 24 23 22-20 19-15 14-10 9-7 6   Modifier CH CI CJ CK CL CM CN          Physical Therapy Evaluation Charge Determination   History Examination Presentation Decision-Making   LOW Complexity : Zero comorbidities / personal factors that will impact the outcome / POC LOW Complexity : 1-2 Standardized tests and measures addressing body structure, function, activity limitation and / or participation in recreation  LOW Complexity : Stable, uncomplicated  Outcome measure: AMPAC 6: LOW      Based on the above components, the patient evaluation is determined to be of the following complexity level: LOW      Pain Ratin/10   Pain Intervention(s):       Activity Tolerance:   Good    After treatment patient left in no apparent distress:       COMMUNICATION/EDUCATION:   The patient’s plan of care was discussed with: Registered nurse    Patient Education  Education Given To: Patient  Education Provided: Role of Therapy;Plan of Care;Home Exercise Program;Transfer Training;Fall Prevention Strategies;Mobility Training;Family Education;Precautions;Equipment  Education Method: Demonstration;Verbal;Teach Back  Barriers to Learning: Readiness to Learn  Education Outcome: Verbalized understanding;Continued education needed        Thank you for this referral.  Rupinder Araya, PT  Minutes: 10

## 2025-03-29 NOTE — BH NOTE
Patient is awake and has tried to jump out of the bed quickly again without asking for help from the 1:1 sitter. He is less agitated and was finally accepting of his Lovenox injection and his scheduled nighttime medications. Patient currently resting quietly attempting to sleep with no distress noted, respirations regular and unlabored. Will continue to monitor patient closely with the 1:1 sitter to remain at bedside for safety.

## 2025-03-29 NOTE — BH NOTE
8a  Pt in room with 1:1 staff. Pt uncooperative with redirection and encouragement to use his walker to avoid falls/injury, pt refused to comply with staff. Pt yelled at staff and asked why is this happening to him. Staff offered support and comfort to pt to no avail, pt remained angry and walked out of his room, paced the halls a round times then returned to his bed. Pt will remain on 1:1 for safety and will be provided with support as needed.

## 2025-03-29 NOTE — PLAN OF CARE
Problem: Safety - Adult  Goal: Free from fall injury  3/29/2025 0524 by Deuce Pham RN  Outcome: Not Progressing  3/28/2025 1644 by Luba Silver RN  Outcome: Progressing     Problem: Behavior  Goal: Pt/Family maintain appropriate behavior and adhere to behavioral management agreement, if implemented  Description: INTERVENTIONS:  1. Assess patient/family's coping skills and  non-compliant behavior (including use of illegal substances)  2. Notify security of behavior or suspected illegal substances which indicate the need for search of the family and/or belongings  3. Encourage verbalization of thoughts and concerns in a socially appropriate manner  4. Utilize positive, consistent limit setting strategies supporting safety of patient, staff and others  5. Encourage participation in the decision making process about the behavioral management agreement  6. If a visitor's behavior poses a threat to safety call refer to organization policy.  7. Initiate consult with , Psychosocial CNS, Spiritual Care as appropriate  3/29/2025 0524 by Deuce Pham RN  Outcome: Not Progressing  3/29/2025 0518 by Deuce Pham RN  Outcome: Progressing     Problem: Anxiety  Goal: Will report anxiety at manageable levels  Description: INTERVENTIONS:  1. Administer medication as ordered  2. Teach and rehearse alternative coping skills  3. Provide emotional support with 1:1 interaction with staff  3/29/2025 0524 by Deuce Pham RN  Outcome: Not Progressing  3/29/2025 0518 by Deuce Pham RN  Outcome: Progressing     Problem: Sleep Disturbance  Goal: Will exhibit normal sleeping pattern  Description: INTERVENTIONS:  1. Administer medication as ordered  2. Decrease environmental stimuli, including noise, as appropriate  3. Discourage social isolation and naps during the day  3/29/2025 0524 by Deuce Pham RN  Outcome: Not Progressing  3/29/2025 0518 by Deuce Pham RN  Outcome: Progressing     Problem:  Discharge Planning  Goal: Discharge to home or other facility with appropriate resources  3/28/2025 1644 by Luba Silver RN  Outcome: Progressing     Problem: Self Harm/Suicidality  Goal: Will have no self-injury during hospital stay  Description: INTERVENTIONS:  1.  Ensure constant observer at bedside with Q15M safety checks  2.  Maintain a safe environment  3.  Secure patient belongings  4.  Ensure family/visitors adhere to safety recommendations  5.  Ensure safety tray has been added to patient's diet order  6.  Every shift and PRN: Re-assess suicidal risk via Frequent Screener    3/29/2025 0524 by Deuce Pham RN  Outcome: Progressing  3/29/2025 0518 by Deuce Pham RN  Outcome: Progressing     Problem: Depression  Goal: Will be euthymic at discharge  Description: INTERVENTIONS:  1. Administer medication as ordered  2. Provide emotional support via 1:1 interaction with staff  3. Encourage involvement in milieu/groups/activities  4. Monitor for social isolation  Outcome: Progressing     Problem: Safety - Adult  Goal: Free from fall injury  3/29/2025 0524 by Deuce Pham RN  Outcome: Not Progressing  3/28/2025 1644 by Luba Silver RN  Outcome: Progressing     Problem: Behavior  Goal: Pt/Family maintain appropriate behavior and adhere to behavioral management agreement, if implemented  Description: INTERVENTIONS:  1. Assess patient/family's coping skills and  non-compliant behavior (including use of illegal substances)  2. Notify security of behavior or suspected illegal substances which indicate the need for search of the family and/or belongings  3. Encourage verbalization of thoughts and concerns in a socially appropriate manner  4. Utilize positive, consistent limit setting strategies supporting safety of patient, staff and others  5. Encourage participation in the decision making process about the behavioral management agreement  6. If a visitor's behavior poses a threat to safety call refer

## 2025-03-29 NOTE — PLAN OF CARE
Problem: Safety - Adult  Goal: Free from fall injury  3/29/2025 1000 by Clive Santizo RN  Outcome: Not Progressing  3/29/2025 0524 by Deuce Pham RN  Outcome: Not Progressing     Problem: Depression  Goal: Will be euthymic at discharge  Description: INTERVENTIONS:  1. Administer medication as ordered  2. Provide emotional support via 1:1 interaction with staff  3. Encourage involvement in milieu/groups/activities  4. Monitor for social isolation  3/29/2025 1000 by Clive Santizo RN  Outcome: Not Progressing  3/29/2025 0518 by Deuce Pham RN  Outcome: Progressing     Problem: Behavior  Goal: Pt/Family maintain appropriate behavior and adhere to behavioral management agreement, if implemented  Description: INTERVENTIONS:  1. Assess patient/family's coping skills and  non-compliant behavior (including use of illegal substances)  2. Notify security of behavior or suspected illegal substances which indicate the need for search of the family and/or belongings  3. Encourage verbalization of thoughts and concerns in a socially appropriate manner  4. Utilize positive, consistent limit setting strategies supporting safety of patient, staff and others  5. Encourage participation in the decision making process about the behavioral management agreement  6. If a visitor's behavior poses a threat to safety call refer to organization policy.  7. Initiate consult with , Psychosocial CNS, Spiritual Care as appropriate  3/29/2025 0524 by Deuce Pham RN  Outcome: Not Progressing  3/29/2025 0518 by Deuce Pham RN  Outcome: Progressing     Problem: Anxiety  Goal: Will report anxiety at manageable levels  Description: INTERVENTIONS:  1. Administer medication as ordered  2. Teach and rehearse alternative coping skills  3. Provide emotional support with 1:1 interaction with staff  3/29/2025 1000 by Clive Santizo RN  Outcome: Not Progressing  3/29/2025 0524 by Deuce Pham  appropriate  3/29/2025 0524 by Deuce Pham, RN  Outcome: Not Progressing  3/29/2025 0518 by Deuce Pham RN  Outcome: Progressing     Problem: Anxiety  Goal: Will report anxiety at manageable levels  Description: INTERVENTIONS:  1. Administer medication as ordered  2. Teach and rehearse alternative coping skills  3. Provide emotional support with 1:1 interaction with staff  3/29/2025 1000 by Clive Santizo RN  Outcome: Not Progressing  3/29/2025 0524 by Deuce Pham RN  Outcome: Not Progressing  3/29/2025 0518 by Deuce Pham, RN  Outcome: Progressing     Problem: Sleep Disturbance  Goal: Will exhibit normal sleeping pattern  Description: INTERVENTIONS:  1. Administer medication as ordered  2. Decrease environmental stimuli, including noise, as appropriate  3. Discourage social isolation and naps during the day  3/29/2025 0524 by Deuce Pham, RN  Outcome: Not Progressing  3/29/2025 0518 by Deuce Pham RN  Outcome: Progressing

## 2025-03-29 NOTE — BH NOTE
Progress note for March 28, 2025 receiving calls from the [facility] medical floor for disposition transferred to the behavioral health unit and informed that this patient is medically cleared to be discharged.  Spoke with the Cl spoke with the one-to-one staff spoke with the nursing staff Axis patient is in the bed depressed alert warble little bit disheveled and unkempt apparently he felt that he needed to get up and just go home he tried to walk in the hallway of course there is a premature staff brought her back patient ambulation is a little bit wobbly blood pressure better last to 3 days prior to today was saying not suicidal and not have hallucinations but today he tells that while we keeping him alive that we should go ahead and kill him he says he is already dead.  Delusional hallucinating poor insight poor judgment I informed that our goal is to get him better to but also safe acknowledges him losing his wife and depression loss however the family support that is available sister and brother he himself asked for his son's telephone number.  Focus on being safe focus on working with the staff stay keep a positive attitude keep up with the nutrition follow the hospital staff directives tried to get back to the [facility].  Be with loving family patient will be transferred to the [facility] unit for now in need to have one-to-one supervision as he can sometimes get up quickly and run risk of falling.  We will also make sure we get PT consult follow-up and hospitalist follow-up pharmacy informed me that they have been doing the Coumadin management.  We will continue to the

## 2025-03-29 NOTE — BH NOTE
10am Pt resting in bed at present, pt became angry with U/s when meds were offered, stated he is being forced to take a lot of pills. U/s attempted to educate pt on meds as ordered, he asked U/s to stop talking. Pt quickly got out of bed, exited his room, refused to use the walker at bedside, angrily paced the halls a few times. Pt returned back to his bed, asked U/s to give his meds, he accepted them cooperatively. U/s offered pt to verbalize his concerns,he declined. Pt to remain on 1:1 for safety, walker at bedside.

## 2025-03-29 NOTE — PROGRESS NOTES
Warfarin Dosing Consult  Bony Smith is a 59 y.o. male with LV thrombus. Pharmacy was consulted by Dr. Rivas to dose and monitor warfarin.    INR Goal: 2.5-3.5    PTA Dose: N/A    Drugs that may increase INR:None  Drugs that may decrease INR: None  Other current anticoagulants/ drugs that may increase bleeding risk: Enoxaparin  Risk factors: None  Daily INR ordered: Yes    Recent Labs     03/27/25  0622 03/28/25  0534 03/29/25  1234   HGB 12.7 13.0 12.9    373 391     No results for input(s): \"ALT\", \"AST\" in the last 72 hours.  Recent Labs     03/27/25  0622 03/28/25  0534 03/29/25  1234   INR 2.0* 2.0* 3.0*       Date INR Previous Dose   3/22 1.2 7.5   3/23 1.2 7.5   3/24 1.4 10   3/25 1.7 10   3/26 2.3 5   3/27 2 5   3/28 2 7.5   3/29 3 10 mg     Assessment/ Plan:  INR rapidly increased. Warfarin to be held tonight.  Pharmacy will continue to monitor daily and adjust therapy as indicated.

## 2025-03-29 NOTE — H&P
INITIAL PSYCHIATRIC EVALUATION            IDENTIFICATION:    Patient Name  Bony Smith   Date of Birth 1965   [facility] [id number]   Medical Record Number  [id number]      Age  59 y.o.   PCP None, None   Admit date:  3/28/2025    Room Number  241/01  @ [facility]   Date of Service  3/29/2025            HISTORY         REASON FOR HOSPITALIZATION:    CC: Depression, helplessness, hopelessness, made statement \"while we keeping him alive that we should go ahead and kill him he says he is already dead\".    Patient is a transfer from medical floor to behavioral health unit   HISTORY OF PRESENT ILLNESS:     The patient, Bony Smith, is a 59 y.o. male admitted to the behavioral health floor after patient is medically stabilized and was to be discharged home.  Dr. Pimentel has followed up on the consult where patient had expressed increased feelings of helplessness depression, hopelessness and statement is about.  Patient was transferred to behavioral health for for further stabilization as he presents with impaired coping, suicidal feelings.    Patient seen this morning who presents with unstable gait and is on one-to-one staff.  He presents depressed helpless and hopeless.  He is willing to get further help.  He remains withdrawn and unable to identify his treatment goals.  Patient expresses not having much support system and remains quite asked about his support.  He expresses losing his wife.    He denies having any command auditory hallucinations no reported visual hallucinations.  He presents lethargic    Reported history of auditory and visual hallucinations and unsure what is real.  Patient has been grieving the loss of his wife.    PAST PSYCHIATRIC HISTORY:   History of major depressive disorder currently on medications.  was in a hospital in 2023 at [facility] up to 3 months.     TRAUMA HISTORY:   Patient did not share any recent trauma history will continue to obtain  juancho. The findings of LV thrombus were sent to Dr. MICHAELLE Rubalcava via [employer] on 3/12/2025 at 1850 hours EST by Dr. Masters. [id number] Electronically signed by BAKARI MASTERS             MEDICATIONS       ALL MEDICATIONS    Current Facility-Administered Medications   Medication Dose Route Frequency    enoxaparin (LOVENOX) injection 80 mg  1 mg/kg SubCUTAneous BID    warfarin placeholder: dosing by pharmacy   Oral RX Placeholder    hydrOXYzine HCl (ATARAX) tablet 50 mg  50 mg Oral Q6H PRN    aluminum & magnesium hydroxide-simethicone (MAALOX PLUS) 200-200-20 MG/5ML suspension 30 mL  30 mL Oral Q6H PRN    acetaminophen (TYLENOL) tablet 650 mg  650 mg Oral Q4H PRN    ARIPiprazole (ABILIFY) tablet 2 mg  2 mg Oral Daily    atorvastatin (LIPITOR) tablet 40 mg  40 mg Oral Nightly    busPIRone (BUSPAR) tablet 7.5 mg  7.5 mg Oral BID    DULoxetine (CYMBALTA) extended release capsule 30 mg  30 mg Oral Daily    FLUoxetine (PROZAC) capsule 40 mg  40 mg Oral Daily    midodrine (PROAMATINE) tablet 15 mg  15 mg Oral TID WC    mirtazapine (REMERON) tablet 15 mg  15 mg Oral Nightly    OLANZapine zydis (ZYPREXA) disintegrating tablet 5 mg  5 mg Oral Nightly    thiamine mononitrate tablet 100 mg  100 mg Oral Daily    ziprasidone (GEODON) injection 20 mg  20 mg IntraMUSCular Q12H PRN      SCHEDULED MEDICATIONS     enoxaparin  1 mg/kg SubCUTAneous BID    warfarin placeholder: dosing by pharmacy   Oral RX Placeholder    ARIPiprazole  2 mg Oral Daily    atorvastatin  40 mg Oral Nightly    busPIRone  7.5 mg Oral BID    DULoxetine  30 mg Oral Daily    FLUoxetine  40 mg Oral Daily    midodrine  15 mg Oral TID WC    mirtazapine  15 mg Oral Nightly    OLANZapine zydis  5 mg Oral Nightly    thiamine mononitrate  100 mg Oral Daily                 ASSESSMENT & PLAN        The patient, Bony Smith, is a 59 y.o.  male who presents at this time for treatment of the following diagnoses:  Active Problems:    Major depressive disorder, moderate recurrent

## 2025-03-29 NOTE — BH NOTE
PSA PART II ADDITIONAL INFORMATION        Access To Fire Arms: Yes: Comment: pt reported having a 45 Mag and bb guns. Pt stated \"you know stuff to get in trouble with\"    Substance Use: Yes    Last Use: pt could not remember    Type of Substance: marijuana    Frequency of Use: daily    Request to See : No    If yes, notified : No    Guardian:No    Guardian Contact:pt is his own legal guardian     Release of Information Signed: No    Release of Information Signed For: pt stated \"I would like to talk to my sons Messi and Carlos Eduardo but I think they are dead\"    Goal: \"to go home\"

## 2025-03-30 PROBLEM — I63.20: Status: ACTIVE | Noted: 2025-03-30

## 2025-03-30 PROBLEM — I82.90 THROMBUS: Status: ACTIVE | Noted: 2025-03-30

## 2025-03-30 LAB
BASOPHILS # BLD: 0.06 K/UL (ref 0–0.1)
BASOPHILS NFR BLD: 0.6 % (ref 0–1)
DIFFERENTIAL METHOD BLD: ABNORMAL
EOSINOPHIL # BLD: 0.08 K/UL (ref 0–0.4)
EOSINOPHIL NFR BLD: 0.8 % (ref 0–7)
ERYTHROCYTE [DISTWIDTH] IN BLOOD BY AUTOMATED COUNT: 16.7 % (ref 11.5–14.5)
HCT VFR BLD AUTO: 40.1 % (ref 36.6–50.3)
HGB BLD-MCNC: 12.9 G/DL (ref 12.1–17)
IMM GRANULOCYTES # BLD AUTO: 0.04 K/UL (ref 0–0.04)
IMM GRANULOCYTES NFR BLD AUTO: 0.4 % (ref 0–0.5)
INR PPP: 2.3 (ref 0.9–1.1)
LYMPHOCYTES # BLD: 2.41 K/UL (ref 0.8–3.5)
LYMPHOCYTES NFR BLD: 23.9 % (ref 12–49)
MCH RBC QN AUTO: 28.2 PG (ref 26–34)
MCHC RBC AUTO-ENTMCNC: 32.2 G/DL (ref 30–36.5)
MCV RBC AUTO: 87.7 FL (ref 80–99)
MONOCYTES # BLD: 0.71 K/UL (ref 0–1)
MONOCYTES NFR BLD: 7 % (ref 5–13)
NEUTS SEG # BLD: 6.8 K/UL (ref 1.8–8)
NEUTS SEG NFR BLD: 67.3 % (ref 32–75)
NRBC # BLD: 0 K/UL (ref 0–0.01)
NRBC BLD-RTO: 0 PER 100 WBC
PLATELET # BLD AUTO: 389 K/UL (ref 150–400)
PMV BLD AUTO: 10.8 FL (ref 8.9–12.9)
PROTHROMBIN TIME: 25.7 SEC (ref 11.9–14.6)
RBC # BLD AUTO: 4.57 M/UL (ref 4.1–5.7)
WBC # BLD AUTO: 10.1 K/UL (ref 4.1–11.1)

## 2025-03-30 PROCEDURE — 85610 PROTHROMBIN TIME: CPT

## 2025-03-30 PROCEDURE — 6370000000 HC RX 637 (ALT 250 FOR IP): Performed by: PSYCHIATRY & NEUROLOGY

## 2025-03-30 PROCEDURE — 85025 COMPLETE CBC W/AUTO DIFF WBC: CPT

## 2025-03-30 PROCEDURE — 36415 COLL VENOUS BLD VENIPUNCTURE: CPT

## 2025-03-30 PROCEDURE — 1240000000 HC EMOTIONAL WELLNESS R&B

## 2025-03-30 RX ORDER — WARFARIN SODIUM 2.5 MG/1
7.5 TABLET ORAL
Status: DISPENSED | OUTPATIENT
Start: 2025-03-30 | End: 2025-03-31

## 2025-03-30 RX ADMIN — DULOXETINE HYDROCHLORIDE 30 MG: 30 CAPSULE, DELAYED RELEASE ORAL at 08:36

## 2025-03-30 RX ADMIN — ATORVASTATIN CALCIUM 40 MG: 40 TABLET, FILM COATED ORAL at 21:24

## 2025-03-30 RX ADMIN — BUSPIRONE HYDROCHLORIDE 7.5 MG: 5 TABLET ORAL at 02:10

## 2025-03-30 RX ADMIN — MIDODRINE HYDROCHLORIDE 15 MG: 5 TABLET ORAL at 08:36

## 2025-03-30 RX ADMIN — OLANZAPINE 5 MG: 5 TABLET, ORALLY DISINTEGRATING ORAL at 02:09

## 2025-03-30 RX ADMIN — MIRTAZAPINE 15 MG: 15 TABLET, FILM COATED ORAL at 21:24

## 2025-03-30 RX ADMIN — ARIPIPRAZOLE 2 MG: 2 TABLET ORAL at 08:36

## 2025-03-30 RX ADMIN — MIDODRINE HYDROCHLORIDE 15 MG: 5 TABLET ORAL at 18:15

## 2025-03-30 RX ADMIN — BUSPIRONE HYDROCHLORIDE 7.5 MG: 5 TABLET ORAL at 21:24

## 2025-03-30 RX ADMIN — MIRTAZAPINE 15 MG: 15 TABLET, FILM COATED ORAL at 02:09

## 2025-03-30 RX ADMIN — MIDODRINE HYDROCHLORIDE 15 MG: 5 TABLET ORAL at 13:01

## 2025-03-30 RX ADMIN — ATORVASTATIN CALCIUM 40 MG: 40 TABLET, FILM COATED ORAL at 02:10

## 2025-03-30 RX ADMIN — THIAMINE HCL TAB 100 MG 100 MG: 100 TAB at 08:36

## 2025-03-30 RX ADMIN — FLUOXETINE HYDROCHLORIDE 40 MG: 20 CAPSULE ORAL at 08:36

## 2025-03-30 RX ADMIN — BUSPIRONE HYDROCHLORIDE 7.5 MG: 5 TABLET ORAL at 08:36

## 2025-03-30 RX ADMIN — OLANZAPINE 5 MG: 5 TABLET, ORALLY DISINTEGRATING ORAL at 21:24

## 2025-03-30 NOTE — BH NOTE
Patient began shift in bed, but still jumping up frequently with erratic movements. He went to toilet and back to bed without too much assistance, but still a little off balance and not using his walker. When nurse assessed and asked him about depression he says none but then says why don't yall just let me die. Nurse informed him of the 4 pills he had scheduled as the same as last night and what they were for. He began getting agitated. Nurse spoke with 1:1 sitter for a moment mentioning eating a salad for dinner and patient starting stating that staff was mocking him and talking about him. He was redirected multiple times but would not stop jumping up erratically. Patient then began growling and hollering at staff when trying to prevent patient from falling. He was kicking and swinging arms at staff when nurse assisted patient to sit on side of bed so he wouldn't fall. Pt was then given an IM injection of Geodon, by the time nurse pulled the IM injection, he willingly took the medication better than the previous night, not whining as much about the pain. Pt refuses his PO meds, but will attempt medication later in night. Patient left in bed awake, with 1:1  at bedside.

## 2025-03-30 NOTE — BH NOTE
At 0628 pt ask to go the dinning room area,as we get there. I offered him a chair to watch tv,he then ask staff to go back to the room. He apologized then saying He can't help it  Pt lay across the bed he look calm ,no pain or any discomfort to report at this time

## 2025-03-30 NOTE — PLAN OF CARE
Problem: Depression  Goal: Will be euthymic at discharge  Description: INTERVENTIONS:  1. Administer medication as ordered  2. Provide emotional support via 1:1 interaction with staff  3. Encourage involvement in milieu/groups/activities  4. Monitor for social isolation  3/30/2025 1037 by Clive Santizo RN  Outcome: Not Progressing  3/30/2025 0437 by Deuce Pham RN  Outcome: Progressing     Problem: Behavior  Goal: Pt/Family maintain appropriate behavior and adhere to behavioral management agreement, if implemented  Description: INTERVENTIONS:  1. Assess patient/family's coping skills and  non-compliant behavior (including use of illegal substances)  2. Notify security of behavior or suspected illegal substances which indicate the need for search of the family and/or belongings  3. Encourage verbalization of thoughts and concerns in a socially appropriate manner  4. Utilize positive, consistent limit setting strategies supporting safety of patient, staff and others  5. Encourage participation in the decision making process about the behavioral management agreement  6. If a visitor's behavior poses a threat to safety call refer to organization policy.  7. Initiate consult with , Psychosocial CNS, Spiritual Care as appropriate  3/30/2025 0437 by Deuce Pham RN  Outcome: Not Progressing     Problem: Anxiety  Goal: Will report anxiety at manageable levels  Description: INTERVENTIONS:  1. Administer medication as ordered  2. Teach and rehearse alternative coping skills  3. Provide emotional support with 1:1 interaction with staff  3/30/2025 1037 by Clive Santizo RN  Outcome: Not Progressing  3/30/2025 0437 by Deuce Pham RN  Outcome: Not Progressing

## 2025-03-30 NOTE — BH NOTE
All around Pt had a great night,no fall ,no pain ,behavior or discomfort to report at the end of the shift. Pt still awake in bed alert and communicate with staff.Patient was supervised throughout the shift with a safe and secure environment

## 2025-03-30 NOTE — BH NOTE
Around 0516 pt ask to go for walk in the hallway,as we arrived around the corner office he asked to turn around to go laid down,he expressed to himself by saying and I quote'\" I am not all there in my brain\" I encourage him to think positive about himself and to let go of negative thoughts  Pt answer okay and he thanks staff .  Staff continued to supervise pt with a safe and secured environment

## 2025-03-30 NOTE — PROGRESS NOTES
2000 - Patient being aggressive with staff, 1:1 sitter at bedside along with extra staff.     2200 - Patient resting quietly, no distress noted, respirations regular and unlabored. 1:1 sitter at bedside    0000 - Patient resting quietly, no distress noted, respirations regular and unlabored. 1:1 sitter at bedside    0200 - Nurse encouraged patient to take meds but pt refused PO medications again.    0215 - Patient changed mind when BHT 1:1 sitter encouraged. Patient accepted medications. Sitter continues to be needed at bedside. Began eating snack and drinking some fluids.    0400 -  Patient appears to be resting well, no distress noted. 1:1 sitter remains at bedside.    0600 - Patient is awake, has been on and off throughout the night. 1:1 sitter still at bedside for safety. Andrew BRASWELL wrote notes frequently about his erratic up and down behavior.

## 2025-03-30 NOTE — FLOWSHEET NOTE
Pt jumping while sleeping asking what is going on,pt was redirect to bed by staff and assure him everything it's fine,it's just a dream.Pt went back to sleep.  He will be monitored throughout the shift for safety precaution

## 2025-03-30 NOTE — ACP (ADVANCE CARE PLANNING)
Pt ..was awake for medication ,consume joe snack and drank some fluid,talkative asking question about his treatment,somehow agitated but cooperative.  Staff will continued to monitored pt with a safe and secure environment

## 2025-03-30 NOTE — GROUP NOTE
Group Therapy Note    Date: 3/30/2025    Group Start Time: 1500  Group End Time: 1545  Group Topic: Recreational    SSR 2 BH NON ACUTE    Yolanda Perry        Group Therapy Note    Attendees: 6/11       Recreational Therapist facilitated structured leisure skills group to introduce healthy leisure skills as positive way to cope and manage mood.        Patient's Goal:  Client will use words to express feelings, wants, and needs     Notes: Attended group late. Sat and listened to songs selected by peers. Required prompts and cues from CTRS and was encouraged to participate however, participation was limited to sitting in group for a portion of the session.    Status After Intervention:  Unchanged    Participation Level: Minimal    Participation Quality: Appropriate      Speech:  normal      Thought Process/Content: Logical      Affective Functioning: Congruent      Mood:  calm      Level of consciousness:  Alert      Response to Learning: Progressing to goal      Endings: None Reported    Modes of Intervention: Activity      Discipline Responsible: Recreational Therapist      Signature:  MANISH Rebolledo

## 2025-03-30 NOTE — FLOWSHEET NOTE
Pt remained sleep awake on/off at time,no issues,behavior or pain to report at this time  Pt will continued to be supervised if needed help to the bathroom from being unsteady

## 2025-03-30 NOTE — FLOWSHEET NOTE
Pt resting peacefully staring,pt ask to go in the hallway,then change his mind  Staff will continue and support pt to best we can safety environment

## 2025-03-30 NOTE — PROGRESS NOTES
Progress Note  Date:3/30/2025       Room:[facility]  Patient Name:Bony Smith     YOB: 1965     Age:59 y.o.        Subjective    Subjective  Patient in bed this morning, presents depressed isolator withdrawn to himself.  He did not engage much in conversation.He still presents with hopelessness and helplessness.  No active SI or HI voiced.    Mental status examination-    Patient is alert, oriented x3   No Active suicidal ideations, plan or intent.  No active homicidal ideations plan or intent  No command hallucinations  Thought Processes linear  Not seen responding to any active internal stimuli  No persecutory delusions  Insight limited  Judgement limited     Review of Systems  Objective         Vitals Last 24 Hours:  TEMPERATURE:  Temp  Av °F (36.7 °C)  Min: 97.5 °F (36.4 °C)  Max: 98.4 °F (36.9 °C)  RESPIRATIONS RANGE: Resp  Av.5  Min: 17  Max: 18  PULSE OXIMETRY RANGE: SpO2  Av.5 %  Min: 96 %  Max: 97 %  PULSE RANGE: Pulse  Av  Min: 58  Max: 66  BLOOD PRESSURE RANGE: Systolic (24hrs), Av , Min:96 , Max:119   ; Diastolic (24hrs), Av, Min:71, Max:77    I/O (24Hr):  No intake or output data in the 24 hours ending 25 1414  Objective  Labs/Imaging/Diagnostics    Labs:  CBC:  Recent Labs     25  0534 25  1234 25  1122   WBC 9.7 8.6 10.1   RBC 4.48 4.58 4.57   HGB 13.0 12.9 12.9   HCT 39.5 39.4 40.1   MCV 88.2 86.0 87.7   RDW 16.8* 16.6* 16.7*    391 389     CHEMISTRIES:No results for input(s): \"NA\", \"K\", \"CL\", \"CO2\", \"BUN\", \"CREATININE\", \"GLUCOSE\", \"PHOS\", \"MG\" in the last 72 hours.    Invalid input(s): \"CA\"  PT/INR:  Recent Labs     25  0534 25  1234 25  1122   PROTIME 23.1* 31.3* 25.7*   INR 2.0* 3.0* 2.3*     APTT:No results for input(s): \"APTT\" in the last 72 hours.  LIVER PROFILE:No results for input(s): \"AST\", \"ALT\", \"BILIDIR\", \"BILITOT\", \"ALKPHOS\" in the last 72 hours.    Imaging Last 24 Hours:  No results  EDT

## 2025-03-30 NOTE — GROUP NOTE
Group Therapy Note    Date: 3/30/2025    Group Start Time: 1045  Group End Time: 1130  Group Topic: Education Group - Inpatient    SSR 2 BH NON ACUTE    Yolanda Perry        Group Therapy Note    Attendees: 7/11    Facilitated structured group discussion to identify protective factors that have been valuable and protective factors that could be improved in managing stressors.         Patient's Goal:  Client will use words to express feelings, wants, and needs     Notes:  PT attended group for short time. Accepted materials from group. Verbalized he was unable to stay in session. Apologized and left group.     Status After Intervention:  Unchanged    Participation Level: Minimal    Participation Quality: Appropriate      Speech:  normal      Thought Process/Content: Logical      Affective Functioning: Congruent      Mood:  calm      Level of consciousness:  Alert      Response to Learning: Progressing to goal      Endings: None Reported    Modes of Intervention: Education      Discipline Responsible: Recreational Therapist      Signature:  MANISH Rebolledo

## 2025-03-30 NOTE — FLOWSHEET NOTE
Patient jump from sleep wanted to outside for walk,confused  Pt was redirected to bed with no issues  Staff continued to supervised for safety concern

## 2025-03-30 NOTE — PLAN OF CARE
Problem: Behavior  Goal: Pt/Family maintain appropriate behavior and adhere to behavioral management agreement, if implemented  Description: INTERVENTIONS:  1. Assess patient/family's coping skills and  non-compliant behavior (including use of illegal substances)  2. Notify security of behavior or suspected illegal substances which indicate the need for search of the family and/or belongings  3. Encourage verbalization of thoughts and concerns in a socially appropriate manner  4. Utilize positive, consistent limit setting strategies supporting safety of patient, staff and others  5. Encourage participation in the decision making process about the behavioral management agreement  6. If a visitor's behavior poses a threat to safety call refer to organization policy.  7. Initiate consult with , Psychosocial CNS, Spiritual Care as appropriate  Outcome: Not Progressing     Problem: Anxiety  Goal: Will report anxiety at manageable levels  Description: INTERVENTIONS:  1. Administer medication as ordered  2. Teach and rehearse alternative coping skills  3. Provide emotional support with 1:1 interaction with staff  Outcome: Not Progressing     Problem: Self Harm/Suicidality  Goal: Will have no self-injury during hospital stay  Description: INTERVENTIONS:  1.  Ensure constant observer at bedside with Q15M safety checks  2.  Maintain a safe environment  3.  Secure patient belongings  4.  Ensure family/visitors adhere to safety recommendations  5.  Ensure safety tray has been added to patient's diet order  6.  Every shift and PRN: Re-assess suicidal risk via Frequent Screener    Outcome: Progressing     Problem: Depression  Goal: Will be euthymic at discharge  Description: INTERVENTIONS:  1. Administer medication as ordered  2. Provide emotional support via 1:1 interaction with staff  3. Encourage involvement in milieu/groups/activities  4. Monitor for social isolation  Outcome: Progressing     Problem:

## 2025-03-30 NOTE — PROGRESS NOTES
Warfarin Dosing Consult  Bony Smith is a 59 y.o. male with LV thrombus. Pharmacy was consulted by Dr. Rivas to dose and monitor warfarin.    INR Goal: 2.5-3.5    PTA Dose: new start    Drugs that may increase INR:None  Drugs that may decrease INR: None  Other current anticoagulants/ drugs that may increase bleeding risk: None  Risk factors: None  Daily INR ordered: Yes    Recent Labs     03/28/25  0534 03/29/25  1234 03/30/25  1122   HGB 13.0 12.9 12.9    391 389     No results for input(s): \"ALT\", \"AST\" in the last 72 hours.  Recent Labs     03/28/25  0534 03/29/25  1234 03/30/25  1122   INR 2.0* 3.0* 2.3*       Date INR Previous Dose   3/22 1.2 7.5   3/23 1.2 7.5   3/24 1.4 10   3/25 1.7 10   3/26 2.3 5   3/27 2 5   3/28 2 7.5   3/29 3 10 mg   3/30 2.3 Held     Assessment/ Plan:  INR therapeutic. Warfarin 7.5 mg tonight. Can consider scheduling warfarin 7.5 mg daily and  decrease frequency of INR monitoring to q2-3 days.  Daily CBC discontinued. Please monitor patient for clinical s/sx of bleeding.   Pharmacy will continue to monitor daily and adjust therapy as indicated.        Cheryl Sheets, PharmD, BCPS, BCCCP  Clinical Pharmacist   [phone]

## 2025-03-30 NOTE — BH NOTE
Patient appeared restless ,anxious,a bit agitated ,nervous,jump off the bed to the other empty bed ,asking him how can I help,he answer I don't know and was redirected to bed,he just looking around as he remained calm no communication.  Staff will continued to monitored pt for safety concern from being unbalance

## 2025-03-30 NOTE — BH NOTE
Around 0656 pt get up and ask assistant to the bathroom,he hasn't go back to sleep but remained quiet and resting,no pain or discomfort to report at this time or any unusual behavior  Staff will continued to monitor pt with a safe and secured environment

## 2025-03-30 NOTE — FLOWSHEET NOTE
Pt for the third time jumping from his sleep confused,he was redirect by staff assure him it's just a dream. Sitting down look confused  Pt will continued to receive guidance from staff

## 2025-03-30 NOTE — BH NOTE
Received call from dominick [facility], informed order for CBC will be discontinued and encouraged to monitor pt closely for s/s of bleeding. INR 2.3. U/s returned call to pharmacist to inform of 1800 order for Coumadin and it is not available in [facility]. Pharmacist stated med will be delivered to unit, required dosage is not available on unit. U/s will defer to oncoming shift

## 2025-03-30 NOTE — BH NOTE
9a  Pt in room resting at start of shift. Cooperative and pleasant upon approach. Pt accepted and tolerated meds as ordered. Affect flat, mood depressed. Pt denied SI/HI, pt also denied hallucinations and delusions. Pt accepted breakfast well. Pt remains on 1:1, for safety, walker at bedside.    11a  Pt remains on 1:1, no falls/injury. Affect remains flat, mood depressed. Pt denied any concerns to discuss during 1:1. Pt encouraged to seek staff as needed.      1p  Pt attended scheduled group, he excused himself from group and returned to his room. Pt continues to be less verbal today, affect flat, mood depressed. Remains on 1:1 for safety, using walker to safely ambulate      3p  Pt out of room to walk the halls and to the dayroom briefly., using walker to ambulate without falls/injury. Pt remains on 1:1.    5p  Pt ate dinner in dayroom with others, then returned to his room, resting in bed. Pt remains on 1:1, using walker to ambulate without falls.injury.

## 2025-03-30 NOTE — BH NOTE
Upon arrival on shift pt was laying in bed.he was greeting by staff. He will still remained in 1-1 according to previous staff report.No sign off pain or discomfort,staff will continued to monitor pt throughout the night for safety

## 2025-03-31 LAB
INR PPP: 1.9 (ref 0.9–1.1)
PROTHROMBIN TIME: 21.9 SEC (ref 11.9–14.6)

## 2025-03-31 PROCEDURE — 6370000000 HC RX 637 (ALT 250 FOR IP): Performed by: PSYCHIATRY & NEUROLOGY

## 2025-03-31 PROCEDURE — 36415 COLL VENOUS BLD VENIPUNCTURE: CPT

## 2025-03-31 PROCEDURE — 85610 PROTHROMBIN TIME: CPT

## 2025-03-31 PROCEDURE — 1240000000 HC EMOTIONAL WELLNESS R&B

## 2025-03-31 RX ORDER — OXCARBAZEPINE 150 MG/1
150 TABLET, FILM COATED ORAL 2 TIMES DAILY
Status: DISCONTINUED | OUTPATIENT
Start: 2025-03-31 | End: 2025-04-09 | Stop reason: HOSPADM

## 2025-03-31 RX ORDER — ZIPRASIDONE HYDROCHLORIDE 20 MG/1
20 CAPSULE ORAL DAILY
Status: DISCONTINUED | OUTPATIENT
Start: 2025-03-31 | End: 2025-04-09 | Stop reason: HOSPADM

## 2025-03-31 RX ADMIN — OXCARBAZEPINE 150 MG: 150 TABLET, FILM COATED ORAL at 12:29

## 2025-03-31 RX ADMIN — ZIPRASIDONE HYDROCHLORIDE 20 MG: 20 CAPSULE ORAL at 12:29

## 2025-03-31 RX ADMIN — FLUOXETINE HYDROCHLORIDE 40 MG: 20 CAPSULE ORAL at 08:25

## 2025-03-31 RX ADMIN — MIDODRINE HYDROCHLORIDE 15 MG: 5 TABLET ORAL at 17:41

## 2025-03-31 RX ADMIN — BUSPIRONE HYDROCHLORIDE 7.5 MG: 5 TABLET ORAL at 08:26

## 2025-03-31 RX ADMIN — OXCARBAZEPINE 150 MG: 150 TABLET, FILM COATED ORAL at 20:28

## 2025-03-31 RX ADMIN — MIDODRINE HYDROCHLORIDE 15 MG: 5 TABLET ORAL at 08:26

## 2025-03-31 RX ADMIN — BUSPIRONE HYDROCHLORIDE 7.5 MG: 5 TABLET ORAL at 20:28

## 2025-03-31 RX ADMIN — DULOXETINE HYDROCHLORIDE 30 MG: 30 CAPSULE, DELAYED RELEASE ORAL at 08:25

## 2025-03-31 RX ADMIN — HYDROXYZINE HYDROCHLORIDE 50 MG: 50 TABLET, FILM COATED ORAL at 09:20

## 2025-03-31 RX ADMIN — MIDODRINE HYDROCHLORIDE 15 MG: 5 TABLET ORAL at 12:29

## 2025-03-31 RX ADMIN — THIAMINE HCL TAB 100 MG 100 MG: 100 TAB at 08:25

## 2025-03-31 RX ADMIN — ARIPIPRAZOLE 2 MG: 2 TABLET ORAL at 08:25

## 2025-03-31 RX ADMIN — WARFARIN SODIUM 7.5 MG: 5 TABLET ORAL at 17:41

## 2025-03-31 RX ADMIN — MIRTAZAPINE 15 MG: 15 TABLET, FILM COATED ORAL at 20:28

## 2025-03-31 RX ADMIN — ATORVASTATIN CALCIUM 40 MG: 40 TABLET, FILM COATED ORAL at 20:28

## 2025-03-31 ASSESSMENT — PAIN SCALES - GENERAL: PAINLEVEL_OUTOF10: 0

## 2025-03-31 NOTE — BH NOTE
Behavioral Health Treatment Team Note     Patient goal(s) for today: pt did not voice any  Treatment team focus/goals: meds management, dc planning, group therapy    Progress note: Pt was seen in his room with his 1:1 staff as he was sitting on the side of his bed. Pt presented to be confused, restless, not engaging in conversation, scared, disheveled, with poor judgment/insight, with a distressed look on face and minimal eye contact. Pt did not ans when asked if he was experiencing any si/hi/ah/vh/dep/anxiety. Pt abruptly got up mid conversation and in a hurrying manner got up and walked out of the room and was redirected by staff to use his walker. Pt did not engage in further conversation. Pt cont to meet criteria for inpt stay for further stabilization through meds management.     LOS:  3  Expected LOS: 5-7 days    Insurance info/prescription [facility]   Date of last family contact:  pt declined  Family requesting physician contact today:  No  Discharge plan:  to stabilize pt  Guns in the home:  No   Outpatient provider(s):  will be established prior to dc    Participating treatment team members: Bony Smith, * (assigned SW), Eric Raya, MS

## 2025-03-31 NOTE — GROUP NOTE
Group Therapy Note    Date: 3/31/2025    Group Start Time: 0920  Group End Time: 0935  Group Topic: Community Meeting    SSR 2 [facility] ACUTE    Ana María Milton        Group Therapy Note    Attendees: 2/12       Patient's Goal:      Notes:  Patient did not attend group.    Status After Intervention:      Participation Level:     Participation Quality:       Speech:        Thought Process/Content:       Affective Functioning:       Mood:       Level of consciousness:        Response to Learning:       Endings:     Modes of Intervention:       Discipline Responsible: Behavorial Health Tech      Signature:  Ana María Milton

## 2025-03-31 NOTE — BH NOTE
Patient was sleeping suddenly woke up ask what time is it,staff replied to pt and continued to sat down on the side of the bed,asking assistant for the bathroom. Patient has not complain of any pain or discomfort. Staff will continued to assist pt 1-1 for safety reason

## 2025-03-31 NOTE — BH NOTE
at bedside.     1645pm- Patient sitting in the dayroom eating dinner. 1:1 staff present.     1740pm- Patient went back to his room and awake in his bed. This writer gave him his evening medications and patient presents frustrated with medications but was compliant and asked for peanut butter crackers with his medications but was given lornadoone cookies. Patient continues to have a flat affect, discheveled, and blank stare at times. 1:1 present with patient.

## 2025-03-31 NOTE — BH NOTE
Patient had a great night a bit restless,for few hours he just laying down eyes close but not deep sleep,pt used restroom few times with ,a little  confuse at time nothing major,no fall,no pain to report at this time.Pt was supervise throughout the shift wit a safe and secure environment   Color consistent with ethnicity/race, warm, dry intact, resilient.

## 2025-03-31 NOTE — BH NOTE
Pt get up from bed start walking taking few steps,satff ask did you need help or where do you want to go,pt replied I don't know.Pt turn around and lay back down with eyes close.No sign of pain or discomfort at this time

## 2025-03-31 NOTE — PLAN OF CARE
Problem: Sleep Disturbance  Goal: Will exhibit normal sleeping pattern  Description: INTERVENTIONS:  1. Administer medication as ordered  2. Decrease environmental stimuli, including noise, as appropriate  3. Discourage social isolation and naps during the day  Outcome: Not Progressing     Problem: Safety - Adult  Goal: Free from fall injury  Outcome: Progressing     Problem: Self Harm/Suicidality  Goal: Will have no self-injury during hospital stay  Description: INTERVENTIONS:  1.  Ensure constant observer at bedside with Q15M safety checks  2.  Maintain a safe environment  3.  Secure patient belongings  4.  Ensure family/visitors adhere to safety recommendations  5.  Ensure safety tray has been added to patient's diet order  6.  Every shift and PRN: Re-assess suicidal risk via Frequent Screener    Outcome: Progressing     Problem: Depression  Goal: Will be euthymic at discharge  Description: INTERVENTIONS:  1. Administer medication as ordered  2. Provide emotional support via 1:1 interaction with staff  3. Encourage involvement in milieu/groups/activities  4. Monitor for social isolation  Outcome: Progressing     Problem: Behavior  Goal: Pt/Family maintain appropriate behavior and adhere to behavioral management agreement, if implemented  Description: INTERVENTIONS:  1. Assess patient/family's coping skills and  non-compliant behavior (including use of illegal substances)  2. Notify security of behavior or suspected illegal substances which indicate the need for search of the family and/or belongings  3. Encourage verbalization of thoughts and concerns in a socially appropriate manner  4. Utilize positive, consistent limit setting strategies supporting safety of patient, staff and others  5. Encourage participation in the decision making process about the behavioral management agreement  6. If a visitor's behavior poses a threat to safety call refer to organization policy.  7. Initiate consult with Social

## 2025-03-31 NOTE — PROGRESS NOTES
Patient is observed laying on right side and abruptly jumps out of bed and walks fast. Patient refuses walker and several prompts of redirection to use it for safety were made. Patient appears to be confused and apologetic afterwards.

## 2025-03-31 NOTE — BH NOTE
Pt remained in bed,engage in conversation while taking his evening vitals.No sign of behavior,pain or discomfort,staff will continue to supervised pt for fall risk precaution and well being.

## 2025-03-31 NOTE — GROUP NOTE
Group Therapy Note    Date: 3/31/2025    Group Start Time: 1115  Group End Time: 1200  Group Topic: Recreational    SSR 2 BH NON ACUTE    Katt Brorego        Group Therapy Note    Facilitated leisure skills group to reinforce positive coping and to manage mood through music, social interaction, group activities and art task      Attendees: 5/12      Notes:  Encouraged but did not attend    Discipline Responsible: Recreational Therapist      Signature:  MANISH Miller

## 2025-03-31 NOTE — BH NOTE
Patient remained in bed sleeping,!-1 fall risk precaution. No pain or discomfort to report at this time. Pt has been supervised throughout the shift with a safe and secured environment

## 2025-03-31 NOTE — BH NOTE
Patient appears restless,doesn't show sign of pain or discomfort,very calm no pain to report at this time

## 2025-03-31 NOTE — GROUP NOTE
Group Therapy Note    Date: 3/31/2025    Group Start Time: 1300  Group End Time: 1345  Group Topic: Process Group - Inpatient    SSR 2 [facility] ACUTE    Maisha Fuller MSW        Group Therapy Note: Facilitator encouraged the group to discuss coping strategies, things they are most proud of accomplishing and how to identify needs and create SMART goals.    Attendees: 7       Patient's Goal:  To attend groups    Notes:  Pt shared that he is most proud of owning his own home due to hard work and blood, sweat and tears to make it happen. He plans to make better choices in life.    Status After Intervention:  Improved    Participation Level: Minimal    Participation Quality: Attentive and Resistant      Speech:  hesitant      Thought Process/Content: Logical      Affective Functioning: Flat      Mood: dysphoric      Level of consciousness:  Alert and Oriented x4      Response to Learning: Able to verbalize current knowledge/experience, Able to verbalize/acknowledge new learning, Able to retain information, Capable of insight, Able to change behavior, and Progressing to goal      Endings: None Reported    Modes of Intervention: Education, Support, and Socialization      Discipline Responsible: /Counselor      Signature:  ZEESHAN DOUGHERTY

## 2025-03-31 NOTE — BH NOTE
Around 0930 pt was receiving his medication from the nurse with no issue, a snack was provided per his request.Resting and calm no agitation,pain or discomfort to report at this time  Staff will continued to be supervised due to fall risk

## 2025-03-31 NOTE — BH NOTE
Patient has been observe sleeping for awhile no distraction,any complaint of pain or discomfort  Staff will continued to monitored pt ,fall risk precaution.throughout the shift

## 2025-03-31 NOTE — BH NOTE
Patient remains in bed resting eyes open and quiet,pt get up fix his bed and went back laying down,no incidents,pain or discomfort to report at this time

## 2025-03-31 NOTE — PROGRESS NOTES
Warfarin Dosing Consult  Bony Smith is a 59 y.o. male with LV thrombus. Pharmacy was consulted by Dr. Rivas to dose and monitor warfarin.    INR Goal: 2.5-3.5    PTA Dose: new start    Drugs that may increase INR:None  Drugs that may decrease INR: None  Other current anticoagulants/ drugs that may increase bleeding risk: None  Risk factors: None  Daily INR ordered: Yes    Recent Labs     03/29/25  1234 03/30/25  1122   HGB 12.9 12.9    389     No results for input(s): \"ALT\", \"AST\" in the last 72 hours.  Recent Labs     03/29/25  1234 03/30/25  1122 03/31/25  1045   INR 3.0* 2.3* 1.9*       Date INR Previous Dose   3/22 1.2 7.5   3/23 1.2 7.5   3/24 1.4 10   3/25 1.7 10   3/26 2.3 5   3/27 2 5   3/28 2 7.5   3/29 3 10 mg   3/30 2.3 Held   3/31 1.9 Ordered, but not given last night.      Assessment/ Plan:  Warfarin not given last night. Will order warfarin 7.5 mg tonight. Can decrease frequency of INR monitoring when INR has stabilized.  Daily CBC discontinued. Please monitor patient for clinical s/sx of bleeding.   Pharmacy will continue to monitor daily and adjust therapy as indicated.

## 2025-03-31 NOTE — BH NOTE
About 1050 pt woken up as for assistant to walk to the bathroom ,he was a bit unbalance,with staff guidance,then was return to bed. Pt didn't shows no pain of pain or discomfort

## 2025-03-31 NOTE — BH NOTE
Pt refused evening snacks at first he say yes,then change his mind.around 0847 pt jump out of bed,staff rush to him and ask how can I help,he stated that he want to outside.Staff reached out for his walker,then he said I don't want to go anymore,pt was trembling look confused   He was redirect to bed,no complaint of pain or discomfort. Pt will continued to be supervise throughout the night for fall risk precaution

## 2025-03-31 NOTE — PROGRESS NOTES
Patient has be more cooperative so far this shift. No IM prn medications have been required this evening. He still has a flat affect, with a sadness to him. He seems depressed but did not verbalize this. Patient med compliant with his PO medications at scheduled time tonight. Patient remains on 1:1 sitter due to fall risk. Sitter is at bedside. Patient is still unsteady with gait and doesn't always use walker, needs cues, reminders. He did have snack and soda after he took his medications.    2000 - Patient resting  Quietly, does not appear to be sleeping. 1:1 sitter for safety at bedside.    2200 - Patient alert and awake, but laying in bed resting.Patient just given a snack of peanut butter crackers, and shortbread cookies, and nurse filled his cup with ice water. 1:1 sitter for safety at bedside.    0000 - Patient appears to be sleeping. No distress noted. 1:1 onstant observer still at bedside for safety due to jumping up erratically out of the blue.    0100- Patient has nurse sitting in room monitoring patient for a bit. Patient continues to jump up about every 20-30 mins randomly and erratic, off balance. Says he has to go to the bathroom, nurse gave pt walker and assisted patient towards bathroom. He then changes his mind and says I can't stand and walks back and lays back on the bed. Patient awake and alert but still remains confused.    0200 -     0400- Patient resting quietly, appears to be sleeping, no distress noted. 1:1 constant observer present at bedside.    0600 -

## 2025-04-01 LAB
INR PPP: 1.8 (ref 0.9–1.1)
PROTHROMBIN TIME: 21.7 SEC (ref 11.9–14.6)

## 2025-04-01 PROCEDURE — 1240000000 HC EMOTIONAL WELLNESS R&B

## 2025-04-01 PROCEDURE — 6370000000 HC RX 637 (ALT 250 FOR IP): Performed by: PSYCHIATRY & NEUROLOGY

## 2025-04-01 PROCEDURE — 85610 PROTHROMBIN TIME: CPT

## 2025-04-01 RX ORDER — ARIPIPRAZOLE 5 MG/1
5 TABLET ORAL DAILY
Status: DISCONTINUED | OUTPATIENT
Start: 2025-04-02 | End: 2025-04-09 | Stop reason: HOSPADM

## 2025-04-01 RX ADMIN — THIAMINE HCL TAB 100 MG 100 MG: 100 TAB at 08:27

## 2025-04-01 RX ADMIN — MIDODRINE HYDROCHLORIDE 15 MG: 5 TABLET ORAL at 12:39

## 2025-04-01 RX ADMIN — OXCARBAZEPINE 150 MG: 150 TABLET, FILM COATED ORAL at 08:26

## 2025-04-01 RX ADMIN — BUSPIRONE HYDROCHLORIDE 7.5 MG: 5 TABLET ORAL at 08:26

## 2025-04-01 RX ADMIN — MIDODRINE HYDROCHLORIDE 15 MG: 5 TABLET ORAL at 08:25

## 2025-04-01 RX ADMIN — FLUOXETINE HYDROCHLORIDE 40 MG: 20 CAPSULE ORAL at 08:26

## 2025-04-01 RX ADMIN — ARIPIPRAZOLE 2 MG: 2 TABLET ORAL at 08:26

## 2025-04-01 RX ADMIN — MIDODRINE HYDROCHLORIDE 15 MG: 5 TABLET ORAL at 17:43

## 2025-04-01 RX ADMIN — HYDROXYZINE HYDROCHLORIDE 50 MG: 50 TABLET, FILM COATED ORAL at 12:47

## 2025-04-01 RX ADMIN — ATORVASTATIN CALCIUM 40 MG: 40 TABLET, FILM COATED ORAL at 21:19

## 2025-04-01 RX ADMIN — ZIPRASIDONE HYDROCHLORIDE 20 MG: 20 CAPSULE ORAL at 08:26

## 2025-04-01 RX ADMIN — DULOXETINE HYDROCHLORIDE 30 MG: 30 CAPSULE, DELAYED RELEASE ORAL at 08:26

## 2025-04-01 RX ADMIN — WARFARIN SODIUM 7.5 MG: 2.5 TABLET ORAL at 21:19

## 2025-04-01 RX ADMIN — OXCARBAZEPINE 150 MG: 150 TABLET, FILM COATED ORAL at 21:19

## 2025-04-01 RX ADMIN — HYDROXYZINE HYDROCHLORIDE 50 MG: 50 TABLET, FILM COATED ORAL at 21:19

## 2025-04-01 RX ADMIN — MIRTAZAPINE 15 MG: 15 TABLET, FILM COATED ORAL at 21:19

## 2025-04-01 RX ADMIN — BUSPIRONE HYDROCHLORIDE 7.5 MG: 5 TABLET ORAL at 21:19

## 2025-04-01 NOTE — BH NOTE
2030   58yo admitted 3/28/25, from medical unit. Medical Discharge Diagnosis: LV thrombus on CT/CTA, small acute left thalamic infarction, Orthostatic hypotension, Abdominal pain resolved, Right renal cysts, BPH, Bipolar disorder, Anxiety, Depression severe, Suicidal ideation.  VSS. Refused bedtime snack. Compliant with bedtime medications.   1:1 staff present, walker at bedside.     2230  Hx SI, depression, bipolar, anxiety, blind right eye, detached retina right eye, diverticulitis. NKA  Patient presents isolative, irritable, helpless, hopeless, depression, anxiety, anhedonia, not interested or vested in treatment.   1:1 staff present, walker at bedside.    0030   Patient on coumadin management, f/b pharmacy; PT/INR daily; 21.9/1.9 today, received coumadin 7.5mg po earlier today @ 1741.  Patient asleep.  1:1 staff present, walker at bedside.    0230   HIGH FALL RISK / FALL PRECAUTIONS  Provide a safe environment by clearing a pathway free of obstructing items.   Frequently used items within patient reach.   Intentional Rounding.  Intentional toileting.   Supplemental lighting.   Place fall risk armband on patient.   Post fall risk signage / visible indicator.  High fall risk banner displayed in the EMR.  Sitter at bedside  patient close to the nurse's station  Keep room door open   PT consulted.  Patient asleep.  1:1 staff present, walker at bedside.    0430   Discharge plans pending.  Patient asleep.  1:1 staff present, walker at bedside.    0630   Patient slept throughout the night without difficulty.  1:1 staff present, walker at bedside.  Continue constant observation & fall precautions.

## 2025-04-01 NOTE — BH NOTE
1:1 NOTES:     0750am- Patient sitting in the dayroom with 1:1 staff eating breakfast.     0825am- Patient laying in the bed awake with 1:1 staff at bedside. Patient has an angry facial expression and is irritable on approach. Patient states that he is not feeling any better and did not get any sleep last night. Patient states his depression and anxiety are still high. Denies SI/HI. Denies AH/VH. Patient is medication compliant and all medications reviewed with patient. BP rechecked due to being low on first check this morning prior to breakfast. BP 98/74 on re-check and heart rate 82.     1015am-Patient laying in the bed awake and resting. Patient not interactive and quiet at this time. 1:1 staff present.       1130am- Patient sitting in the dayroom and eating lunch. 1:1 staff present.       1245pm- Patient explosively got up out of bed and walked in the unit halls without his walker and 1:1 staff and went down to the end of the arora and started to punch on the window multiple times. Patient was redirected and patient went to exit doors to unit and started to push on the exit doors refusing redirection. Staff had to intervene and get between him and the doors and he ended up walking back to his room and laying down. Staff reassured patient that we are trying to help him and he stated, \"no you aren't\" and mumbled something else under his breath that could not be understood. Patient presents as angry and frustrated. Patient was given a PRN Atarax at 1247pm. 1:1 staff at bedside and patient is laying back down.     1405pm- Patient laying in the bed awake at this time with 1:1 staff present.     1540pm- Patient sitting in the dayroom with 1:1 staff present.     1645pm- Patient walked back to his room from the dayroom with 1:1 staff.     1745pm- Patient laying in the bed awake. Patient accepted his evening midodrine. Patient continues to have flat, irritable affect. Patient has declined to take a shower today despite  staff encouragement and is malodorous and discheveled. 1:1 staff at bedside.     1800pm- Patient walking the unit halls with 1:1 staff and walker. 1:1 staff asked if he had any family contact numbers in his chart that he could reach out to. Alicia (his sister's) number was given to him. Patient accepted and went to dayroom to use the phone with 1:1 staff.

## 2025-04-01 NOTE — BH NOTE
Progress note for 2025 patient seen for follow-up chart reviewed patient sitting in the dining room and eating his lunch alert surinder made eye contact still anti-D disheveled week even though he is eating and taking some Ensure he still look weak and frail early in the morning I got a call from Ruth she was inquired about how this progress whether he had an MRI or not her apparently he is avoiding the patient because patient has anger issues does not want to talk to some no more than the concept that his son  and sister  he worked fairly well with a walker today but at least walked with me to the group with walker vital signs 59 blood pressure 116/72 temperature 97.9 respirations 16 O2 saturation 98% he seems to be responding to the Geodon Geodon 20 mg daily given also placed him on a Trileptal 300 mg 150 mg twice a day for anger and mood swings

## 2025-04-01 NOTE — BH NOTE
Behavioral Health Treatment Team Note     Patient goal(s) for today:  pt did not voice any   Treatment team focus/goals: meds management, dc planning, group therapy     Progress note:  Pt was seen in his room with his 1:1 staff as he was laying on his side. Pt woke up easily, acknowledged this writer's approach, minimally engaging in conversation, alert, disheveled, frail, guarded, slow to respond to questioning with a distressed look on face. Pt denied current si/hi/ah/vh. Pt reported his depression and anxiety to be a 10 on a scale of 1-10, [facility] staff made aware of this. Pt stated the trigger for the presence of anxiety and depression is him cont to be inpatient. Pt seemed unmoved when provided with support and encouragement. Pt did not voice any further concerns. Pt cont to meet criteria for inpt stay for further stabilization through meds management.     LOS:  4  Expected LOS: 5-7 days       Insurance info/prescription coverage[facility]   Date of last family contact:  pt declined   Family requesting physician contact today:  No  Discharge plan:  to stabilize pt   Guns in the home:  No   Outpatient provider(s):  will be established prior to dc     Participating treatment team members: Bony Smith, * (assigned SW), Eric Raya MS

## 2025-04-01 NOTE — PLAN OF CARE
Problem: Discharge Planning  Goal: Discharge to home or other facility with appropriate resources  Outcome: Progressing     Problem: Safety - Adult  Goal: Free from fall injury  Outcome: Progressing     Problem: Self Harm/Suicidality  Goal: Will have no self-injury during hospital stay  Description: INTERVENTIONS:  1.  Ensure constant observer at bedside with Q15M safety checks  2.  Maintain a safe environment  3.  Secure patient belongings  4.  Ensure family/visitors adhere to safety recommendations  5.  Ensure safety tray has been added to patient's diet order  6.  Every shift and PRN: Re-assess suicidal risk via Frequent Screener  Outcome: Progressing     Problem: Depression  Goal: Will be euthymic at discharge  Description: INTERVENTIONS:  1. Administer medication as ordered  2. Provide emotional support via 1:1 interaction with staff  3. Encourage involvement in milieu/groups/activities  4. Monitor for social isolation  Outcome: Progressing     Problem: Behavior  Goal: Pt/Family maintain appropriate behavior and adhere to behavioral management agreement, if implemented  Description: INTERVENTIONS:  1. Assess patient/family's coping skills and  non-compliant behavior (including use of illegal substances)  2. Notify security of behavior or suspected illegal substances which indicate the need for search of the family and/or belongings  3. Encourage verbalization of thoughts and concerns in a socially appropriate manner  4. Utilize positive, consistent limit setting strategies supporting safety of patient, staff and others  5. Encourage participation in the decision making process about the behavioral management agreement  6. If a visitor's behavior poses a threat to safety call refer to organization policy.  7. Initiate consult with , Psychosocial CNS, Spiritual Care as appropriate  Outcome: Progressing     Problem: Anxiety  Goal: Will report anxiety at manageable levels  Description:  INTERVENTIONS:  1. Administer medication as ordered  2. Teach and rehearse alternative coping skills  3. Provide emotional support with 1:1 interaction with staff  Outcome: Progressing     Problem: Sleep Disturbance  Goal: Will exhibit normal sleeping pattern  Description: INTERVENTIONS:  1. Administer medication as ordered  2. Decrease environmental stimuli, including noise, as appropriate  3. Discourage social isolation and naps during the day  Outcome: Progressing     Problem: Skin/Tissue Integrity  Goal: Skin integrity remains intact  Description: 1.  Monitor for areas of redness and/or skin breakdown  2.  Assess vascular access sites hourly  3.  Every 4-6 hours minimum:  Change oxygen saturation probe site  4.  Every 4-6 hours:  If on nasal continuous positive airway pressure, respiratory therapy assess nares and determine need for appliance change or resting period  Outcome: Progressing

## 2025-04-01 NOTE — GROUP NOTE
Group Therapy Note    Date: 4/1/2025    Group Start Time: 1330  Group End Time: 1400  Group Topic: Process Group - Inpatient    SSR 2 BEHA [facility]    Eric Raya        Group Therapy Note: This writer facilitated a group where question were asked from the worksheet \"Toot Your Own Horn\" where focus was given to the positive aspects of one's character and life and responses were shared in a group setting.    Attendees: 4       Patient's Goal:  to attend groups.     Notes:  Pt was encouraged to attend but did not.         Signature:  Eric Raya

## 2025-04-02 LAB
INR PPP: 1.9 (ref 0.9–1.1)
PROTHROMBIN TIME: 22.2 SEC (ref 11.9–14.6)

## 2025-04-02 PROCEDURE — 36415 COLL VENOUS BLD VENIPUNCTURE: CPT

## 2025-04-02 PROCEDURE — 1240000000 HC EMOTIONAL WELLNESS R&B

## 2025-04-02 PROCEDURE — 85610 PROTHROMBIN TIME: CPT

## 2025-04-02 PROCEDURE — 6370000000 HC RX 637 (ALT 250 FOR IP): Performed by: PSYCHIATRY & NEUROLOGY

## 2025-04-02 RX ADMIN — BUSPIRONE HYDROCHLORIDE 7.5 MG: 5 TABLET ORAL at 08:21

## 2025-04-02 RX ADMIN — ATORVASTATIN CALCIUM 40 MG: 40 TABLET, FILM COATED ORAL at 20:26

## 2025-04-02 RX ADMIN — MIDODRINE HYDROCHLORIDE 15 MG: 5 TABLET ORAL at 16:48

## 2025-04-02 RX ADMIN — MIRTAZAPINE 15 MG: 15 TABLET, FILM COATED ORAL at 20:26

## 2025-04-02 RX ADMIN — HYDROXYZINE HYDROCHLORIDE 50 MG: 50 TABLET, FILM COATED ORAL at 20:27

## 2025-04-02 RX ADMIN — FLUOXETINE HYDROCHLORIDE 40 MG: 20 CAPSULE ORAL at 08:21

## 2025-04-02 RX ADMIN — ZIPRASIDONE HYDROCHLORIDE 20 MG: 20 CAPSULE ORAL at 08:22

## 2025-04-02 RX ADMIN — WARFARIN SODIUM 7.5 MG: 2.5 TABLET ORAL at 18:02

## 2025-04-02 RX ADMIN — THIAMINE HCL TAB 100 MG 100 MG: 100 TAB at 08:22

## 2025-04-02 RX ADMIN — ARIPIPRAZOLE 5 MG: 5 TABLET ORAL at 08:22

## 2025-04-02 RX ADMIN — HYDROXYZINE HYDROCHLORIDE 50 MG: 50 TABLET, FILM COATED ORAL at 08:21

## 2025-04-02 RX ADMIN — MIDODRINE HYDROCHLORIDE 15 MG: 5 TABLET ORAL at 08:22

## 2025-04-02 RX ADMIN — MIDODRINE HYDROCHLORIDE 15 MG: 5 TABLET ORAL at 12:54

## 2025-04-02 RX ADMIN — OXCARBAZEPINE 150 MG: 150 TABLET, FILM COATED ORAL at 20:26

## 2025-04-02 RX ADMIN — OXCARBAZEPINE 150 MG: 150 TABLET, FILM COATED ORAL at 08:22

## 2025-04-02 RX ADMIN — DULOXETINE HYDROCHLORIDE 30 MG: 30 CAPSULE, DELAYED RELEASE ORAL at 08:22

## 2025-04-02 RX ADMIN — ALUMINUM HYDROXIDE, MAGNESIUM HYDROXIDE, AND SIMETHICONE 30 ML: 200; 200; 20 SUSPENSION ORAL at 20:24

## 2025-04-02 RX ADMIN — BUSPIRONE HYDROCHLORIDE 7.5 MG: 5 TABLET ORAL at 20:26

## 2025-04-02 NOTE — PLAN OF CARE
of patient, staff and others  5. Encourage participation in the decision making process about the behavioral management agreement  6. If a visitor's behavior poses a threat to safety call refer to organization policy.  7. Initiate consult with , Psychosocial CNS, Spiritual Care as appropriate  4/2/2025 0932 by Ayah De Oliveira RN  Outcome: Progressing  4/1/2025 2200 by Autumn Ly RN  Outcome: Not Progressing  4/1/2025 2157 by Autumn Ly RN  Outcome: Progressing     Problem: Anxiety  Goal: Will report anxiety at manageable levels  Description: INTERVENTIONS:  1. Administer medication as ordered  2. Teach and rehearse alternative coping skills  3. Provide emotional support with 1:1 interaction with staff  4/2/2025 0932 by Ayah De Oliveira RN  Outcome: Progressing  4/1/2025 2200 by Autumn Ly RN  Outcome: Not Progressing  4/1/2025 2157 by Autumn Ly RN  Outcome: Progressing     Problem: Sleep Disturbance  Goal: Will exhibit normal sleeping pattern  Description: INTERVENTIONS:  1. Administer medication as ordered  2. Decrease environmental stimuli, including noise, as appropriate  3. Discourage social isolation and naps during the day  4/2/2025 0932 by Ayah De Oliveira RN  Outcome: Progressing  4/1/2025 2200 by Autumn Ly RN  Outcome: Not Progressing  4/1/2025 2157 by Autumn Ly RN  Outcome: Progressing     Problem: Discharge Planning  Goal: Discharge to home or other facility with appropriate resources  4/2/2025 0932 by Ayah De Oliveira RN  Outcome: Progressing  4/1/2025 2200 by Autumn Ly RN  Outcome: Not Progressing  4/1/2025 2157 by Autumn Ly RN  Outcome: Progressing     Problem: Safety - Adult  Goal: Free from fall injury  4/1/2025 2200 by Autumn Ly RN  Outcome: Not Progressing  4/1/2025 2157 by Autumn Ly RN  Outcome: Progressing     Problem: Depression  Goal: Will be euthymic at discharge  Description:  sleeping pattern  Description: INTERVENTIONS:  1. Administer medication as ordered  2. Decrease environmental stimuli, including noise, as appropriate  3. Discourage social isolation and naps during the day  4/2/2025 0932 by Ayah De Oliveira RN  Outcome: Progressing  4/1/2025 2200 by Autumn Ly, RN  Outcome: Not Progressing  4/1/2025 2157 by Autumn Ly, RN  Outcome: Progressing

## 2025-04-02 NOTE — GROUP NOTE
Group Therapy Note    Date: 4/1/2025    Group Start Time: 1935  Group End Time: 2020  Group Topic: Recreational    SSR 2 BH NON ACUTE    Yolanda Perry        Group Therapy Note    Attendees: 5/10       Recreational Therapist facilitated structured leisure skills group to introduce healthy leisure skills as positive way to cope and manage mood.            Notes:  Did not attend group despite encouragement      Discipline Responsible: Recreational Therapist      Signature:  MANISH Rebolledo

## 2025-04-02 NOTE — BH NOTE
*ATTENTION:  This note has been created by a medical student for educational purposes only.  Please do not refer to the content of this note for clinical decision-making, billing, or other purposes.  Please see attending physician’s note to obtain clinical information on this patient.*        Note for 4/1/2025 The patient was observed in their room, accompanied by their 1:1 staff, lying in bed resting. Pt was alert and responsive to the approach of this writer, although engagement in conversation was minimal. The patient appeared frail, disheveled and unkempt. He is  guarded in interactions and appears to be worried. He was encouraged to get out of bed and he did walk down the hallway with primary provider Dr Rivas.  Response to questions was slow, and the patient denied experiencing suicidal ideation, homicidal ideation, auditory hallucinations, or visual hallucinations. Mood is depressed and affect is congruent. Pt did confirm depression rated 8/10. The patient identified the ongoing inpatient status as the primary trigger for their heightened anxiety and depressive symptoms. Despite the provision of support and encouragement, the patient appeared largely unmoved and did not express further concerns during the encounter. He reports eating well but sleeping poorly. He did express that he wanted to speak with his sister and his son. Attempts were made to reach his sister by phone by Dr Rivas and she did not answer at this time. Attempts will be made to connect later today. Pt denies any medication side effects. The patient continues to meet criteria for inpatient care and remains in need of further stabilization, including medication management, to address their ongoing psychiatric symptoms.

## 2025-04-02 NOTE — PROGRESS NOTES
Warfarin Dosing Consult  Bony Smith is a 59 y.o. male with LV thrombus. Pharmacy was consulted by Dr. Rivas to dose and monitor warfarin.    INR Goal: 2.5-3.5    PTA Dose: new start    Drugs that may increase INR:None  Drugs that may decrease INR: None  Other current anticoagulants/ drugs that may increase bleeding risk: None  Risk factors: None  Daily INR ordered: Yes    No results for input(s): \"HGB\", \"PLT\" in the last 72 hours.    No results for input(s): \"ALT\", \"AST\" in the last 72 hours.  Recent Labs     03/31/25  1045 04/01/25  1620 04/02/25  0737   INR 1.9* 1.8* 1.9*       Date INR Previous Dose   3/22 1.2 7.5   3/23 1.2 7.5   3/24 1.4 10   3/25 1.7 10   3/26 2.3 5   3/27 2 5   3/28 2 7.5   3/29 3 10 mg   3/30 2.3 Held   3/31 1.9 Ordered, but not given last night.    4/1 1.8 7.5 mg   4/2 1.9 7.5 mg     Assessment/ Plan:  INR is still subtherapeutic.  Continue warfarin 7.5 mg tonight.   Can decrease frequency of INR monitoring when INR has stabilized.  Daily CBC discontinued. Please monitor patient for clinical s/sx of bleeding.   Pharmacy will continue to monitor daily and adjust therapy as indicated.

## 2025-04-02 NOTE — BH NOTE
0740- Pt noted to be sleeping in bed, no distress noted at this time. 1:! Observation staff with patient for safety.     0830 Pt eating breakfast in dayroom. Ate approximately 50% of meal. Drank coffee x 2. Ambulated back to room with walker and assist x 1 staff.     0940- Pt assisted to room 245 to utilize handicapped shower and shower chair. Pt donned clean gowns and new slip resistant footwear. Pt assisted back to his room. Linens changed. Pt resting in bed. 1:1 staff at bedside.     1130 Pt up to dayroom for lunch. Assisted with walker and staff x 1    1215  Pt ambulated around unit with walker and Dr. Rivas. Gait slow but steady. Pt denies SI/HI/AVH at this time.     1225 1:1 observation discontinued per Dr. Rivas.

## 2025-04-02 NOTE — PROGRESS NOTES
Bony actively participated in Spirituality Group about Марина on acute Behavioral Health unit    sonya Arreola M.Div, M.S, [facility]   can be reached by calling the  at [facility]   [phone]

## 2025-04-02 NOTE — PLAN OF CARE
2000 Bony is AOX4, ambulatory and able to voice his concerns. Pt is 1:1. Has been isolating from peers and refused to join groups. Seen lying quietly on his bed.  2200 Patient's anxiety is 10/10, Depression 10/10, denied hallucinations, suicidal/homicidal thoughts. Complained of stomach pain and was given maalox as well as his scheduled and PRN meds.No further complaint at this time. Plan of care ongoing.  0000 Patient is lying in bed with eyes closed,respiration even,no signs of distress observed  0200 Patient is lying in bed,eyes closed,respiration even.no signs of distress observed  0400 Patient lying in bed,eyes closed,respiration even,no signs of distress observed        Problem: Discharge Planning  Goal: Discharge to home or other facility with appropriate resources  4/1/2025 2200 by Autumn Ly RN  Outcome: Not Progressing  4/1/2025 2157 by Autumn Ly RN  Outcome: Progressing  4/1/2025 1055 by Camilla Jennings RN  Outcome: Progressing     Problem: Safety - Adult  Goal: Free from fall injury  4/1/2025 2200 by Autumn Ly RN  Outcome: Not Progressing  4/1/2025 2157 by Autumn Ly RN  Outcome: Progressing  4/1/2025 1055 by Camilla Jennings RN  Outcome: Progressing     Problem: Depression  Goal: Will be euthymic at discharge  Description: INTERVENTIONS:  1. Administer medication as ordered  2. Provide emotional support via 1:1 interaction with staff  3. Encourage involvement in milieu/groups/activities  4. Monitor for social isolation  4/1/2025 2200 by Autumn yL RN  Outcome: Not Progressing  4/1/2025 2157 by Autumn Ly RN  Outcome: Progressing  4/1/2025 1055 by Camilla Jennings RN  Outcome: Progressing     Problem: Behavior  Goal: Pt/Family maintain appropriate behavior and adhere to behavioral management agreement, if implemented  Description: INTERVENTIONS:  1. Assess patient/family's coping skills and  non-compliant behavior (including  use of illegal substances)  2. Notify security of behavior or suspected illegal substances which indicate the need for search of the family and/or belongings  3. Encourage verbalization of thoughts and concerns in a socially appropriate manner  4. Utilize positive, consistent limit setting strategies supporting safety of patient, staff and others  5. Encourage participation in the decision making process about the behavioral management agreement  6. If a visitor's behavior poses a threat to safety call refer to organization policy.  7. Initiate consult with , Psychosocial CNS, Spiritual Care as appropriate  4/1/2025 2200 by Autumn Ly RN  Outcome: Not Progressing  4/1/2025 2157 by Autumn Ly RN  Outcome: Progressing  4/1/2025 1055 by Camilla Jennings RN  Outcome: Progressing     Problem: Anxiety  Goal: Will report anxiety at manageable levels  Description: INTERVENTIONS:  1. Administer medication as ordered  2. Teach and rehearse alternative coping skills  3. Provide emotional support with 1:1 interaction with staff  4/1/2025 2200 by Autumn Ly RN  Outcome: Not Progressing  4/1/2025 2157 by Autumn Ly RN  Outcome: Progressing  4/1/2025 1055 by Camilla Jennings RN  Outcome: Progressing     Problem: Sleep Disturbance  Goal: Will exhibit normal sleeping pattern  Description: INTERVENTIONS:  1. Administer medication as ordered  2. Decrease environmental stimuli, including noise, as appropriate  3. Discourage social isolation and naps during the day  4/1/2025 2200 by Autumn Ly RN  Outcome: Not Progressing  4/1/2025 2157 by Autumn Ly RN  Outcome: Progressing  4/1/2025 1055 by Camilla Jennings RN  Outcome: Progressing

## 2025-04-02 NOTE — BH NOTE
Behavioral Health Treatment Team Note     Patient goal(s) for today: \"to get better'  Treatment team focus/goals: continue medication management, group therapy, maintain ADLs and provide a safe discharge     Progress note: Pt presented with a flat but pleasant affect. He was laying in the bed after he took a shower and \"felt better\". Pt denied any SI/HI/ but when asked about AVH he stated \"I don't know\". Pt has a difficult time identifying if he is \"hearing things\" because the \"echos\" in the hospital. Pt at times appeared to be thought blocking or a slow process to communicate with the writer and was observed with a distressed facial expression. Pt shared that he would like to be connected with a [facility]HS[facility] when he returns home.     Writer reached out to [facility] for [facility] [phone]      LOS:  5  Expected LOS: 7-10 days    Insurance info/prescription coverage:  Medicaid   Date of last family contact: Writer was given verbal permission from the pt to speak with his sister Jacqui Duong [phone]  Family requesting physician contact today:  No  Discharge plan:  to stabilize the pt   Guns in the home:  Yes   Outpatient provider(s):  will be coordinated prior to discharge    Participating treatment team members: Bony Smith, * (assigned SW), iRddhi Trotter LMSW

## 2025-04-02 NOTE — BH NOTE
Patient was seen for follow up. Vital signs-blood pressure 102/78 temperature 98.8 respirations 18 O2 saturation 95% Chart has been reviewed and case discussed with treatment team for planing. He is currently still with a 1:1 sitter for safety. Pt has improved having showered and combed his hair. He still appears to have some weakness and frailty but appears more alert and willing to engage today. Pt expressed that he wishes to speak with his sister at some point today to make contact with his two sons. Pt did not express thoughts that sister or sons were  today. Sitter noted that patient has been ambulating to the restroom. Pt ambulated in hallway with his walker and also without the walker well. No gait disturbances were noted. He remains medication compliant, denying any side effects. He is confirming feelings of both anxiety and depression rated 3/10. Plan is to continue current medications.    coffee

## 2025-04-02 NOTE — GROUP NOTE
Group Therapy Note    Date: 4/2/2025    Group Start Time: 1410  Group End Time: 1500  Group Topic: Process Group - Inpatient    SSR 2 BEHA [facility]    Riddhi Trotter        Group Therapy Note  Process group was focused on self-care and goals. Pts interacted well with one another and provided positive feedback to others. Writer asked \"what are you most excited for when you discharge\" as an icebreaker and passed out a handout.  Attendees: 3-10       Patient's Goal:  \"to find peace again\"    Notes:  pt was guarded but interacted well with others. He apologized to the writer for not being able to communicate quickly and \"can't get my thoughts together\". He reported not taking care of himself since his wife passed away and wants to start getting out more.     Status After Intervention:  Improved    Participation Level: Active Listener    Participation Quality: Appropriate, Attentive, and Sharing      Speech:  soft      Thought Process/Content: Logical      Affective Functioning: Flat      Mood: depressed      Level of consciousness:  Alert      Response to Learning: Able to verbalize current knowledge/experience, Capable of insight, and Progressing to goal      Endings: None Reported    Modes of Intervention: Support and Exploration      Discipline Responsible: /Counselor      Signature:  Riddhi Trotter

## 2025-04-03 LAB
INR PPP: 2.6 (ref 0.9–1.1)
PROTHROMBIN TIME: 28.2 SEC (ref 11.9–14.6)

## 2025-04-03 PROCEDURE — 85610 PROTHROMBIN TIME: CPT

## 2025-04-03 PROCEDURE — 1240000000 HC EMOTIONAL WELLNESS R&B

## 2025-04-03 PROCEDURE — 6370000000 HC RX 637 (ALT 250 FOR IP): Performed by: PSYCHIATRY & NEUROLOGY

## 2025-04-03 PROCEDURE — 36415 COLL VENOUS BLD VENIPUNCTURE: CPT

## 2025-04-03 RX ORDER — WARFARIN SODIUM 5 MG/1
5 TABLET ORAL
Status: COMPLETED | OUTPATIENT
Start: 2025-04-03 | End: 2025-04-03

## 2025-04-03 RX ADMIN — ARIPIPRAZOLE 5 MG: 5 TABLET ORAL at 08:41

## 2025-04-03 RX ADMIN — OXCARBAZEPINE 150 MG: 150 TABLET, FILM COATED ORAL at 08:46

## 2025-04-03 RX ADMIN — FLUOXETINE HYDROCHLORIDE 40 MG: 20 CAPSULE ORAL at 08:41

## 2025-04-03 RX ADMIN — THIAMINE HCL TAB 100 MG 100 MG: 100 TAB at 08:41

## 2025-04-03 RX ADMIN — ZIPRASIDONE HYDROCHLORIDE 20 MG: 20 CAPSULE ORAL at 08:42

## 2025-04-03 RX ADMIN — MIDODRINE HYDROCHLORIDE 15 MG: 5 TABLET ORAL at 16:48

## 2025-04-03 RX ADMIN — HYDROXYZINE HYDROCHLORIDE 50 MG: 50 TABLET, FILM COATED ORAL at 20:48

## 2025-04-03 RX ADMIN — MIDODRINE HYDROCHLORIDE 15 MG: 5 TABLET ORAL at 08:40

## 2025-04-03 RX ADMIN — WARFARIN SODIUM 5 MG: 5 TABLET ORAL at 18:42

## 2025-04-03 RX ADMIN — MIDODRINE HYDROCHLORIDE 15 MG: 5 TABLET ORAL at 12:31

## 2025-04-03 RX ADMIN — BUSPIRONE HYDROCHLORIDE 7.5 MG: 5 TABLET ORAL at 08:41

## 2025-04-03 RX ADMIN — OXCARBAZEPINE 150 MG: 150 TABLET, FILM COATED ORAL at 20:48

## 2025-04-03 RX ADMIN — BUSPIRONE HYDROCHLORIDE 7.5 MG: 5 TABLET ORAL at 20:47

## 2025-04-03 RX ADMIN — ATORVASTATIN CALCIUM 40 MG: 40 TABLET, FILM COATED ORAL at 20:48

## 2025-04-03 RX ADMIN — MIRTAZAPINE 15 MG: 15 TABLET, FILM COATED ORAL at 20:48

## 2025-04-03 RX ADMIN — DULOXETINE HYDROCHLORIDE 30 MG: 30 CAPSULE, DELAYED RELEASE ORAL at 08:42

## 2025-04-03 RX ADMIN — DOCUSATE SODIUM 100 MG: 50 LIQUID ORAL at 08:40

## 2025-04-03 NOTE — GROUP NOTE
Group Therapy Note    Date: 4/3/2025    Group Start Time: 1345  Group End Time: 1430  Group Topic: Recreational    SSR 2 BH NON ACUTE    Katt Borrego        Group Therapy Note    Facilitated leisure skills group to reinforce positive coping and to manage mood through music, social interaction, group activities and art task       Attendees: 7/11       Notes:  Encouraged but did not attend    Discipline Responsible: Recreational Therapist      Signature:  MANISH Miller

## 2025-04-03 NOTE — GROUP NOTE
Group Therapy Note    Date: 4/3/2025    Group Start Time: 1015  Group End Time: 1100  Group Topic: Education Group - Inpatient    SSR 2  NON ACUTE    Katt Borrego        Group Therapy Note    Facilitated group to focus on defining different types of defense mechanisms and being able to recognize examples of some defenses that was used to cope with stress and anxiety      Attendees: 4/11       Notes:  Encouraged but did not attend    Discipline Responsible: Recreational Therapist      Signature:  MANSIH Miller

## 2025-04-03 NOTE — PLAN OF CARE
Patient is AOX4,ambulatory and able to voice his concerns. He joined groups bu left midway,saying he could not stand it.she answers minimally to questions,refuses to join groups and refused to have her vital signs checked. She however took her HS snacks,fluids and scheduled HS medications.Denies SI/HI/AVH and pains.No distress observed.Plan of care is ongoing.  Problem: Discharge Planning  Goal: Discharge to home or other facility with appropriate resources  Outcome: Not Progressing     Problem: Depression  Goal: Will be euthymic at discharge  Description: INTERVENTIONS:  1. Administer medication as ordered  2. Provide emotional support via 1:1 interaction with staff  3. Encourage involvement in milieu/groups/activities  4. Monitor for social isolation  Outcome: Not Progressing     Problem: Behavior  Goal: Pt/Family maintain appropriate behavior and adhere to behavioral management agreement, if implemented  Description: INTERVENTIONS:  1. Assess patient/family's coping skills and  non-compliant behavior (including use of illegal substances)  2. Notify security of behavior or suspected illegal substances which indicate the need for search of the family and/or belongings  3. Encourage verbalization of thoughts and concerns in a socially appropriate manner  4. Utilize positive, consistent limit setting strategies supporting safety of patient, staff and others  5. Encourage participation in the decision making process about the behavioral management agreement  6. If a visitor's behavior poses a threat to safety call refer to organization policy.  7. Initiate consult with , Psychosocial CNS, Spiritual Care as appropriate  Outcome: Not Progressing     Problem: Anxiety  Goal: Will report anxiety at manageable levels  Description: INTERVENTIONS:  1. Administer medication as ordered  2. Teach and rehearse alternative coping skills  3. Provide emotional support with 1:1 interaction with staff  Outcome: Not  Progressing

## 2025-04-03 NOTE — BH NOTE
Alert and oriented to person, place and situation. Affect and mood remains sad, depressed and anxious. Denies SI/HI/AVH at this time. Pt ate breakfast in his room. Medication compliant. Pt reminded several times by several staff to utilize walker when ambulating on unit. Remains on fall precautions for safety.

## 2025-04-03 NOTE — PLAN OF CARE
Problem: Discharge Planning  Goal: Discharge to home or other facility with appropriate resources  4/3/2025 0945 by Ayah De Oliveira RN  Outcome: Progressing  4/3/2025 0023 by Autumn Ly RN  Outcome: Not Progressing  4/3/2025 0020 by Autumn Ly RN  Outcome: Not Progressing     Problem: Safety - Adult  Goal: Free from fall injury  4/3/2025 0945 by Ayah De Oliveira RN  Outcome: Progressing  4/3/2025 0023 by Autumn Ly RN  Outcome: Progressing  4/3/2025 0020 by Autumn Ly RN  Outcome: Progressing     Problem: Self Harm/Suicidality  Goal: Will have no self-injury during hospital stay  Description: INTERVENTIONS:  1.  Ensure constant observer at bedside with Q15M safety checks  2.  Maintain a safe environment  3.  Secure patient belongings  4.  Ensure family/visitors adhere to safety recommendations  5.  Ensure safety tray has been added to patient's diet order  6.  Every shift and PRN: Re-assess suicidal risk via Frequent Screener    4/3/2025 0945 by Ayah De Oliveira RN  Outcome: Progressing  4/3/2025 0023 by Autumn Ly RN  Outcome: Progressing  4/3/2025 0020 by Autumn Ly RN  Outcome: Progressing     Problem: Depression  Goal: Will be euthymic at discharge  Description: INTERVENTIONS:  1. Administer medication as ordered  2. Provide emotional support via 1:1 interaction with staff  3. Encourage involvement in milieu/groups/activities  4. Monitor for social isolation  4/3/2025 0945 by Ayah De Oliveira RN  Outcome: Progressing  4/3/2025 0023 by Autumn Ly RN  Outcome: Not Progressing  4/3/2025 0020 by Autumn Ly RN  Outcome: Not Progressing     Problem: Behavior  Goal: Pt/Family maintain appropriate behavior and adhere to behavioral management agreement, if implemented  Description: INTERVENTIONS:  1. Assess patient/family's coping skills and  non-compliant behavior (including use of illegal substances)  2. Notify security of behavior or

## 2025-04-03 NOTE — BH NOTE
Pt was met in the hallway ambulating without his walker. Pt is alert and oriented x 4. He presents with a depressed mood and congruent affect. He is making efforts to ambulate and eat appropriately. Pt reported that last nights sleep was broken due to 15 minute rounds. Education was given on the importance of rounding for safety. Pt requested strawberry Ensure, order was completed. Pt conveys that his medications are working well with no side effects. Vital signs have remained stable. 98.4 temp, 67-HR, 18-respirations, and 96% of RA.

## 2025-04-03 NOTE — PROGRESS NOTES
Warfarin Dosing Consult  Bony Smith is a 59 y.o. male with LV thrombus. Pharmacy was consulted by Dr. Rivas to dose and monitor warfarin.    INR Goal: 2.5-3.5    PTA Dose: new start    Drugs that may increase INR:None  Drugs that may decrease INR: None  Other current anticoagulants/ drugs that may increase bleeding risk: None  Risk factors: None  Daily INR ordered: Yes    No results for input(s): \"HGB\", \"PLT\" in the last 72 hours.    No results for input(s): \"ALT\", \"AST\" in the last 72 hours.  Recent Labs     04/01/25  1620 04/02/25  0737 04/03/25  1110   INR 1.8* 1.9* 2.6*       Date INR Previous Dose   3/22 1.2 7.5   3/23 1.2 7.5   3/24 1.4 10   3/25 1.7 10   3/26 2.3 5   3/27 2 5   3/28 2 7.5   3/29 3 10 mg   3/30 2.3 Held   3/31 1.9 Ordered, but not given last night.    4/1 1.8 7.5 mg   4/2 1.9 7.5 mg   4/3 2.6 7.5 mg     Assessment/ Plan:  INR increased from 1.9 --> 2.6.  Continue warfarin 7.5 mg tonight.   Can decrease frequency of INR monitoring when INR has stabilized.  Will decrease Warfarin dose to 5 mg x 1 dose tonight given jump in INR  INR ordered through 4/4  Pharmacy will continue to monitor daily and adjust therapy as indicated.

## 2025-04-03 NOTE — GROUP NOTE
Group Therapy Note    Date: 4/2/2025    Group Start Time: 1930  Group End Time: 2020  Group Topic: Recreational    SSR 2 BH NON ACUTE    Yolanda Perry        Group Therapy Note    Attendees: 6/10    Recreational Therapist facilitated structured leisure skills group to introduce healthy leisure skills as positive way to cope and manage mood.         Patient's Goal:  Client will use words to express feelings, wants, and needs    Notes:  Initially attended group. Sat in session for 10 min. Left session and did not return.     Status After Intervention:  Unchanged    Participation Level: Minimal    Participation Quality: Appropriate      Speech:  normal      Thought Process/Content: Logical      Affective Functioning: Flat      Mood:  calm       Level of consciousness:  Alert      Response to Learning: Progressing to goal      Endings: None Reported    Modes of Intervention: Activity      Discipline Responsible: Recreational Therapist      Signature:  MANISH Rebolledo

## 2025-04-03 NOTE — PLAN OF CARE
Bony is AOX4,ambulatory and able to make his needs known.He was able to join groups but left long term into that.He took his HS snacks,fluids and scheduled HS medications.complained of gas pains and was given maalox for that.Still anxious (10/10) and depressed (10/10).Denies SI/HI/AVH.He however stated,\"I m dying\" but wouldn't elaborate more on that.No distress observed.Plan of care is ongoing.  Problem: Discharge Planning  Goal: Discharge to home or other facility with appropriate resources  4/3/2025 0023 by Autumn Ly RN  Outcome: Not Progressing  4/3/2025 0020 by Autumn Ly RN  Outcome: Not Progressing     Problem: Depression  Goal: Will be euthymic at discharge  Description: INTERVENTIONS:  1. Administer medication as ordered  2. Provide emotional support via 1:1 interaction with staff  3. Encourage involvement in milieu/groups/activities  4. Monitor for social isolation  4/3/2025 0023 by Autumn Ly RN  Outcome: Not Progressing  4/3/2025 0020 by Autumn Ly RN  Outcome: Not Progressing     Problem: Behavior  Goal: Pt/Family maintain appropriate behavior and adhere to behavioral management agreement, if implemented  Description: INTERVENTIONS:  1. Assess patient/family's coping skills and  non-compliant behavior (including use of illegal substances)  2. Notify security of behavior or suspected illegal substances which indicate the need for search of the family and/or belongings  3. Encourage verbalization of thoughts and concerns in a socially appropriate manner  4. Utilize positive, consistent limit setting strategies supporting safety of patient, staff and others  5. Encourage participation in the decision making process about the behavioral management agreement  6. If a visitor's behavior poses a threat to safety call refer to organization policy.  7. Initiate consult with , Psychosocial CNS, Spiritual Care as appropriate  4/3/2025 0023 by Autunm Ly  RN  Outcome: Progressing  4/3/2025 0020 by Autumn Ly RN  Outcome: Not Progressing     Problem: Anxiety  Goal: Will report anxiety at manageable levels  Description: INTERVENTIONS:  1. Administer medication as ordered  2. Teach and rehearse alternative coping skills  3. Provide emotional support with 1:1 interaction with staff  4/3/2025 0023 by Autumn Ly RN  Outcome: Not Progressing  4/3/2025 0020 by Autumn Ly RN  Outcome: Not Progressing

## 2025-04-03 NOTE — BH NOTE
Behavioral Health Treatment Team Note     Patient goal(s) for today: \"got to groups\"  Treatment team focus/goals: continue medication management, group therapy, maintain ADLs and provide a safe discharge     Progress note: pt was seen in his room as he was resting. Pt acknowledged this writer when he was greeted, engaging in conversation with appropriate eye contact, pleasant upon approach, calm, guarded, disheveled, alert, frail, distressed look on face, with a flat affect. Pt denied current si/hi/ah/vh. Pt stated he did not sleep at all last night and was unable to identify a trigger. Pt reported anxiety and depression but did not rate it and did not report a trigger. Pt did not voice any concerns. Pt cont to meet criteria for inpt stay for further stabilization through meds management.     reached out to [facility] for [facility] [phone] on 4/2    LOS:  6  Expected LOS:  7-10 days     Insurance info/prescription coverage:   Medicaid   Date of last family contact:  sister Jacqui Duong [phone] on 4/2  Family requesting physician contact today:  No  Discharge plan:  to stabilize the pt   Guns in the home:  No   Outpatient provider(s):  will be coordinated prior to discharge     Participating treatment team members: Bony Smith, * (assigned SW), Eric Raya, MS

## 2025-04-04 LAB
INR PPP: 3 (ref 0.9–1.1)
PROTHROMBIN TIME: 31.3 SEC (ref 11.9–14.6)

## 2025-04-04 PROCEDURE — 36415 COLL VENOUS BLD VENIPUNCTURE: CPT

## 2025-04-04 PROCEDURE — 1240000000 HC EMOTIONAL WELLNESS R&B

## 2025-04-04 PROCEDURE — 6370000000 HC RX 637 (ALT 250 FOR IP): Performed by: PSYCHIATRY & NEUROLOGY

## 2025-04-04 PROCEDURE — 85610 PROTHROMBIN TIME: CPT

## 2025-04-04 RX ORDER — WARFARIN SODIUM 5 MG/1
5 TABLET ORAL
Status: COMPLETED | OUTPATIENT
Start: 2025-04-04 | End: 2025-04-04

## 2025-04-04 RX ADMIN — ARIPIPRAZOLE 5 MG: 5 TABLET ORAL at 08:32

## 2025-04-04 RX ADMIN — MIDODRINE HYDROCHLORIDE 15 MG: 5 TABLET ORAL at 16:41

## 2025-04-04 RX ADMIN — OXCARBAZEPINE 150 MG: 150 TABLET, FILM COATED ORAL at 08:32

## 2025-04-04 RX ADMIN — MIDODRINE HYDROCHLORIDE 15 MG: 5 TABLET ORAL at 13:15

## 2025-04-04 RX ADMIN — ZIPRASIDONE HYDROCHLORIDE 20 MG: 20 CAPSULE ORAL at 08:32

## 2025-04-04 RX ADMIN — DOCUSATE SODIUM 100 MG: 50 LIQUID ORAL at 08:32

## 2025-04-04 RX ADMIN — ATORVASTATIN CALCIUM 40 MG: 40 TABLET, FILM COATED ORAL at 20:22

## 2025-04-04 RX ADMIN — WARFARIN SODIUM 5 MG: 5 TABLET ORAL at 17:38

## 2025-04-04 RX ADMIN — MIRTAZAPINE 15 MG: 15 TABLET, FILM COATED ORAL at 20:22

## 2025-04-04 RX ADMIN — MIDODRINE HYDROCHLORIDE 15 MG: 5 TABLET ORAL at 08:32

## 2025-04-04 RX ADMIN — THIAMINE HCL TAB 100 MG 100 MG: 100 TAB at 08:32

## 2025-04-04 RX ADMIN — OXCARBAZEPINE 150 MG: 150 TABLET, FILM COATED ORAL at 20:22

## 2025-04-04 RX ADMIN — FLUOXETINE HYDROCHLORIDE 40 MG: 20 CAPSULE ORAL at 08:32

## 2025-04-04 RX ADMIN — BUSPIRONE HYDROCHLORIDE 7.5 MG: 5 TABLET ORAL at 08:32

## 2025-04-04 RX ADMIN — DULOXETINE HYDROCHLORIDE 30 MG: 30 CAPSULE, DELAYED RELEASE ORAL at 08:32

## 2025-04-04 RX ADMIN — BUSPIRONE HYDROCHLORIDE 7.5 MG: 5 TABLET ORAL at 20:21

## 2025-04-04 NOTE — PLAN OF CARE
Problem: Discharge Planning  Goal: Discharge to home or other facility with appropriate resources  4/4/2025 1036 by Ayah De Oliveira RN  Outcome: Progressing  4/3/2025 2147 by Autumn Ly RN  Outcome: Progressing     Problem: Self Harm/Suicidality  Goal: Will have no self-injury during hospital stay  Description: INTERVENTIONS:  1.  Ensure constant observer at bedside with Q15M safety checks  2.  Maintain a safe environment  3.  Secure patient belongings  4.  Ensure family/visitors adhere to safety recommendations  5.  Ensure safety tray has been added to patient's diet order  6.  Every shift and PRN: Re-assess suicidal risk via Frequent Screener    4/4/2025 1036 by Ayah De Oliveira RN  Outcome: Progressing  4/3/2025 2147 by Autumn Ly RN  Outcome: Progressing     Problem: Depression  Goal: Will be euthymic at discharge  Description: INTERVENTIONS:  1. Administer medication as ordered  2. Provide emotional support via 1:1 interaction with staff  3. Encourage involvement in milieu/groups/activities  4. Monitor for social isolation  4/4/2025 1036 by Ayah De Oliveira RN  Outcome: Progressing  4/3/2025 2147 by Autumn Ly RN  Outcome: Not Progressing     Problem: Behavior  Goal: Pt/Family maintain appropriate behavior and adhere to behavioral management agreement, if implemented  Description: INTERVENTIONS:  1. Assess patient/family's coping skills and  non-compliant behavior (including use of illegal substances)  2. Notify security of behavior or suspected illegal substances which indicate the need for search of the family and/or belongings  3. Encourage verbalization of thoughts and concerns in a socially appropriate manner  4. Utilize positive, consistent limit setting strategies supporting safety of patient, staff and others  5. Encourage participation in the decision making process about the behavioral management agreement  6. If a visitor's behavior poses a threat to safety call refer

## 2025-04-04 NOTE — PLAN OF CARE
Patient is AOX4,ambulatory and able to make his needs known.His anxiety level is still 10/10 and depression,10/10.He however,denies SI/HI/AVH and pains.Calm and cooperative.No distress observed.Plan of care is ongoing.  Problem: Depression  Goal: Will be euthymic at discharge  Description: INTERVENTIONS:  1. Administer medication as ordered  2. Provide emotional support via 1:1 interaction with staff  3. Encourage involvement in milieu/groups/activities  4. Monitor for social isolation  4/3/2025 2147 by Autumn Ly RN  Outcome: Not Progressing  4/3/2025 0945 by Ayah De Oliveira RN  Outcome: Progressing     Problem: Anxiety  Goal: Will report anxiety at manageable levels  Description: INTERVENTIONS:  1. Administer medication as ordered  2. Teach and rehearse alternative coping skills  3. Provide emotional support with 1:1 interaction with staff  4/3/2025 2147 by Autumn Ly RN  Outcome: Not Progressing  4/3/2025 0945 by Ayah De Oliveira RN  Outcome: Progressing

## 2025-04-04 NOTE — BH NOTE
Behavioral Health Treatment Team Note     Patient goal(s) for today: \" I don't know\"  Treatment team focus/goals: continue medication management, group therapy, maintain ADLs and provide a safe discharge     Progress note: The patient presented in bed and appeared to be in need of grooming, he denied SI/HI/AVH at this time and was oriented x4. He rated his depression and anxiety a 10/10 presenting with a sad affect. Describing his mood as \"I don't know I'm just sad\" presenting with a depressed mood. The writer asked how much of his meal he ate to which he responded \"just ice cream\". The writer encouraged him to eat more of his meal at dinner to which the patient nodded his head at in response. The patient required prompting throughout the conversation to participate providing minimal eye contact. The writer encouraged the patient to socialize and attend group as he has been isolating himself to his room. The patient nodded to show agreement. When the writer asked how much sleep he had gotten last night he voiced \"2-3 minutes, they think I'm asleep but I just lay there\". The writer encouraged him to let his attending nurse know before he sleeps tonight. An inpatient level of care is needed for safe discharge planning and medication management.      LOS:  7  Expected LOS: 8-10 Days    Insurance info/prescription coverage:   Medicaid   Date of last family contact:  sister Jacqui Duong [phone] on 4/2  Family requesting physician contact today:  No  Discharge plan:  to stabilize the pt   Guns in the home:  No   Outpatient provider(s):  will be coordinated prior to discharge     Participating treatment team members: Kelly Brantley MSW

## 2025-04-04 NOTE — PROGRESS NOTES
Warfarin Dosing Consult  Bony Smith is a 59 y.o. male with LV thrombus. Pharmacy was consulted by Dr. Rivas to dose and monitor warfarin.    INR Goal: 2.5-3.5    PTA Dose: new start    Drugs that may increase INR:None  Drugs that may decrease INR: None  Other current anticoagulants/ drugs that may increase bleeding risk: None  Risk factors: None  Daily INR ordered: Yes    No results for input(s): \"HGB\", \"PLT\" in the last 72 hours.    No results for input(s): \"ALT\", \"AST\" in the last 72 hours.  Recent Labs     04/01/25  1620 04/02/25  0737 04/03/25  1110 04/04/25  0740   INR 1.8* 1.9* 2.6* 3.0*       Date INR Previous Dose   3/22 1.2 7.5   3/23 1.2 7.5   3/24 1.4 10   3/25 1.7 10   3/26 2.3 5   3/27 2 5   3/28 2 7.5   3/29 3 10 mg   3/30 2.3 Held   3/31 1.9 Ordered, but not given last night.    4/1 1.8 7.5 mg   4/2 1.9 7.5 mg   4/3 2.6 7.5 mg   4/4 3.0 5 mg     Assessment/ Plan:  INR is 3.0 and in therapeutic goal.  Continue 5 mg x 1 dose tonight.  Can decrease frequency of INR monitoring when INR has stabilized.  Order INR level for 4/5  Pharmacy will continue to monitor daily and adjust therapy as indicated.

## 2025-04-04 NOTE — GROUP NOTE
Group Therapy Note    Date: 4/4/2025    Group Start Time: 1310  Group End Time: 1355  Group Topic: Recreational    SSR 2  NON ACUTE    Katt Borrego        Group Therapy Note    Facilitated leisure skills group to reinforce positive coping and to manage mood through music, social interaction, group activities and art task       Attendees: 4/10       Patient's Goal:  Client will use words to express feelings, wants and needs    Notes:  Receptive to listening to music for about 15 minutes. Left group. Did not return    Status After Intervention:  Unchanged    Participation Level: Active Listener    Participation Quality: Appropriate      Speech:  normal      Thought Process/Content: Logical      Affective Functioning: Congruent      Mood:  calm      Level of consciousness:  Alert      Response to Learning: Progressing to goal      Endings: None Reported    Modes of Intervention: Socialization and Activity      Discipline Responsible: Recreational Therapist      Signature:  MANISH Miller

## 2025-04-04 NOTE — GROUP NOTE
Group Therapy Note    Date: 4/4/2025    Group Start Time: 1015  Group End Time: 1100  Group Topic: Education Group - Inpatient    SSR 2  NON ACUTE    Katt Borrego        Group Therapy Note    Facilitated discussion focus on identifying different barriers that has prevented progress and identifying ways to confront them       Attendees: 6/11       Patient's Goal:  Client will use words to express feelings, wants and needs    Notes:   Receptive to information discussed on different barriers for about 10 minutes. Left group. Did not return    Status After Intervention:  Unchanged    Participation Level: Active Listener    Participation Quality: Appropriate      Speech:  normal      Thought Process/Content: Quiet and Guarded      Affective Functioning: Flat      Mood:  calm      Level of consciousness:  Alert and Preoccupied      Response to Learning: Progressing to goal      Endings: None Reported    Modes of Intervention: Education and Support      Discipline Responsible: Recreational Therapist      Signature:  MANISH Miller

## 2025-04-05 LAB
INR PPP: 2.4 (ref 0.9–1.1)
PROTHROMBIN TIME: 26.9 SEC (ref 11.9–14.6)

## 2025-04-05 PROCEDURE — 6370000000 HC RX 637 (ALT 250 FOR IP): Performed by: PSYCHIATRY & NEUROLOGY

## 2025-04-05 PROCEDURE — 85610 PROTHROMBIN TIME: CPT

## 2025-04-05 PROCEDURE — 1240000000 HC EMOTIONAL WELLNESS R&B

## 2025-04-05 PROCEDURE — 36415 COLL VENOUS BLD VENIPUNCTURE: CPT

## 2025-04-05 RX ADMIN — ZIPRASIDONE HYDROCHLORIDE 20 MG: 20 CAPSULE ORAL at 09:05

## 2025-04-05 RX ADMIN — MIDODRINE HYDROCHLORIDE 15 MG: 5 TABLET ORAL at 09:06

## 2025-04-05 RX ADMIN — HYDROXYZINE HYDROCHLORIDE 50 MG: 50 TABLET, FILM COATED ORAL at 21:24

## 2025-04-05 RX ADMIN — BUSPIRONE HYDROCHLORIDE 7.5 MG: 5 TABLET ORAL at 21:24

## 2025-04-05 RX ADMIN — DULOXETINE HYDROCHLORIDE 30 MG: 30 CAPSULE, DELAYED RELEASE ORAL at 09:05

## 2025-04-05 RX ADMIN — MIRTAZAPINE 15 MG: 15 TABLET, FILM COATED ORAL at 21:24

## 2025-04-05 RX ADMIN — FLUOXETINE HYDROCHLORIDE 40 MG: 20 CAPSULE ORAL at 09:05

## 2025-04-05 RX ADMIN — THIAMINE HCL TAB 100 MG 100 MG: 100 TAB at 09:05

## 2025-04-05 RX ADMIN — MIDODRINE HYDROCHLORIDE 15 MG: 5 TABLET ORAL at 11:50

## 2025-04-05 RX ADMIN — MIDODRINE HYDROCHLORIDE 15 MG: 5 TABLET ORAL at 16:58

## 2025-04-05 RX ADMIN — ARIPIPRAZOLE 5 MG: 5 TABLET ORAL at 09:05

## 2025-04-05 RX ADMIN — OXCARBAZEPINE 150 MG: 150 TABLET, FILM COATED ORAL at 09:05

## 2025-04-05 RX ADMIN — BUSPIRONE HYDROCHLORIDE 7.5 MG: 5 TABLET ORAL at 09:05

## 2025-04-05 RX ADMIN — WARFARIN SODIUM 7.5 MG: 2.5 TABLET ORAL at 17:55

## 2025-04-05 RX ADMIN — ATORVASTATIN CALCIUM 40 MG: 40 TABLET, FILM COATED ORAL at 21:24

## 2025-04-05 RX ADMIN — OXCARBAZEPINE 150 MG: 150 TABLET, FILM COATED ORAL at 21:24

## 2025-04-05 NOTE — BH NOTE
DAY SHIFT NOTE:    Pt laying in bed awake.  Pt up at times walking around with arms crossed.  Pt up for meal trays, but takes back to room to eat.  Pt presents with flat affect.  Pt rates anxiety and depression 10/10.  Pt denies SI, HI, AH and VH.  When administering morning meds pt states \"that's a lot of medications, its toxic to take that many at the same time\".  Pt educated to discuss medication concerns with provider when making rounds.  Pt states \"I already did\".  Pt refused to take docusate stating \"I don't need that\".  Pt remains on Q 15 minutes close observation for safety.

## 2025-04-05 NOTE — GROUP NOTE
Group Therapy Note    Date: 4/5/2025    Group Start Time: 1335  Group End Time: 1415  Group Topic: Recreational    SSR 2 BH NON ACUTE    Katt Borrego        Group Therapy Note    Facilitated leisure skills group to reinforce positive coping and to manage mood through music, social interaction, group activities and art task       Attendees: 1/12       Notes:  Encouraged but did not attend    Discipline Responsible: Recreational Therapist      Signature:  MANISH Miller

## 2025-04-05 NOTE — GROUP NOTE
Group Therapy Note    Date: 4/5/2025    Group Start Time: 0945  Group End Time: 1025  Group Topic: Education Group - Inpatient    SSR 2  NON ACUTE    Katt Borrego        Group Therapy Note    Facilitated discussion focused on “the stress cycle/ breaking negative coping habits by learning to follow the path to a brighter future by using positive coping skills”       Attendees: 4/13      Notes:  Encouraged but did not attend    Discipline Responsible: Recreational Therapist      Signature:  MANISH Miller

## 2025-04-05 NOTE — BH NOTE
Progress note for April 4, 2025 patient seen for follow-up case discussed in the team he is out of his room standing in the hallway did some walking on his own without walker doing well he made 3 trips around the [facility] asked without dizziness without unsteadiness.  Still looks depressed anxious 10 out of 10 but not suicidal and not homicidal no hallucinations focused on getting better going to [place] apparently his sister lives close by move beyond missing his wife.  Encouraged her to attend the groups continued inpatient level of care indicated making very slow steady progress thank you his vital signs pulse 59 blood pressure 88/70 but no dizziness no falls temperature 98.2 respirations 18 temperature 97 point O2 saturation 97% thank you

## 2025-04-05 NOTE — PLAN OF CARE
Bony is AOX4, ambulatory with walker and able to make needs known. Pt has been interacting limited with peers and staff, isolates in bed. Pt encouraged to attend groups to help learn new coping skills, \"what is the point of me going.\" Pt given all HS medications and PRNs as ordered, tolerated well. Pt denied SI/HI/AVH and pain. Pt given HS snacks and fluids as tolerated. Staff will continue care plan as ordered. Pt encouraged to drink more water and ambulate around room or sit up in bed.     Problem: Depression  Goal: Will be euthymic at discharge  Description: INTERVENTIONS:  1. Administer medication as ordered  2. Provide emotional support via 1:1 interaction with staff  3. Encourage involvement in milieu/groups/activities  4. Monitor for social isolation  Outcome: Not Progressing     Problem: Anxiety  Goal: Will report anxiety at manageable levels  Description: INTERVENTIONS:  1. Administer medication as ordered  2. Teach and rehearse alternative coping skills  3. Provide emotional support with 1:1 interaction with staff  Outcome: Not Progressing

## 2025-04-05 NOTE — PROGRESS NOTES
Warfarin Dosing Consult  Bony Smith is a 59 y.o. male with LV thrombus. Pharmacy was consulted by Dr. Rivas to dose and monitor warfarin.    INR Goal: 2.5-3.5    PTA Dose: new start    Drugs that may increase INR:None  Drugs that may decrease INR: None  Other current anticoagulants/ drugs that may increase bleeding risk: None  Risk factors: None  Daily INR ordered: Yes    No results for input(s): \"HGB\", \"PLT\" in the last 72 hours.    No results for input(s): \"ALT\", \"AST\" in the last 72 hours.  Recent Labs     04/03/25  1110 04/04/25  0740 04/05/25  1134   INR 2.6* 3.0* 2.4*       Date INR Previous Dose   3/22 1.2 7.5   3/23 1.2 7.5   3/24 1.4 10   3/25 1.7 10   3/26 2.3 5   3/27 2 5   3/28 2 7.5   3/29 3 10 mg   3/30 2.3 Held   3/31 1.9 Ordered, but not given last night.    4/1 1.8 7.5 mg   4/2 1.9 7.5 mg   4/3 2.6 7.5 mg   4/4 3.0 5 mg   4/5 2.4 5 mg     Assessment/ Plan:  INR is 2.4 today. Will order Warfarin 7.5 mg x 1 dose tonight.  Please monitor patient for clinical s/sx of bleeding.   INR ordered through 4/12  Pharmacy will continue to monitor daily and adjust therapy as indicated.
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
## 2025-04-06 LAB
INR PPP: 2 (ref 0.9–1.1)
PROTHROMBIN TIME: 23.3 SEC (ref 11.9–14.6)

## 2025-04-06 PROCEDURE — 85610 PROTHROMBIN TIME: CPT

## 2025-04-06 PROCEDURE — 6370000000 HC RX 637 (ALT 250 FOR IP): Performed by: PSYCHIATRY & NEUROLOGY

## 2025-04-06 PROCEDURE — 1240000000 HC EMOTIONAL WELLNESS R&B

## 2025-04-06 PROCEDURE — 36415 COLL VENOUS BLD VENIPUNCTURE: CPT

## 2025-04-06 RX ADMIN — THIAMINE HCL TAB 100 MG 100 MG: 100 TAB at 09:06

## 2025-04-06 RX ADMIN — OXCARBAZEPINE 150 MG: 150 TABLET, FILM COATED ORAL at 20:57

## 2025-04-06 RX ADMIN — ZIPRASIDONE HYDROCHLORIDE 20 MG: 20 CAPSULE ORAL at 09:07

## 2025-04-06 RX ADMIN — MIRTAZAPINE 15 MG: 15 TABLET, FILM COATED ORAL at 20:57

## 2025-04-06 RX ADMIN — HYDROXYZINE HYDROCHLORIDE 50 MG: 50 TABLET, FILM COATED ORAL at 20:57

## 2025-04-06 RX ADMIN — ARIPIPRAZOLE 5 MG: 5 TABLET ORAL at 09:06

## 2025-04-06 RX ADMIN — BUSPIRONE HYDROCHLORIDE 7.5 MG: 5 TABLET ORAL at 09:06

## 2025-04-06 RX ADMIN — MIDODRINE HYDROCHLORIDE 15 MG: 5 TABLET ORAL at 17:30

## 2025-04-06 RX ADMIN — WARFARIN SODIUM 7.5 MG: 2.5 TABLET ORAL at 17:30

## 2025-04-06 RX ADMIN — ATORVASTATIN CALCIUM 40 MG: 40 TABLET, FILM COATED ORAL at 20:57

## 2025-04-06 RX ADMIN — MIDODRINE HYDROCHLORIDE 15 MG: 5 TABLET ORAL at 09:07

## 2025-04-06 RX ADMIN — MIDODRINE HYDROCHLORIDE 15 MG: 5 TABLET ORAL at 11:54

## 2025-04-06 RX ADMIN — BUSPIRONE HYDROCHLORIDE 7.5 MG: 5 TABLET ORAL at 20:57

## 2025-04-06 RX ADMIN — FLUOXETINE HYDROCHLORIDE 40 MG: 20 CAPSULE ORAL at 09:07

## 2025-04-06 RX ADMIN — DULOXETINE HYDROCHLORIDE 30 MG: 30 CAPSULE, DELAYED RELEASE ORAL at 09:07

## 2025-04-06 RX ADMIN — OXCARBAZEPINE 150 MG: 150 TABLET, FILM COATED ORAL at 09:07

## 2025-04-06 NOTE — PLAN OF CARE
Encourage participation in the decision making process about the behavioral management agreement  6. If a visitor's behavior poses a threat to safety call refer to organization policy.  7. Initiate consult with , Psychosocial CNS, Spiritual Care as appropriate  4/5/2025 2339 by Dorcas Okeefe RN  Outcome: Progressing  4/5/2025 1051 by Luba Silver RN  Outcome: Progressing     Problem: Anxiety  Goal: Will report anxiety at manageable levels  Description: INTERVENTIONS:  1. Administer medication as ordered  2. Teach and rehearse alternative coping skills  3. Provide emotional support with 1:1 interaction with staff  4/5/2025 2339 by Dorcas Okeefe RN  Outcome: Progressing  4/5/2025 1051 by Luba Silver RN  Outcome: Progressing     Problem: Sleep Disturbance  Goal: Will exhibit normal sleeping pattern  Description: INTERVENTIONS:  1. Administer medication as ordered  2. Decrease environmental stimuli, including noise, as appropriate  3. Discourage social isolation and naps during the day  4/5/2025 2339 by Dorcas Okeefe RN  Outcome: Progressing  4/5/2025 1051 by Luba Silver RN  Outcome: Progressing     Problem: Skin/Tissue Integrity  Goal: Skin integrity remains intact  Description: 1.  Monitor for areas of redness and/or skin breakdown  2.  Assess vascular access sites hourly  3.  Every 4-6 hours minimum:  Change oxygen saturation probe site  4.  Every 4-6 hours:  If on nasal continuous positive airway pressure, respiratory therapy assess nares and determine need for appliance change or resting period  4/5/2025 2339 by Dorcas Okeefe RN  Outcome: Progressing  4/5/2025 1051 by Luba Silver RN  Outcome: Progressing

## 2025-04-06 NOTE — BH NOTE
Behavioral Health Treatment Team Note     Patient goal(s) for today: To talk to sister  Treatment team focus/goals: Medication management, group therapy, discharge planning    Progress note: Pt presents in bed alert and oriented. His affect is flat with dysphoric mood. He denies SI/HI/AHV. Pt initially denied opportunity to use the phone in the doctor's office to talk with his sister stating, \"I don't like her\". Writer asked open ended questions. Pt got out of bed and used his walker to go into the office and agreed to talk with his sister, Jacqui. The call went to [employer]. Writer left a message simply stating, \"this is Randall, .\" Pt states he would like to try again later. He said the question he wants to ask is if his house was foreclosed on. An inpatient level of care is needed to coordinate a safe discharge.     LOS:  9   Expected LOS: 10 days      Insurance info/prescription coverage:  medicaid   Date of last family contact:  4/6 left  for sister Jacqui Duong [phone]    Family requesting physician contact today:  No  Discharge plan:  to stabilize the pt   Guns in the home:  Yes   Outpatient provider(s):  Dr. Jenkins in [place]     Writer will follow up with [facility] [phone] or [phone] for SB      Participating treatment team members: PEDRO Brantley MSW

## 2025-04-06 NOTE — PROGRESS NOTES
Warfarin Dosing Consult  Bony Smith is a 59 y.o. male with LV thrombus. Pharmacy was consulted by Dr. Rivas to dose and monitor warfarin.    INR Goal: 2.5-3.5    PTA Dose: new start    Drugs that may increase INR:None  Drugs that may decrease INR: None  Other current anticoagulants/ drugs that may increase bleeding risk: None  Risk factors: None  Daily INR ordered: Yes    No results for input(s): \"HGB\", \"PLT\" in the last 72 hours.    No results for input(s): \"ALT\", \"AST\" in the last 72 hours.  Recent Labs     04/04/25  0740 04/05/25  1134 04/06/25  1056   INR 3.0* 2.4* 2.0*       Date INR Previous Dose   3/22 1.2 7.5   3/23 1.2 7.5   3/24 1.4 10   3/25 1.7 10   3/26 2.3 5   3/27 2 5   3/28 2 7.5   3/29 3 10 mg   3/30 2.3 Held   3/31 1.9 Ordered, but not given last night.    4/1 1.8 7.5 mg   4/2 1.9 7.5 mg   4/3 2.6 7.5 mg   4/4 3.0 5 mg   4/5 2.4 5 mg   4/6 2 7.5 mg     Assessment/ Plan:  INR is 2 today. Will order Warfarin 7.5 mg x 1 dose tonight.  Please monitor patient for clinical s/sx of bleeding.   INR ordered through 4/12  Pharmacy will continue to monitor daily and adjust therapy as indicated.                 well

## 2025-04-06 NOTE — GROUP NOTE
Group Therapy Note    Date: 4/6/2025    Group Start Time: 1340  Group End Time: 1430  Group Topic: Recreational    SSR 2 BH NON ACUTE    Katt Borrego        Group Therapy Note    Facilitated leisure skills group to reinforce positive coping and to manage mood through music, social interaction, group activities and art task       Attendees: 5/13       Notes:  Encouraged but did not attend    Discipline Responsible: Recreational Therapist      Signature:  MANISH Miller

## 2025-04-06 NOTE — GROUP NOTE
Group Therapy Note    Date: 4/6/2025    Group Start Time: 1015  Group End Time: 1055  Group Topic: Education Group - Inpatient    SSR 2  NON ACUTE    Katt Borrego        Group Therapy Note    Facilitated group to discuss “reaction patterns” to different situations and recognizing patterns in behaviors that need to be changed for future reactions       Attendees: 5/13      Notes:  Encouraged but did not attend    Discipline Responsible: Recreational Therapist      Signature:  MANISH Miller

## 2025-04-06 NOTE — BH NOTE
DAY SHIFT NOTE:    0815 - Morning BP and pulse 103/78, 61.  Pt given scheduled Midodrine.      Pt up on unit walking around this morning.  Pt up for meal trays, but eats meals in his room.  Pt presents with flat and sad affect.  Pt rates anxiety and depression 7/10.  Pt denies SI and HI.  When pt asked about AH/VH, he stated \"I don't know\".  Pt reports that he does not want to be discharged tomorrow.  Pt educated to speak with case management and provider about his concerns.  Pt remains on Q 15 minutes close observation for safety.      1130 - Pt at unit door pushing on doors.  Pt redirected away from doors.  When pt came out to get lunch tray pt came out with gown open in the front.  Pt educated to keep gown closed when exiting his room.  This writer asked pt if he spoke with provider and  about his concerns of discharging, pt nodded head yes.  When pt was asked to clarify why he felt like he was not ready to be discharged, he would not answer.  Pt then stating \"I just give up on everything\".  Pt would not elaborate when asked for further explanation.      1151 - Afternoon BP and pulse 106/69, 71.  Pt given afternoon Midodrine.    1716 - Evening BP and pulse 91/53, 66.  Pt given evening dose of Midodrine.

## 2025-04-07 LAB
INR PPP: 1.9 (ref 0.9–1.1)
PROTHROMBIN TIME: 22.3 SEC (ref 11.9–14.6)

## 2025-04-07 PROCEDURE — 85610 PROTHROMBIN TIME: CPT

## 2025-04-07 PROCEDURE — 36415 COLL VENOUS BLD VENIPUNCTURE: CPT

## 2025-04-07 PROCEDURE — 6370000000 HC RX 637 (ALT 250 FOR IP): Performed by: PSYCHIATRY & NEUROLOGY

## 2025-04-07 PROCEDURE — 1240000000 HC EMOTIONAL WELLNESS R&B

## 2025-04-07 RX ADMIN — HYDROXYZINE HYDROCHLORIDE 50 MG: 50 TABLET, FILM COATED ORAL at 20:45

## 2025-04-07 RX ADMIN — MIDODRINE HYDROCHLORIDE 15 MG: 5 TABLET ORAL at 17:31

## 2025-04-07 RX ADMIN — BUSPIRONE HYDROCHLORIDE 7.5 MG: 5 TABLET ORAL at 09:26

## 2025-04-07 RX ADMIN — DOCUSATE SODIUM 100 MG: 50 LIQUID ORAL at 09:26

## 2025-04-07 RX ADMIN — FLUOXETINE HYDROCHLORIDE 40 MG: 20 CAPSULE ORAL at 09:26

## 2025-04-07 RX ADMIN — WARFARIN SODIUM 7.5 MG: 2.5 TABLET ORAL at 18:12

## 2025-04-07 RX ADMIN — OXCARBAZEPINE 150 MG: 150 TABLET, FILM COATED ORAL at 09:26

## 2025-04-07 RX ADMIN — DULOXETINE HYDROCHLORIDE 30 MG: 30 CAPSULE, DELAYED RELEASE ORAL at 09:26

## 2025-04-07 RX ADMIN — MIDODRINE HYDROCHLORIDE 15 MG: 5 TABLET ORAL at 11:53

## 2025-04-07 RX ADMIN — BUSPIRONE HYDROCHLORIDE 7.5 MG: 5 TABLET ORAL at 20:45

## 2025-04-07 RX ADMIN — ARIPIPRAZOLE 5 MG: 5 TABLET ORAL at 09:26

## 2025-04-07 RX ADMIN — MIRTAZAPINE 15 MG: 15 TABLET, FILM COATED ORAL at 20:45

## 2025-04-07 RX ADMIN — ZIPRASIDONE HYDROCHLORIDE 20 MG: 20 CAPSULE ORAL at 09:26

## 2025-04-07 RX ADMIN — THIAMINE HCL TAB 100 MG 100 MG: 100 TAB at 09:27

## 2025-04-07 RX ADMIN — OXCARBAZEPINE 150 MG: 150 TABLET, FILM COATED ORAL at 20:45

## 2025-04-07 RX ADMIN — ATORVASTATIN CALCIUM 40 MG: 40 TABLET, FILM COATED ORAL at 20:45

## 2025-04-07 NOTE — BH NOTE
Progress note for April 5, 2025 patient seen for follow-up patient in bed withdrawn still depressed says depression 10 out of 10 but not suicidal and not hallucinating upon inquiry he did get up and walk the hallway fairly confidently without a walker no dizziness.  Still looks sad depressed grieving over the loss of his wife.  Tried to connect with the living people at his sister's brother's son and the activities that he used to enjoy back to his community of origin.  Eating well sleeping well energy level is low even though it is slowly improving continued stay indicated thank you

## 2025-04-07 NOTE — PROGRESS NOTES
Warfarin Dosing Consult  Bony Smith is a 59 y.o. male with LV thrombus. Pharmacy was consulted by Dr. Rivas to dose and monitor warfarin.    INR Goal: 2.5-3.5    PTA Dose: new start    Drugs that may increase INR:None  Drugs that may decrease INR: None  Other current anticoagulants/ drugs that may increase bleeding risk: None  Risk factors: None  Daily INR ordered: Yes    No results for input(s): \"HGB\", \"PLT\" in the last 72 hours.    No results for input(s): \"ALT\", \"AST\" in the last 72 hours.  Recent Labs     04/05/25  1134 04/06/25  1056 04/07/25  0847   INR 2.4* 2.0* 1.9*       Date INR Previous Dose   3/22 1.2 7.5   3/23 1.2 7.5   3/24 1.4 10   3/25 1.7 10   3/26 2.3 5   3/27 2 5   3/28 2 7.5   3/29 3 10 mg   3/30 2.3 Held   3/31 1.9 Ordered, but not given last night.    4/1 1.8 7.5 mg   4/2 1.9 7.5 mg   4/3 2.6 7.5 mg   4/4 3.0 5 mg   4/5 2.4 5 mg   4/6 2 7.5 mg   4/7  1.9 7.5 mg     Assessment/ Plan:  INR is 1.9 today. Will order Warfarin 7.5 mg x 1 dose tonight.  Please monitor patient for clinical s/sx of bleeding.   INR ordered through 4/12  Pharmacy will continue to monitor daily and adjust therapy as indicated.

## 2025-04-07 NOTE — GROUP NOTE
Group Therapy Note    Date: 4/7/2025    Group Start Time: 1345  Group End Time: 1440  Group Topic: Recreational    SSR 2 BH NON ACUTE    Katt Borrego        Group Therapy Note    Facilitated leisure skills group to reinforce positive coping and to manage mood through music, social interaction, group activities and art task       Attendees: 7/14      Notes:  Encouraged but did not attend    Discipline Responsible: Recreational Therapist      Signature:  MANISH Miller

## 2025-04-07 NOTE — GROUP NOTE
Group Therapy Note    Date: 4/7/2025    Group Start Time: 1010  Group End Time: 1050  Group Topic: Education Group - Inpatient    SSR 2  NON ACUTE    Katt Borrego        Group Therapy Note    Facilitated group to focus on “exploring values “and “utilized values discussion questions      Attendees: 6/15       Notes:  Encouraged but did not attend    Discipline Responsible: Recreational Therapist      Signature:  MANISH Miller

## 2025-04-07 NOTE — BH NOTE
Client is quietly active on the unit.Minimum interaction noted between peers.He has a good appetite and reports sleeping well.Denies feeling suicidal or homicidal.Takes meds as prescribed and denies any side effects.Preoccupied with inner thoughts.Quiet and withdrawn.Flat affect and depressed mood.Conversation is superficial.Remains on close observation for safety.
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
## 2025-04-07 NOTE — BH NOTE
Pt encountered ambulating in the hallway. He presents as depressed with a flat affect. Mr Luis denies SI/HI/AVH.Pt reported that he is making an effort to ambulate more, eat his meals and snacks as provided and take Ensure drinks for supplemental nutrition. He is showing noticeable improvement as his efforts seem to have a positive impact on his overall presentation. He denied any dizziness today. He is sleeping well and his energy level is showing improvement.Pt reports that an attempt was made to reach his sister without success. He will continue to work with social work to communicate with his sister for discharge planning. Pt expresses concern on the foreclosure of his home. Awaiting communication with sister for this answer. At this time pt is progressing well but shows needs for continued monitoring and inpatient treatment.

## 2025-04-07 NOTE — BH NOTE
Progress note for April 6, 2025 patient seen for follow-up chart reviewed patient on his own standing outside his room spontaneous and announced he is not ready to be discharged on Monday of course we did not identify Monday as a discharge date though is trying to focus on getting him out he did not give any reason why he felt that his do not need to go on Monday needs some more days.  Yet he was standing at the door as if he want to get he is compliant with the medication walking without dizziness not suicidal still labile his depression 10 out of 10 he does look sad he spontaneously says that he wanted to take a shower staff assisted him giving a fresh gown and encouraged him to complete taking a shower.  Give a positive feedback to do so for his the efforts he is making to improve his functioning continued inpatient level of care indicated his vital signs today pulse 51 blood pressure 94/51 temperature 98.2 respiration 18 O2 is saturation 98% no side effects noted thank you

## 2025-04-08 LAB
INR PPP: 2.3 (ref 0.9–1.1)
PROTHROMBIN TIME: 25.7 SEC (ref 11.9–14.6)

## 2025-04-08 PROCEDURE — 36415 COLL VENOUS BLD VENIPUNCTURE: CPT

## 2025-04-08 PROCEDURE — 6370000000 HC RX 637 (ALT 250 FOR IP): Performed by: PSYCHIATRY & NEUROLOGY

## 2025-04-08 PROCEDURE — 85610 PROTHROMBIN TIME: CPT

## 2025-04-08 PROCEDURE — 1240000000 HC EMOTIONAL WELLNESS R&B

## 2025-04-08 RX ORDER — WARFARIN SODIUM 3 MG/1
6 TABLET ORAL
Status: COMPLETED | OUTPATIENT
Start: 2025-04-08 | End: 2025-04-08

## 2025-04-08 RX ADMIN — OXCARBAZEPINE 150 MG: 150 TABLET, FILM COATED ORAL at 21:09

## 2025-04-08 RX ADMIN — WARFARIN SODIUM 6 MG: 3 TABLET ORAL at 17:35

## 2025-04-08 RX ADMIN — BUSPIRONE HYDROCHLORIDE 7.5 MG: 5 TABLET ORAL at 08:42

## 2025-04-08 RX ADMIN — THIAMINE HCL TAB 100 MG 100 MG: 100 TAB at 08:42

## 2025-04-08 RX ADMIN — ZIPRASIDONE HYDROCHLORIDE 20 MG: 20 CAPSULE ORAL at 08:42

## 2025-04-08 RX ADMIN — OXCARBAZEPINE 150 MG: 150 TABLET, FILM COATED ORAL at 08:42

## 2025-04-08 RX ADMIN — HYDROXYZINE HYDROCHLORIDE 50 MG: 50 TABLET, FILM COATED ORAL at 21:09

## 2025-04-08 RX ADMIN — MIDODRINE HYDROCHLORIDE 15 MG: 5 TABLET ORAL at 08:42

## 2025-04-08 RX ADMIN — MIDODRINE HYDROCHLORIDE 15 MG: 5 TABLET ORAL at 17:35

## 2025-04-08 RX ADMIN — ATORVASTATIN CALCIUM 40 MG: 40 TABLET, FILM COATED ORAL at 21:09

## 2025-04-08 RX ADMIN — MIRTAZAPINE 15 MG: 15 TABLET, FILM COATED ORAL at 21:09

## 2025-04-08 RX ADMIN — DULOXETINE HYDROCHLORIDE 30 MG: 30 CAPSULE, DELAYED RELEASE ORAL at 08:42

## 2025-04-08 RX ADMIN — MIDODRINE HYDROCHLORIDE 15 MG: 5 TABLET ORAL at 12:42

## 2025-04-08 RX ADMIN — FLUOXETINE HYDROCHLORIDE 40 MG: 20 CAPSULE ORAL at 08:42

## 2025-04-08 RX ADMIN — BUSPIRONE HYDROCHLORIDE 7.5 MG: 5 TABLET ORAL at 21:09

## 2025-04-08 RX ADMIN — ARIPIPRAZOLE 5 MG: 5 TABLET ORAL at 08:42

## 2025-04-08 NOTE — GROUP NOTE
Group Therapy Note    Date: 4/8/2025    Group Start Time: 1315  Group End Time: 1400  Group Topic: Process Group - Inpatient    SSR 2 [facility] ACUTE    Maisha Fuller MSW        Group Therapy Note: Facilitator encouraged the group to identify ways to cope with stress. Discussed empathy and ways to manifest more of what's missing emotionally.    Attendees: 7           Notes:  PT did not attend group    Signature:  ZEESHAN DOUGHERTY

## 2025-04-08 NOTE — GROUP NOTE
Group Therapy Note    Date: 4/8/2025    Group Start Time: 1815  Group End Time: 1850  Group Topic: Education Group - Inpatient    SSR 2 BH NON ACUTE    Yolanda Perry        Group Therapy Note    Attendees: 7/12    Facilitated structured group discussion to identify symptoms and behaviors related to depression cycle.          Notes:  Did not attend group despite encouragement      Discipline Responsible: Recreational Therapist      Signature:  MANISH Rebolledo

## 2025-04-08 NOTE — BH NOTE
Behavioral Health Treatment Team Note     Patient goal(s) for today: To go to groups  Treatment team focus/goals: Medication management, group therapy, discharge planning    Progress note: Pt presents ambulating the halls without the use of a walker. Writer engaged pt in therapeutic discussion about self care post discharge. Pt's sister, Alicia will accompany him to his medication management appointment at [facility] on 4/21. She will also make sure he finishes his intake with [facility]\" so that he can be assigned a mental health skill builder to assist with med mgt at home. An inpatient level of care is needed to coordinate a safe discharge.  will  tomorrow.    LOS:  11  Expected LOS: 12 days    Insurance info/prescription coverage:  [facility]  Date of last family contact:  4/8/2025 with Alicia gordon [phone]   Family requesting physician contact today:  No  Discharge plan:  Home with [facility] referral  Guns in the home:  No  Outpatient provider(s):   Dr. Keith at [facility]     Participating treatment team members: PEDRO Brantley MSW

## 2025-04-08 NOTE — BH NOTE
DAY SHIFT NOTE:    Pt laying in bed awake this morning.  Pt up for all meal trays, but takes them back to his room to eat.  Pt presents with flat and sad affect.  Pt denies anxiety, depression, SI, HI, AH and VH.  Pt noted up on unit at times walking.  Pt remains on Q 15 minutes close observation for safety.      1152 - Afternoon vitals /74, pulse 66.  Pt administered afternoon scheduled Midodrine.    1731 - Evening VS /82, pulse 63.  Pt administered evening scheduled Midodrine.

## 2025-04-08 NOTE — PROGRESS NOTES
Warfarin Dosing Consult  Bony Smith is a 59 y.o. male with LV thrombus. Pharmacy was consulted by Dr. Rivas to dose and monitor warfarin.    INR Goal: 2.5-3.5    PTA Dose: new start    Drugs that may increase INR:None  Drugs that may decrease INR: None  Other current anticoagulants/ drugs that may increase bleeding risk: None  Risk factors: None  Daily INR ordered: Yes    No results for input(s): \"HGB\", \"PLT\" in the last 72 hours.    No results for input(s): \"ALT\", \"AST\" in the last 72 hours.  Recent Labs     04/06/25  1056 04/07/25  0847 04/08/25  0913   INR 2.0* 1.9* 2.3*       Date INR Previous Dose   3/22 1.2 7.5   3/23 1.2 7.5   3/24 1.4 10   3/25 1.7 10   3/26 2.3 5   3/27 2 5   3/28 2 7.5   3/29 3 10 mg   3/30 2.3 Held   3/31 1.9 Ordered, but not given last night.    4/1 1.8 7.5 mg   4/2 1.9 7.5 mg   4/3 2.6 7.5 mg   4/4 3.0 5 mg   4/5 2.4 5 mg   4/6 2 7.5 mg   4/7  1.9 7.5 mg   4/8 2.3 7.5 mg     Assessment/ Plan:  INR has improved to 2.3.  In order to prevent the INR from increasing at a rapid pace, will order Warfarin 6 mg x 1 dose tonight.  Please monitor patient for clinical s/sx of bleeding.   INR ordered through 4/12  Pharmacy will continue to monitor daily and adjust therapy as indicated.

## 2025-04-08 NOTE — PLAN OF CARE
Problem: Discharge Planning  Goal: Discharge to home or other facility with appropriate resources  Outcome: Progressing     Problem: Safety - Adult  Goal: Free from fall injury  Outcome: Progressing     Problem: Self Harm/Suicidality  Goal: Will have no self-injury during hospital stay  Description: INTERVENTIONS:  1.  Ensure constant observer at bedside with Q15M safety checks  2.  Maintain a safe environment  3.  Secure patient belongings  4.  Ensure family/visitors adhere to safety recommendations  5.  Ensure safety tray has been added to patient's diet order  6.  Every shift and PRN: Re-assess suicidal risk via Frequent Screener    Outcome: Progressing     Problem: Depression  Goal: Will be euthymic at discharge  Description: INTERVENTIONS:  1. Administer medication as ordered  2. Provide emotional support via 1:1 interaction with staff  3. Encourage involvement in milieu/groups/activities  4. Monitor for social isolation  Outcome: Progressing     Problem: Behavior  Goal: Pt/Family maintain appropriate behavior and adhere to behavioral management agreement, if implemented  Description: INTERVENTIONS:  1. Assess patient/family's coping skills and  non-compliant behavior (including use of illegal substances)  2. Notify security of behavior or suspected illegal substances which indicate the need for search of the family and/or belongings  3. Encourage verbalization of thoughts and concerns in a socially appropriate manner  4. Utilize positive, consistent limit setting strategies supporting safety of patient, staff and others  5. Encourage participation in the decision making process about the behavioral management agreement  6. If a visitor's behavior poses a threat to safety call refer to organization policy.  7. Initiate consult with , Psychosocial CNS, Spiritual Care as appropriate  Outcome: Progressing     Problem: Anxiety  Goal: Will report anxiety at manageable levels  Description:  [de-identified] : Mr. Dalal is a 64 year old male with PMH of DM, HTN, DLD, MAICOL who was recently admitted for suspected ITP. Patient was noted to have asymptomatic thrombocytopenia as an outpatient and referred to the ED. In the ED his platelet count was 5K with normal WBC and Hg. His smear was reviewed and showed 1-2 schistocytes, no clumping, large platelets, decrease number of platelets, normal WBC morphology/no blasts, some target cells. He was started on prednisone 1 mg/kg of prednisone, and he received two doses of IVIG. His platelets improved to 38 and he was discharged on prednisone. \par Of note, his platelets were 106 in June 2020, ad then 53 in January 2021. Also in January and then in February he received the Moderna vaccine. He does have high RBC count (6.45) and low MCV (73).\par Workup in the hospital showed normal US spleen, no evidence of hemolysis, normal LDH, hapto and ret count, HIV, HepB, HepC EBV IgM, CMV IgM NR , HIT negative , RF negative, peripheral flow negative\par \par Patient informed us he was not discharged with needles for his insulin. His fingersticks have in the low 200s. \par His platelets today are 116. No bleeding, bruising or petechiae. \par \par 5/14/2021- pt returns for follow up today.  feeling well.  some mild bruising where labs drawn otherwise no other bruising.  wbc 10.49 hgb 13.5 hct 23.0 platelets now 133. \par pt made f/up appt with new fer Doss for this monday 5/17/2021. P t checking FS at home sugar 130 today.   [de-identified] : 5/18/21: Mr. Dalal returns for followup. Since Saturday he has been taking 100mg prednsone daily, he was on 130mg prior to this. No evidence of bleeding, petechiae. His platelets dropped to 28K. We reviewed the smear and there was no clumping, 2-3 platelets per HPF. We told him to take an additional 30mg today.\par \par 5/21/21\par Patient is here for a follow-up visit for immune thrombocytopenia.  He is feeling well with no new complaints.  Reviewed most recent CBC, which shows PLTs are down to 12,000 from 28,000 earlier this week.  His PLT dropped even with increase of prednisone back up to 130mg daily.  Patient denies easier bruising or bleeding.  \par \par 5/26/21\par Patient is here for a follow-up visit for immune thrombocytopenia.  He is feeling well with no new complaints.  Patient denies easier bruising or bleeding.  Since last visit, he was sent to ER for complete workup and received IVIG x 2 days.  During hospital stay, we offered BMBx and to start Rituxan, but patient deferred at that time.  He has been taking Prednisone 130mg daily.  Reviewed most recent CBC which shows PLTs are down to 35,000 from 51,000 earlier this week.

## 2025-04-08 NOTE — BH NOTE
DISCHARGE SUMMARY    NAME:Bony Smith  : 1965  MRN: [id number]    The patient Bony Smith exhibits the ability to control behavior in a less restrictive environment.  Patient's level of functioning is improving.  No assaultive/destructive behavior has been observed for the past 24 hours.  No suicidal/homicidal threat or behavior has been observed for the past 24 hours.  There is no evidence of serious medication side effects.  Patient has not been in physical or protective restraints for at least the past 24 hours.    If weapons involved, how are they secured? N/a    Is patient aware of and in agreement with discharge plan? Yes    Arrangements for medication:  Prescriptions to pharmacy in the chart    Copy of discharge instructions to provider?:  yes    Arrangements for transportation home:  Pt's sister, Alicia will pick him up.    Keep all follow up appointments as scheduled, continue to take prescribed medications per physician instructions.  Mental health crisis number:  [phone]      Mental Health Emergency WARM LINE      [phone] ([phone])      M-F: 9am to 9pm      Sat & Sun: 5pm - 9pm  National suicide prevention lines:                             8-541-UJGULOH (6-264-085-1606)       2-541-126-TALK ([phone])    Crisis Text Line:  Text HOME to 071962

## 2025-04-08 NOTE — BH NOTE
Behavioral Health Transition Record to Provider    Patient Name: Bony Smith  YOB: 1965  Medical Record Number: [id number]  Date of Admission: 3/28/2025  Date of Discharge: 4/9/2025    Attending Provider: Rg Rivas MD  Discharging Provider: Dr. Rivas  To contact this individual call [phone] and ask the  to page.  If unavailable, ask to be transferred to Behavioral Health Provider on call.  A Behavioral Health Provider will be available on call 24/7 and during holidays.    Primary Care Provider: None, None    No Known Allergies    Reason for Admission: Pt was transferred to [facility] from [facility] due to abdominal pain. Once medically cleared the pt was transferred to [facility] but then had to be sent back to medical due to RR and weakness per nursing note on 3-15-25. Pt was originally admitted due to having SI stating that he did not want to live any more.     Admission Diagnosis: Bipolar disorder [F31.9]    * No surgery found *    Results for orders placed or performed during the hospital encounter of 03/28/25   Protime-INR   Result Value Ref Range    Protime 31.3 (H) 11.9 - 14.6 sec    INR 3.0 (H) 0.9 - 1.1     CBC with Auto Differential   Result Value Ref Range    WBC 8.6 4.1 - 11.1 K/uL    RBC 4.58 4.10 - 5.70 M/uL    Hemoglobin 12.9 12.1 - 17.0 g/dL    Hematocrit 39.4 36.6 - 50.3 %    MCV 86.0 80.0 - 99.0 FL    MCH 28.2 26.0 - 34.0 PG    MCHC 32.7 30.0 - 36.5 g/dL    RDW 16.6 (H) 11.5 - 14.5 %    Platelets 391 150 - 400 K/uL    MPV 11.3 8.9 - 12.9 FL    Nucleated RBCs 0.0 0.0  WBC    nRBC 0.00 0.00 - 0.01 K/uL    Neutrophils % 59.7 32.0 - 75.0 %    Lymphocytes % 31.4 12.0 - 49.0 %    Monocytes % 6.5 5.0 - 13.0 %    Eosinophils % 1.5 0.0 - 7.0 %    Basophils % 0.7 0.0 - 1.0 %    Immature Granulocytes % 0.2 0 - 0.5 %    Neutrophils Absolute 5.10 1.80 - 8.00 K/UL    Lymphocytes Absolute 2.69 0.80 - 3.50 K/UL    Monocytes Absolute 0.56 0.00 - 1.00 K/UL    Eosinophils Absolute 0.13

## 2025-04-09 VITALS
SYSTOLIC BLOOD PRESSURE: 96 MMHG | TEMPERATURE: 97.9 F | HEART RATE: 88 BPM | DIASTOLIC BLOOD PRESSURE: 77 MMHG | OXYGEN SATURATION: 97 % | HEIGHT: 71 IN | WEIGHT: 170 LBS | BODY MASS INDEX: 23.8 KG/M2 | RESPIRATION RATE: 16 BRPM

## 2025-04-09 LAB
INR PPP: 2.2 (ref 0.9–1.1)
PROTHROMBIN TIME: 25.2 SEC (ref 11.9–14.6)

## 2025-04-09 PROCEDURE — 85610 PROTHROMBIN TIME: CPT

## 2025-04-09 PROCEDURE — 6370000000 HC RX 637 (ALT 250 FOR IP): Performed by: PSYCHIATRY & NEUROLOGY

## 2025-04-09 PROCEDURE — 36415 COLL VENOUS BLD VENIPUNCTURE: CPT

## 2025-04-09 RX ORDER — DULOXETIN HYDROCHLORIDE 30 MG/1
30 CAPSULE, DELAYED RELEASE ORAL DAILY
Qty: 30 CAPSULE | Refills: 1 | Status: SHIPPED | OUTPATIENT
Start: 2025-04-09

## 2025-04-09 RX ORDER — OXCARBAZEPINE 150 MG/1
150 TABLET, FILM COATED ORAL 2 TIMES DAILY
Qty: 60 TABLET | Refills: 3 | Status: SHIPPED | OUTPATIENT
Start: 2025-04-09

## 2025-04-09 RX ORDER — MIRTAZAPINE 15 MG/1
15 TABLET, FILM COATED ORAL NIGHTLY
Qty: 30 TABLET | Refills: 1 | Status: SHIPPED | OUTPATIENT
Start: 2025-04-09

## 2025-04-09 RX ORDER — HYDROXYZINE HYDROCHLORIDE 50 MG/1
25 TABLET, FILM COATED ORAL DAILY PRN
Qty: 15 TABLET | Refills: 0 | Status: SHIPPED | OUTPATIENT
Start: 2025-04-09 | End: 2025-05-09

## 2025-04-09 RX ORDER — ATORVASTATIN CALCIUM 40 MG/1
40 TABLET, FILM COATED ORAL NIGHTLY
Qty: 30 TABLET | Refills: 1 | Status: SHIPPED | OUTPATIENT
Start: 2025-04-09

## 2025-04-09 RX ORDER — WARFARIN SODIUM 5 MG/1
TABLET ORAL
Qty: 60 TABLET | Refills: 2 | Status: SHIPPED | OUTPATIENT
Start: 2025-04-09

## 2025-04-09 RX ORDER — MIDODRINE HYDROCHLORIDE 5 MG/1
15 TABLET ORAL
Qty: 90 TABLET | Refills: 3 | Status: SHIPPED | OUTPATIENT
Start: 2025-04-09

## 2025-04-09 RX ORDER — ARIPIPRAZOLE 5 MG/1
5 TABLET ORAL DAILY
Qty: 30 TABLET | Refills: 3 | Status: SHIPPED | OUTPATIENT
Start: 2025-04-10

## 2025-04-09 RX ORDER — WARFARIN SODIUM 7.5 MG/1
TABLET ORAL
Qty: 30 TABLET | Refills: 0 | Status: SHIPPED | OUTPATIENT
Start: 2025-04-09 | End: 2025-04-09 | Stop reason: HOSPADM

## 2025-04-09 RX ORDER — TRAZODONE HYDROCHLORIDE 50 MG/1
50 TABLET ORAL NIGHTLY PRN
Qty: 30 TABLET | Refills: 1 | Status: SHIPPED | OUTPATIENT
Start: 2025-04-09

## 2025-04-09 RX ORDER — BUSPIRONE HYDROCHLORIDE 7.5 MG/1
7.5 TABLET ORAL 2 TIMES DAILY
Qty: 60 TABLET | Refills: 0 | Status: SHIPPED | OUTPATIENT
Start: 2025-04-09

## 2025-04-09 RX ADMIN — ARIPIPRAZOLE 5 MG: 5 TABLET ORAL at 09:15

## 2025-04-09 RX ADMIN — ZIPRASIDONE HYDROCHLORIDE 20 MG: 20 CAPSULE ORAL at 09:15

## 2025-04-09 RX ADMIN — DULOXETINE HYDROCHLORIDE 30 MG: 30 CAPSULE, DELAYED RELEASE ORAL at 09:15

## 2025-04-09 RX ADMIN — FLUOXETINE HYDROCHLORIDE 40 MG: 20 CAPSULE ORAL at 09:15

## 2025-04-09 RX ADMIN — BUSPIRONE HYDROCHLORIDE 7.5 MG: 5 TABLET ORAL at 09:15

## 2025-04-09 RX ADMIN — THIAMINE HCL TAB 100 MG 100 MG: 100 TAB at 09:15

## 2025-04-09 RX ADMIN — OXCARBAZEPINE 150 MG: 150 TABLET, FILM COATED ORAL at 09:15

## 2025-04-09 RX ADMIN — MIDODRINE HYDROCHLORIDE 15 MG: 5 TABLET ORAL at 12:28

## 2025-04-09 NOTE — CONSULTS
Hospital medicine was consulted for warfarin dosing clarification prior to discharge from behavioral health unit.  Patient initially admitted to medical floor where a left apical thrombus was found, started on IV heparin and eventually bridged to warfarin.  Patient with suicidal ideation so once medically discharged was sent to behavioral health unit.  Pharmacy has been dosing based on INR with goal INR 2.5-3.5.  INR today 2.2.  Spoke with ordering provider Dr. Hatch who cleared patient for discharge with close outpatient follow-up with PCP.  Clarified dosing with pharmacy who contacted patient's pharmacy in [place]; this pharmacy only has warfarin available in 5 mg tablets so patient will need to take 1.5 tablets until further follow-up with PCP for INR check with further dose adjustments per PCP.  Unfortunately, patient was discharged prior to being able to provide education regarding importance of close PCP follow-up and dosage instructions.  Attempted to call patient's sister Ruth.  Correct warfarin dosage has been sent to patient's pharmacy.  Behavioral health unit  aware and will also contact patient and his sister to update.

## 2025-04-09 NOTE — PLAN OF CARE
Problem: Discharge Planning  Goal: Discharge to home or other facility with appropriate resources  4/9/2025 0427 by Dorcas Okeefe RN  Outcome: Progressing  4/8/2025 1529 by Luba Silver RN  Outcome: Progressing     Problem: Safety - Adult  Goal: Free from fall injury  4/9/2025 0427 by Dorcas Okeefe RN  Outcome: Progressing  4/8/2025 1529 by Luba Silver RN  Outcome: Progressing     Problem: Self Harm/Suicidality  Goal: Will have no self-injury during hospital stay  Description: INTERVENTIONS:  1.  Ensure constant observer at bedside with Q15M safety checks  2.  Maintain a safe environment  3.  Secure patient belongings  4.  Ensure family/visitors adhere to safety recommendations  5.  Ensure safety tray has been added to patient's diet order  6.  Every shift and PRN: Re-assess suicidal risk via Frequent Screener    4/9/2025 0427 by Dorcas Okeefe RN  Outcome: Progressing  4/8/2025 1529 by Luba Silver RN  Outcome: Progressing     Problem: Depression  Goal: Will be euthymic at discharge  Description: INTERVENTIONS:  1. Administer medication as ordered  2. Provide emotional support via 1:1 interaction with staff  3. Encourage involvement in milieu/groups/activities  4. Monitor for social isolation  4/9/2025 0427 by Dorcas Okeefe RN  Outcome: Progressing  4/8/2025 1529 by Luba Silver RN  Outcome: Progressing     Problem: Behavior  Goal: Pt/Family maintain appropriate behavior and adhere to behavioral management agreement, if implemented  Description: INTERVENTIONS:  1. Assess patient/family's coping skills and  non-compliant behavior (including use of illegal substances)  2. Notify security of behavior or suspected illegal substances which indicate the need for search of the family and/or belongings  3. Encourage verbalization of thoughts and concerns in a socially appropriate manner  4. Utilize positive, consistent limit setting strategies supporting safety of patient, staff and others  5.  Encourage participation in the decision making process about the behavioral management agreement  6. If a visitor's behavior poses a threat to safety call refer to organization policy.  7. Initiate consult with , Psychosocial CNS, Spiritual Care as appropriate  4/9/2025 0427 by Dorcas Okeefe RN  Outcome: Progressing  4/8/2025 1529 by Luba Silver RN  Outcome: Progressing     Problem: Anxiety  Goal: Will report anxiety at manageable levels  Description: INTERVENTIONS:  1. Administer medication as ordered  2. Teach and rehearse alternative coping skills  3. Provide emotional support with 1:1 interaction with staff  4/9/2025 0427 by Dorcas Okeefe RN  Outcome: Progressing  4/8/2025 1529 by Luba Silver RN  Outcome: Progressing     Problem: Sleep Disturbance  Goal: Will exhibit normal sleeping pattern  Description: INTERVENTIONS:  1. Administer medication as ordered  2. Decrease environmental stimuli, including noise, as appropriate  3. Discourage social isolation and naps during the day  4/9/2025 0427 by Dorcas Okeefe RN  Outcome: Progressing  4/8/2025 1529 by Luba Silver RN  Outcome: Progressing     Problem: Skin/Tissue Integrity  Goal: Skin integrity remains intact  Description: 1.  Monitor for areas of redness and/or skin breakdown  2.  Assess vascular access sites hourly  3.  Every 4-6 hours minimum:  Change oxygen saturation probe site  4.  Every 4-6 hours:  If on nasal continuous positive airway pressure, respiratory therapy assess nares and determine need for appliance change or resting period  4/9/2025 0427 by Dorcas Okeefe RN  Outcome: Progressing  4/8/2025 1529 by Luba Silver RN  Outcome: Progressing

## 2025-04-09 NOTE — BH NOTE
DAY SHIFT NOTE:    Pt laying in bed awake.  Pt presents with flat affect.  Pt denies anxiety, depression, SI, HI, AH and VH.  Pt isolative to room except to get meal trays.  Pt remains on Q 15 minutes close observation for safety.      DISCHARGE NOTE:    Received discharge orders for pt.  Discharge instructions reviewed and understood with the patient.  Pt verbalized understanding.  Pt educated that prescriptions were sent electronically to [facility] and to  after discharge.  Pt did not have any belongings in unit safe, unit storage to be returned to pt.  Pt did not have any home medications in pharmacy to return to pt.  Pt discharged home via sister without any complaints voiced.

## 2025-04-09 NOTE — GROUP NOTE
Group Therapy Note    Date: 4/8/2025    Group Start Time: 1930  Group End Time: 2025  Group Topic: Recreational    SSR 2 BH NON ACUTE    Yolanda Perry        Group Therapy Note    Attendees: 6/12    Recreational Therapist facilitated structured leisure skills group to introduce healthy leisure skills as positive way to cope and manage mood             Notes:  Did not attend group despite encouragement    Discipline Responsible: Recreational Therapist      Signature:  MANISH Rebolledo

## 2025-04-09 NOTE — PROGRESS NOTES
Warfarin Dosing Consult  Bony Smith is a 59 y.o. male with LV thrombus. Pharmacy was consulted by Dr. Rivas to dose and monitor warfarin.    INR Goal: 2.5-3.5    PTA Dose: new start    Drugs that may increase INR:None  Drugs that may decrease INR: None  Other current anticoagulants/ drugs that may increase bleeding risk: None  Risk factors: None  Daily INR ordered: Yes    No results for input(s): \"HGB\", \"PLT\" in the last 72 hours.    No results for input(s): \"ALT\", \"AST\" in the last 72 hours.  Recent Labs     04/07/25  0847 04/08/25  0913 04/09/25  1016   INR 1.9* 2.3* 2.2*       Date INR Previous Dose   3/22 1.2 7.5   3/23 1.2 7.5   3/24 1.4 10   3/25 1.7 10   3/26 2.3 5   3/27 2 5   3/28 2 7.5   3/29 3 10 mg   3/30 2.3 Held   3/31 1.9 Ordered, but not given last night.    4/1 1.8 7.5 mg   4/2 1.9 7.5 mg   4/3 2.6 7.5 mg   4/4 3.0 5 mg   4/5 2.4 5 mg   4/6 2 7.5 mg   4/7  1.9 7.5 mg   4/8 2.3 7.5 mg   4/9 2.2 6 mg     Assessment/ Plan:  INR is stagnant at 2.2 from 2.3 yesterday. Will order Warfarin 7.5 mg x 1 today. Patient is due t discharge today per Dr. Rivas. With patient still not at goal and no signs of current bleeding, proceed with dosing prior to discharge. Recommended care be coordinated with PCP to set up anticoagulation clinic appointment to ensure therapeutic anticoagulation.   Please monitor patient for clinical s/sx of bleeding.   INR ordered through 4/12  Pharmacy will continue to monitor daily and adjust therapy as indicated.

## 2025-04-09 NOTE — BH NOTE
Progress note for April 8, 2024 saw the patient he is in bed withdrawn but did get up promptly he asked whether he needed to walk and he said what he got up and made 3 circles around the nursing station without any walker without any stumbling then he felt tired and came back to the room he is engaging and sharing more apparently his sister communicate to him that she is keeping the house clean his house clean paying the taxes and bills so he was happy to do the know the information and also happy that his son is living and doing well discussed about discharge planning he asked about whether he can be discharged today he is making good progress however and need to talk to his sister on the son I suggest after he leaves here to stay with a family member for a week or so to make sure he holds onto the gains that he may keep up with the day program may be  or skill building spoke with Maisha Fuller's  felt that local [facility] has a walking policy.  Talked to the family and they are willing to come and pick him up tomorrow on Wednesday at 1230.  Discussed about her support and hobbies and interests utilizing the family support and fellowship thank you is vital signs pulse 55 blood pressure 116/71 temperature 97.9 respirations 16 O2 saturation 97% eating well sleeping well not suicidal and not homicidal and not psychotic thank he felt positive and spontaneously asking for discharge

## 2025-05-30 ENCOUNTER — HOSPITAL ENCOUNTER (EMERGENCY)
Facility: HOSPITAL | Age: 60
Discharge: HOME | End: 2025-05-30
Attending: STUDENT IN AN ORGANIZED HEALTH CARE EDUCATION/TRAINING PROGRAM
Payer: MEDICARE

## 2025-05-30 VITALS
TEMPERATURE: 98.2 F | BODY MASS INDEX: 23.4 KG/M2 | RESPIRATION RATE: 16 BRPM | SYSTOLIC BLOOD PRESSURE: 123 MMHG | HEIGHT: 69 IN | OXYGEN SATURATION: 95 % | WEIGHT: 158 LBS | DIASTOLIC BLOOD PRESSURE: 79 MMHG | HEART RATE: 61 BPM

## 2025-05-30 DIAGNOSIS — R42 ORTHOSTATIC LIGHTHEADEDNESS: Primary | ICD-10-CM

## 2025-05-30 LAB
ANION GAP SERPL CALC-SCNC: 12 MMOL/L (ref 3–18)
BASOPHILS # BLD: 0.11 K/UL (ref 0–0.1)
BASOPHILS NFR BLD: 0.8 % (ref 0–2)
BUN SERPL-MCNC: 19 MG/DL (ref 6–23)
BUN/CREAT SERPL: 22 (ref 12–20)
CALCIUM SERPL-MCNC: 9.4 MG/DL (ref 8.5–10.1)
CHLORIDE SERPL-SCNC: 105 MMOL/L (ref 98–107)
CO2 SERPL-SCNC: 23 MMOL/L (ref 21–32)
CREAT SERPL-MCNC: 0.86 MG/DL (ref 0.6–1.3)
DIFFERENTIAL METHOD BLD: ABNORMAL
EOSINOPHIL # BLD: 0.2 K/UL (ref 0–0.4)
EOSINOPHIL NFR BLD: 1.5 % (ref 0–5)
ERYTHROCYTE [DISTWIDTH] IN BLOOD BY AUTOMATED COUNT: 15.3 % (ref 11.6–14.5)
GLUCOSE SERPL-MCNC: 87 MG/DL (ref 74–108)
HCT VFR BLD AUTO: 44 % (ref 36–48)
HGB BLD-MCNC: 14.4 G/DL (ref 13–16)
IMM GRANULOCYTES # BLD AUTO: 0.06 K/UL (ref 0–0.04)
IMM GRANULOCYTES NFR BLD AUTO: 0.5 % (ref 0–0.5)
LYMPHOCYTES # BLD: 4.31 K/UL (ref 0.9–3.6)
LYMPHOCYTES NFR BLD: 32.9 % (ref 21–52)
MCH RBC QN AUTO: 28.9 PG (ref 24–34)
MCHC RBC AUTO-ENTMCNC: 32.7 G/DL (ref 31–37)
MCV RBC AUTO: 88.2 FL (ref 78–100)
MONOCYTES # BLD: 0.87 K/UL (ref 0.05–1.2)
MONOCYTES NFR BLD: 6.6 % (ref 3–10)
NEUTS SEG # BLD: 7.54 K/UL (ref 1.8–8)
NEUTS SEG NFR BLD: 57.7 % (ref 40–73)
NRBC # BLD: 0 K/UL (ref 0–0.01)
NRBC BLD-RTO: 0 PER 100 WBC
PLATELET # BLD AUTO: 347 K/UL (ref 135–420)
PMV BLD AUTO: 10.6 FL (ref 9.2–11.8)
POTASSIUM SERPL-SCNC: 4.3 MMOL/L (ref 3.5–5.5)
RBC # BLD AUTO: 4.99 M/UL (ref 4.35–5.65)
SODIUM SERPL-SCNC: 140 MMOL/L (ref 136–145)
TROPONIN T SERPL HS-MCNC: 12.3 NG/L (ref 0–22)
WBC # BLD AUTO: 13.1 K/UL (ref 4.6–13.2)

## 2025-05-30 PROCEDURE — 96360 HYDRATION IV INFUSION INIT: CPT

## 2025-05-30 PROCEDURE — 99284 EMERGENCY DEPT VISIT MOD MDM: CPT

## 2025-05-30 PROCEDURE — 80048 BASIC METABOLIC PNL TOTAL CA: CPT

## 2025-05-30 PROCEDURE — 85025 COMPLETE CBC W/AUTO DIFF WBC: CPT

## 2025-05-30 PROCEDURE — 96361 HYDRATE IV INFUSION ADD-ON: CPT

## 2025-05-30 PROCEDURE — 84484 ASSAY OF TROPONIN QUANT: CPT

## 2025-05-30 PROCEDURE — 93005 ELECTROCARDIOGRAM TRACING: CPT | Performed by: STUDENT IN AN ORGANIZED HEALTH CARE EDUCATION/TRAINING PROGRAM

## 2025-05-30 PROCEDURE — 2580000003 HC RX 258: Performed by: STUDENT IN AN ORGANIZED HEALTH CARE EDUCATION/TRAINING PROGRAM

## 2025-05-30 RX ORDER — SODIUM CHLORIDE, SODIUM LACTATE, POTASSIUM CHLORIDE, AND CALCIUM CHLORIDE .6; .31; .03; .02 G/100ML; G/100ML; G/100ML; G/100ML
1000 INJECTION, SOLUTION INTRAVENOUS ONCE
Status: COMPLETED | OUTPATIENT
Start: 2025-05-30 | End: 2025-05-30

## 2025-05-30 RX ADMIN — SODIUM CHLORIDE, SODIUM LACTATE, POTASSIUM CHLORIDE, AND CALCIUM CHLORIDE 1000 ML: .6; .31; .03; .02 INJECTION, SOLUTION INTRAVENOUS at 17:54

## 2025-05-30 NOTE — ED TRIAGE NOTES
Patient was at [facility] and began to feel dizzy.  Patient states he no longer feels dizzy, no other complaints at this time.

## 2025-05-31 LAB
EKG ATRIAL RATE: 66 BPM
EKG DIAGNOSIS: NORMAL
EKG P AXIS: 42 DEGREES
EKG P-R INTERVAL: 184 MS
EKG Q-T INTERVAL: 386 MS
EKG QRS DURATION: 84 MS
EKG QTC CALCULATION (BAZETT): 404 MS
EKG R AXIS: 42 DEGREES
EKG T AXIS: 93 DEGREES
EKG VENTRICULAR RATE: 66 BPM

## 2025-05-31 PROCEDURE — 93010 ELECTROCARDIOGRAM REPORT: CPT | Performed by: INTERNAL MEDICINE

## 2025-05-31 NOTE — ED NOTES
Patient's sister (Kerline) is on her way to pick patient up. She will be here in about 30 minutes.

## 2025-05-31 NOTE — ED PROVIDER NOTES
EMERGENCY DEPARTMENT HISTORY AND PHYSICAL EXAM      Date: 5/30/2025  Patient Name: Bony Smith    History of Presenting Illness     Chief Complaint   Patient presents with    Dizziness       History (Context): Bony Smith is a 59 y.o. male  presents to the ED today with chief complaint of lightheadedness.  Patient brought in by a mental health counselor.  States that patient began to endorse lightheadedness just prior to arrival in the emergency department when he went from a sitting to standing position.  Brought in by EMS from a doctor's office after he was found to have a presyncopal episode when changing position.  Upon arrival patient states that he feels overall well.  Denies any complaints.  Denies any preceding symptoms to his lightheadedness.  Further denies any fever, chills, chest pain, dyspnea, palpitations, abdominal pain, nausea, or vomiting.  Counselor states that he has had decreased p.o. intake recently.    Per EMS vital signs gross within normal is prior to arrival.      PCP: None, None    No current facility-administered medications for this encounter.     Current Outpatient Medications   Medication Sig Dispense Refill    busPIRone (BUSPAR) 7.5 MG tablet Take 1 tablet by mouth in the morning and at bedtime 60 tablet 0    OXcarbazepine (TRILEPTAL) 150 MG tablet Take 1 tablet by mouth 2 times daily 60 tablet 3    DULoxetine (CYMBALTA) 30 MG extended release capsule Take 1 capsule by mouth daily 30 capsule 1    mirtazapine (REMERON) 15 MG tablet Take 1 tablet by mouth nightly 30 tablet 1    traZODone (DESYREL) 50 MG tablet Take 1 tablet by mouth nightly as needed for Sleep 30 tablet 1    atorvastatin (LIPITOR) 40 MG tablet Take 1 tablet by mouth nightly 30 tablet 1    ARIPiprazole (ABILIFY) 5 MG tablet Take 1 tablet by mouth daily 30 tablet 3    docusate (COLACE) 50 MG/5ML liquid Take 10 mLs by mouth daily 30 mL 1    midodrine (PROAMATINE) 5 MG tablet Take 3 tablets by mouth 3 times

## 2025-08-04 ENCOUNTER — APPOINTMENT (OUTPATIENT)
Facility: HOSPITAL | Age: 60
End: 2025-08-04
Payer: MEDICARE

## 2025-08-04 ENCOUNTER — HOSPITAL ENCOUNTER (EMERGENCY)
Facility: HOSPITAL | Age: 60
Discharge: HOME | End: 2025-08-04
Attending: STUDENT IN AN ORGANIZED HEALTH CARE EDUCATION/TRAINING PROGRAM
Payer: MEDICARE

## 2025-08-04 VITALS
OXYGEN SATURATION: 98 % | HEART RATE: 71 BPM | HEIGHT: 69 IN | WEIGHT: 170 LBS | DIASTOLIC BLOOD PRESSURE: 95 MMHG | BODY MASS INDEX: 25.18 KG/M2 | RESPIRATION RATE: 11 BRPM | SYSTOLIC BLOOD PRESSURE: 123 MMHG | TEMPERATURE: 98.1 F

## 2025-08-04 DIAGNOSIS — R94.31 ABNORMAL EKG: Primary | ICD-10-CM

## 2025-08-04 LAB
ALBUMIN SERPL-MCNC: 3.6 G/DL (ref 3.4–5)
ALBUMIN/GLOB SERPL: 1.3 (ref 0.8–1.7)
ALP SERPL-CCNC: 126 U/L (ref 45–117)
ALT SERPL-CCNC: 28 U/L (ref 10–50)
ANION GAP SERPL CALC-SCNC: 10 MMOL/L (ref 3–18)
AST SERPL-CCNC: 19 U/L (ref 10–38)
BASOPHILS # BLD: 0.08 K/UL (ref 0–0.1)
BASOPHILS NFR BLD: 0.6 % (ref 0–2)
BILIRUB SERPL-MCNC: 0.4 MG/DL (ref 0.2–1)
BUN SERPL-MCNC: 16 MG/DL (ref 6–23)
BUN/CREAT SERPL: 16 (ref 12–20)
CALCIUM SERPL-MCNC: 9.3 MG/DL (ref 8.5–10.1)
CHLORIDE SERPL-SCNC: 106 MMOL/L (ref 98–107)
CO2 SERPL-SCNC: 28 MMOL/L (ref 21–32)
CREAT SERPL-MCNC: 0.97 MG/DL (ref 0.6–1.3)
DIFFERENTIAL METHOD BLD: ABNORMAL
EKG ATRIAL RATE: 69 BPM
EKG ATRIAL RATE: 70 BPM
EKG DIAGNOSIS: NORMAL
EKG DIAGNOSIS: NORMAL
EKG P AXIS: 11 DEGREES
EKG P AXIS: 55 DEGREES
EKG P-R INTERVAL: 182 MS
EKG P-R INTERVAL: 220 MS
EKG Q-T INTERVAL: 380 MS
EKG Q-T INTERVAL: 404 MS
EKG QRS DURATION: 84 MS
EKG QRS DURATION: 86 MS
EKG QTC CALCULATION (BAZETT): 410 MS
EKG QTC CALCULATION (BAZETT): 432 MS
EKG R AXIS: 48 DEGREES
EKG R AXIS: 82 DEGREES
EKG T AXIS: 40 DEGREES
EKG T AXIS: 87 DEGREES
EKG VENTRICULAR RATE: 69 BPM
EKG VENTRICULAR RATE: 70 BPM
EOSINOPHIL # BLD: 0.15 K/UL (ref 0–0.4)
EOSINOPHIL NFR BLD: 1.2 % (ref 0–5)
ERYTHROCYTE [DISTWIDTH] IN BLOOD BY AUTOMATED COUNT: 15.2 % (ref 11.6–14.5)
GLOBULIN SER CALC-MCNC: 2.8 G/DL (ref 2–4)
GLUCOSE SERPL-MCNC: 99 MG/DL (ref 74–108)
HCT VFR BLD AUTO: 42.2 % (ref 36–48)
HGB BLD-MCNC: 13.7 G/DL (ref 13–16)
IMM GRANULOCYTES # BLD AUTO: 0.06 K/UL (ref 0–0.04)
IMM GRANULOCYTES NFR BLD AUTO: 0.5 % (ref 0–0.5)
INR PPP: 1.6 (ref 0.9–1.2)
LYMPHOCYTES # BLD: 3.66 K/UL (ref 0.9–3.6)
LYMPHOCYTES NFR BLD: 28.3 % (ref 21–52)
MCH RBC QN AUTO: 28.4 PG (ref 24–34)
MCHC RBC AUTO-ENTMCNC: 32.5 G/DL (ref 31–37)
MCV RBC AUTO: 87.4 FL (ref 78–100)
MONOCYTES # BLD: 0.78 K/UL (ref 0.05–1.2)
MONOCYTES NFR BLD: 6 % (ref 3–10)
NEUTS SEG # BLD: 8.22 K/UL (ref 1.8–8)
NEUTS SEG NFR BLD: 63.4 % (ref 40–73)
NRBC # BLD: 0 K/UL (ref 0–0.01)
NRBC BLD-RTO: 0 PER 100 WBC
NT PRO BNP: 124 PG/ML (ref 36–900)
PLATELET # BLD AUTO: 348 K/UL (ref 135–420)
PMV BLD AUTO: 10.1 FL (ref 9.2–11.8)
POTASSIUM SERPL-SCNC: 3.9 MMOL/L (ref 3.5–5.5)
PROT SERPL-MCNC: 6.4 G/DL (ref 6.4–8.2)
PROTHROMBIN TIME: 18.9 SEC (ref 12–15.1)
RBC # BLD AUTO: 4.83 M/UL (ref 4.35–5.65)
SODIUM SERPL-SCNC: 143 MMOL/L (ref 136–145)
TROPONIN T SERPL HS-MCNC: 10 NG/L (ref 0–22)
WBC # BLD AUTO: 13 K/UL (ref 4.6–13.2)

## 2025-08-04 PROCEDURE — 93010 ELECTROCARDIOGRAM REPORT: CPT | Performed by: INTERNAL MEDICINE

## 2025-08-04 PROCEDURE — 93005 ELECTROCARDIOGRAM TRACING: CPT | Performed by: NURSE PRACTITIONER

## 2025-08-04 PROCEDURE — 85025 COMPLETE CBC W/AUTO DIFF WBC: CPT

## 2025-08-04 PROCEDURE — 84484 ASSAY OF TROPONIN QUANT: CPT

## 2025-08-04 PROCEDURE — 94761 N-INVAS EAR/PLS OXIMETRY MLT: CPT

## 2025-08-04 PROCEDURE — 83880 ASSAY OF NATRIURETIC PEPTIDE: CPT

## 2025-08-04 PROCEDURE — 99285 EMERGENCY DEPT VISIT HI MDM: CPT

## 2025-08-04 PROCEDURE — 85610 PROTHROMBIN TIME: CPT

## 2025-08-04 PROCEDURE — 80053 COMPREHEN METABOLIC PANEL: CPT

## 2025-08-04 PROCEDURE — 71045 X-RAY EXAM CHEST 1 VIEW: CPT

## 2025-08-04 PROCEDURE — 93005 ELECTROCARDIOGRAM TRACING: CPT | Performed by: STUDENT IN AN ORGANIZED HEALTH CARE EDUCATION/TRAINING PROGRAM

## 2025-08-04 ASSESSMENT — PAIN - FUNCTIONAL ASSESSMENT: PAIN_FUNCTIONAL_ASSESSMENT: NONE - DENIES PAIN

## 2025-08-28 ENCOUNTER — TRANSCRIBE ORDERS (OUTPATIENT)
Facility: HOSPITAL | Age: 60
End: 2025-08-28

## 2025-08-28 DIAGNOSIS — R55 SYNCOPE, UNSPECIFIED SYNCOPE TYPE: Primary | ICD-10-CM

## 2025-08-28 DIAGNOSIS — R56.9 SEIZURES (HCC): ICD-10-CM

## (undated) DEVICE — GAUZE,SPONGE,4"X4",16PLY,STRL,LF,10/TRAY: Brand: MEDLINE

## (undated) DEVICE — SYRINGE MED 25GA 3ML L5/8IN SUBQ PLAS W/ DETACH NDL SFTY

## (undated) DEVICE — SNARE ENDOSCP M L240CM LOOP W27MM SHTH DIA2.4MM OVL FLX

## (undated) DEVICE — ENDOSCOPY PUMP TUBING/ CAP SET: Brand: ERBE

## (undated) DEVICE — SYRINGE MED 10ML LUERLOCK TIP W/O SFTY DISP

## (undated) DEVICE — SYRINGE MED 50ML LUERSLIP TIP

## (undated) DEVICE — GOWN PLASTIC FILM THMBHKS UNIV BLUE: Brand: CARDINAL HEALTH

## (undated) DEVICE — CATHETER SUCT TR FL TIP 14FR W/ O CTRL

## (undated) DEVICE — SNARE ENDOSCP POLYP 2.4 MM 240 CM 10 MM 2.8 MM CAPTIVATOR

## (undated) DEVICE — FLUFF AND POLYMER UNDERPAD,EXTRA HEAVY: Brand: WINGS

## (undated) DEVICE — SYRINGE 20ML LL S/C 50

## (undated) DEVICE — SOLUTION IRRIG 1000ML H2O STRL BLT

## (undated) DEVICE — TRAP SPEC POLYP REM STRNR CLN DSGN MAGNIFYING WIND DISP

## (undated) DEVICE — CANNULA NSL AD TBNG L14FT STD PVC O2 CRV CONN NONFLARED NSL

## (undated) DEVICE — CANNULA ORIG TL CLR W FOAM CUSHIONS AND 14FT SUPL TB 3 CHN

## (undated) DEVICE — LINER SUCT CANSTR 3000CC PLAS SFT PRE ASSEMB W/OUT TBNG W/

## (undated) DEVICE — MEDI-VAC NON-CONDUCTIVE SUCTION TUBING: Brand: CARDINAL HEALTH

## (undated) DEVICE — YANKAUER,SMOOTH HANDLE,HIGH CAPACITY: Brand: MEDLINE INDUSTRIES, INC.

## (undated) DEVICE — SNARE ENDOSCP M W27MMXL240CM SHTH DIA2.4MM OVL FLX DISP